# Patient Record
Sex: FEMALE | Race: WHITE | NOT HISPANIC OR LATINO | Employment: UNEMPLOYED | URBAN - METROPOLITAN AREA
[De-identification: names, ages, dates, MRNs, and addresses within clinical notes are randomized per-mention and may not be internally consistent; named-entity substitution may affect disease eponyms.]

---

## 2017-02-23 ENCOUNTER — LAB VISIT (OUTPATIENT)
Dept: LAB | Facility: HOSPITAL | Age: 72
End: 2017-02-23
Attending: FAMILY MEDICINE
Payer: MEDICARE

## 2017-02-23 ENCOUNTER — OFFICE VISIT (OUTPATIENT)
Dept: INTERNAL MEDICINE | Facility: CLINIC | Age: 72
End: 2017-02-23
Payer: MEDICARE

## 2017-02-23 VITALS
OXYGEN SATURATION: 99 % | SYSTOLIC BLOOD PRESSURE: 155 MMHG | DIASTOLIC BLOOD PRESSURE: 67 MMHG | BODY MASS INDEX: 29.64 KG/M2 | WEIGHT: 167.31 LBS | HEIGHT: 63 IN | RESPIRATION RATE: 16 BRPM | HEART RATE: 60 BPM | TEMPERATURE: 98 F

## 2017-02-23 DIAGNOSIS — J30.9 ALLERGIC RHINITIS, UNSPECIFIED ALLERGIC RHINITIS TRIGGER, UNSPECIFIED RHINITIS SEASONALITY: ICD-10-CM

## 2017-02-23 DIAGNOSIS — E78.5 DYSLIPIDEMIA: ICD-10-CM

## 2017-02-23 DIAGNOSIS — I10 ESSENTIAL HYPERTENSION: ICD-10-CM

## 2017-02-23 DIAGNOSIS — Z11.59 ENCOUNTER FOR HEPATITIS C SCREENING TEST FOR LOW RISK PATIENT: ICD-10-CM

## 2017-02-23 DIAGNOSIS — Z12.11 COLON CANCER SCREENING: Primary | ICD-10-CM

## 2017-02-23 LAB
ALBUMIN SERPL BCP-MCNC: 3.9 G/DL
ALP SERPL-CCNC: 130 U/L
ALT SERPL W/O P-5'-P-CCNC: 24 U/L
ANION GAP SERPL CALC-SCNC: 7 MMOL/L
AST SERPL-CCNC: 28 U/L
BILIRUB SERPL-MCNC: 0.6 MG/DL
BUN SERPL-MCNC: 13 MG/DL
CALCIUM SERPL-MCNC: 9.7 MG/DL
CHLORIDE SERPL-SCNC: 103 MMOL/L
CHOLEST/HDLC SERPL: 3.4 {RATIO}
CO2 SERPL-SCNC: 30 MMOL/L
CREAT SERPL-MCNC: 0.8 MG/DL
EST. GFR  (AFRICAN AMERICAN): >60 ML/MIN/1.73 M^2
EST. GFR  (NON AFRICAN AMERICAN): >60 ML/MIN/1.73 M^2
GLUCOSE SERPL-MCNC: 97 MG/DL
HDL/CHOLESTEROL RATIO: 29.6 %
HDLC SERPL-MCNC: 159 MG/DL
HDLC SERPL-MCNC: 47 MG/DL
LDLC SERPL CALC-MCNC: 81.4 MG/DL
NONHDLC SERPL-MCNC: 112 MG/DL
POTASSIUM SERPL-SCNC: 4.7 MMOL/L
PROT SERPL-MCNC: 7.4 G/DL
SODIUM SERPL-SCNC: 140 MMOL/L
TRIGL SERPL-MCNC: 153 MG/DL

## 2017-02-23 PROCEDURE — 80053 COMPREHEN METABOLIC PANEL: CPT

## 2017-02-23 PROCEDURE — 3077F SYST BP >= 140 MM HG: CPT | Mod: S$GLB,,, | Performed by: FAMILY MEDICINE

## 2017-02-23 PROCEDURE — 1159F MED LIST DOCD IN RCRD: CPT | Mod: S$GLB,,, | Performed by: FAMILY MEDICINE

## 2017-02-23 PROCEDURE — 36415 COLL VENOUS BLD VENIPUNCTURE: CPT | Mod: PO

## 2017-02-23 PROCEDURE — 1160F RVW MEDS BY RX/DR IN RCRD: CPT | Mod: S$GLB,,, | Performed by: FAMILY MEDICINE

## 2017-02-23 PROCEDURE — 1126F AMNT PAIN NOTED NONE PRSNT: CPT | Mod: S$GLB,,, | Performed by: FAMILY MEDICINE

## 2017-02-23 PROCEDURE — 99999 PR PBB SHADOW E&M-EST. PATIENT-LVL III: CPT | Mod: PBBFAC,,, | Performed by: FAMILY MEDICINE

## 2017-02-23 PROCEDURE — 80061 LIPID PANEL: CPT

## 2017-02-23 PROCEDURE — 99203 OFFICE O/P NEW LOW 30 MIN: CPT | Mod: S$GLB,,, | Performed by: FAMILY MEDICINE

## 2017-02-23 PROCEDURE — 86803 HEPATITIS C AB TEST: CPT

## 2017-02-23 PROCEDURE — 3078F DIAST BP <80 MM HG: CPT | Mod: S$GLB,,, | Performed by: FAMILY MEDICINE

## 2017-02-23 PROCEDURE — 1157F ADVNC CARE PLAN IN RCRD: CPT | Mod: S$GLB,,, | Performed by: FAMILY MEDICINE

## 2017-02-23 RX ORDER — LISINOPRIL 10 MG/1
10 TABLET ORAL DAILY
Qty: 90 TABLET | Refills: 2 | Status: SHIPPED | OUTPATIENT
Start: 2017-02-23 | End: 2017-08-24 | Stop reason: SDUPTHER

## 2017-02-23 RX ORDER — FLUTICASONE PROPIONATE 50 MCG
1 SPRAY, SUSPENSION (ML) NASAL DAILY
Qty: 16 G | Refills: 5 | Status: SHIPPED | OUTPATIENT
Start: 2017-02-23 | End: 2017-10-19 | Stop reason: SDUPTHER

## 2017-02-23 RX ORDER — ATORVASTATIN CALCIUM 20 MG/1
20 TABLET, FILM COATED ORAL DAILY
Qty: 90 TABLET | Refills: 2 | Status: SHIPPED | OUTPATIENT
Start: 2017-02-23 | End: 2017-09-25 | Stop reason: SDUPTHER

## 2017-02-23 RX ORDER — ASPIRIN 81 MG/1
81 TABLET ORAL DAILY
COMMUNITY

## 2017-02-23 RX ORDER — LORATADINE 10 MG/1
10 TABLET ORAL DAILY
COMMUNITY
End: 2020-03-09

## 2017-02-23 RX ORDER — MONTELUKAST SODIUM 10 MG/1
10 TABLET ORAL NIGHTLY
Qty: 90 TABLET | Refills: 2 | Status: SHIPPED | OUTPATIENT
Start: 2017-02-23 | End: 2017-09-25 | Stop reason: SDUPTHER

## 2017-02-23 RX ORDER — MONTELUKAST SODIUM 10 MG/1
10 TABLET ORAL NIGHTLY
COMMUNITY
End: 2017-02-23 | Stop reason: SDUPTHER

## 2017-02-23 RX ORDER — LISINOPRIL 10 MG/1
10 TABLET ORAL DAILY
COMMUNITY
End: 2017-02-23 | Stop reason: SDUPTHER

## 2017-02-23 RX ORDER — CALCIUM CARBONATE 600 MG
600 TABLET ORAL 2 TIMES DAILY WITH MEALS
COMMUNITY
End: 2024-02-21

## 2017-02-23 RX ORDER — FLUTICASONE PROPIONATE 44 UG/1
1 AEROSOL, METERED RESPIRATORY (INHALATION) 2 TIMES DAILY
COMMUNITY
End: 2017-02-23

## 2017-02-23 RX ORDER — ATENOLOL 25 MG/1
25 TABLET ORAL DAILY
Qty: 90 TABLET | Refills: 2 | Status: SHIPPED | OUTPATIENT
Start: 2017-02-23 | End: 2017-10-19 | Stop reason: SDUPTHER

## 2017-02-23 RX ORDER — ATENOLOL 25 MG/1
25 TABLET ORAL DAILY
COMMUNITY
End: 2017-02-23 | Stop reason: SDUPTHER

## 2017-02-23 RX ORDER — ATORVASTATIN CALCIUM 20 MG/1
20 TABLET, FILM COATED ORAL DAILY
COMMUNITY
End: 2017-02-23 | Stop reason: SDUPTHER

## 2017-02-23 NOTE — PROGRESS NOTES
Subjective:       Patient ID: Amber Naranjo is a 71 y.o. female.    Chief Complaint: Establish Care    HPI     Was seeing Dr. Adams. Here to establish care. She does not have any complaints today    Hypertension: She has no problems with the medications. She has been taking them a long time. Her last reading was 130/70 something at home. Feels like she gets a lot of stress from taking care of her  and that's possibly why it is high today.     Hypercholesterolemia: She has not had it done in the past 6 months.     Arthritis: Uses exercise and stretching. Does not like pain medications.     Allergies: These have been all over the place with the past few weather changes. Feels well controlled on her current medications.     Health Maintenance  [place], but doesn't know exact date  -Colonoscopy 5 years ago  -up to date on flu and pneumonia (september 2016)  -no zoster and tetanus      No family history on file.    Current Outpatient Prescriptions:     aspirin (ECOTRIN) 81 MG EC tablet, Take 81 mg by mouth once daily., Disp: , Rfl:     atenolol (TENORMIN) 25 MG tablet, Take 25 mg by mouth once daily., Disp: , Rfl:     atorvastatin (LIPITOR) 20 MG tablet, Take 20 mg by mouth once daily., Disp: , Rfl:     calcium carbonate (OS-NERI) 600 mg (1,500 mg) Tab, Take 600 mg by mouth 2 (two) times daily with meals., Disp: , Rfl:     cyanocobalamin, vitamin B-12, (VITAMIN B-12) 50 mcg tablet, Take 50 mcg by mouth once daily., Disp: , Rfl:     fluticasone (FLOVENT HFA) 44 mcg/actuation inhaler, Inhale 1 puff into the lungs 2 (two) times daily. Controller, Disp: , Rfl:     lisinopril 10 MG tablet, Take 10 mg by mouth once daily., Disp: , Rfl:     loratadine (CLARITIN) 10 mg tablet, Take 10 mg by mouth once daily., Disp: , Rfl:     montelukast (SINGULAIR) 10 mg tablet, Take 10 mg by mouth every evening., Disp: , Rfl:     multivitamin capsule, Take 1 capsule by mouth once daily.,  "Disp: , Rfl:     timolol 0.5 % ophthalmic solution, 1 drop 2 (two) times daily., Disp: , Rfl:     Review of Systems   HENT: Positive for postnasal drip and sneezing. Negative for congestion, ear pain, sore throat and trouble swallowing.    Eyes: Negative for visual disturbance.   Respiratory: Negative for cough, shortness of breath and wheezing.    Cardiovascular: Negative for chest pain, palpitations and leg swelling.   Gastrointestinal: Negative for abdominal pain, constipation, diarrhea, nausea and vomiting.   Genitourinary: Negative for difficulty urinating.   Musculoskeletal: Positive for arthralgias (Hip). Negative for back pain, gait problem and neck pain.   Neurological: Negative for headaches.   Hematological: Negative for adenopathy.   Psychiatric/Behavioral: Negative for sleep disturbance.       Objective:     Visit Vitals    BP (!) 155/67 (BP Location: Left arm, Patient Position: Sitting, BP Method: Automatic)    Pulse 60    Temp 97.7 °F (36.5 °C) (Tympanic)    Resp 16    Ht 5' 3" (1.6 m)    Wt 75.9 kg (167 lb 5.3 oz)    SpO2 99%    BMI 29.64 kg/m2        Physical Exam   Constitutional: She is oriented to person, place, and time. She appears well-developed and well-nourished.   HENT:   Head: Normocephalic and atraumatic.   Right Ear: External ear normal.   Left Ear: External ear normal.   Nose: Nose normal.   Mouth/Throat: Oropharynx is clear and moist.   Eyes: Conjunctivae and EOM are normal. Pupils are equal, round, and reactive to light.   Neck: Normal range of motion. Neck supple. No thyromegaly present.   Cardiovascular: Normal rate, regular rhythm, normal heart sounds and intact distal pulses.    Pulmonary/Chest: Effort normal and breath sounds normal.   Abdominal: Soft. Bowel sounds are normal. There is no tenderness.   Lymphadenopathy:     She has no cervical adenopathy.   Neurological: She is alert and oriented to person, place, and time.   Skin: Skin is warm and dry.   Psychiatric: She " has a normal mood and affect. Her behavior is normal. Judgment and thought content normal.       Assessment & Plan     1. Colon cancer screening  Referring for repeat colonoscopy.  - Case request GI: COLONOSCOPY    2. Essential hypertension  Mildly elevated today.  Continue same medications for now.  We'll continue to monitor at next visit and adjust medications as needed.  - Comprehensive metabolic panel; Future    3. Allergic rhinitis, unspecified allergic rhinitis trigger, unspecified rhinitis seasonality  Sent refill for maintenance medication of Singulair, Flonase and she will get Claritin over-the-counter.    4. Dyslipidemia  Continue Lipitor.  Assessing lipid levels today.  - Lipid panel; Future    5. Encounter for hepatitis C screening test for low risk patient  - Hepatitis C antibody; Future    Follow-up in 3 months.

## 2017-02-23 NOTE — MEDICAL/APP STUDENT
Subjective:       Patient ID: Amber Naranjo is a 71 y.o. female.    Chief Complaint: Establish Care    HPI     Was seeing Dr. Adams. Here to establish care. She does not have any complaints today    Hypertension: She has no problems with the medications. She has been taking them a long time. Her last reading was 130/70 something at home. Feels like she gets a lot of stress from taking care of her  and that's possibly why it is high today.     Hypercholesterolemia: She has not had it done in the past 6 months.     Arthritis: Uses exercise and stretching. Does not like pain medications.     Allergies: These have been all over the place with the past few weather changes. Feels well controlled on her current medications.     Health Maintenance  [place], but doesn't know exact date  -Colonoscopy 5 years ago  -up to date on flu and pneumonia (september 2016)  -no zoster and tetanus      No family history on file.    Current Outpatient Prescriptions:     aspirin (ECOTRIN) 81 MG EC tablet, Take 81 mg by mouth once daily., Disp: , Rfl:     atenolol (TENORMIN) 25 MG tablet, Take 25 mg by mouth once daily., Disp: , Rfl:     atorvastatin (LIPITOR) 20 MG tablet, Take 20 mg by mouth once daily., Disp: , Rfl:     calcium carbonate (OS-NERI) 600 mg (1,500 mg) Tab, Take 600 mg by mouth 2 (two) times daily with meals., Disp: , Rfl:     cyanocobalamin, vitamin B-12, (VITAMIN B-12) 50 mcg tablet, Take 50 mcg by mouth once daily., Disp: , Rfl:     fluticasone (FLOVENT HFA) 44 mcg/actuation inhaler, Inhale 1 puff into the lungs 2 (two) times daily. Controller, Disp: , Rfl:     lisinopril 10 MG tablet, Take 10 mg by mouth once daily., Disp: , Rfl:     loratadine (CLARITIN) 10 mg tablet, Take 10 mg by mouth once daily., Disp: , Rfl:     montelukast (SINGULAIR) 10 mg tablet, Take 10 mg by mouth every evening., Disp: , Rfl:     multivitamin capsule, Take 1 capsule by mouth once daily.,  "Disp: , Rfl:     timolol 0.5 % ophthalmic solution, 1 drop 2 (two) times daily., Disp: , Rfl:     Review of Systems   HENT: Positive for postnasal drip and sneezing. Negative for congestion, ear pain, sore throat and trouble swallowing.    Eyes: Negative for visual disturbance.   Respiratory: Negative for cough, shortness of breath and wheezing.    Cardiovascular: Negative for chest pain, palpitations and leg swelling.   Gastrointestinal: Negative for abdominal pain, constipation, diarrhea, nausea and vomiting.   Genitourinary: Negative for difficulty urinating.   Musculoskeletal: Positive for arthralgias (Hip). Negative for back pain, gait problem and neck pain.   Neurological: Negative for headaches.   Hematological: Negative for adenopathy.   Psychiatric/Behavioral: Negative for sleep disturbance.       Objective:     Visit Vitals    BP (!) 155/67 (BP Location: Left arm, Patient Position: Sitting, BP Method: Automatic)    Pulse 60    Temp 97.7 °F (36.5 °C) (Tympanic)    Resp 16    Ht 5' 3" (1.6 m)    Wt 75.9 kg (167 lb 5.3 oz)    SpO2 99%    BMI 29.64 kg/m2        Physical Exam   Constitutional: She is oriented to person, place, and time. She appears well-developed and well-nourished.   HENT:   Head: Normocephalic and atraumatic.   Right Ear: External ear normal.   Left Ear: External ear normal.   Nose: Nose normal.   Mouth/Throat: Oropharynx is clear and moist.   Eyes: Conjunctivae and EOM are normal. Pupils are equal, round, and reactive to light.   Neck: Normal range of motion. Neck supple. No thyromegaly present.   Cardiovascular: Normal rate, regular rhythm, normal heart sounds and intact distal pulses.    Pulmonary/Chest: Effort normal and breath sounds normal.   Abdominal: Soft. Bowel sounds are normal. There is no tenderness.   Lymphadenopathy:     She has no cervical adenopathy.   Neurological: She is alert and oriented to person, place, and time.   Skin: Skin is warm and dry.   Psychiatric: She " has a normal mood and affect. Her behavior is normal. Judgment and thought content normal.       Assessment & Plan     Establish Care  -needs Lipid Panel, Hep C, CMP today    Health Maintenance  -needs to be scheduled for colonoscopy  -zoster and tetanus at pharmacy    Viktoria Jacobsen  MS 4

## 2017-02-23 NOTE — MR AVS SNAPSHOT
[facility]  [address] [address] [address]  Phone: [phone]                  Amber Naranjo   2017 10:20 AM   Office Visit    Description:  Female : 1945   Provider:  Wei Roa DO   Department:  [facility]           Reason for Visit     Establish Care           Diagnoses this Visit        Comments    Colon cancer screening    -  Primary     Essential hypertension         Allergic rhinitis, unspecified allergic rhinitis trigger, unspecified rhinitis seasonality         Dyslipidemia         Encounter for hepatitis C screening test for low risk patient                To Do List           Future Appointments        Provider Department Dept Phone    2017 11:50 AM [facility] LABORATORY [facility] [phone]    2017 9:40 AM Wei Roa DO [facility] [phone]      Goals (5 Years of Data)     None      Follow-Up and Disposition     Return in about 3 months (around 2017).       These Medications        Disp Refills Start End    montelukast (SINGULAIR) 10 mg tablet 90 tablet 2 2017     Take 1 tablet (10 mg total) by mouth every evening. - Oral    Pharmacy: [facility] - [address] Ph #: [phone]       lisinopril 10 MG tablet 90 tablet 2 2017     Take 1 tablet (10 mg total) by mouth once daily. - Oral    Pharmacy: [facility] - [address] Ph #: [phone]       atorvastatin (LIPITOR) 20 MG tablet 90 tablet 2 2017     Take 1 tablet (20 mg total) by mouth once daily. - Oral    Pharmacy: [facility] - [address] Ph #: [phone]       atenolol (TENORMIN) 25 MG tablet 90 tablet 2 2017     Take 1 tablet (25 mg total) by mouth once daily. - Oral    Pharmacy: [facility] - [address] Ph #:  [phone]       fluticasone (FLONASE) 50 mcg/actuation nasal spray 16 g 5 2017     1 spray by Each Nare route once daily. - Each Nare    Pharmacy: [facility] - [facility] [address]  Ph #: [phone]         [facility]     [facility] [place] On [facility] Nurse Care Line -  Assistance  Registered nurses in the [facility] [place] [facility] Center provide clinical advisement, health education, appointment booking, and other advisory services.  Call for this free service at [phone].             Medications           START taking these NEW medications        Refills    montelukast (SINGULAIR) 10 mg tablet 2    Sig: Take 1 tablet (10 mg total) by mouth every evening.    Class: Normal    Route: Oral    lisinopril 10 MG tablet 2    Sig: Take 1 tablet (10 mg total) by mouth once daily.    Class: Normal    Route: Oral    atorvastatin (LIPITOR) 20 MG tablet 2    Sig: Take 1 tablet (20 mg total) by mouth once daily.    Class: Normal    Route: Oral    atenolol (TENORMIN) 25 MG tablet 2    Sig: Take 1 tablet (25 mg total) by mouth once daily.    Class: Normal    Route: Oral    fluticasone (FLONASE) 50 mcg/actuation nasal spray 5    Si spray by Each Nare route once daily.    Class: Normal    Route: Each Nare      STOP taking these medications     fluticasone (FLOVENT HFA) 44 mcg/actuation inhaler Inhale 1 puff into the lungs 2 (two) times daily. Controller           Verify that the below list of medications is an accurate representation of the medications you are currently taking.  If none reported, the list may be blank. If incorrect, please contact your healthcare provider. Carry this list with you in case of emergency.           Current Medications     aspirin (ECOTRIN) 81 MG EC tablet Take 81 mg by mouth once daily.    atenolol (TENORMIN) 25 MG tablet Take 1 tablet (25 mg total) by mouth once daily.    atorvastatin (LIPITOR) 20 MG tablet Take 1 tablet (20 mg total) by mouth once daily.     "calcium carbonate (OS-NERI) 600 mg (1,500 mg) Tab Take 600 mg by mouth 2 (two) times daily with meals.    cyanocobalamin, vitamin B-12, (VITAMIN B-12) 50 mcg tablet Take 50 mcg by mouth once daily.    lisinopril 10 MG tablet Take 1 tablet (10 mg total) by mouth once daily.    loratadine (CLARITIN) 10 mg tablet Take 10 mg by mouth once daily.    montelukast (SINGULAIR) 10 mg tablet Take 1 tablet (10 mg total) by mouth every evening.    multivitamin capsule Take 1 capsule by mouth once daily.    timolol 0.5 % ophthalmic solution 1 drop 2 (two) times daily.    fluticasone (FLONASE) 50 mcg/actuation nasal spray 1 spray by Each Nare route once daily.           Clinical Reference Information           Your Vitals Were     BP Pulse Temp Resp    155/67 (BP Location: Left arm, Patient Position: Sitting, BP Method: Automatic) 60 97.7 °F (36.5 °C) (Tympanic) 16    Height Weight SpO2 BMI    5' 3" (1.6 m) 75.9 kg (167 lb 5.3 oz) 99% 29.64 kg/m2      Blood Pressure          Most Recent Value    BP  (!)  155/67      Allergies as of 2/23/2017     No Known Allergies      Immunizations Administered on Date of Encounter - 2/23/2017     None      Orders Placed During Today's Visit      Normal Orders This Visit    Case request GI: COLONOSCOPY     Future Labs/Procedures Expected by Expires    Comprehensive metabolic panel  2/23/2017 4/24/2018    Hepatitis C antibody  2/23/2017 4/24/2018    Lipid panel  2/23/2017 (Approximate) 4/24/2018      [facility] Sign-Up     Activating your [facility] account is as easy as 1-2-3!     1) Visit [email or web address], select Sign Up Now, enter this activation code and your date of birth, then select Next.  [email or web address]  Expires: 4/9/2017 11:34 AM      2) Create a username and password to use when you visit [facility] in the future and select a security question in case you lose your password and select Next.    3) Enter your e-mail address and click Sign Up!    Additional Information  If you have questions, " please e-mail [email or web address] or call [phone] to talk to our [facility] staff. Remember, [facility] is NOT to be used for urgent needs. For medical emergencies, dial 911.         Instructions      Prevention Guidelines, Women Ages 65 and Older  Screening tests and vaccines are an important part of managing your health. Health counseling is essential, too. Below are guidelines for these, for women ages 65 and older. Talk with your healthcare provider to make sure youre up to date on what you need.  Screening Who needs it How often   Type 2 diabetes or prediabetes All adults beginning at age 45 and adults without symptoms at any age who are overweight or obese and have 1 or more additional risk factors for diabetes At least every 3 years   Alcohol misuse All women in this age group At routine exams   Blood pressure All women in this age group Every 2 years if your blood pressure is less than 120/80 mm Hg; yearly if your systolic blood pressure is 120 to 139 mm Hg, or your diastolic blood pressure reading is 80 to 89 mm Hg   Breast cancer All women in this age group Yearly mammogram and clinical breast exam1   Cervical cancer Only women who had abnormal screening results before age 65 Talk with your healthcare provider   Chlamydia Women at increased risk for infection At routine exams   Colorectal cancer All women in this age group1 Flexible sigmoidoscopy every 5 years, or colonoscopy every 10 years, or double-contrast barium enema every 5 years; yearly fecal occult blood test or fecal immunochemical test; or a stool DNA test as often as your healthcare provider advises; talk with your healthcare provider about which tests are best for you   Depression All women in this age group At routine exams   Gonorrhea Sexually active women at increased risk for infection At routine exams   Hepatitis C Anyone at increased risk; 1 time for those born between 1945 and 1965 At routine exams   High cholesterol or  triglycerides All women in this age group who are at risk for coronary artery disease At least every 5 years   HIV Women at increased risk for infection - talk with your healthcare provider At routine exams   Lung cancer Adults age 55 to 80 who have smoked Yearly screening in smokers with 30 pack-year history of smoking or who quit within 15 years   Obesity All women in this age group At routine exams   Osteoporosis All women in this age group Bone density test at age 65, then follow-up as advised by your healthcare provider   Syphilis Women at increased risk for infection - talk with your healthcare provider At routine exams   Thyroid-Stimulating Hormone (TSH) All women in this age group Every 5 years   Tuberculosis Women at increased risk for infection - talk with your healthcare provider Ask your healthcare provider   Vision All women in this age group Every 1 to 2 years; if you have a chronic health condition, ask your healthcare provider if you need exams more often   Vaccine Who needs it How often   Chickenpox (varicella) All women in this age group who have no record of this infection or vaccine 2 doses; second dose should be given at least 4 weeks after the first dose   Hepatitis A Women at increased risk for infection - talk with your healthcare provider 2 doses given 6 months apart   Hepatitis B Women at increased risk for infection - talk with your healthcare provider 3 doses over 6 months; second dose should be given 1 month after the first dose; the third dose should be given at least 2 months after the second dose and at least 4 months after the first dose   Haemophilus influenza Type B (HIB) Women at increased risk for infection - talk with your healthcare provider 1 to 3 doses   Influenza (flu) All women in this age group Once a year   Pneumococcal conjugate vaccine (PCV13) and pneumococcal polysaccharide vaccine (PPSV23) All women in this age group 1 dose of each vaccine    Tetanus/diphtheria/pertussis (Td/Tdap) booster All women in this age group Td every 10 years, or a one-time dose of Tdap instead of a Td booster after age 18, then Td every 10 years   Zoster All women in this age group 1 dose   Counseling Who needs it How often   Diet and exercise Women who are overweight or obese When diagnosed, and then at routine exams   Fall prevention (exercise and vitamin D supplements) All women in this age group At routine exams   Sexually transmitted infection prevention Women at increased risk for infection - talk with your healthcare provider At routine exams   Use of daily aspirin Women ages 55 and up in this age group who are at risk for cardiovascular health problems such as stroke When your risk is known   Use of tobacco and the health effects it can cause All women in this age group Every exam   1[employer]  Date Last Reviewed: 8/9/2015  © 8506-3462 [employer]. [address] [place] [address]. All rights reserved. This information is not intended as a substitute for professional medical care. Always follow your healthcare professional's instructions.             Language Assistance Services     ATTENTION: Language assistance services are available, free of charge. Please call [phone].      ATENCIÓN: Si habla español, tiene a malave disposición servicios gratuitos de asistencia lingüística. Llame al [phone].     DAV Ý: N?u b?n nói Ti?ng Vi?t, có các d?ch v? h? tr? ngôn ng? mi?n phí dành cho b?n. G?i s? [phone].         [facility] complies with applicable Federal civil rights laws and does not discriminate on the basis of race, color, national origin, age, disability, or sex.

## 2017-02-23 NOTE — PATIENT INSTRUCTIONS
Prevention Guidelines, Women Ages 65 and Older  Screening tests and vaccines are an important part of managing your health. Health counseling is essential, too. Below are guidelines for these, for women ages 65 and older. Talk with your healthcare provider to make sure youre up to date on what you need.  Screening Who needs it How often   Type 2 diabetes or prediabetes All adults beginning at age 45 and adults without symptoms at any age who are overweight or obese and have 1 or more additional risk factors for diabetes At least every 3 years   Alcohol misuse All women in this age group At routine exams   Blood pressure All women in this age group Every 2 years if your blood pressure is less than 120/80 mm Hg; yearly if your systolic blood pressure is 120 to 139 mm Hg, or your diastolic blood pressure reading is 80 to 89 mm Hg   Breast cancer All women in this age group Yearly mammogram and clinical breast exam1   Cervical cancer Only women who had abnormal screening results before age 65 Talk with your healthcare provider   Chlamydia Women at increased risk for infection At routine exams   Colorectal cancer All women in this age group1 Flexible sigmoidoscopy every 5 years, or colonoscopy every 10 years, or double-contrast barium enema every 5 years; yearly fecal occult blood test or fecal immunochemical test; or a stool DNA test as often as your healthcare provider advises; talk with your healthcare provider about which tests are best for you   Depression All women in this age group At routine exams   Gonorrhea Sexually active women at increased risk for infection At routine exams   Hepatitis C Anyone at increased risk; 1 time for those born between 1945 and 1965 At routine exams   High cholesterol or triglycerides All women in this age group who are at risk for coronary artery disease At least every 5 years   HIV Women at increased risk for infection - talk with your healthcare provider At routine exams   Lung  cancer Adults age 55 to 80 who have smoked Yearly screening in smokers with 30 pack-year history of smoking or who quit within 15 years   Obesity All women in this age group At routine exams   Osteoporosis All women in this age group Bone density test at age 65, then follow-up as advised by your healthcare provider   Syphilis Women at increased risk for infection - talk with your healthcare provider At routine exams   Thyroid-Stimulating Hormone (TSH) All women in this age group Every 5 years   Tuberculosis Women at increased risk for infection - talk with your healthcare provider Ask your healthcare provider   Vision All women in this age group Every 1 to 2 years; if you have a chronic health condition, ask your healthcare provider if you need exams more often   Vaccine Who needs it How often   Chickenpox (varicella) All women in this age group who have no record of this infection or vaccine 2 doses; second dose should be given at least 4 weeks after the first dose   Hepatitis A Women at increased risk for infection - talk with your healthcare provider 2 doses given 6 months apart   Hepatitis B Women at increased risk for infection - talk with your healthcare provider 3 doses over 6 months; second dose should be given 1 month after the first dose; the third dose should be given at least 2 months after the second dose and at least 4 months after the first dose   Haemophilus influenza Type B (HIB) Women at increased risk for infection - talk with your healthcare provider 1 to 3 doses   Influenza (flu) All women in this age group Once a year   Pneumococcal conjugate vaccine (PCV13) and pneumococcal polysaccharide vaccine (PPSV23) All women in this age group 1 dose of each vaccine   Tetanus/diphtheria/pertussis (Td/Tdap) booster All women in this age group Td every 10 years, or a one-time dose of Tdap instead of a Td booster after age 18, then Td every 10 years   Zoster All women in this age group 1 dose   Counseling  Who needs it How often   Diet and exercise Women who are overweight or obese When diagnosed, and then at routine exams   Fall prevention (exercise and vitamin D supplements) All women in this age group At routine exams   Sexually transmitted infection prevention Women at increased risk for infection - talk with your healthcare provider At routine exams   Use of daily aspirin Women ages 55 and up in this age group who are at risk for cardiovascular health problems such as stroke When your risk is known   Use of tobacco and the health effects it can cause All women in this age group Every exam   1[employer]  Date Last Reviewed: 8/9/2015  © 2317-5558 [employer]. [address] [place] [address], [place] [address]. All rights reserved. This information is not intended as a substitute for professional medical care. Always follow your healthcare professional's instructions.

## 2017-02-24 ENCOUNTER — TELEPHONE (OUTPATIENT)
Dept: INTERNAL MEDICINE | Facility: CLINIC | Age: 72
End: 2017-02-24

## 2017-02-24 LAB — HCV AB SERPL QL IA: NEGATIVE

## 2017-02-24 RX ORDER — SODIUM, POTASSIUM,MAG SULFATES 17.5-3.13G
SOLUTION, RECONSTITUTED, ORAL ORAL
Qty: 354 ML | Refills: 0 | Status: SHIPPED | OUTPATIENT
Start: 2017-02-24 | End: 2017-06-06 | Stop reason: ALTCHOICE

## 2017-02-24 NOTE — TELEPHONE ENCOUNTER
----- Message from Lillie Mathew sent at 2/24/2017  3:34 PM CST -----  States she received a call from [facility] pharmacy stating she have two Rx to . States need to know if her Rx was called in to [facility] because she use [employer].     Please adv/call [phone].//thanks. cw

## 2017-02-24 NOTE — TELEPHONE ENCOUNTER
----- Message from Wei Roa, DO sent at 2/24/2017  9:13 AM CST -----  Please inform patient that all of the labs are normal if he/she does not have [facility] activated. If there are any questions please let me know.

## 2017-02-24 NOTE — TELEPHONE ENCOUNTER
Spoke with patient all scripts that were sent by Dr. Roa have been sent to [facility].

## 2017-03-15 ENCOUNTER — HOSPITAL ENCOUNTER (OUTPATIENT)
Facility: HOSPITAL | Age: 72
Discharge: HOME | End: 2017-03-15
Attending: SURGERY | Admitting: SURGERY
Payer: MEDICARE

## 2017-03-15 ENCOUNTER — SURGERY (OUTPATIENT)
Age: 72
End: 2017-03-15

## 2017-03-15 ENCOUNTER — ANESTHESIA (OUTPATIENT)
Dept: ENDOSCOPY | Facility: HOSPITAL | Age: 72
End: 2017-03-15
Payer: MEDICARE

## 2017-03-15 ENCOUNTER — TELEPHONE (OUTPATIENT)
Dept: INTERNAL MEDICINE | Facility: CLINIC | Age: 72
End: 2017-03-15

## 2017-03-15 ENCOUNTER — HOSPITAL ENCOUNTER (OUTPATIENT)
Dept: RADIOLOGY | Facility: HOSPITAL | Age: 72
Discharge: HOME | End: 2017-03-15
Attending: SURGERY
Payer: MEDICARE

## 2017-03-15 ENCOUNTER — ANESTHESIA EVENT (OUTPATIENT)
Dept: ENDOSCOPY | Facility: HOSPITAL | Age: 72
End: 2017-03-15
Payer: MEDICARE

## 2017-03-15 VITALS
HEART RATE: 65 BPM | RESPIRATION RATE: 16 BRPM | HEIGHT: 63 IN | OXYGEN SATURATION: 96 % | RESPIRATION RATE: 20 BRPM | SYSTOLIC BLOOD PRESSURE: 177 MMHG | DIASTOLIC BLOOD PRESSURE: 82 MMHG | WEIGHT: 162 LBS | BODY MASS INDEX: 28.7 KG/M2 | TEMPERATURE: 98 F

## 2017-03-15 DIAGNOSIS — Z12.11 ENCOUNTER FOR SCREENING COLONOSCOPY: Primary | ICD-10-CM

## 2017-03-15 DIAGNOSIS — Z12.11 SPECIAL SCREENING FOR MALIGNANT NEOPLASMS, COLON: ICD-10-CM

## 2017-03-15 DIAGNOSIS — Z12.11 ENCOUNTER FOR SCREENING COLONOSCOPY: ICD-10-CM

## 2017-03-15 PROCEDURE — 25000003 PHARM REV CODE 250: Performed by: NURSE ANESTHETIST, CERTIFIED REGISTERED

## 2017-03-15 PROCEDURE — G0121 COLON CA SCRN NOT HI RSK IND: HCPCS | Mod: 53,,, | Performed by: SURGERY

## 2017-03-15 PROCEDURE — 74280 X-RAY XM COLON 2CNTRST STD: CPT | Mod: TC

## 2017-03-15 PROCEDURE — 37000008 HC ANESTHESIA 1ST 15 MINUTES: Performed by: SURGERY

## 2017-03-15 PROCEDURE — 63600175 PHARM REV CODE 636 W HCPCS: Performed by: NURSE ANESTHETIST, CERTIFIED REGISTERED

## 2017-03-15 PROCEDURE — G0121 COLON CA SCRN NOT HI RSK IND: HCPCS | Performed by: SURGERY

## 2017-03-15 PROCEDURE — 25000003 PHARM REV CODE 250: Performed by: SURGERY

## 2017-03-15 PROCEDURE — 37000009 HC ANESTHESIA EA ADD 15 MINS: Performed by: SURGERY

## 2017-03-15 PROCEDURE — 45378 DIAGNOSTIC COLONOSCOPY: CPT | Performed by: SURGERY

## 2017-03-15 RX ORDER — SODIUM CHLORIDE, SODIUM LACTATE, POTASSIUM CHLORIDE, CALCIUM CHLORIDE 600; 310; 30; 20 MG/100ML; MG/100ML; MG/100ML; MG/100ML
INJECTION, SOLUTION INTRAVENOUS CONTINUOUS
Status: DISCONTINUED | OUTPATIENT
Start: 2017-03-15 | End: 2017-03-15 | Stop reason: HOSPADM

## 2017-03-15 RX ORDER — LIDOCAINE HYDROCHLORIDE 10 MG/ML
INJECTION INFILTRATION; PERINEURAL
Status: DISCONTINUED | OUTPATIENT
Start: 2017-03-15 | End: 2017-03-15

## 2017-03-15 RX ORDER — PROPOFOL 10 MG/ML
VIAL (ML) INTRAVENOUS
Status: DISCONTINUED | OUTPATIENT
Start: 2017-03-15 | End: 2017-03-15

## 2017-03-15 RX ADMIN — PROPOFOL 50 MG: 10 INJECTION, EMULSION INTRAVENOUS at 12:03

## 2017-03-15 RX ADMIN — PROPOFOL 50 MG: 10 INJECTION, EMULSION INTRAVENOUS at 01:03

## 2017-03-15 RX ADMIN — PROPOFOL 100 MG: 10 INJECTION, EMULSION INTRAVENOUS at 12:03

## 2017-03-15 RX ADMIN — LIDOCAINE HYDROCHLORIDE 40 MG: 10 INJECTION, SOLUTION INFILTRATION; PERINEURAL at 12:03

## 2017-03-15 RX ADMIN — SODIUM CHLORIDE, SODIUM LACTATE, POTASSIUM CHLORIDE, AND CALCIUM CHLORIDE: .6; .31; .03; .02 INJECTION, SOLUTION INTRAVENOUS at 10:03

## 2017-03-15 NOTE — ANESTHESIA RELEASE NOTE
"Anesthesia Release from PACU Note    Patient: Amber Naranjo    Procedure(s) Performed: Procedure(s) (LRB):  COLONOSCOPY (N/A)    Anesthesia type: MAC    Post pain: Adequate analgesia    Post assessment: no apparent anesthetic complications, tolerated procedure well and no evidence of recall    Last Vitals:   Visit Vitals    BP (!) 154/68 (BP Location: Left arm, Patient Position: Lying, BP Method: Automatic)    Pulse 66    Temp 36.7 °C (98.1 °F) (Oral)    Resp 18    Ht 5' 3" (1.6 m)    Wt 73.5 kg (162 lb)    SpO2 97%    Breastfeeding No    BMI 28.7 kg/m2       Post vital signs: stable    Level of consciousness: awake and alert     Nausea/Vomiting: no nausea/no vomiting    Complications: none    Airway Patency: patent    Respiratory: unassisted, spontaneous ventilation    Cardiovascular: stable and blood pressure at baseline    Hydration: euvolemic  "

## 2017-03-15 NOTE — H&P
Short Stay Endoscopy History and Physical    PCP - Wei Roa, DO    Procedure - screening colonoscopy  ASA - 1  Mallampati - per anesthesia  History of Anesthesia problems - no  Family history Anesthesia problems -  no     HPI:  This is a 71 y.o.female here for evaluation of :     Reflux - no  Dysphagia - no  Abdominal pain - no  Diarrhea - no  Anemia - no  GI bleeding - no  Nausea and vomiting-no  Early satiety-no  aversion to sight or smell of food-no    ROS:  Constitutional: No fevers, chills, No weight loss  ENT: No allergies  CV: No chest pain  Pulm: No cough, No shortness of breath  Ophtho: No vision changes  GI: see HPI  Derm: No rash  Heme: No lymphadenopathy, No bruising  MSK: No arthritis  : No dysuria, No hematuria  Endo: No hot or cold intolerance  Neuro: No syncope, No seizure  Psych: No anxiety, No depression    Medical History:  Past Medical History:   Diagnosis Date    Allergy     Arthritis     Glaucoma     Hyperlipidemia     Hypertension        Surgical History:  Past Surgical History:   Procedure Laterality Date    COLONOSCOPY         Family History:  Family History   Problem Relation Age of Onset    Hypertension Father     Colon cancer Sister        Social History:  Social History     Social History    Marital status:      Spouse name: N/A    Number of children: N/A    Years of education: N/A     Occupational History    Not on file.     Social History Main Topics    Smoking status: Never Smoker    Smokeless tobacco: Not on file    Alcohol use No    Drug use: No    Sexual activity: Not on file     Other Topics Concern    Not on file     Social History Narrative       Allergies: Review of patient's allergies indicates:  No Known Allergies    Medications:   No current facility-administered medications on file prior to encounter.      Current Outpatient Prescriptions on File Prior to Encounter   Medication Sig Dispense Refill    atenolol (TENORMIN) 25 MG tablet Take  1 tablet (25 mg total) by mouth once daily. 90 tablet 2    atorvastatin (LIPITOR) 20 MG tablet Take 1 tablet (20 mg total) by mouth once daily. 90 tablet 2    calcium carbonate (OS-NERI) 600 mg (1,500 mg) Tab Take 600 mg by mouth 2 (two) times daily with meals.      cyanocobalamin, vitamin B-12, (VITAMIN B-12) 50 mcg tablet Take 50 mcg by mouth once daily.      fluticasone (FLONASE) 50 mcg/actuation nasal spray 1 spray by Each Nare route once daily. 16 g 5    lisinopril 10 MG tablet Take 1 tablet (10 mg total) by mouth once daily. 90 tablet 2    loratadine (CLARITIN) 10 mg tablet Take 10 mg by mouth once daily.      montelukast (SINGULAIR) 10 mg tablet Take 1 tablet (10 mg total) by mouth every evening. 90 tablet 2    multivitamin capsule Take 1 capsule by mouth once daily.      timolol 0.5 % ophthalmic solution 1 drop 2 (two) times daily.      aspirin (ECOTRIN) 81 MG EC tablet Take 81 mg by mouth once daily.         Objective Findings:    Vital Signs:  Vitals:    03/15/17 1041   BP: (!) 154/68   Pulse: 66   Resp: 18   Temp: 98.1 °F (36.7 °C)           Physical Exam:  General Appearance: Well appearing in no acute distress  Eyes:    No scleral icterus  ENT: Neck supple, Lips, mucosa, and tongue normal; teeth and gums normal  Lungs: CTA bilaterally in anterior and posterior fields, no wheezes, no crackles.  Heart:  Regular rate, S1, S2 normal, no murmurs heard.  Abdomen: Soft, non tender, non distended with normal bowel sounds. No hepatosplenomegaly, ascites, or mass.  Extremities: No clubbing, cyanosis or edema  Skin: No rash    Labs:  Reviewed    Plan:  I have explained the risks and benefits of endoscopy procedures to the patient including but not limited to bleeding, perforation, infection, and death. The patient wishes to proceed.     Yogi Carbone

## 2017-03-15 NOTE — OP NOTE
See my note in Provation. Due to severe stricture and tortuosity of the sigmoid colon, the procedure not completed. Barium enema is scheduled.    Yogi Carbone

## 2017-03-15 NOTE — ANESTHESIA POSTPROCEDURE EVALUATION
"Anesthesia Post Evaluation    Patient: Amber Naranjo    Procedure(s) Performed: Procedure(s) (LRB):  COLONOSCOPY (N/A)    Final Anesthesia Type: MAC  Patient location during evaluation: GI PACU  Patient participation: Yes- Able to Participate  Level of consciousness: awake and alert  Post-procedure vital signs: reviewed and stable  Pain management: adequate  Airway patency: patent  PONV status at discharge: No PONV  Anesthetic complications: no      Cardiovascular status: blood pressure returned to baseline  Respiratory status: unassisted and spontaneous ventilation  Hydration status: euvolemic  Follow-up not needed.        Visit Vitals    BP (!) 154/68 (BP Location: Left arm, Patient Position: Lying, BP Method: Automatic)    Pulse 66    Temp 36.7 °C (98.1 °F) (Oral)    Resp 18    Ht 5' 3" (1.6 m)    Wt 73.5 kg (162 lb)    SpO2 97%    Breastfeeding No    BMI 28.7 kg/m2       Pain/Vickie Score: No Data Recorded      "

## 2017-03-15 NOTE — ANESTHESIA PREPROCEDURE EVALUATION
03/15/2017  Amber Naranjo is a 71 y.o., female.    [facility] Anesthesia Evaluation    I have reviewed the Patient Summary Reports.    I have reviewed the Nursing Notes.   I have reviewed the Medications.     Review of Systems  Anesthesia Hx:  No problems with previous Anesthesia    Cardiovascular:   Hypertension, well controlled        Physical Exam  General:  Well nourished    Airway/Jaw/Neck:  Airway Findings: Mouth Opening: Normal Tongue: Normal  General Airway Assessment: Adult  Mallampati: II  TM Distance: Normal, at least 6 cm      Dental:  Dental Findings: In tact   Chest/Lungs:  Chest/Lungs Findings: Normal Respiratory Rate     Heart/Vascular:  Heart Findings: Rate: Normal  Rhythm: Regular Rhythm             Anesthesia Plan  Type of Anesthesia, risks & benefits discussed:  Anesthesia Type:  MAC  Patient's Preference:   Intra-op Monitoring Plan:   Intra-op Monitoring Plan Comments:   Post Op Pain Control Plan:   Post Op Pain Control Plan Comments:   Induction:   IV  Beta Blocker:  Patient is not currently on a Beta-Blocker (No further documentation required).       Informed Consent: Patient understands risks and agrees with Anesthesia plan.  Questions answered. Anesthesia consent signed with patient.  ASA Score: 2     Day of Surgery Review of History & Physical: I have interviewed and examined the patient. I have reviewed the patient's H&P dated:  There are no significant changes.          Ready For Surgery From Anesthesia Perspective.

## 2017-03-15 NOTE — IP AVS SNAPSHOT
[address] [place] [address]           Patient Discharge Instructions     Our goal is to set you up for success. This packet includes information on your condition, medications, and your home care. It will help you to care for yourself so you don't get sicker and need to go back to the hospital.     Please ask your nurse if you have any questions.        There are many details to remember when preparing to leave the hospital. Here is what you will need to do:    1. Take your medicine. If you are prescribed medications, review your Medication List in the following pages. You may have new medications to  at the pharmacy and others that you'll need to stop taking. Review the instructions for how and when to take your medications. Talk with your doctor or nurses if you are unsure of what to do.     2. Go to your follow-up appointments. Specific follow-up information is listed in the following pages. Your may be contacted by a transition nurse or clinical provider about future appointments. Be sure we have all of the phone numbers to reach you, if needed. Please contact your provider's office if you are unable to make an appointment.     3. Watch for warning signs. Your doctor or nurse will give you detailed warning signs to watch for and when to call for assistance. These instructions may also include educational information about your condition. If you experience any of warning signs to your health, call your doctor.               ** Verify the list of medication(s) below is accurate and up to date. Carry this with you in case of emergency. If your medications have changed, please notify your healthcare provider.             Medication List      CONTINUE taking these medications        Additional Info                      aspirin 81 MG EC tablet   Commonly known as:  ECOTRIN   Refills:  0   Dose:  81 mg    Instructions:  Take 81 mg by mouth once daily.     Begin  Date    AM    Noon    PM    Bedtime       atenolol 25 MG tablet   Commonly known as:  TENORMIN   Quantity:  90 tablet   Refills:  2   Dose:  25 mg    Instructions:  Take 1 tablet (25 mg total) by mouth once daily.     Begin Date    AM    Noon    PM    Bedtime       atorvastatin 20 MG tablet   Commonly known as:  LIPITOR   Quantity:  90 tablet   Refills:  2   Dose:  20 mg    Instructions:  Take 1 tablet (20 mg total) by mouth once daily.     Begin Date    AM    Noon    PM    Bedtime       calcium carbonate 600 mg (1,500 mg) Tab   Commonly known as:  OS-NERI   Refills:  0   Dose:  600 mg    Instructions:  Take 600 mg by mouth 2 (two) times daily with meals.     Begin Date    AM    Noon    PM    Bedtime       fluticasone 50 mcg/actuation nasal spray   Commonly known as:  FLONASE   Quantity:  16 g   Refills:  5   Dose:  1 spray    Instructions:  1 spray by Each Nare route once daily.     Begin Date    AM    Noon    PM    Bedtime       lisinopril 10 MG tablet   Quantity:  90 tablet   Refills:  2   Dose:  10 mg    Instructions:  Take 1 tablet (10 mg total) by mouth once daily.     Begin Date    AM    Noon    PM    Bedtime       loratadine 10 mg tablet   Commonly known as:  CLARITIN   Refills:  0   Dose:  10 mg    Instructions:  Take 10 mg by mouth once daily.     Begin Date    AM    Noon    PM    Bedtime       montelukast 10 mg tablet   Commonly known as:  SINGULAIR   Quantity:  90 tablet   Refills:  2   Dose:  10 mg    Instructions:  Take 1 tablet (10 mg total) by mouth every evening.     Begin Date    AM    Noon    PM    Bedtime       multivitamin capsule   Refills:  0   Dose:  1 capsule    Instructions:  Take 1 capsule by mouth once daily.     Begin Date    AM    Noon    PM    Bedtime       sodium,potassium,mag sulfates 17.5-3.13-1.6 gram Solr   Commonly known as:  SUPREP BOWEL PREP KIT   Quantity:  354 mL   Refills:  0    Instructions:  Use as directed.     Begin Date    AM    Noon    PM    Bedtime       timolol 0.5 %  ophthalmic solution   Refills:  0   Dose:  1 drop    Instructions:  1 drop 2 (two) times daily.     Begin Date    AM    Noon    PM    Bedtime       VITAMIN B-12 50 mcg tablet   Refills:  0   Dose:  50 mcg   Generic drug:  cyanocobalamin (vitamin B-12)    Instructions:  Take 50 mcg by mouth once daily.     Begin Date    AM    Noon    PM    Bedtime                  Please bring to all follow up appointments:    1. A copy of your discharge instructions.  2. All medicines you are currently taking in their original bottles.  3. Identification and insurance card.    Please arrive 15 minutes ahead of scheduled appointment time.    Please call 24 hours in advance if you must reschedule your appointment and/or time.        Your Scheduled Appointments     May 23, 2017  9:40 AM CDT   Established Patient Visit with Wei Roa DO   [facility] ([facility])    [address] [place] [address]  [place] [id number] [phone]              Follow-up Information     Follow up with Wei Roa DO.    Specialty:  Family Medicine    Why:  As needed    Contact information:    [address] [place] [address]  [place] [address]  [phone]          Discharge Instructions     Future Orders    FL Barium Enema Air Contrast     Process Instructions:    No food or drink after midnight (8 hours before exam time for an afternoon appointment).  Oral medication with a small amount of water the morning before test is acceptable.    Questions:    Reason for Exam:      May the Radiologist modify the order per protocol to meet the clinical needs of the patient?:  Yes        Primary Diagnosis     Your primary diagnosis was:  Screen For Colon Cancer      Admission Information     Date & Time Provider Department CSN    3/15/2017  9:47 AM Yogi Carbone MD [facility] -  [id number]      Care Providers     Provider Role Specialty Primary office phone    Yogi Carbone MD Attending Provider General Surgery [phone]    Yogi Carbone MD Surgeon  " General Surgery [phone]      Your Vitals Were     BP Pulse Temp Resp Height Weight    100/70 66 98.1 °F (36.7 °C) (Oral) 16 5' 3" (1.6 m) 73.5 kg (162 lb)    SpO2 BMI             93% 28.7 kg/m2         Recent Lab Values     No lab values to display.      Allergies as of 3/15/2017     No Known Allergies      [facility]     [facility] Nurse Care Line - 24/7 Assistance  Unless otherwise directed by your provider, please contact [facility], our nurse care line that is available for 24/7 assistance.     Registered nurses in the [facility] provide clinical advisement, health education, appointment booking, and other advisory services.  Call for this free service at [phone].        Advance Directives     An advance directive is a document which, in the event you are no longer able to make decisions for yourself, tells your healthcare team what kind of treatment you do or do not want to receive, or who you would like to make those decisions for you.  If you do not currently have an advance directive, [facility] encourages you to create one.  For more information call:  [phone]), [phone] ([phone]),  or log on to [email or web address].        Language Assistance Services     ATTENTION: Language assistance services are available, free of charge. Please call [phone].      ATENCIÓN: Si habla español, tiene a malave disposición servicios gratuitos de asistencia lingüística. Llame al [phone].     [facility] Ý: N?u b?n nói Ti?ng Vi?t, có các d?ch v? h? tr? ngôn ng? mi?n phí dành cho b?n. G?i s? [phone].        [facility] Sign-Up     Activating your [facility] account is as easy as 1-2-3!     1) Visit [email or web address], select Sign Up Now, enter this activation code and your date of birth, then select Next.  [email or web address]  Expires: 4/9/2017 12:34 PM      2) Create a username and password to use when you visit [facility] in the future and select a security " question in case you lose your password and select Next.    3) Enter your e-mail address and click Sign Up!    Additional Information  If you have questions, please e-mail [email or web address] or call [phone] to talk to our [facility] staff. Remember, [facility] is NOT to be used for urgent needs. For medical emergencies, dial 911.          [facility] complies with applicable Federal civil rights laws and does not discriminate on the basis of race, color, national origin, age, disability, or sex.

## 2017-03-15 NOTE — DISCHARGE SUMMARY
[facility]  Short Stay  Discharge Summary    Admit Date: 3/15/2017    Discharge Date and Time: 3/15/2017      Discharge Attending Physician: Yogi Carbone MD     Reason for Admission: Screening colonoscopy    Hospital Course (synopsis of major diagnoses, care, treatment, and services provided during the course of the hospital stay): Uneventful and full recovery    Final Diagnoses:    Principal Problem: Special screening for malignant neoplasms, colon   Secondary Diagnoses: Special screening for malignant neoplasms, colon    Procedures Performed: Procedure(s) (LRB):  COLONOSCOPY (N/A)      Discharged Condition: good    Disposition: Home or Self Care    Discharge Condition: Stable    Follow up/Patient Instructions:  Follow-up Information     Follow up with Wei Roa DO.    Specialty:  Family Medicine    Why:  As needed    Contact information:    [address] [address] [address] [phone]            Medications:      Medication List      CONTINUE taking these medications          aspirin 81 MG EC tablet   Commonly known as:  ECOTRIN       atenolol 25 MG tablet   Commonly known as:  TENORMIN   Take 1 tablet (25 mg total) by mouth once daily.       atorvastatin 20 MG tablet   Commonly known as:  LIPITOR   Take 1 tablet (20 mg total) by mouth once daily.       calcium carbonate 600 mg (1,500 mg) Tab   Commonly known as:  OS-NERI       fluticasone 50 mcg/actuation nasal spray   Commonly known as:  FLONASE   1 spray by Each Nare route once daily.       lisinopril 10 MG tablet   Take 1 tablet (10 mg total) by mouth once daily.       loratadine 10 mg tablet   Commonly known as:  CLARITIN       montelukast 10 mg tablet   Commonly known as:  SINGULAIR   Take 1 tablet (10 mg total) by mouth every evening.       multivitamin capsule       sodium,potassium,mag sulfates 17.5-3.13-1.6 gram Solr   Commonly known as:  SUPREP BOWEL PREP KIT   Use as directed.       timolol 0.5 % ophthalmic solution       VITAMIN  B-12 50 mcg tablet   Generic drug:  cyanocobalamin (vitamin B-12)             Discharge Procedure Orders  FL Barium Enema Air Contrast   Standing Status: Future  Standing Exp. Date: 03/15/18   Order Specific Question Answer Comments   May the Radiologist modify the order per protocol to meet the clinical needs of the patient? Yes      Yogi Carbone

## 2017-03-15 NOTE — TRANSFER OF CARE
"Anesthesia Transfer of Care Note    Patient: Amber Naranjo    Procedure(s) Performed: Procedure(s) (LRB):  COLONOSCOPY (N/A)    Patient location: GI    Anesthesia Type: MAC    Transport from OR: Transported from OR on room air with adequate spontaneous ventilation    Post pain: adequate analgesia    Post assessment: no apparent anesthetic complications    Post vital signs: stable    Level of consciousness: awake    Nausea/Vomiting: no nausea/vomiting    Complications: none          Last vitals:   Visit Vitals    BP (!) 154/68 (BP Location: Left arm, Patient Position: Lying, BP Method: Automatic)    Pulse 66    Temp 36.7 °C (98.1 °F) (Oral)    Resp 18    Ht 5' 3" (1.6 m)    Wt 73.5 kg (162 lb)    SpO2 97%    Breastfeeding No    BMI 28.7 kg/m2     "

## 2017-05-09 ENCOUNTER — PATIENT OUTREACH (OUTPATIENT)
Dept: ADMINISTRATIVE | Facility: HOSPITAL | Age: 72
End: 2017-05-09
Payer: MEDICARE

## 2017-05-09 DIAGNOSIS — M89.9 BONE DISORDER: Primary | ICD-10-CM

## 2017-05-09 DIAGNOSIS — N95.1 MENOPAUSAL STATE: ICD-10-CM

## 2017-05-09 NOTE — LETTER
May 9, 2017    Amber Naranjo  [id number] [address]             [facility] [address] [address]  Phone: [phone] Dear Mrs. Naranjo:    [facility] is committed to your overall health.  To help you get the most out of each of your visits, we will review your information to make sure you are up to date on all of your recommended tests and/or procedures.      Wei Roa DO has found that you may be due for   Health Maintenance Due   Topic    TETANUS VACCINE     DEXA SCAN         If you have had any of the above done at another facility, please bring the records or information with you so that your record at [facility] will be complete.    If you are currently taking medication, please bring it with you to your appointment for review.    We will be happy to assist you with scheduling any necessary appointments or you may contact the [facility] appointment desk at [phone] to schedule at your convenience.     Thank you for choosing [facility] for your healthcare needs.  If you have any questions or concerns, please don't hesitate to call.    Sincerely,  Jennifer ODELL LPN Care Coordinator  [facility]  [phone]

## 2017-05-11 ENCOUNTER — DOCUMENTATION ONLY (OUTPATIENT)
Dept: INTERNAL MEDICINE | Facility: CLINIC | Age: 72
End: 2017-05-11

## 2017-05-11 NOTE — PROGRESS NOTES
Records from former PCP received and reviewed. Chart updated appropriately.     Eye Dr is Dr Corbin VANCE

## 2017-05-23 ENCOUNTER — OFFICE VISIT (OUTPATIENT)
Dept: INTERNAL MEDICINE | Facility: CLINIC | Age: 72
End: 2017-05-23
Payer: MEDICARE

## 2017-05-23 VITALS
RESPIRATION RATE: 16 BRPM | BODY MASS INDEX: 29.88 KG/M2 | DIASTOLIC BLOOD PRESSURE: 74 MMHG | HEART RATE: 60 BPM | OXYGEN SATURATION: 98 % | WEIGHT: 168.63 LBS | SYSTOLIC BLOOD PRESSURE: 128 MMHG | HEIGHT: 63 IN | TEMPERATURE: 98 F

## 2017-05-23 DIAGNOSIS — I10 ESSENTIAL HYPERTENSION: ICD-10-CM

## 2017-05-23 DIAGNOSIS — E78.5 DYSLIPIDEMIA: ICD-10-CM

## 2017-05-23 PROCEDURE — 3074F SYST BP LT 130 MM HG: CPT | Mod: S$GLB,,, | Performed by: FAMILY MEDICINE

## 2017-05-23 PROCEDURE — 99213 OFFICE O/P EST LOW 20 MIN: CPT | Mod: S$GLB,,, | Performed by: FAMILY MEDICINE

## 2017-05-23 PROCEDURE — 1159F MED LIST DOCD IN RCRD: CPT | Mod: S$GLB,,, | Performed by: FAMILY MEDICINE

## 2017-05-23 PROCEDURE — 3078F DIAST BP <80 MM HG: CPT | Mod: S$GLB,,, | Performed by: FAMILY MEDICINE

## 2017-05-23 PROCEDURE — 1126F AMNT PAIN NOTED NONE PRSNT: CPT | Mod: S$GLB,,, | Performed by: FAMILY MEDICINE

## 2017-05-23 PROCEDURE — 99999 PR PBB SHADOW E&M-EST. PATIENT-LVL III: CPT | Mod: PBBFAC,,, | Performed by: FAMILY MEDICINE

## 2017-05-23 PROCEDURE — 1160F RVW MEDS BY RX/DR IN RCRD: CPT | Mod: S$GLB,,, | Performed by: FAMILY MEDICINE

## 2017-05-23 NOTE — PROGRESS NOTES
Subjective:       Patient ID: Amber Naranjo is a 71 y.o. female.    Chief Complaint: Follow-up (3 month )    HPI  Here today for routine follow-up for hypertension.  She has been doing well with same medications.  Reports to having a bit more stress recently and especially in the context of her  recently falling off his bike and fracturing his neck.  He is doing okay but she continues to worry about him.  Otherwise she is doing well and has no acute complaints at this time.  Had DEXA in past at [facility]      Family History   Problem Relation Age of Onset    Glaucoma Mother     Hypertension Father     Colon cancer Sister     Cancer Sister     Diabetes Sister     Hypertension Sister     Cancer Brother     Diabetes Brother     Hypertension Brother        Current Outpatient Prescriptions:     aspirin (ECOTRIN) 81 MG EC tablet, Take 81 mg by mouth once daily., Disp: , Rfl:     atenolol (TENORMIN) 25 MG tablet, Take 1 tablet (25 mg total) by mouth once daily., Disp: 90 tablet, Rfl: 2    atorvastatin (LIPITOR) 20 MG tablet, Take 1 tablet (20 mg total) by mouth once daily., Disp: 90 tablet, Rfl: 2    calcium carbonate (OS-NERI) 600 mg (1,500 mg) Tab, Take 600 mg by mouth 2 (two) times daily with meals., Disp: , Rfl:     cyanocobalamin, vitamin B-12, (VITAMIN B-12) 50 mcg tablet, Take 50 mcg by mouth once daily., Disp: , Rfl:     fluticasone (FLONASE) 50 mcg/actuation nasal spray, 1 spray by Each Nare route once daily., Disp: 16 g, Rfl: 5    lisinopril 10 MG tablet, Take 1 tablet (10 mg total) by mouth once daily., Disp: 90 tablet, Rfl: 2    loratadine (CLARITIN) 10 mg tablet, Take 10 mg by mouth once daily., Disp: , Rfl:     montelukast (SINGULAIR) 10 mg tablet, Take 1 tablet (10 mg total) by mouth every evening., Disp: 90 tablet, Rfl: 2    multivitamin capsule, Take 1 capsule by mouth once daily., Disp: , Rfl:     timolol 0.5 % ophthalmic solution, 1 drop 2 (two) times daily., Disp: , Rfl:      "sodium,potassium,mag sulfates (SUPREP BOWEL PREP KIT) 17.5-3.13-1.6 gram SolR, Use as directed., Disp: 354 mL, Rfl: 0    Review of Systems   Constitutional: Negative for chills and fever.   HENT: Negative for congestion and sore throat.    Eyes: Negative for visual disturbance.   Respiratory: Negative for cough and shortness of breath.    Cardiovascular: Negative for chest pain.   Gastrointestinal: Negative for abdominal pain, constipation and diarrhea.   Endocrine: Negative for polydipsia and polyuria.   Genitourinary: Negative for difficulty urinating and menstrual problem.   Skin: Negative for rash.   Neurological: Negative for dizziness.   Hematological: Does not bruise/bleed easily.       Objective:   BP (!) 148/69 (BP Location: Left arm, Patient Position: Sitting, BP Method: Automatic)   Pulse 60   Temp 97.7 °F (36.5 °C) (Tympanic)   Resp 16   Ht 5' 3" (1.6 m)   Wt 76.5 kg (168 lb 10.4 oz)   SpO2 98%   BMI 29.88 kg/m²      Physical Exam   Constitutional: She is oriented to person, place, and time. She appears well-developed and well-nourished.   HENT:   Head: Normocephalic and atraumatic.   Eyes: Conjunctivae are normal.   Cardiovascular: Normal rate, regular rhythm and normal heart sounds.    No murmur heard.  Pulmonary/Chest: Effort normal. No respiratory distress.   Musculoskeletal: She exhibits no edema.   Neurological: She is alert and oriented to person, place, and time. Coordination normal.   Skin: Skin is warm and dry. No rash noted.   Psychiatric: She has a normal mood and affect. Her behavior is normal.   Vitals reviewed.      Assessment & Plan     1. Essential hypertension  A bit elevated today here in the clinic.  I repeated manually and it was similar.  She reports having systolic readings in the 120s and 130s at home.  Has been adhering to her atenolol and lisinopril.  Also admits to having stress with her  recently fracturing his neck after a bicycle accident.  I encouraged her to " continue taking same medication and monitor her blood pressure.  Report to me if having consistent numbers greater than 140.    2. Dyslipidemia  Doing well with atorvastatin.  Encouraged her to continue this medication.    We'll request records for past DEXA scan and I encouraged her to get TdaP at pharmacy

## 2017-06-02 ENCOUNTER — TELEPHONE (OUTPATIENT)
Dept: INTERNAL MEDICINE | Facility: CLINIC | Age: 72
End: 2017-06-02

## 2017-06-02 ENCOUNTER — NURSE TRIAGE (OUTPATIENT)
Dept: ADMINISTRATIVE | Facility: CLINIC | Age: 72
End: 2017-06-02

## 2017-06-02 NOTE — TELEPHONE ENCOUNTER
Advice given was appropriate. Please make sure that as she is keeping BP log over next several weeks she is only taking her BP at least 1 hour after she has taken her medication or if she is having symptoms. Do not routinely check BP prior to medications. She is scheduled to have an HRA with me next week. She can bring her BP log for review that day

## 2017-06-02 NOTE — TELEPHONE ENCOUNTER
Reason for Disposition   BP > 140/90 and is taking BP medications    Protocols used: ST HIGH BLOOD PRESSURE-A-OH    Amber is calling to report blood pressure has been elevated.  States this AM blood pressure is 177/72.  Just took blood pressure medication.  States head feels funny but can not describe.  No chest pain, sob, difficulty walking or talking.  Home care advice given.  Advised to monitor and keep log and see PCP within 2 weeks if elevation continues.  Please contact caller directly with any further care advice.

## 2017-06-02 NOTE — TELEPHONE ENCOUNTER
Kaylie Whitney, RN 1 hour ago (6:21 AM)         Reason for Disposition   BP > 140/90 and is taking BP medications    Protocols used:  HIGH BLOOD PRESSURE-A-OH     Amber is calling to report blood pressure has been elevated.  States this AM blood pressure is 177/72.  Just took blood pressure medication.  States head feels funny but can not describe.  No chest pain, sob, difficulty walking or talking.  Home care advice given.  Advised to monitor and keep log and see PCP within 2 weeks if elevation continues.  Please contact caller directly with any further care advice.      ANABELAI: Pt of Dr. Roa.

## 2017-06-06 ENCOUNTER — OFFICE VISIT (OUTPATIENT)
Dept: INTERNAL MEDICINE | Facility: CLINIC | Age: 72
End: 2017-06-06
Payer: MEDICARE

## 2017-06-06 VITALS
WEIGHT: 169.75 LBS | BODY MASS INDEX: 32.05 KG/M2 | DIASTOLIC BLOOD PRESSURE: 68 MMHG | HEART RATE: 61 BPM | SYSTOLIC BLOOD PRESSURE: 153 MMHG | HEIGHT: 61 IN | OXYGEN SATURATION: 97 % | TEMPERATURE: 98 F | RESPIRATION RATE: 18 BRPM

## 2017-06-06 DIAGNOSIS — K57.30 DIVERTICULOSIS OF LARGE INTESTINE WITHOUT HEMORRHAGE: ICD-10-CM

## 2017-06-06 DIAGNOSIS — J30.1 SEASONAL ALLERGIC RHINITIS DUE TO POLLEN: ICD-10-CM

## 2017-06-06 DIAGNOSIS — I10 ESSENTIAL HYPERTENSION: ICD-10-CM

## 2017-06-06 DIAGNOSIS — E78.5 HYPERLIPIDEMIA, UNSPECIFIED HYPERLIPIDEMIA TYPE: ICD-10-CM

## 2017-06-06 DIAGNOSIS — D25.9 UTERINE LEIOMYOMA, UNSPECIFIED LOCATION: ICD-10-CM

## 2017-06-06 DIAGNOSIS — Q43.8 TORTUOUS COLON: ICD-10-CM

## 2017-06-06 DIAGNOSIS — K21.9 GASTROESOPHAGEAL REFLUX DISEASE, ESOPHAGITIS PRESENCE NOT SPECIFIED: ICD-10-CM

## 2017-06-06 DIAGNOSIS — H40.9 GLAUCOMA, UNSPECIFIED GLAUCOMA, UNSPECIFIED LATERALITY: ICD-10-CM

## 2017-06-06 DIAGNOSIS — E66.9 OBESITY, UNSPECIFIED OBESITY SEVERITY, UNSPECIFIED OBESITY TYPE: ICD-10-CM

## 2017-06-06 DIAGNOSIS — M19.90 ARTHRITIS: ICD-10-CM

## 2017-06-06 DIAGNOSIS — Z00.00 ENCOUNTER FOR PREVENTIVE HEALTH EXAMINATION: Primary | ICD-10-CM

## 2017-06-06 PROBLEM — Z12.11 SPECIAL SCREENING FOR MALIGNANT NEOPLASMS, COLON: Status: RESOLVED | Noted: 2017-03-15 | Resolved: 2017-06-06

## 2017-06-06 PROCEDURE — 99999 PR PBB SHADOW E&M-EST. PATIENT-LVL V: CPT | Mod: PBBFAC,,, | Performed by: NURSE PRACTITIONER

## 2017-06-06 PROCEDURE — G0439 PPPS, SUBSEQ VISIT: HCPCS | Mod: S$GLB,,, | Performed by: NURSE PRACTITIONER

## 2017-06-06 PROCEDURE — 99499 UNLISTED E&M SERVICE: CPT | Mod: S$GLB,,, | Performed by: NURSE PRACTITIONER

## 2017-06-06 NOTE — PROGRESS NOTES
"Amber Naranjo presented for a  Medicare AWV and comprehensive Health Risk Assessment today. The following components were reviewed and updated:    · Medical history  · Family History  · Social history  · Allergies and Current Medications  · Health Risk Assessment  · Health Maintenance  · Care Team     ** See Completed Assessments for Annual Wellness Visit within the encounter summary.**       The following assessments were completed:  · Living Situation  · CAGE  · Depression Screening  · Timed Get Up and Go  · Whisper Test  · Cognitive Function Screening  · Nutrition Screening  · ADL Screening  · PAQ Screening    Vitals:    06/06/17 1033   BP: (!) 153/68   Pulse: 61   Resp: 18   Temp: 98 °F (36.7 °C)   TempSrc: Tympanic   SpO2: 97%   Weight: 77 kg (169 lb 12.1 oz)   Height: 5' 1.25" (1.556 m)     Body mass index is 31.81 kg/m².  Physical Exam   Constitutional: She is oriented to person, place, and time. She appears well-developed and well-nourished. No distress.   HENT:   Head: Normocephalic and atraumatic.   Right Ear: External ear normal.   Left Ear: External ear normal.   Nose: Nose normal.   Mouth/Throat: Oropharynx is clear and moist. No oropharyngeal exudate.   Eyes: Conjunctivae are normal. Pupils are equal, round, and reactive to light. Right eye exhibits no discharge. Left eye exhibits no discharge.   Neck: Normal range of motion. Neck supple.   Cardiovascular: Normal rate, regular rhythm, normal heart sounds and intact distal pulses.    No murmur heard.  Pulmonary/Chest: Effort normal and breath sounds normal. No respiratory distress.   Abdominal: Soft. Bowel sounds are normal. She exhibits no distension. There is no tenderness.   Musculoskeletal: Normal range of motion. She exhibits no edema or tenderness.   Lymphadenopathy:     She has no cervical adenopathy.   Neurological: She is alert and oriented to person, place, and time. Coordination normal.   Skin: Skin is warm and dry. No rash noted. She is not " diaphoretic.   Psychiatric: She has a normal mood and affect. Her behavior is normal.   Nursing note and vitals reviewed.        Diagnoses and health risks identified today and associated recommendations/orders:    1. Encounter for preventive health examination  Done today    2. Essential hypertension  Uncontrolled. Follow up with PCP for medication adjustments    3. Hyperlipidemia, unspecified hyperlipidemia type  Stable and controlled on Lipitor. Follow with PCP    4. Diverticulosis of large intestine without hemorrhage  As per colonoscopy and barium enema 3/15/17. Follow diverticulosis diet. Follow up with PCP if any complications such as constipation or abdominal pain.     5. Tortuous colon  See # 4     6. Gastroesophageal reflux disease, esophagitis presence not specified  Controlled with lifestyle changes and OTC medications. Follow with PCP.    7. Obesity, unspecified obesity severity, unspecified obesity type  Educated on importance of healthy/balanced diet and getting 150 minutes of exercise per week to promote weight loss, reach optimal BMI, improve comorbidities and improve lifestyle.   Pt plans to start using exercise bike. Goal to loose 10 lbs.    8. Uterine leiomyoma, unspecified location  Per abd/pelvic US 8/2/15. No longer with pelvic pain. Instructed to follow up with PCP if any pain or especially if any AUB.     9. Glaucoma, unspecified glaucoma, unspecified laterality  Controlled with timolol. Follow with Dr Alaniz, opthalmology as scheduled in July.     10. Arthritis  Currently using naproxen for management of pain when needed. Recommended avoid NSAID due to hypertension, recommended she try to use tylenol. Follow with PCP.    11. Seasonal allergic rhinitis due to pollen  Controlled with Claritin, Singulair and Flonase. Continue to follow with PCP.       Provided Amber with a 5-10 year written screening schedule and personal prevention plan. Recommendations were developed using the USPSTF age  appropriate recommendations. Education, counseling, and referrals were provided as needed. After Visit Summary printed and given to patient which includes a list of additional screenings\tests needed.    Keep follow up appt 8/23/2017 as scheduled with Dr Roa. Scheduled DXA as previously ordered by Dr Roa.    AGUILAR Gonzalez,FNP-C

## 2017-06-06 NOTE — PATIENT INSTRUCTIONS
Counseling and Referral of Other Preventative  (Italic type indicates deductible and co-insurance are waived)    Patient Name: Amber Naranjo  Today's Date: 6/6/2017      SERVICE LIMITATIONS RECOMMENDATION    Vaccines    · Pneumococcal (once after 65)    · Influenza (annually)    · Hepatitis B (if medium/high risk)    · Prevnar 13      Hepatitis B medium/high risk factors:       - End-stage renal disease       - Hemophiliacs who received Factor VII or         IX concentrates       - Clients of institutions for the mentally             retarded       - Persons who live in the same house as          a HepB carrier       - Homosexual men       - Illicit injectable drug abusers     Pneumococcal: Done, no repeat necessary     Influenza: Done, repeat in one year     Hepatitis B: N/A     Prevnar 13: Done, no repeat necessary    Mammogram (biennial age 50-74)  Annually (age 40 or over)  Done this year, repeat every year    Pap (up to age 70 and after 70 if unknown history or abnormal study last 10 years)    N/A     The USPSTF recommends against screening for cervical cancer in women older than age 65 years who have had adequate prior screening and are not otherwise at high risk for cervical cancer.      Colorectal cancer screening (to age 75)    · Fecal occult blood test (annual)  · Flexible sigmoidoscopy (5y)  · Screening colonoscopy (10y)  · Barium enema   Last done 3/15/2017, recommend to repeat every 10  years    Diabetes self-management training (no USPSTF recommendations)  Requires referral by treating physician for patient with diabetes or renal disease. 10 hours of initial DSMT sessions of no less than 30 minutes each in a continuous 12-month period. 2 hours of follow-up DSMT in subsequent years.  N/A    Bone mass measurements (age 65 & older, biennial)  Requires diagnosis related to osteoporosis or estrogen deficiency. Biennial benefit unless patient has history of long-term glucocorticoid  Scheduled, see appointments     Glaucoma screening (no USPSTF recommendation)  Diabetes mellitus, family history   , age 50 or over    American, age 65 or over Currently being treated for glaucoma by Dr Alaniz. Next appt July.    Medical nutrition therapy for diabetes or renal disease (no recommended schedule)  Requires referral by treating physician for patient with diabetes or renal disease or kidney transplant within the past 3 years.  Can be provided in same year as diabetes self-management training (DSMT), and CMS recommends medical nutrition therapy take place after DSMT. Up to 3 hours for initial year and 2 hours in subsequent years.  N/A    Cardiovascular screening blood tests (every 5 years)  · Fasting lipid panel  Order as a panel if possible  Done this year, repeat every year    Diabetes screening tests (at least every 3 years, Medicare covers annually or at 6-month intervals for prediabetic patients)  · Fasting blood sugar (FBS) or glucose tolerance test (GTT)  Patient must be diagnosed with one of the following:       - Hypertension       - Dyslipidemia       - Obesity (BMI 30kg/m2)       - Previous elevated impaired FBS or GTT       ... or any two of the following:       - Overweight (BMI 25 but <30)       - Family history of diabetes       - Age 65 or older       - History of gestational diabetes or birth of baby weighing more than 9 pounds  Done this year, repeat every year    Abdominal aortic aneurysm screening (once)  · Sonogram   Limited to patients who meet one of the following criteria:       - Men who are 65-75 years old and have smoked more than 100 cigarette in their lifetime       - Anyone with a family history of abdominal aortic aneurysm       - Anyone recommended for screening by the USPSTF  N/A    HIV screening (annually for increased risk patients)  · HIV-1 and HIV-2 by EIA, or JOSR, rapid antibody test or oral mucosa transudate  Patients must be at increased risk for HIV infection per USPSTF  guidelines or pregnant. Tests covered annually for patient at increased risk or as requested by the patient. Pregnant patients may receive up to 3 tests during pregnancy.  Risks discussed, screening is not recommended    Smoking cessation counseling (up to 8 sessions per year)  Patients must be asymptomatic of tobacco-related conditions to receive as a preventative service.  Non-smoker    Subsequent annual wellness visit  At least 12 months since last AWV  Return in one year     The following information is provided to all patients.  This information is to help you find resources for any of the problems found today that may be affecting your health:                Living healthy guide: www.[facility].[place].[facility]      Understanding Diabetes: www.diabetes.org      Eating healthy: www.cdc.gov/healthyweight      CDC home safety checklist: www.cdc.gov/steadi/patient.html      Agency on Aging: www.goea.[place].[facility]      Alcoholics anonymous (AA): www.aa.org      Physical Activity: www.[place]enny.nih.gov/vr8gssv      Tobacco use: www.quitwithusla.org

## 2017-06-09 ENCOUNTER — APPOINTMENT (OUTPATIENT)
Dept: RADIOLOGY | Facility: CLINIC | Age: 72
End: 2017-06-09
Attending: FAMILY MEDICINE
Payer: MEDICARE

## 2017-06-09 DIAGNOSIS — M89.9 BONE DISORDER: ICD-10-CM

## 2017-06-09 DIAGNOSIS — N95.1 MENOPAUSAL STATE: ICD-10-CM

## 2017-06-09 PROCEDURE — 77080 DXA BONE DENSITY AXIAL: CPT | Mod: TC,PO

## 2017-06-09 PROCEDURE — 77080 DXA BONE DENSITY AXIAL: CPT | Mod: 26,,, | Performed by: INTERNAL MEDICINE

## 2017-06-26 ENCOUNTER — TELEPHONE (OUTPATIENT)
Dept: INTERNAL MEDICINE | Facility: CLINIC | Age: 72
End: 2017-06-26

## 2017-06-26 PROBLEM — M85.89 OSTEOPENIA OF MULTIPLE SITES: Status: ACTIVE | Noted: 2017-06-26

## 2017-06-26 NOTE — TELEPHONE ENCOUNTER
----- Message from Wei Roa DO sent at 6/26/2017 11:05 AM CDT -----  Recent DEXA shows osteopenia. Recommend adequate calcium and vitamin D through dietary sources. May consider multi-vitamin with these. Encourage exercise.

## 2017-08-24 ENCOUNTER — OFFICE VISIT (OUTPATIENT)
Dept: INTERNAL MEDICINE | Facility: CLINIC | Age: 72
End: 2017-08-24
Payer: MEDICARE

## 2017-08-24 VITALS
BODY MASS INDEX: 32.22 KG/M2 | SYSTOLIC BLOOD PRESSURE: 146 MMHG | OXYGEN SATURATION: 99 % | RESPIRATION RATE: 16 BRPM | HEIGHT: 61 IN | DIASTOLIC BLOOD PRESSURE: 66 MMHG | TEMPERATURE: 96 F | WEIGHT: 170.63 LBS | HEART RATE: 60 BPM

## 2017-08-24 DIAGNOSIS — K21.9 GASTROESOPHAGEAL REFLUX DISEASE WITHOUT ESOPHAGITIS: ICD-10-CM

## 2017-08-24 DIAGNOSIS — I10 ESSENTIAL HYPERTENSION: Primary | ICD-10-CM

## 2017-08-24 DIAGNOSIS — R43.9 SMELL OR TASTE PROBLEM: ICD-10-CM

## 2017-08-24 DIAGNOSIS — Z12.31 ENCOUNTER FOR SCREENING MAMMOGRAM FOR BREAST CANCER: ICD-10-CM

## 2017-08-24 PROCEDURE — 99214 OFFICE O/P EST MOD 30 MIN: CPT | Mod: S$GLB,,, | Performed by: FAMILY MEDICINE

## 2017-08-24 PROCEDURE — 1159F MED LIST DOCD IN RCRD: CPT | Mod: S$GLB,,, | Performed by: FAMILY MEDICINE

## 2017-08-24 PROCEDURE — 99999 PR PBB SHADOW E&M-EST. PATIENT-LVL IV: CPT | Mod: PBBFAC,,, | Performed by: FAMILY MEDICINE

## 2017-08-24 PROCEDURE — 3008F BODY MASS INDEX DOCD: CPT | Mod: S$GLB,,, | Performed by: FAMILY MEDICINE

## 2017-08-24 PROCEDURE — 3078F DIAST BP <80 MM HG: CPT | Mod: S$GLB,,, | Performed by: FAMILY MEDICINE

## 2017-08-24 PROCEDURE — 3077F SYST BP >= 140 MM HG: CPT | Mod: S$GLB,,, | Performed by: FAMILY MEDICINE

## 2017-08-24 PROCEDURE — 99499 UNLISTED E&M SERVICE: CPT | Mod: S$PBB,,, | Performed by: FAMILY MEDICINE

## 2017-08-24 PROCEDURE — 1126F AMNT PAIN NOTED NONE PRSNT: CPT | Mod: S$GLB,,, | Performed by: FAMILY MEDICINE

## 2017-08-24 RX ORDER — LISINOPRIL 20 MG/1
20 TABLET ORAL DAILY
Qty: 90 TABLET | Refills: 1 | Status: SHIPPED | OUTPATIENT
Start: 2017-08-24 | End: 2017-09-26

## 2017-08-24 RX ORDER — CALCIUM/MAGNESIUM/ZINC 333-133-5
TABLET ORAL DAILY
COMMUNITY
End: 2019-05-16 | Stop reason: ALTCHOICE

## 2017-08-24 NOTE — PROGRESS NOTES
Subjective:       Patient ID: Amber Naranjo is a 71 y.o. female.    Chief Complaint: Follow-up; Pain (left ); and Chest Pain    HPI  Here today to follow-up on blood pressure.  At her recent health risk assessment she had elevated blood pressure and her blood pressure at home usually is borderline.  She has been adherent to her medication.  Denies any chest pain, shortness of breath or headaches.  She is not really having any chest pain but reported to the nurse that she does have occasional dyspepsia and reflux symptoms.  She takes an over-the-counter medication that she cannot remember the name but this helps sufficiently.  Also has been having some left foot pain for the last few weeks.  She did not have any injury or fall.  No history of foot problems.  Says that it bothers her mostly in the morning whenever she gets up but after she stretches and starts making a few steps and puts her shoes on she feels better.  She has not taken any medications for this and doesn't feel that she needs any.    Due for upcoming mammogram for yearly screening.  She does have a family history of breast cancer which is why she is doing screening yearly.    Family History   Problem Relation Age of Onset    Glaucoma Mother     Hypertension Father     Asthma Daughter     Colon cancer Sister     Cancer Sister 70     colon    Diabetes Sister     Hypertension Sister     Hyperlipidemia Sister     Diabetes Brother     Hypertension Brother     Heart disease Brother     Hyperlipidemia Brother     Asthma Paternal Aunt     Diabetes Paternal Uncle     Diabetes Maternal Grandmother     Early death Maternal Grandfather     Early death Paternal Grandfather     COPD Neg Hx     Kidney disease Neg Hx     Stroke Neg Hx     Mental illness Neg Hx        Current Outpatient Prescriptions:     aspirin (ECOTRIN) 81 MG EC tablet, Take 81 mg by mouth once daily., Disp: , Rfl:     atenolol (TENORMIN) 25 MG tablet, Take 1 tablet (25 mg  "total) by mouth once daily., Disp: 90 tablet, Rfl: 2    atorvastatin (LIPITOR) 20 MG tablet, Take 1 tablet (20 mg total) by mouth once daily., Disp: 90 tablet, Rfl: 2    calcium carbonate (OS-NERI) 600 mg (1,500 mg) Tab, Take 600 mg by mouth 2 (two) times daily with meals., Disp: , Rfl:     calcium-magnesium-zinc 333-133-5 mg Tab, Take by mouth once daily., Disp: , Rfl:     cyanocobalamin, vitamin B-12, (VITAMIN B-12) 50 mcg tablet, Take 50 mcg by mouth once daily., Disp: , Rfl:     fluticasone (FLONASE) 50 mcg/actuation nasal spray, 1 spray by Each Nare route once daily., Disp: 16 g, Rfl: 5    lisinopril 10 MG tablet, Take 1 tablet (10 mg total) by mouth once daily., Disp: 90 tablet, Rfl: 2    loratadine (CLARITIN) 10 mg tablet, Take 10 mg by mouth once daily., Disp: , Rfl:     montelukast (SINGULAIR) 10 mg tablet, Take 1 tablet (10 mg total) by mouth every evening., Disp: 90 tablet, Rfl: 2    multivitamin capsule, Take 1 capsule by mouth once daily., Disp: , Rfl:     timolol 0.5 % ophthalmic solution, 1 drop 2 (two) times daily., Disp: , Rfl:     Review of Systems   Constitutional: Negative for chills and fever.   Respiratory: Negative for cough and shortness of breath.    Cardiovascular: Negative for chest pain.   Gastrointestinal: Negative for abdominal pain.   Musculoskeletal: Positive for arthralgias (left foot pain).   Skin: Negative for rash.   Neurological: Negative for dizziness.       Objective:   BP (!) 146/66 (BP Location: Left arm, Patient Position: Sitting, BP Method: Large (Automatic))   Pulse 60   Temp 96.2 °F (35.7 °C) (Tympanic)   Resp 16   Ht 5' 1.25" (1.556 m)   Wt 77.4 kg (170 lb 10.2 oz)   SpO2 99%   BMI 31.98 kg/m²      Physical Exam   Constitutional: She is oriented to person, place, and time. She appears well-developed and well-nourished.   HENT:   Head: Normocephalic and atraumatic.   Eyes: Conjunctivae are normal.   Cardiovascular: Normal rate.    Pulmonary/Chest: Effort " normal. No respiratory distress.   Musculoskeletal: She exhibits no edema.   No pain to palpation of her left foot.  No acute abnormality   Neurological: She is alert and oriented to person, place, and time. Coordination normal.   Skin: Skin is warm and dry. No rash noted.   Psychiatric: She has a normal mood and affect. Her behavior is normal.   Vitals reviewed.      Assessment & Plan     1. Essential hypertension  Continues to be not well controlled.  Will increase the dose of lisinopril to 20 mg from 10 mg and have her follow-up in 4 weeks with Rupali.  Also at that time would recommend doing basic metabolic panel to monitor potassium and renal function.    2. Gastroesophageal reflux disease without esophagitis  She does note well with only intermittent use of anti acid medication.    3. Encounter for screening mammogram for breast cancer  Due for follow-up mammogram in October.  - Mammo Digital Screening Bilateral With CAD; Future    4. Smell or taste problem  Referring to ENT for additional recommendations for chronic sinus issues and now decrease in smell and taste  - Ambulatory Referral to ENT    Recommended that she get flu shot within the next month or 2.

## 2017-09-26 RX ORDER — MONTELUKAST SODIUM 10 MG/1
TABLET ORAL
Qty: 90 TABLET | Refills: 2 | Status: SHIPPED | OUTPATIENT
Start: 2017-09-26 | End: 2018-05-10 | Stop reason: SDUPTHER

## 2017-09-26 RX ORDER — LISINOPRIL 10 MG/1
TABLET ORAL
Qty: 90 TABLET | Refills: 2 | Status: SHIPPED | OUTPATIENT
Start: 2017-09-26 | End: 2017-11-24 | Stop reason: DRUGHIGH

## 2017-09-26 RX ORDER — ATORVASTATIN CALCIUM 20 MG/1
TABLET, FILM COATED ORAL
Qty: 90 TABLET | Refills: 2 | Status: SHIPPED | OUTPATIENT
Start: 2017-09-26 | End: 2018-05-08 | Stop reason: SDUPTHER

## 2017-10-03 ENCOUNTER — TELEPHONE (OUTPATIENT)
Dept: INTERNAL MEDICINE | Facility: CLINIC | Age: 72
End: 2017-10-03

## 2017-10-03 NOTE — TELEPHONE ENCOUNTER
Spoke with pt and she said [facility] called her that she needed refills. I asked her which ones she needed refilled. She said she did not know.I explained to her that she needs to call them and ask them what they are and write them down. Then call us back so we can send the request to the doctor. Pt verbalized understanding.

## 2017-10-03 NOTE — TELEPHONE ENCOUNTER
----- Message from Tyler Mora sent at 10/3/2017  3:10 PM CDT -----  Pt is requesting the office f/u with [facility] regarding her prescriptions.          Please call pt back at [phone]

## 2017-10-19 RX ORDER — ATENOLOL 25 MG/1
TABLET ORAL
Qty: 90 TABLET | Refills: 2 | Status: SHIPPED | OUTPATIENT
Start: 2017-10-19 | End: 2017-11-24

## 2017-10-19 RX ORDER — FLUTICASONE PROPIONATE 50 MCG
SPRAY, SUSPENSION (ML) NASAL
Qty: 32 G | Refills: 5 | Status: SHIPPED | OUTPATIENT
Start: 2017-10-19 | End: 2018-11-21 | Stop reason: SDUPTHER

## 2017-10-24 ENCOUNTER — HOSPITAL ENCOUNTER (OUTPATIENT)
Dept: RADIOLOGY | Facility: HOSPITAL | Age: 72
Discharge: HOME | End: 2017-10-24
Attending: FAMILY MEDICINE
Payer: MEDICARE

## 2017-10-24 VITALS — HEIGHT: 61 IN | WEIGHT: 170 LBS | BODY MASS INDEX: 32.1 KG/M2

## 2017-10-24 DIAGNOSIS — Z12.31 ENCOUNTER FOR SCREENING MAMMOGRAM FOR BREAST CANCER: ICD-10-CM

## 2017-10-24 PROCEDURE — 77067 SCR MAMMO BI INCL CAD: CPT | Mod: TC

## 2017-10-24 PROCEDURE — 77063 BREAST TOMOSYNTHESIS BI: CPT | Mod: 26,,, | Performed by: RADIOLOGY

## 2017-10-24 PROCEDURE — 77067 SCR MAMMO BI INCL CAD: CPT | Mod: 26,,, | Performed by: RADIOLOGY

## 2017-11-08 ENCOUNTER — PATIENT OUTREACH (OUTPATIENT)
Dept: ADMINISTRATIVE | Facility: HOSPITAL | Age: 72
End: 2017-11-08

## 2017-11-24 ENCOUNTER — OFFICE VISIT (OUTPATIENT)
Dept: INTERNAL MEDICINE | Facility: CLINIC | Age: 72
End: 2017-11-24
Payer: MEDICARE

## 2017-11-24 ENCOUNTER — LAB VISIT (OUTPATIENT)
Dept: LAB | Facility: HOSPITAL | Age: 72
End: 2017-11-24
Attending: NURSE PRACTITIONER
Payer: MEDICARE

## 2017-11-24 VITALS
BODY MASS INDEX: 31.16 KG/M2 | OXYGEN SATURATION: 98 % | DIASTOLIC BLOOD PRESSURE: 80 MMHG | HEIGHT: 62 IN | WEIGHT: 169.31 LBS | HEART RATE: 64 BPM | SYSTOLIC BLOOD PRESSURE: 154 MMHG | TEMPERATURE: 97 F

## 2017-11-24 DIAGNOSIS — I10 ESSENTIAL HYPERTENSION: ICD-10-CM

## 2017-11-24 DIAGNOSIS — I10 ESSENTIAL HYPERTENSION: Primary | ICD-10-CM

## 2017-11-24 LAB
ANION GAP SERPL CALC-SCNC: 10 MMOL/L
BUN SERPL-MCNC: 26 MG/DL
CALCIUM SERPL-MCNC: 9.9 MG/DL
CHLORIDE SERPL-SCNC: 106 MMOL/L
CO2 SERPL-SCNC: 25 MMOL/L
CREAT SERPL-MCNC: 0.8 MG/DL
EST. GFR  (AFRICAN AMERICAN): >60 ML/MIN/1.73 M^2
EST. GFR  (NON AFRICAN AMERICAN): >60 ML/MIN/1.73 M^2
GLUCOSE SERPL-MCNC: 101 MG/DL
POTASSIUM SERPL-SCNC: 4.8 MMOL/L
SODIUM SERPL-SCNC: 141 MMOL/L

## 2017-11-24 PROCEDURE — 36415 COLL VENOUS BLD VENIPUNCTURE: CPT | Mod: PO

## 2017-11-24 PROCEDURE — 99999 PR PBB SHADOW E&M-EST. PATIENT-LVL IV: CPT | Mod: PBBFAC,,, | Performed by: NURSE PRACTITIONER

## 2017-11-24 PROCEDURE — 99214 OFFICE O/P EST MOD 30 MIN: CPT | Mod: S$GLB,,, | Performed by: NURSE PRACTITIONER

## 2017-11-24 PROCEDURE — 80048 BASIC METABOLIC PNL TOTAL CA: CPT | Mod: PO

## 2017-11-24 PROCEDURE — 99499 UNLISTED E&M SERVICE: CPT | Mod: S$GLB,,, | Performed by: NURSE PRACTITIONER

## 2017-11-24 RX ORDER — METOPROLOL SUCCINATE 25 MG/1
25 TABLET, EXTENDED RELEASE ORAL DAILY
Qty: 90 TABLET | Refills: 3 | Status: SHIPPED | OUTPATIENT
Start: 2017-11-24 | End: 2018-09-10 | Stop reason: SDUPTHER

## 2017-11-24 RX ORDER — LISINOPRIL 20 MG/1
20 TABLET ORAL DAILY
Qty: 90 TABLET | Refills: 3 | Status: SHIPPED | OUTPATIENT
Start: 2017-11-24 | End: 2018-09-24 | Stop reason: SDUPTHER

## 2017-11-24 RX ORDER — CHLORTHALIDONE 25 MG/1
25 TABLET ORAL DAILY
Qty: 90 TABLET | Refills: 3 | Status: SHIPPED | OUTPATIENT
Start: 2017-11-24 | End: 2018-05-22 | Stop reason: ALTCHOICE

## 2017-11-24 RX ORDER — LISINOPRIL 20 MG/1
1 TABLET ORAL DAILY
COMMUNITY
Start: 2017-10-24 | End: 2017-11-24 | Stop reason: SDUPTHER

## 2017-11-24 RX ORDER — LISINOPRIL 20 MG/1
20 TABLET ORAL DAILY
Status: CANCELLED | OUTPATIENT
Start: 2017-11-24

## 2017-11-24 NOTE — PROGRESS NOTES
Subjective:       Patient ID: Amber Naranjo is a 72 y.o. female.    Chief Complaint: Hypertension    Hypertension   This is a chronic problem. The current episode started more than 1 year ago. The problem is unchanged. The problem is uncontrolled. Pertinent negatives include no anxiety, blurred vision, chest pain, headaches, malaise/fatigue, neck pain, orthopnea, palpitations, peripheral edema, PND or sweats. There are no associated agents to hypertension. Risk factors for coronary artery disease include dyslipidemia, obesity, sedentary lifestyle, stress, post-menopausal state and family history. Past treatments include ACE inhibitors and beta blockers. The current treatment provides mild improvement. There are no compliance problems.      Review of Systems   Constitutional: Negative.  Negative for malaise/fatigue.   HENT: Negative.    Eyes: Negative for blurred vision.   Respiratory: Negative.    Cardiovascular: Positive for leg swelling (trace BLE). Negative for chest pain, palpitations, orthopnea and PND.   Gastrointestinal: Negative.    Musculoskeletal: Negative for arthralgias, myalgias and neck pain.   Skin: Negative.    Neurological: Negative for dizziness, weakness and headaches.   Psychiatric/Behavioral: Negative.        Objective:      Physical Exam   Constitutional: She is oriented to person, place, and time. Vital signs are normal. She appears well-developed and well-nourished.   Neck: Normal range of motion. Neck supple. No JVD present. No thyromegaly present.   Cardiovascular: Normal rate, regular rhythm, normal heart sounds and intact distal pulses.  Exam reveals no gallop and no friction rub.    No murmur heard.  Pulmonary/Chest: Effort normal and breath sounds normal. She has no wheezes. She has no rhonchi. She has no rales.   Abdominal: Soft. She exhibits no distension. There is no tenderness.   Neurological: She is alert and oriented to person, place, and time. She displays a negative Romberg sign.  Coordination and gait normal.   Skin: Skin is warm and dry. No rash noted.   Psychiatric: She has a normal mood and affect. Her behavior is normal. Thought content normal.       Assessment:       1. Essential hypertension        Plan:       *Essential hypertension  Changing from atenolol to metoprolol as pt states insurance informed her they will no longer cover atenolol  Adding chlorthalidone and have pt f/u in 3 weeks for recheck  -     metoprolol succinate (TOPROL-XL) 25 MG 24 hr tablet; Take 1 tablet (25 mg total) by mouth once daily.  Dispense: 90 tablet; Refill: 3  -     lisinopril (PRINIVIL,ZESTRIL) 20 MG tablet; Take 1 tablet (20 mg total) by mouth once daily.  Dispense: 90 tablet; Refill: 3  -     chlorthalidone (HYGROTEN) 25 MG Tab; Take 1 tablet (25 mg total) by mouth once daily.  Dispense: 90 tablet; Refill: 3  -     Basic metabolic panel; Future; Expected date: 12/08/2017      **

## 2017-11-24 NOTE — PATIENT INSTRUCTIONS
Heart Disease Education    The heart beats 60 to 100 times per minute, 24 hours a day. This equals almost 1000,000 times a day. It pumps blood with oxygen and nutrients to the tissues and organs of the body. But the heart is a muscle and needs its own supply of blood. Blood flow to the heart is supplied by the coronary arteries. Coronary artery disease (atherosclerosis) is a result of cholesterol, saturated fat, and calcium deposits (plaques) that build up inside the walls. This causes inflammation within the coronary arteries. These plaques narrow the artery and reduce blood flow to the heart muscle. The reduction in blood flow to the heart muscle decreases oxygen supply to the heart. If the narrowing is significant enough, the oxygen supply to one or more regions of the heart can be temporarily or permanently shut down. This can cause chest pain, and possibly death of heart tissue (heart attack).  Types of chest pain  Angina is the name for pain in the heart muscle. Angina is a warning sign of serious heart disease. When untreated it can lead to a heart attack, also known as acute myocardial infarction, or AMI. Angina occurs when there is not enough blood and oxygen flowing to the heart for the amount of work it is doing. This most often happens during physical exertion, when the heart is working hardest. It is usually relieved by rest or nitroglycerin. Angina may also occur after a large meal when extra blood is sent to the digestive organs and less goes to the heart. In the case of advanced or unstable heart disease, angina can occur at rest or awaken you from sleep. Angina usually lasts from a few minutes up to 20 minutes or more. When treated early, the effects of angina can be reversed without permanent damage to the heart. Angina is a serious condition and needs to be evaluated by a medical professional immediately.  There are two types of angina -- stable and unstable:  · Stable angina usually occurs  with a predictable level of activity. Being stable, its character, severity, and occurrence do not change much over time. It usually starts with activity, and resolves with rest or taking your medicine as instructed by your doctor. The symptoms usually do not last long.  · Unstable angina changes or gets worse over time. It is different from whatever you are used to. It may feel different or worse, begin without cause, occur with exercise or exertion, wake you up from sleep, and last longer. It may not respond in the same way as it does when you take your usual medicines for an attack. This type of angina can be a warning sign of an impending heart attack.     A heart attack is usually the result of a blood clot that suddenly forms in a coronary artery that has been narrowed with plaque. When this occurs, blood flow may be cut off to a part of the heart muscle, causing the cells to die. This weakens the pumping action of the heart, which affects the delivery of blood to all the other organs in the body including the brain. This damage is not reversible. However, early treatment can limit the amount of damage.  The pain you feel with angina and a heart attack may have a similar quality. However, it is usually different in intensity and duration. Here are some typical descriptions of a heart attack:  · It is most often experienced as a squeezing, crushing, pressure-like sensation in the center of the chest.  · It is sometimes described as something heavy sitting on my chest.  · It may feel more like a bad case of indigestion.  · The pain may spread from the chest to the arm, shoulder, throat or jaw.  · Sometimes the pain is not felt in the chest at all, but only in the arm, shoulder, throat or jaw.  · There may also be nausea, vomiting, dizziness or light-headedness, sweating and trouble breathing.  · Palpitations, or your heart beating rapidly  · A new, irregular heart beat  · Unexplained weakness  You may not be  "able to tell the difference between "bad" angina and a heart attack at home. Seek help if your symptoms are different than usual. Do not be in denial or just try to "tough it out."  Call 911  This is the fastest and safest way to get to the emergency department. The paramedics can also start treatment on the way to the hospital, saving valuable time for your heart.  · If the angina gets worse, if it continues, or if it stops and returns, call 911 immediately. Do not delay. You may be having a heart attack.  · After you call 911, take a second tablet or spray unless instructed otherwise. When repeating doses, sit down if possible, because it can make you feel lightheaded or dizzy. Wait another 5 minutes. If the angina still does not go away, take a third tablet or spray. Do not take more than 3 tablets or sprays within 15 minutes. Stay on the phone with 911 for further instruction.  · Your healthcare provider may give you slightly different instructions than those above. If so, follow them carefully.  Do not wait until symptoms become severe to call 911.  Other reasons to call 911 include:  · Trouble breathing  · Feeling lightheaded, faint, or dizzy  · Rapid heart beat  · Slower than usual heart rate compared to your normal  · Angina with weakness, dizziness, fainting, heavy sweating, nausea, or vomiting  · Extreme drowsiness, confusion  · Weakness of an arm or leg or one side of the face  · Difficulty with speech or vision  When to seek medical care  Remember, the signs and symptoms of a heart attack are not always like they are on TV. Sometimes they are not so obvious. You may only feel weak, or just not right. If it is not clear or if you have any doubt, call for advice.  · Seek help if there is a change in the type of pain, if it feels different, or if your symptoms are mild.  · Do not drive yourself. Have someone else drive you. If no one can drive, call 911.  · Do not delay. Fast diagnosis and treatment can " "prevent or limit the amount of heart damage during a heart attack.  · Do not go to your doctor's office or a clinic as they may not be able to provide all the testing and treatment required for this condition.  · If your doctor has given you medicine to take when symptoms occur, take them but don't delay getting help trying to locate medicines.  What happens in the emergency department  The emergency department is connected to your local emergency medical system (EMS) through 911. That's why during a cardiac emergency, calling 911 is the fastest way to get help. The goal of the emergency department is to rapidly screen, evaluate, and treat people.  Once you are there, an electrocardiogram (ECG or heart tracing) will be done. Blood samples may be taken to look for the presence of heart enzymes that leak from damaged heart cells and show if a heart attack is occurring. You will often be evaluated by a heart specialist (cardiologist) who decides the best course of action. In the case of severe angina or early heart attack, and depending on the circumstances, powerful "clot busting" medicines can be used to dissolve blood clots in the coronary artery. In other cases, you may be taken to a cardiac catheterization lab. Here, a tiny balloon-tipped catheter is advanced through blood vessels to the heart. There the balloon is inflated pushing open the blood vessel restoring blood flow.  Risk factors for heart disease  Risk factors for heart disease are a combination of genetic and lifestyle. Many risk factors work by either directly or indirectly damaging the blood vessels of the heart, or by increasing the risk of forming blood or cholesterol clots, which then clog up and block the arteries.     Examples of physical lifestyle risk factors:  · Cigarette smoking  · High blood pressure  · High blood cholesterol  · Use of stimulant drugs such as cocaine, crack, and amphetamines  · Eating a high-fat, high-cholesterol " meal  · Diabetes   · Obesity which increases risk for diabetes and high blood pressure  · Lack of regular physical activity     Examples of emotional lifestyle factors:  · Chronic high stress levels release stress hormones. These raise blood pressure and cholesterol level and makes blood clot more easily.  · Held-in anger, hostile or cynical attitude  · Social and emotional isolation, lack of intimacy  · Loss of relationship  · Depression  Other factors that increase the risk of heart attack that you cannot control :  · Age. The older you get beyond 40, the greater is your risk of significant coronary artery disease.  · Gender. More men than women get heart disease; but once past menopause, women who are not taking estrogen replacement have the same risk as men for a heart attack.  · Family history. If your mother, father, brother or sister has coronary artery disease, your risk of having it is higher than a person your age without this family history.  What can you do to decrease your risk  To reduce your risk of heart disease:  · Get regular checkups with your doctor.  · Take your medicines for blood pressure, cholesterol or diabetes as directed.  · Watch your diet. Eat a heart healthy diet choosing fresh foods, less salt, cholesterol, and fat  · Stop smoking. Get help if needed.  · Get regular exercise.  · Manage stress.  · Carry a list of medicines and doses in your wallet.  Date Last Reviewed: 12/30/2015  © 9705-6209 [employer]. [address] [place] [address], [place] [address]. All rights reserved. This information is not intended as a substitute for professional medical care. Always follow your healthcare professional's instructions.

## 2017-12-13 ENCOUNTER — TELEPHONE (OUTPATIENT)
Dept: INTERNAL MEDICINE | Facility: CLINIC | Age: 72
End: 2017-12-13

## 2017-12-13 NOTE — TELEPHONE ENCOUNTER
----- Message from Sunil Zaldivar sent at 12/12/2017  1:50 PM CST -----  Contact: self [phone]  Would like to consult with nurse regarding blood pressure.  Please call back at [phone].   Md Louie

## 2017-12-13 NOTE — TELEPHONE ENCOUNTER
Spoke with pt and she said she needed to make an appt for a BP check per Rupali Escoto. Pt scheduled for 12/19/17 on nurse visit schedule.

## 2017-12-19 ENCOUNTER — TELEPHONE (OUTPATIENT)
Dept: INTERNAL MEDICINE | Facility: CLINIC | Age: 72
End: 2017-12-19

## 2017-12-19 ENCOUNTER — CLINICAL SUPPORT (OUTPATIENT)
Dept: INTERNAL MEDICINE | Facility: CLINIC | Age: 72
End: 2017-12-19
Payer: MEDICARE

## 2017-12-19 VITALS
HEART RATE: 68 BPM | DIASTOLIC BLOOD PRESSURE: 76 MMHG | OXYGEN SATURATION: 97 % | SYSTOLIC BLOOD PRESSURE: 130 MMHG | RESPIRATION RATE: 18 BRPM

## 2017-12-19 PROCEDURE — 99999 PR PBB SHADOW E&M-EST. PATIENT-LVL II: CPT | Mod: PBBFAC,,,

## 2017-12-27 ENCOUNTER — OFFICE VISIT (OUTPATIENT)
Dept: OTOLARYNGOLOGY | Facility: CLINIC | Age: 72
End: 2017-12-27
Payer: MEDICARE

## 2017-12-27 VITALS
SYSTOLIC BLOOD PRESSURE: 123 MMHG | WEIGHT: 166.69 LBS | DIASTOLIC BLOOD PRESSURE: 67 MMHG | BODY MASS INDEX: 30.48 KG/M2 | TEMPERATURE: 98 F | HEART RATE: 69 BPM

## 2017-12-27 DIAGNOSIS — R43.8 HYPOSMIA: Primary | ICD-10-CM

## 2017-12-27 DIAGNOSIS — J30.89 CHRONIC NON-SEASONAL ALLERGIC RHINITIS, UNSPECIFIED TRIGGER: ICD-10-CM

## 2017-12-27 PROCEDURE — 31231 NASAL ENDOSCOPY DX: CPT | Mod: S$GLB,,, | Performed by: ORTHOPAEDIC SURGERY

## 2017-12-27 PROCEDURE — 99204 OFFICE O/P NEW MOD 45 MIN: CPT | Mod: 25,S$GLB,, | Performed by: ORTHOPAEDIC SURGERY

## 2017-12-27 PROCEDURE — 99999 PR PBB SHADOW E&M-EST. PATIENT-LVL III: CPT | Mod: PBBFAC,,, | Performed by: ORTHOPAEDIC SURGERY

## 2017-12-27 NOTE — PROGRESS NOTES
Subjective:       Patient ID: Amber Naranjo is a 72 y.o. female.    Chief Complaint: Problems tasting    Patient is a very pleasant 72 year old female here to see me today for evaluation of an alteration in her sense of smell.  She says that since a pneumonia in 2015 she has had a diminished sense of smell, as well as some alteration in her sense of smell.  For example, she used to enjoy the smell of coffee, but no longer finds it pleasant.  She has not noted any difficulty or change in her sense of taste.  She does not smoke, but her  does.  She does have nasal allergies, and is currently on claritin, singulair, and flonase daily.  She does not have frequent issues with heartburn, only with certain foods.      Review of Systems   Constitutional: Negative for chills, fatigue, fever and unexpected weight change.   HENT: Positive for congestion and postnasal drip. Negative for dental problem, ear discharge, ear pain, facial swelling, hearing loss, nosebleeds, rhinorrhea, sinus pressure, sneezing, sore throat, tinnitus, trouble swallowing and voice change.    Eyes: Negative for redness, itching and visual disturbance.   Respiratory: Negative for cough (intermittent), choking, shortness of breath and wheezing.    Cardiovascular: Negative for chest pain and palpitations.   Gastrointestinal: Negative for abdominal pain.        No reflux.   Musculoskeletal: Negative for gait problem.   Skin: Negative for rash.   Allergic/Immunologic: Positive for environmental allergies.   Neurological: Negative for dizziness, light-headedness and headaches.       Objective:      Physical Exam   Constitutional: She is oriented to person, place, and time. She appears well-developed and well-nourished. No distress.   HENT:   Head: Normocephalic and atraumatic.   Right Ear: Tympanic membrane, external ear and ear canal normal.   Left Ear: Tympanic membrane, external ear and ear canal normal.   Nose: Nose normal. No mucosal edema,  rhinorrhea, nasal deformity or septal deviation. No epistaxis. Right sinus exhibits no maxillary sinus tenderness and no frontal sinus tenderness. Left sinus exhibits no maxillary sinus tenderness and no frontal sinus tenderness.   Mouth/Throat: Uvula is midline, oropharynx is clear and moist and mucous membranes are normal. Mucous membranes are not pale and not dry. No oropharyngeal exudate or posterior oropharyngeal erythema.   Edentulous   Eyes: Conjunctivae, EOM and lids are normal. Pupils are equal, round, and reactive to light. Right eye exhibits no chemosis. Left eye exhibits no chemosis. Right conjunctiva is not injected. Left conjunctiva is not injected. No scleral icterus. Right eye exhibits normal extraocular motion and no nystagmus. Left eye exhibits normal extraocular motion and no nystagmus.   Neck: Trachea normal and phonation normal. No tracheal tenderness present. No tracheal deviation present. No thyroid mass and no thyromegaly present.   Cardiovascular: Intact distal pulses.    Pulmonary/Chest: Effort normal. No stridor. No respiratory distress.   Abdominal: She exhibits no distension.   Lymphadenopathy:        Head (right side): No submental, no submandibular, no preauricular, no posterior auricular and no occipital adenopathy present.        Head (left side): No submental, no submandibular, no preauricular, no posterior auricular and no occipital adenopathy present.     She has no cervical adenopathy.   Neurological: She is alert and oriented to person, place, and time. No cranial nerve deficit.   Skin: Skin is warm and dry. No rash noted. No erythema.   Psychiatric: She has a normal mood and affect. Her behavior is normal.       PROCEDURE NOTE:  Nasal endoscopy and debridement  Preprocedure diagnosis:  Chronic sinusitis  Postprocedure diangosis:  Same  Complications:  None  Blood Loss:  None    Procedure in detail:  After verbal consent was obtained, the patient's nasal cavity was anesthesized  using topical Tetracaine and Neosynepherine.  A rigid 30 degree endoscope was placed in first the right, then the left nasal cavity.  The inferior and middle turbinates were examined, and found to be normal bilaterally.  The middle meatus and maxillary antrum was also examined, and found to be normal bilaterally.  No purulent drainage or masses seen.  The patient tolerated the procedure well and there were no complications.            Assessment:       1. Hyposmia    2. Chronic non-seasonal allergic rhinitis, unspecified trigger        Plan:       1.  Hyposmia:  She has had an issue with hyposmia following a pneumonia in 2015.  Discussed that she may have had a viral insult to her olfactory nerve, and is not likely to have significant recovery at this point.  No indication for central imaging at this time (no phantosmia, etc.).  I would recommend doing what she can to maximize her nasal airflow, and would like for her to add saline nasal spray to her daily regimen.  Also, avoid cigarette smoke from her .  2.  AR:  Currently on maximal medical therapy including claritin, singulair and flonase.  Continue with those medications daily.

## 2017-12-27 NOTE — LETTER
December 27, 2017      [place]  [id number] [address] [address]           [facility]  [address] [facility] [address]  [facility] [address]  Phone: [phone]  Fax: [phone]          Patient: Amber Naranjo   MR Number: [id number]   YOB: 1945   Date of Visit: 12/27/2017       Dear Dr. Wei Roa:    Thank you for referring Amber Naranjo to me for evaluation. Attached you will find relevant portions of my assessment and plan of care.    If you have questions, please do not hesitate to call me. I look forward to following Amber Naranjo along with you.    Sincerely,    Liliam Rosales MD    Enclosure  CC:  No Recipients    If you would like to receive this communication electronically, please contact [email or web address] or [phone] to request more information on [facility] access.    For providers and/or their staff who would like to refer a patient to [facility], please contact us through our one-stop-shop provider referral line, [facility], at [phone].    If you feel you have received this communication in error or would no longer like to receive these types of communications, please e-mail [email or web address]

## 2018-02-20 ENCOUNTER — PATIENT OUTREACH (OUTPATIENT)
Dept: ADMINISTRATIVE | Facility: HOSPITAL | Age: 73
End: 2018-02-20

## 2018-02-20 DIAGNOSIS — E78.5 HYPERLIPIDEMIA, UNSPECIFIED HYPERLIPIDEMIA TYPE: ICD-10-CM

## 2018-02-23 ENCOUNTER — OFFICE VISIT (OUTPATIENT)
Dept: INTERNAL MEDICINE | Facility: CLINIC | Age: 73
End: 2018-02-23
Payer: MEDICARE

## 2018-02-23 ENCOUNTER — LAB VISIT (OUTPATIENT)
Dept: LAB | Facility: HOSPITAL | Age: 73
End: 2018-02-23
Attending: NURSE PRACTITIONER
Payer: MEDICARE

## 2018-02-23 VITALS
DIASTOLIC BLOOD PRESSURE: 72 MMHG | WEIGHT: 168 LBS | BODY MASS INDEX: 30.91 KG/M2 | HEART RATE: 64 BPM | SYSTOLIC BLOOD PRESSURE: 116 MMHG | TEMPERATURE: 97 F | HEIGHT: 62 IN | RESPIRATION RATE: 16 BRPM | OXYGEN SATURATION: 97 %

## 2018-02-23 DIAGNOSIS — E78.5 HYPERLIPIDEMIA, UNSPECIFIED HYPERLIPIDEMIA TYPE: ICD-10-CM

## 2018-02-23 DIAGNOSIS — I10 ESSENTIAL HYPERTENSION: Primary | ICD-10-CM

## 2018-02-23 DIAGNOSIS — S60.559A FOREIGN BODY IN HAND, INITIAL ENCOUNTER: ICD-10-CM

## 2018-02-23 DIAGNOSIS — I10 ESSENTIAL HYPERTENSION: ICD-10-CM

## 2018-02-23 LAB
ANION GAP SERPL CALC-SCNC: 8 MMOL/L
BUN SERPL-MCNC: 17 MG/DL
CALCIUM SERPL-MCNC: 10.1 MG/DL
CHLORIDE SERPL-SCNC: 102 MMOL/L
CHOLEST SERPL-MCNC: 158 MG/DL
CHOLEST/HDLC SERPL: 3.2 {RATIO}
CO2 SERPL-SCNC: 30 MMOL/L
CREAT SERPL-MCNC: 0.8 MG/DL
EST. GFR  (AFRICAN AMERICAN): >60 ML/MIN/1.73 M^2
EST. GFR  (NON AFRICAN AMERICAN): >60 ML/MIN/1.73 M^2
GLUCOSE SERPL-MCNC: 107 MG/DL
HDLC SERPL-MCNC: 49 MG/DL
HDLC SERPL: 31 %
LDLC SERPL CALC-MCNC: 85.2 MG/DL
NONHDLC SERPL-MCNC: 109 MG/DL
POTASSIUM SERPL-SCNC: 4.4 MMOL/L
SODIUM SERPL-SCNC: 140 MMOL/L
TRIGL SERPL-MCNC: 119 MG/DL

## 2018-02-23 PROCEDURE — 99999 PR PBB SHADOW E&M-EST. PATIENT-LVL V: CPT | Mod: PBBFAC,,, | Performed by: NURSE PRACTITIONER

## 2018-02-23 PROCEDURE — 80061 LIPID PANEL: CPT

## 2018-02-23 PROCEDURE — 1126F AMNT PAIN NOTED NONE PRSNT: CPT | Mod: S$GLB,,, | Performed by: NURSE PRACTITIONER

## 2018-02-23 PROCEDURE — 80048 BASIC METABOLIC PNL TOTAL CA: CPT | Mod: PO

## 2018-02-23 PROCEDURE — 3008F BODY MASS INDEX DOCD: CPT | Mod: S$GLB,,, | Performed by: NURSE PRACTITIONER

## 2018-02-23 PROCEDURE — 1159F MED LIST DOCD IN RCRD: CPT | Mod: S$GLB,,, | Performed by: NURSE PRACTITIONER

## 2018-02-23 PROCEDURE — 36415 COLL VENOUS BLD VENIPUNCTURE: CPT | Mod: PO

## 2018-02-23 PROCEDURE — 99499 UNLISTED E&M SERVICE: CPT | Mod: S$GLB,,, | Performed by: NURSE PRACTITIONER

## 2018-02-23 PROCEDURE — 99214 OFFICE O/P EST MOD 30 MIN: CPT | Mod: S$GLB,,, | Performed by: NURSE PRACTITIONER

## 2018-02-23 NOTE — PROGRESS NOTES
Subjective:       Patient ID: Amber Naranjo is a 72 y.o. female.    Chief Complaint: Follow-up (3 month); Hypertension; and Hand Pain    Hypertension   This is a chronic problem. The current episode started more than 1 year ago. The problem has been rapidly improving since onset. The problem is controlled. Pertinent negatives include no anxiety, blurred vision, chest pain, headaches, malaise/fatigue, neck pain, orthopnea, palpitations, peripheral edema, PND, shortness of breath or sweats. There are no associated agents to hypertension. Risk factors for coronary artery disease include dyslipidemia, obesity, sedentary lifestyle, stress, post-menopausal state and family history. Past treatments include ACE inhibitors and beta blockers. The current treatment provides mild improvement. There are no compliance problems.    Hand Pain    Incident onset: 5 months. The incident occurred at home. Injury mechanism: splinter in hand. The pain is present in the left hand. The quality of the pain is described as aching (only when grabbing something). The pain is mild. Pertinent negatives include no chest pain. The symptoms are aggravated by movement, palpation, lifting and a foreign body (splinter stuck). She has tried nothing for the symptoms.     Review of Systems   Constitutional: Negative.  Negative for malaise/fatigue.   HENT: Negative.    Eyes: Negative for blurred vision.   Respiratory: Negative for chest tightness, shortness of breath and wheezing.    Cardiovascular: Negative for chest pain, palpitations, orthopnea, leg swelling and PND.   Gastrointestinal: Negative for abdominal pain, constipation, diarrhea, nausea and vomiting.   Musculoskeletal: Negative for myalgias and neck pain.   Skin: Negative for rash.   Neurological: Negative for dizziness, weakness, light-headedness and headaches.       Objective:      Physical Exam   Constitutional: She is oriented to person, place, and time. Vital signs are normal. She appears  well-developed and well-nourished.   Neck: Normal range of motion. Neck supple. No JVD present. No thyromegaly present.   Cardiovascular: Normal rate, regular rhythm, normal heart sounds and intact distal pulses.  Exam reveals no gallop and no friction rub.    No murmur heard.  Pulmonary/Chest: Effort normal and breath sounds normal. She has no wheezes. She has no rhonchi. She has no rales.   Abdominal: Soft. She exhibits no distension. There is no tenderness.   Musculoskeletal:        Left hand: She exhibits tenderness. She exhibits no bony tenderness and no deformity. Normal sensation noted. Normal strength noted.        Hands:  Neurological: She is alert and oriented to person, place, and time. She displays a negative Romberg sign. Coordination and gait normal.   Skin: Skin is warm and dry. No rash noted.   Psychiatric: She has a normal mood and affect. Her behavior is normal. Thought content normal.       Assessment:       1. Essential hypertension    2. Foreign body in hand, initial encounter        Plan:     Problem List Items Addressed This Visit        Cardiac/Vascular    Essential hypertension - Primary    Current Assessment & Plan     Controlled, continue current meds and follow with Dr Roa in 3 months            Other    Foreign body in hand, initial encounter    Current Assessment & Plan     Since FB is possibly associated with tendon will send to hand specialist to have removed         Relevant Orders    Ambulatory Referral to Orthopedics

## 2018-04-11 ENCOUNTER — LAB VISIT (OUTPATIENT)
Dept: LAB | Facility: HOSPITAL | Age: 73
End: 2018-04-11
Attending: FAMILY MEDICINE
Payer: MEDICARE

## 2018-04-11 ENCOUNTER — OFFICE VISIT (OUTPATIENT)
Dept: INTERNAL MEDICINE | Facility: CLINIC | Age: 73
End: 2018-04-11
Payer: MEDICARE

## 2018-04-11 VITALS
HEART RATE: 63 BPM | SYSTOLIC BLOOD PRESSURE: 126 MMHG | OXYGEN SATURATION: 99 % | RESPIRATION RATE: 16 BRPM | WEIGHT: 169.31 LBS | TEMPERATURE: 97 F | DIASTOLIC BLOOD PRESSURE: 62 MMHG | HEIGHT: 62 IN | BODY MASS INDEX: 31.16 KG/M2

## 2018-04-11 DIAGNOSIS — R55 SYNCOPE, UNSPECIFIED SYNCOPE TYPE: Primary | ICD-10-CM

## 2018-04-11 DIAGNOSIS — R55 SYNCOPE, UNSPECIFIED SYNCOPE TYPE: ICD-10-CM

## 2018-04-11 LAB
ALBUMIN SERPL BCP-MCNC: 3.8 G/DL
ALP SERPL-CCNC: 110 U/L
ALT SERPL W/O P-5'-P-CCNC: 29 U/L
ANION GAP SERPL CALC-SCNC: 10 MMOL/L
AST SERPL-CCNC: 28 U/L
BASOPHILS # BLD AUTO: 0.04 K/UL
BASOPHILS NFR BLD: 0.5 %
BILIRUB SERPL-MCNC: 0.7 MG/DL
BUN SERPL-MCNC: 22 MG/DL
CALCIUM SERPL-MCNC: 10.4 MG/DL
CHLORIDE SERPL-SCNC: 101 MMOL/L
CO2 SERPL-SCNC: 29 MMOL/L
CREAT SERPL-MCNC: 0.9 MG/DL
DIFFERENTIAL METHOD: ABNORMAL
EOSINOPHIL # BLD AUTO: 0.3 K/UL
EOSINOPHIL NFR BLD: 4 %
ERYTHROCYTE [DISTWIDTH] IN BLOOD BY AUTOMATED COUNT: 12.8 %
EST. GFR  (AFRICAN AMERICAN): >60 ML/MIN/1.73 M^2
EST. GFR  (NON AFRICAN AMERICAN): >60 ML/MIN/1.73 M^2
GLUCOSE SERPL-MCNC: 102 MG/DL
HCT VFR BLD AUTO: 38.4 %
HGB BLD-MCNC: 12.7 G/DL
LYMPHOCYTES # BLD AUTO: 1.7 K/UL
LYMPHOCYTES NFR BLD: 20.4 %
MCH RBC QN AUTO: 31.3 PG
MCHC RBC AUTO-ENTMCNC: 33.1 G/DL
MCV RBC AUTO: 95 FL
MONOCYTES # BLD AUTO: 0.5 K/UL
MONOCYTES NFR BLD: 6.5 %
NEUTROPHILS # BLD AUTO: 5.6 K/UL
NEUTROPHILS NFR BLD: 68.4 %
PLATELET # BLD AUTO: 176 K/UL
PMV BLD AUTO: 11.1 FL
POTASSIUM SERPL-SCNC: 4.2 MMOL/L
PROT SERPL-MCNC: 7.3 G/DL
RBC # BLD AUTO: 4.06 M/UL
SODIUM SERPL-SCNC: 140 MMOL/L
WBC # BLD AUTO: 8.18 K/UL

## 2018-04-11 PROCEDURE — 3074F SYST BP LT 130 MM HG: CPT | Mod: CPTII,S$GLB,, | Performed by: FAMILY MEDICINE

## 2018-04-11 PROCEDURE — 85025 COMPLETE CBC W/AUTO DIFF WBC: CPT | Mod: PO

## 2018-04-11 PROCEDURE — 36415 COLL VENOUS BLD VENIPUNCTURE: CPT | Mod: PO

## 2018-04-11 PROCEDURE — 99214 OFFICE O/P EST MOD 30 MIN: CPT | Mod: S$GLB,,, | Performed by: FAMILY MEDICINE

## 2018-04-11 PROCEDURE — 93005 ELECTROCARDIOGRAM TRACING: CPT | Mod: S$GLB,,, | Performed by: FAMILY MEDICINE

## 2018-04-11 PROCEDURE — 3078F DIAST BP <80 MM HG: CPT | Mod: CPTII,S$GLB,, | Performed by: FAMILY MEDICINE

## 2018-04-11 PROCEDURE — 93010 ELECTROCARDIOGRAM REPORT: CPT | Mod: S$GLB,,, | Performed by: INTERNAL MEDICINE

## 2018-04-11 PROCEDURE — 80053 COMPREHEN METABOLIC PANEL: CPT | Mod: PO

## 2018-04-11 PROCEDURE — 99999 PR PBB SHADOW E&M-EST. PATIENT-LVL III: CPT | Mod: PBBFAC,,, | Performed by: FAMILY MEDICINE

## 2018-04-11 NOTE — PROGRESS NOTES
Subjective:       Patient ID: Amber Naranjo is a 72 y.o. female.    Chief Complaint: Fall; Dizziness; and Neck Pain    HPI  Amber is here today with concerns about some neck pain and mild headache that is been lasting ever since she had a recent fall.  She said that she fell backwards after feeling lightheaded.  She has not had any episodes since that time.  Does not have any lightheadedness or dizziness whenever she stands up quickly.  Denies having any chest pain or shortness of breath.  No vision problems or changes.  She has not had any medication changes and has been adherent to what is on her medication list.    Family History   Problem Relation Age of Onset    Glaucoma Mother     Hypertension Father     Asthma Daughter     Colon cancer Sister     Cancer Sister 70     colon    Diabetes Sister     Hypertension Sister     Hyperlipidemia Sister     Diabetes Brother     Hypertension Brother     Heart disease Brother     Hyperlipidemia Brother     Asthma Paternal Aunt     Diabetes Paternal Uncle     Diabetes Maternal Grandmother     Early death Maternal Grandfather     Early death Paternal Grandfather     Breast cancer Cousin     COPD Neg Hx     Kidney disease Neg Hx     Stroke Neg Hx     Mental illness Neg Hx        Current Outpatient Prescriptions:     aspirin (ECOTRIN) 81 MG EC tablet, Take 81 mg by mouth once daily., Disp: , Rfl:     atorvastatin (LIPITOR) 20 MG tablet, TAKE 1 TABLET EVERY DAY, Disp: 90 tablet, Rfl: 2    calcium carbonate (OS-NERI) 600 mg (1,500 mg) Tab, Take 600 mg by mouth 2 (two) times daily with meals., Disp: , Rfl:     calcium-magnesium-zinc 333-133-5 mg Tab, Take by mouth once daily., Disp: , Rfl:     chlorthalidone (HYGROTEN) 25 MG Tab, Take 1 tablet (25 mg total) by mouth once daily., Disp: 90 tablet, Rfl: 3    cyanocobalamin, vitamin B-12, (VITAMIN B-12) 50 mcg tablet, Take 50 mcg by mouth once daily., Disp: , Rfl:     fluticasone (FLONASE) 50 mcg/actuation  "nasal spray, USE 1 SPRAY IN EACH NOSTRIL ONE TIME DAILY, Disp: 32 g, Rfl: 5    lisinopril (PRINIVIL,ZESTRIL) 20 MG tablet, Take 1 tablet (20 mg total) by mouth once daily., Disp: 90 tablet, Rfl: 3    loratadine (CLARITIN) 10 mg tablet, Take 10 mg by mouth once daily., Disp: , Rfl:     metoprolol succinate (TOPROL-XL) 25 MG 24 hr tablet, Take 1 tablet (25 mg total) by mouth once daily., Disp: 90 tablet, Rfl: 3    montelukast (SINGULAIR) 10 mg tablet, TAKE 1 TABLET EVERY EVENING, Disp: 90 tablet, Rfl: 2    multivitamin capsule, Take 1 capsule by mouth once daily., Disp: , Rfl:     SODIUM CHLORIDE (SALINE SPRAY MISC), by Misc.(Non-Drug; Combo Route) route., Disp: , Rfl:     timolol 0.5 % ophthalmic solution, 1 drop every evening. , Disp: , Rfl:     FLUZONE HIGH-DOSE 2017-18, PF, 180 mcg/0.5 mL vaccine, , Disp: , Rfl:     Review of Systems   Constitutional: Negative for chills and fever.   Respiratory: Negative for cough and shortness of breath.    Cardiovascular: Negative for chest pain.   Gastrointestinal: Negative for abdominal pain.   Musculoskeletal: Positive for neck pain.   Skin: Negative for rash.   Neurological: Positive for light-headedness and headaches. Negative for dizziness.       Objective:   /62 (BP Location: Right arm, Patient Position: Sitting, BP Method: Medium (Manual))   Pulse 63   Temp 96.9 °F (36.1 °C) (Tympanic)   Resp 16   Ht 5' 2" (1.575 m)   Wt 76.8 kg (169 lb 5 oz)   SpO2 99%   BMI 30.97 kg/m²      Physical Exam   Constitutional: She is oriented to person, place, and time. She appears well-developed and well-nourished. No distress.   HENT:   Head: Normocephalic and atraumatic.   Nose: Nose normal.   Eyes: Conjunctivae and EOM are normal. Pupils are equal, round, and reactive to light. Right eye exhibits no discharge. Left eye exhibits no discharge.   Neck: No thyromegaly present.   Cardiovascular: Normal rate and regular rhythm.    No murmur heard.  Pulmonary/Chest: " Effort normal and breath sounds normal. No respiratory distress.   Abdominal: Soft. She exhibits no distension.   Musculoskeletal: She exhibits no edema.   Normal strength of upper and lower extremities   Neurological: She is alert and oriented to person, place, and time. No cranial nerve deficit. Coordination normal.   Skin: No rash noted. She is not diaphoretic.   Psychiatric: She has a normal mood and affect. Her behavior is normal.       Assessment & Plan     Problem List Items Addressed This Visit        Other    Syncope - Primary    Current Assessment & Plan     EKG showed normal sinus rhythm.  We did orthostatic blood pressure measurements here in the clinic also and these were not positive for postural hypotension.  Physical exam normal.  She reported that she had issues like this in the past whenever she was dehydrated from not drinking enough.  I told her that if she starts to feel lightheaded whenever she stands up, she should let me know as her blood pressure is a bit on the low side and we may need to consider backing off on some of her medication.  The first thing I would think about decreasing would be the metoprolol.         Relevant Orders    IN OFFICE EKG 12-LEAD (to Muse) (Completed)    CBC auto differential (Completed)    Comprehensive metabolic panel (Completed)            No Follow-up on file.

## 2018-04-13 PROBLEM — R55 SYNCOPE: Status: ACTIVE | Noted: 2018-04-13

## 2018-04-13 NOTE — ASSESSMENT & PLAN NOTE
EKG showed normal sinus rhythm.  We did orthostatic blood pressure measurements here in the clinic also and these were not positive for postural hypotension.  Physical exam normal.  She reported that she had issues like this in the past whenever she was dehydrated from not drinking enough.  I told her that if she starts to feel lightheaded whenever she stands up, she should let me know as her blood pressure is a bit on the low side and we may need to consider backing off on some of her medication.  The first thing I would think about decreasing would be the metoprolol.

## 2018-05-09 RX ORDER — ATORVASTATIN CALCIUM 20 MG/1
TABLET, FILM COATED ORAL
Qty: 90 TABLET | Refills: 2 | Status: SHIPPED | OUTPATIENT
Start: 2018-05-09 | End: 2018-12-11 | Stop reason: SDUPTHER

## 2018-05-10 RX ORDER — MONTELUKAST SODIUM 10 MG/1
TABLET ORAL
Qty: 90 TABLET | Refills: 2 | Status: SHIPPED | OUTPATIENT
Start: 2018-05-10 | End: 2018-12-11 | Stop reason: SDUPTHER

## 2018-05-22 ENCOUNTER — TELEPHONE (OUTPATIENT)
Dept: INTERNAL MEDICINE | Facility: CLINIC | Age: 73
End: 2018-05-22

## 2018-05-22 ENCOUNTER — OFFICE VISIT (OUTPATIENT)
Dept: INTERNAL MEDICINE | Facility: CLINIC | Age: 73
End: 2018-05-22
Payer: MEDICARE

## 2018-05-22 VITALS
WEIGHT: 170.88 LBS | HEIGHT: 62 IN | DIASTOLIC BLOOD PRESSURE: 62 MMHG | BODY MASS INDEX: 31.45 KG/M2 | OXYGEN SATURATION: 98 % | SYSTOLIC BLOOD PRESSURE: 100 MMHG | HEART RATE: 69 BPM | RESPIRATION RATE: 16 BRPM | TEMPERATURE: 98 F

## 2018-05-22 DIAGNOSIS — I10 ESSENTIAL HYPERTENSION: ICD-10-CM

## 2018-05-22 PROBLEM — R55 SYNCOPE: Status: RESOLVED | Noted: 2018-04-13 | Resolved: 2018-05-22

## 2018-05-22 PROCEDURE — 3078F DIAST BP <80 MM HG: CPT | Mod: CPTII,S$GLB,, | Performed by: NURSE PRACTITIONER

## 2018-05-22 PROCEDURE — 99999 PR PBB SHADOW E&M-EST. PATIENT-LVL IV: CPT | Mod: PBBFAC,,, | Performed by: NURSE PRACTITIONER

## 2018-05-22 PROCEDURE — 3074F SYST BP LT 130 MM HG: CPT | Mod: CPTII,S$GLB,, | Performed by: NURSE PRACTITIONER

## 2018-05-22 PROCEDURE — 99214 OFFICE O/P EST MOD 30 MIN: CPT | Mod: S$GLB,,, | Performed by: NURSE PRACTITIONER

## 2018-05-22 RX ORDER — TIMOLOL MALEATE 5 MG/ML
SOLUTION/ DROPS OPHTHALMIC
COMMUNITY
Start: 2018-05-10 | End: 2018-05-22 | Stop reason: SDUPTHER

## 2018-05-22 RX ORDER — ACETAMINOPHEN 500 MG
TABLET ORAL
COMMUNITY
Start: 2018-05-07 | End: 2024-02-21

## 2018-05-22 NOTE — ASSESSMENT & PLAN NOTE
No more syncopal episodes, but believe her dizziness is from hypotension as she is borderline low in the office today. She also states at one point this week she checked at home and it was 106/49. Will D/C chlorthalidone reevaluate her BP at her [facility] in 3 weeks

## 2018-05-22 NOTE — TELEPHONE ENCOUNTER
----- Message from Janis Adams sent at 5/22/2018  8:23 AM CDT -----  Contact: patient  Calling concerning feeling week at times. States her medication has changed and could be the problem. Please call patient ASAP @ [phone]. Thanks, paresh [facility] [address] [place] [id number] [employer] [facility]  [address] [place] [facility] [address]  Phone: [phone] Fax: [phone]

## 2018-05-22 NOTE — PROGRESS NOTES
Subjective:       Patient ID: Amber Naranjo is a 72 y.o. female.    Chief Complaint: Dizziness    Dizziness:   Chronicity:  New  Onset:  More than 1 month ago  Progression since onset:  Waxing and waning  Severity:  Moderate  Dizziness characteristics:  Lightheaded/impending faint  Frequency of Spells:  Daily (in the morning)   Associated symptoms: weakness, light-headedness and syncope (once about a month ago).no ear congestion, no ear pain, no fever, no headaches, no nausea, no vomiting, no diaphoresis, no palpitations, no facial weakness and no chest pain.  Aggravated by:  Exertion and getting up  Treatments tried: increased fluids.  Improvements on treatment:  Mild    Review of Systems   Constitutional: Negative for diaphoresis and fever.   HENT: Negative.  Negative for ear pain.    Respiratory: Negative for chest tightness, shortness of breath and wheezing.    Cardiovascular: Positive for syncope (once about a month ago). Negative for chest pain, palpitations and leg swelling.   Gastrointestinal: Negative for abdominal pain, constipation, diarrhea, nausea and vomiting.   Musculoskeletal: Negative for arthralgias, myalgias and neck stiffness.   Skin: Negative for color change, rash and wound.   Neurological: Positive for dizziness, weakness and light-headedness. Negative for headaches.   Psychiatric/Behavioral: Negative.        Objective:      Physical Exam   Constitutional: She is oriented to person, place, and time. She appears well-developed and well-nourished. No distress.   HENT:   Head: Normocephalic and atraumatic.   Nose: Nose normal.   Eyes: Conjunctivae and EOM are normal. Pupils are equal, round, and reactive to light. Right eye exhibits no discharge. Left eye exhibits no discharge.   Neck: No JVD present. No thyromegaly present.   Cardiovascular: Normal rate and regular rhythm.    No murmur heard.  Pulmonary/Chest: Effort normal and breath sounds normal. No respiratory distress.   Abdominal: Soft. She  exhibits no distension.   Musculoskeletal: She exhibits no edema.   Normal strength of upper and lower extremities   Neurological: She is alert and oriented to person, place, and time. No cranial nerve deficit. Coordination normal.   Skin: No rash noted. She is not diaphoretic.   Psychiatric: She has a normal mood and affect. Her behavior is normal.       Assessment:       1. Essential hypertension        Plan:     Problem List Items Addressed This Visit        Cardiac/Vascular    Essential hypertension    Current Assessment & Plan     No more syncopal episodes, but believe her dizziness is from hypotension as she is borderline low in the office today. She also states at one point this week she checked at home and it was 106/49. Will D/C chlorthalidone reevaluate her BP at her [facility] in 3 weeks             Follow-up in about 3 weeks (around 6/14/2018) for bp check.

## 2018-09-07 ENCOUNTER — TELEPHONE (OUTPATIENT)
Dept: INTERNAL MEDICINE | Facility: CLINIC | Age: 73
End: 2018-09-07

## 2018-09-07 NOTE — TELEPHONE ENCOUNTER
Patient call stating she got a letter saying it is time for her mammogram in October. Informed patient we can schedule at her upcoming appointment on 09/17. Patient agrees

## 2018-09-07 NOTE — TELEPHONE ENCOUNTER
----- Message from Janis Adams sent at 9/7/2018  8:06 AM CDT -----  Contact: patient  Calling concerning getting her mammogram linked. Please call patient @ [phone]. Thanks, paresh

## 2018-09-10 DIAGNOSIS — I10 ESSENTIAL HYPERTENSION: ICD-10-CM

## 2018-09-10 RX ORDER — METOPROLOL SUCCINATE 25 MG/1
TABLET, EXTENDED RELEASE ORAL
Qty: 90 TABLET | Refills: 3 | Status: SHIPPED | OUTPATIENT
Start: 2018-09-10 | End: 2019-06-19 | Stop reason: SDUPTHER

## 2018-09-10 RX ORDER — CHLORTHALIDONE 25 MG/1
TABLET ORAL
Qty: 90 TABLET | Refills: 3 | Status: SHIPPED | OUTPATIENT
Start: 2018-09-10 | End: 2018-09-17 | Stop reason: ALTCHOICE

## 2018-09-17 ENCOUNTER — OFFICE VISIT (OUTPATIENT)
Dept: INTERNAL MEDICINE | Facility: CLINIC | Age: 73
End: 2018-09-17
Payer: MEDICARE

## 2018-09-17 VITALS
HEIGHT: 62 IN | HEART RATE: 61 BPM | SYSTOLIC BLOOD PRESSURE: 150 MMHG | WEIGHT: 172.81 LBS | BODY MASS INDEX: 31.8 KG/M2 | TEMPERATURE: 97 F | DIASTOLIC BLOOD PRESSURE: 68 MMHG | RESPIRATION RATE: 16 BRPM | OXYGEN SATURATION: 98 %

## 2018-09-17 DIAGNOSIS — M85.89 OSTEOPENIA OF MULTIPLE SITES: ICD-10-CM

## 2018-09-17 DIAGNOSIS — Z78.0 POSTMENOPAUSAL: ICD-10-CM

## 2018-09-17 DIAGNOSIS — Z12.31 ENCOUNTER FOR SCREENING MAMMOGRAM FOR BREAST CANCER: Primary | ICD-10-CM

## 2018-09-17 DIAGNOSIS — M72.2 PLANTAR FASCIITIS OF RIGHT FOOT: ICD-10-CM

## 2018-09-17 DIAGNOSIS — I10 ESSENTIAL HYPERTENSION: ICD-10-CM

## 2018-09-17 PROBLEM — S60.559A: Status: RESOLVED | Noted: 2018-02-23 | Resolved: 2018-09-17

## 2018-09-17 PROCEDURE — 90662 IIV NO PRSV INCREASED AG IM: CPT | Mod: PBBFAC,PO

## 2018-09-17 PROCEDURE — 3078F DIAST BP <80 MM HG: CPT | Mod: CPTII,,, | Performed by: FAMILY MEDICINE

## 2018-09-17 PROCEDURE — 99214 OFFICE O/P EST MOD 30 MIN: CPT | Mod: PBBFAC,PO,25 | Performed by: FAMILY MEDICINE

## 2018-09-17 PROCEDURE — 3077F SYST BP >= 140 MM HG: CPT | Mod: CPTII,,, | Performed by: FAMILY MEDICINE

## 2018-09-17 PROCEDURE — 1100F PTFALLS ASSESS-DOCD GE2>/YR: CPT | Mod: CPTII,,, | Performed by: FAMILY MEDICINE

## 2018-09-17 PROCEDURE — 99999 PR PBB SHADOW E&M-EST. PATIENT-LVL IV: CPT | Mod: PBBFAC,,, | Performed by: FAMILY MEDICINE

## 2018-09-17 PROCEDURE — 3288F FALL RISK ASSESSMENT DOCD: CPT | Mod: CPTII,,, | Performed by: FAMILY MEDICINE

## 2018-09-17 PROCEDURE — 99214 OFFICE O/P EST MOD 30 MIN: CPT | Mod: S$PBB,,, | Performed by: FAMILY MEDICINE

## 2018-09-17 RX ORDER — HYDROCHLOROTHIAZIDE 12.5 MG/1
12.5 TABLET ORAL DAILY
Qty: 90 TABLET | Refills: 3 | Status: SHIPPED | OUTPATIENT
Start: 2018-09-17 | End: 2019-06-19 | Stop reason: SDUPTHER

## 2018-09-17 NOTE — ASSESSMENT & PLAN NOTE
Elevated today.  Starting her on a low dose of hydrochlorothiazide with those medications.  Will have her follow-up in about 4 weeks for monitoring of effectiveness

## 2018-09-17 NOTE — PROGRESS NOTES
Subjective:       Patient ID: Amber Naranjo is a 72 y.o. female.    Chief Complaint: Follow-up (HTN) and Pain (rt heel )    HPI  Came in today to follow-up on hypertension.  At the last visit she was discontinued from chlorthalidone because of blood pressure was actually to low.  She has been feeling fine recently but today her blood pressure is elevated.  She is not taking the chlorthalidone any longer.  A new problem that she is having his right heel pain.  Says it is worse whenever she 1st gets up in the morning.  Also bother her sometimes throughout the day.  She has tried massaging with a frozen water bottle as she was told to do that the past but has not done it any time recently.  She does not have any injury to her foot.  Walking on it makes it worse.  Nothing really has made it better.  Family History   Problem Relation Age of Onset    Glaucoma Mother     Hypertension Father     Asthma Daughter     Colon cancer Sister     Cancer Sister 70        colon    Diabetes Sister     Hypertension Sister     Hyperlipidemia Sister     Diabetes Brother     Hypertension Brother     Heart disease Brother     Hyperlipidemia Brother     Asthma Paternal Aunt     Diabetes Paternal Uncle     Diabetes Maternal Grandmother     Early death Maternal Grandfather     Early death Paternal Grandfather     Breast cancer Cousin     COPD Neg Hx     Kidney disease Neg Hx     Stroke Neg Hx     Mental illness Neg Hx        Current Outpatient Medications:     aspirin (ECOTRIN) 81 MG EC tablet, Take 81 mg by mouth once daily., Disp: , Rfl:     atorvastatin (LIPITOR) 20 MG tablet, TAKE 1 TABLET EVERY DAY, Disp: 90 tablet, Rfl: 2    blood pressure monitor Kit, , Disp: , Rfl:     calcium carbonate (OS-NERI) 600 mg (1,500 mg) Tab, Take 600 mg by mouth 2 (two) times daily with meals., Disp: , Rfl:     calcium-magnesium-zinc 333-133-5 mg Tab, Take by mouth once daily., Disp: , Rfl:     fluticasone (FLONASE) 50 mcg/actuation  "nasal spray, USE 1 SPRAY IN EACH NOSTRIL ONE TIME DAILY, Disp: 32 g, Rfl: 5    lisinopril (PRINIVIL,ZESTRIL) 20 MG tablet, Take 1 tablet (20 mg total) by mouth once daily., Disp: 90 tablet, Rfl: 3    loratadine (CLARITIN) 10 mg tablet, Take 10 mg by mouth once daily., Disp: , Rfl:     metoprolol succinate (TOPROL-XL) 25 MG 24 hr tablet, TAKE 1 TABLET ONE TIME DAILY, Disp: 90 tablet, Rfl: 3    montelukast (SINGULAIR) 10 mg tablet, TAKE 1 TABLET EVERY EVENING, Disp: 90 tablet, Rfl: 2    multivitamin capsule, Take 1 capsule by mouth once daily., Disp: , Rfl:     SODIUM CHLORIDE (SALINE SPRAY MISC), by Misc.(Non-Drug; Combo Route) route., Disp: , Rfl:     timolol 0.5 % ophthalmic solution, 1 drop every evening. , Disp: , Rfl:     hydroCHLOROthiazide (HYDRODIURIL) 12.5 MG Tab, Take 1 tablet (12.5 mg total) by mouth once daily., Disp: 90 tablet, Rfl: 3    Review of Systems   Constitutional: Negative for chills and fever.   Respiratory: Negative for cough and shortness of breath.    Cardiovascular: Negative for chest pain.   Gastrointestinal: Negative for abdominal pain.   Musculoskeletal: Positive for arthralgias.   Skin: Negative for rash.   Neurological: Negative for dizziness.       Objective:   BP (!) 150/68   Pulse 61   Temp 97.1 °F (36.2 °C) (Tympanic)   Resp 16   Ht 5' 2" (1.575 m)   Wt 78.4 kg (172 lb 13.5 oz)   SpO2 98%   BMI 31.61 kg/m²      Physical Exam   Constitutional: She is oriented to person, place, and time. She appears well-developed and well-nourished.   HENT:   Head: Normocephalic and atraumatic.   Eyes: Conjunctivae are normal.   Cardiovascular: Normal rate.   Pulmonary/Chest: Effort normal. No respiratory distress.   Musculoskeletal: She exhibits no edema.        Feet:    Neurological: She is alert and oriented to person, place, and time. Coordination normal.   Skin: Skin is warm and dry. No rash noted.   Psychiatric: She has a normal mood and affect. Her behavior is normal.   Vitals " reviewed.      Assessment & Plan     Problem List Items Addressed This Visit        Cardiac/Vascular    Essential hypertension    Current Assessment & Plan     Elevated today.  Starting her on a low dose of hydrochlorothiazide with those medications.  Will have her follow-up in about 4 weeks for monitoring of effectiveness            Renal/    Encounter for screening mammogram for breast cancer - Primary    Current Assessment & Plan     Will be due for repeat mammogram at the end of October.  Scheduling her today         Relevant Orders    Mammo Digital Screening Bilat with CAD    Postmenopausal    Current Assessment & Plan     Due for DEXA scan.            Orthopedic    Osteopenia of multiple sites    Current Assessment & Plan     Getting follow-up DEXA scan.         Plantar fasciitis of right foot    Current Assessment & Plan     Provided her with information on plantar fasciitis and ways to help treat this condition.                 No Follow-up on file.

## 2018-09-17 NOTE — PATIENT INSTRUCTIONS
Plantar Fasciitis  Plantar fasciitis is a painful swelling of the plantar fascia. The plantar fascia is a thick, fibrous layer of tissue. It covers the bones on the bottom of your foot. And it supports the foot bones in an arched position.  This can happen gradually or suddenly. It usually affects one foot at a time. Heel pain can be sharp, like a knife sticking into the bottom of your foot. You may feel pain after exercising, long-distance jogging, stair climbing, long periods of standing, or after standing up.  Risk factors include: non-active lifestyle, arthritis, diabetes, obesity or recent weight gain, flat foot, high arch. Wearing high heels, loose shoes, or shoes with poor arch support for long periods of time adds to the risk. This problem is commonly found in runners and dancers. It also found in people who stand on hard surfaces for long periods of time.  Foot pain from this condition is usually worse in the morning. But it improves with walking. By the end of the day there may be a dull aching. Treatment requires short-term rest and controlling swelling. It may take up to 9 months before all symptoms go away. Rarely, a steroid injection into the foot, or surgery, may be needed.  Home care  · If you are overweight, lose weight to help healing.  · Choose supportive shoes with good arch support and shock absorbency. Replace athletic shoes when they become worn out. Dont walk or run barefoot.  · Premade or custom-fitted shoe inserts may be helpful. Inserts made of silicone seem to be the most effective. Custom-made inserts can be provided by a podiatrist or foot specialist, physical therapist, or orthopedist.  · Premade or custom-made night splints keep the heel stretched out while you sleep. They may prevent morning pain.  · Avoid activities that stress the feet: jogging, prolonged standing or walking, contact sports, etc.  · First thing in the morning and before sports, stretch the bottom of your feet.  Gently flex your ankle so the toes move toward your knee.  · Icing may help control heel pain. Apply an ice pack to the heel for 10-20 minutes as a preventive. Or ice your heel after a severe flare-up of symptoms. You may repeat this every 1-2 hours as needed.  · You may use over-the-counter pain medicine to control pain, unless another medicine was prescribed. Anti-inflammatory pain medicines, such as ibuprofen or naproxen, may work better than acetaminophen. If you have chronic liver or kidney disease or ever had a stomach ulcer or GI bleeding, talk with your healthcare provider before using these medicines.  Follow-up care  Follow up with your healthcare provider, physical therapist, or podiatrist or foot specialist as advised.  Call for an appointment if pain worsens or there is no relief after a few weeks of home treatment. Shoe inserts, a night splint, or a special boot may be required.  If X-rays were taken, you will be told of any new findings that may affect your care.  When to seek medical advice  Call your healthcare provider right away if any of these occur:  · Foot swelling  · Redness with increasing pain  Date Last Reviewed: 11/21/2015  © 8215-7940 [employer]. [address] [place] [address], [place] [address]. All rights reserved. This information is not intended as a substitute for professional medical care. Always follow your healthcare professional's instructions.

## 2018-09-24 DIAGNOSIS — I10 ESSENTIAL HYPERTENSION: ICD-10-CM

## 2018-09-25 RX ORDER — LISINOPRIL 20 MG/1
TABLET ORAL
Qty: 90 TABLET | Refills: 3 | Status: SHIPPED | OUTPATIENT
Start: 2018-09-25 | End: 2019-07-01 | Stop reason: SDUPTHER

## 2018-10-29 ENCOUNTER — HOSPITAL ENCOUNTER (OUTPATIENT)
Dept: RADIOLOGY | Facility: HOSPITAL | Age: 73
Discharge: HOME | End: 2018-10-29
Attending: FAMILY MEDICINE
Payer: MEDICARE

## 2018-10-29 ENCOUNTER — CLINICAL SUPPORT (OUTPATIENT)
Dept: INTERNAL MEDICINE | Facility: CLINIC | Age: 73
End: 2018-10-29
Payer: MEDICARE

## 2018-10-29 VITALS
SYSTOLIC BLOOD PRESSURE: 136 MMHG | DIASTOLIC BLOOD PRESSURE: 66 MMHG | BODY MASS INDEX: 31.65 KG/M2 | HEART RATE: 67 BPM | WEIGHT: 172 LBS | HEIGHT: 62 IN

## 2018-10-29 DIAGNOSIS — Z12.31 ENCOUNTER FOR SCREENING MAMMOGRAM FOR BREAST CANCER: ICD-10-CM

## 2018-10-29 PROCEDURE — 77067 SCR MAMMO BI INCL CAD: CPT | Mod: 26,HCNC,, | Performed by: RADIOLOGY

## 2018-10-29 PROCEDURE — 77067 SCR MAMMO BI INCL CAD: CPT | Mod: TC,HCNC,PO

## 2018-10-29 PROCEDURE — 77063 BREAST TOMOSYNTHESIS BI: CPT | Mod: 26,HCNC,, | Performed by: RADIOLOGY

## 2018-10-29 PROCEDURE — 77063 BREAST TOMOSYNTHESIS BI: CPT | Mod: TC,HCNC,PO

## 2018-11-21 RX ORDER — FLUTICASONE PROPIONATE 50 MCG
SPRAY, SUSPENSION (ML) NASAL
Qty: 32 G | Refills: 5 | Status: SHIPPED | OUTPATIENT
Start: 2018-11-21 | End: 2019-05-16

## 2018-12-11 RX ORDER — ATORVASTATIN CALCIUM 20 MG/1
TABLET, FILM COATED ORAL
Qty: 90 TABLET | Refills: 2 | Status: SHIPPED | OUTPATIENT
Start: 2018-12-11 | End: 2019-07-15 | Stop reason: SDUPTHER

## 2018-12-11 RX ORDER — MONTELUKAST SODIUM 10 MG/1
TABLET ORAL
Qty: 90 TABLET | Refills: 2 | Status: SHIPPED | OUTPATIENT
Start: 2018-12-11 | End: 2019-05-16

## 2019-05-15 ENCOUNTER — HOSPITAL ENCOUNTER (EMERGENCY)
Facility: HOSPITAL | Age: 74
Discharge: HOME | End: 2019-05-15
Attending: EMERGENCY MEDICINE
Payer: MEDICARE

## 2019-05-15 VITALS
HEIGHT: 61 IN | TEMPERATURE: 99 F | DIASTOLIC BLOOD PRESSURE: 61 MMHG | SYSTOLIC BLOOD PRESSURE: 131 MMHG | WEIGHT: 173.63 LBS | OXYGEN SATURATION: 100 % | BODY MASS INDEX: 32.78 KG/M2 | HEART RATE: 69 BPM | RESPIRATION RATE: 16 BRPM

## 2019-05-15 DIAGNOSIS — R42 DIZZINESS: ICD-10-CM

## 2019-05-15 DIAGNOSIS — R10.9 ABDOMINAL PAIN, UNSPECIFIED ABDOMINAL LOCATION: Primary | ICD-10-CM

## 2019-05-15 LAB
ALBUMIN SERPL BCP-MCNC: 3.6 G/DL (ref 3.5–5.2)
ALP SERPL-CCNC: 117 U/L (ref 55–135)
ALT SERPL W/O P-5'-P-CCNC: 28 U/L (ref 10–44)
ANION GAP SERPL CALC-SCNC: 9 MMOL/L (ref 8–16)
AST SERPL-CCNC: 28 U/L (ref 10–40)
BACTERIA #/AREA URNS AUTO: ABNORMAL /HPF
BASOPHILS # BLD AUTO: 0.04 K/UL (ref 0–0.2)
BASOPHILS NFR BLD: 0.4 % (ref 0–1.9)
BILIRUB SERPL-MCNC: 0.4 MG/DL (ref 0.1–1)
BILIRUB UR QL STRIP: NEGATIVE
BUN SERPL-MCNC: 20 MG/DL (ref 8–23)
CALCIUM SERPL-MCNC: 9.4 MG/DL (ref 8.7–10.5)
CHLORIDE SERPL-SCNC: 105 MMOL/L (ref 95–110)
CLARITY UR REFRACT.AUTO: ABNORMAL
CO2 SERPL-SCNC: 26 MMOL/L (ref 23–29)
COLOR UR AUTO: YELLOW
CREAT SERPL-MCNC: 0.9 MG/DL (ref 0.5–1.4)
DIFFERENTIAL METHOD: ABNORMAL
EOSINOPHIL # BLD AUTO: 0.2 K/UL (ref 0–0.5)
EOSINOPHIL NFR BLD: 2.3 % (ref 0–8)
ERYTHROCYTE [DISTWIDTH] IN BLOOD BY AUTOMATED COUNT: 13.2 % (ref 11.5–14.5)
EST. GFR  (AFRICAN AMERICAN): >60 ML/MIN/1.73 M^2
EST. GFR  (NON AFRICAN AMERICAN): >60 ML/MIN/1.73 M^2
GLUCOSE SERPL-MCNC: 108 MG/DL (ref 70–110)
GLUCOSE UR QL STRIP: NEGATIVE
HCT VFR BLD AUTO: 37.3 % (ref 37–48.5)
HGB BLD-MCNC: 12.3 G/DL (ref 12–16)
HGB UR QL STRIP: ABNORMAL
KETONES UR QL STRIP: NEGATIVE
LEUKOCYTE ESTERASE UR QL STRIP: ABNORMAL
LIPASE SERPL-CCNC: 39 U/L (ref 4–60)
LYMPHOCYTES # BLD AUTO: 1.7 K/UL (ref 1–4.8)
LYMPHOCYTES NFR BLD: 18.2 % (ref 18–48)
MCH RBC QN AUTO: 30.8 PG (ref 27–31)
MCHC RBC AUTO-ENTMCNC: 33 G/DL (ref 32–36)
MCV RBC AUTO: 94 FL (ref 82–98)
MICROSCOPIC COMMENT: ABNORMAL
MONOCYTES # BLD AUTO: 0.7 K/UL (ref 0.3–1)
MONOCYTES NFR BLD: 7.6 % (ref 4–15)
NEUTROPHILS # BLD AUTO: 6.6 K/UL (ref 1.8–7.7)
NEUTROPHILS NFR BLD: 71.2 % (ref 38–73)
NITRITE UR QL STRIP: NEGATIVE
PH UR STRIP: 6 [PH] (ref 5–8)
PLATELET # BLD AUTO: 180 K/UL (ref 150–350)
PMV BLD AUTO: 11.8 FL (ref 9.2–12.9)
POTASSIUM SERPL-SCNC: 4 MMOL/L (ref 3.5–5.1)
PROT SERPL-MCNC: 7 G/DL (ref 6–8.4)
PROT UR QL STRIP: NEGATIVE
RBC # BLD AUTO: 3.99 M/UL (ref 4–5.4)
RBC #/AREA URNS AUTO: 1 /HPF (ref 0–4)
SODIUM SERPL-SCNC: 140 MMOL/L (ref 136–145)
SP GR UR STRIP: 1.01 (ref 1–1.03)
SQUAMOUS #/AREA URNS AUTO: 4 /HPF
URN SPEC COLLECT METH UR: ABNORMAL
UROBILINOGEN UR STRIP-ACNC: <2 EU/DL
WBC # BLD AUTO: 9.21 K/UL (ref 3.9–12.7)
WBC #/AREA URNS AUTO: 5 /HPF (ref 0–5)

## 2019-05-15 PROCEDURE — 99284 EMERGENCY DEPT VISIT MOD MDM: CPT | Mod: 25,HCNC,ER

## 2019-05-15 PROCEDURE — 80053 COMPREHEN METABOLIC PANEL: CPT | Mod: HCNC,ER

## 2019-05-15 PROCEDURE — 83690 ASSAY OF LIPASE: CPT | Mod: HCNC,ER

## 2019-05-15 PROCEDURE — 25000003 PHARM REV CODE 250: Mod: HCNC,ER | Performed by: EMERGENCY MEDICINE

## 2019-05-15 PROCEDURE — 85025 COMPLETE CBC W/AUTO DIFF WBC: CPT | Mod: HCNC,ER

## 2019-05-15 PROCEDURE — 81000 URINALYSIS NONAUTO W/SCOPE: CPT | Mod: HCNC,ER

## 2019-05-15 PROCEDURE — 96360 HYDRATION IV INFUSION INIT: CPT | Mod: HCNC,ER

## 2019-05-15 RX ADMIN — SODIUM CHLORIDE 1000 ML: 0.9 INJECTION, SOLUTION INTRAVENOUS at 01:05

## 2019-05-15 NOTE — ED PROVIDER NOTES
Encounter Date: 5/15/2019       History     Chief Complaint   Patient presents with    Dizziness     dizziness , nausea and lower abd pain since this am. also right knee pain greater than 1 month    Abdominal Pain     The history is provided by the patient.   Dizziness   This is a new problem. The current episode started yesterday. The problem has been resolved. Associated symptoms include abdominal pain. Pertinent negatives include no chest pain, no headaches and no shortness of breath. Nothing aggravates the symptoms. Nothing relieves the symptoms. She has tried rest for the symptoms. The treatment provided significant relief.   Abdominal Pain   The current episode started yesterday. The onset of the illness was gradual. The problem has been resolved. The abdominal pain is generalized. The abdominal pain does not radiate. The severity of the abdominal pain is 0/10. The abdominal pain is relieved by nothing. The other symptoms of the illness include nausea. The other symptoms of the illness do not include fever, fatigue, jaundice, melena, shortness of breath, vomiting, diarrhea, dysuria, hematemesis, hematochezia, vaginal discharge or vaginal bleeding.   Nausea began yesterday. The nausea is exacerbated by food.   The patient has not had a change in bowel habit.     Review of patient's allergies indicates:  No Known Allergies  Past Medical History:   Diagnosis Date    Allergy     Arthritis     Back pain     Diverticulosis of colon     GERD (gastroesophageal reflux disease)     Glaucoma     Hyperlipidemia     Hypertension     Ingrown toenail     Klebsiella cystitis     Obesity     Pain in joint involving multiple sites     Pneumonia     Tortuous colon     Trouble in sleeping     Unspecified disorder of autonomic nervous system     Uterine fibroid      Past Surgical History:   Procedure Laterality Date    COLONOSCOPY      COLONOSCOPY N/A 3/15/2017    Performed by Yogi Carbone MD at [facility]      Family History   Problem Relation Age of Onset    Glaucoma Mother     Hypertension Father     Asthma Daughter     Colon cancer Sister     Cancer Sister 70        colon    Diabetes Sister     Hypertension Sister     Hyperlipidemia Sister     Diabetes Brother     Hypertension Brother     Heart disease Brother     Hyperlipidemia Brother     Asthma Paternal Aunt     Diabetes Paternal Uncle     Diabetes Maternal Grandmother     Early death Maternal Grandfather     Early death Paternal Grandfather     Breast cancer Cousin     COPD Neg Hx     Kidney disease Neg Hx     Stroke Neg Hx     Mental illness Neg Hx      Social History     Tobacco Use    Smoking status: Never Smoker    Smokeless tobacco: Never Used   Substance Use Topics    Alcohol use: No    Drug use: No     Review of Systems   Constitutional: Negative for fatigue and fever.   Respiratory: Negative for shortness of breath.    Cardiovascular: Negative for chest pain.   Gastrointestinal: Positive for abdominal pain and nausea. Negative for diarrhea, hematemesis, hematochezia, jaundice, melena and vomiting.   Genitourinary: Negative for dysuria, vaginal bleeding and vaginal discharge.   Neurological: Positive for dizziness. Negative for headaches.   All other systems reviewed and are negative.      Physical Exam     Initial Vitals [05/15/19 1311]   BP Pulse Resp Temp SpO2   (!) 118/56 72 16 98.6 °F (37 °C) 97 %      MAP       --         Physical Exam    Nursing note and vitals reviewed.  Constitutional: She appears well-developed and well-nourished. No distress.   HENT:   Head: Normocephalic and atraumatic.   Mouth/Throat: Oropharynx is clear and moist.   Eyes: Conjunctivae and EOM are normal. Pupils are equal, round, and reactive to light.   Neck: Normal range of motion. Neck supple.   Cardiovascular: Normal rate, regular rhythm and normal heart sounds.   Pulmonary/Chest: Breath sounds normal. No respiratory distress. She has no  wheezes.   Abdominal: Soft. Bowel sounds are normal. She exhibits no distension. There is no tenderness.   Musculoskeletal: Normal range of motion.   Neurological: She is alert and oriented to person, place, and time. She has normal strength.   Skin: Skin is warm and dry.   Psychiatric: She has a normal mood and affect. Thought content normal.         ED Course   Procedures  Labs Reviewed   CBC W/ AUTO DIFFERENTIAL - Abnormal; Notable for the following components:       Result Value    RBC 3.99 (*)     All other components within normal limits   URINALYSIS, REFLEX TO URINE CULTURE - Abnormal; Notable for the following components:    Appearance, UA Hazy (*)     Occult Blood UA Trace (*)     Leukocytes, UA 1+ (*)     All other components within normal limits    Narrative:     Preferred Collection Type->Urine, Clean Catch   URINALYSIS MICROSCOPIC - Abnormal; Notable for the following components:    Bacteria Few (*)     All other components within normal limits    Narrative:     Preferred Collection Type->Urine, Clean Catch   COMPREHENSIVE METABOLIC PANEL   LIPASE           Results for orders placed or performed during the hospital encounter of 05/15/19   CBC W/ AUTO DIFFERENTIAL   Result Value Ref Range    WBC 9.21 3.90 - 12.70 K/uL    RBC 3.99 (L) 4.00 - 5.40 M/uL    Hemoglobin 12.3 12.0 - 16.0 g/dL    Hematocrit 37.3 37.0 - 48.5 %    Mean Corpuscular Volume 94 82 - 98 fL    Mean Corpuscular Hemoglobin 30.8 27.0 - 31.0 pg    Mean Corpuscular Hemoglobin Conc 33.0 32.0 - 36.0 g/dL    RDW 13.2 11.5 - 14.5 %    Platelets 180 150 - 350 K/uL    MPV 11.8 9.2 - 12.9 fL    Gran # (ANC) 6.6 1.8 - 7.7 K/uL    Lymph # 1.7 1.0 - 4.8 K/uL    Mono # 0.7 0.3 - 1.0 K/uL    Eos # 0.2 0.0 - 0.5 K/uL    Baso # 0.04 0.00 - 0.20 K/uL    Gran% 71.2 38.0 - 73.0 %    Lymph% 18.2 18.0 - 48.0 %    Mono% 7.6 4.0 - 15.0 %    Eosinophil% 2.3 0.0 - 8.0 %    Basophil% 0.4 0.0 - 1.9 %    Differential Method Automated    Comp. Metabolic Panel   Result  Value Ref Range    Sodium 140 136 - 145 mmol/L    Potassium 4.0 3.5 - 5.1 mmol/L    Chloride 105 95 - 110 mmol/L    CO2 26 23 - 29 mmol/L    Glucose 108 70 - 110 mg/dL    BUN, Bld 20 8 - 23 mg/dL    Creatinine 0.9 0.5 - 1.4 mg/dL    Calcium 9.4 8.7 - 10.5 mg/dL    Total Protein 7.0 6.0 - 8.4 g/dL    Albumin 3.6 3.5 - 5.2 g/dL    Total Bilirubin 0.4 0.1 - 1.0 mg/dL    Alkaline Phosphatase 117 55 - 135 U/L    AST 28 10 - 40 U/L    ALT 28 10 - 44 U/L    Anion Gap 9 8 - 16 mmol/L    eGFR if African American >60.0 >60 mL/min/1.73 m^2    eGFR if non African American >60.0 >60 mL/min/1.73 m^2   Lipase   Result Value Ref Range    Lipase 39 4 - 60 U/L   Urinalysis, Reflex to Urine Culture Urine, Clean Catch   Result Value Ref Range    Specimen UA Urine, Clean Catch     Color, UA Yellow Yellow, Straw, Adelita    Appearance, UA Hazy (A) Clear    pH, UA 6.0 5.0 - 8.0    Specific Gravity, UA 1.015 1.005 - 1.030    Protein, UA Negative Negative    Glucose, UA Negative Negative    Ketones, UA Negative Negative    Bilirubin (UA) Negative Negative    Occult Blood UA Trace (A) Negative    Nitrite, UA Negative Negative    Urobilinogen, UA <2.0 <2.0 EU/dL    Leukocytes, UA 1+ (A) Negative   Urinalysis Microscopic   Result Value Ref Range    RBC, UA 1 0 - 4 /hpf    WBC, UA 5 0 - 5 /hpf    Bacteria Few (A) None-Occ /hpf    Squam Epithel, UA 4 /hpf    Microscopic Comment SEE COMMENT        Imaging Results          X-Ray Abdomen Flat And Erect (Final result)  Result time 05/15/19 14:13:43    Final result by Morgan Jacome III, MD (05/15/19 14:13:43)                 Impression:      No radiographic evidence of acute abdominal disease.      Electronically signed by: Morgan Jacome MD  Date:    05/15/2019  Time:    14:13             Narrative:    EXAMINATION:  XR ABDOMEN FLAT AND ERECT    CLINICAL HISTORY:  Abdominal Pain;    FINDINGS:  No definite free air is seen. Bowel gas pattern appears unremarkable.  No evidence of small-bowel  obstruction.  There are scattered atherosclerotic calcifications and a right pelvic phlebolith.  Superimposed small renal calculi cannot be entirely excluded.  No other radiographic evidence of urolithiasis.                            2:21 PM - Counseling: Spoke with the patient and discussed todays findings, in addition to providing specific details for the plan of care and counseling regarding the diagnosis and prognosis. Questions are answered at this time.    I discussed with patient and/or family/caretaker that evaluation in the ED does not suggest any emergent or life threatening medical conditions requiring immediate intervention beyond what was provided in the ED, and I believe patient is safe for discharge.  Regardless, an unremarkable evaluation in the ED does not preclude the development or presence of a serious of life threatening condition. As such, patient was instructed to return immediately for any worsening or change in current symptoms.  Pt reports sx had resolved prior to arrival, No complaints at present.                          Clinical Impression:       ICD-10-CM ICD-9-CM   1. Abdominal pain, unspecified abdominal location R10.9 789.00   2. Dizziness R42 780.4         Disposition:   Disposition: Discharged  Condition: Stable                        All Lopez MD  05/15/19 6920

## 2019-05-16 ENCOUNTER — HOSPITAL ENCOUNTER (OUTPATIENT)
Dept: RADIOLOGY | Facility: HOSPITAL | Age: 74
Discharge: HOME | End: 2019-05-16
Attending: FAMILY MEDICINE
Payer: MEDICARE

## 2019-05-16 ENCOUNTER — OFFICE VISIT (OUTPATIENT)
Dept: INTERNAL MEDICINE | Facility: CLINIC | Age: 74
End: 2019-05-16
Payer: MEDICARE

## 2019-05-16 VITALS
RESPIRATION RATE: 16 BRPM | HEART RATE: 70 BPM | DIASTOLIC BLOOD PRESSURE: 60 MMHG | BODY MASS INDEX: 33.34 KG/M2 | WEIGHT: 176.56 LBS | HEIGHT: 61 IN | SYSTOLIC BLOOD PRESSURE: 106 MMHG | TEMPERATURE: 98 F | OXYGEN SATURATION: 96 %

## 2019-05-16 DIAGNOSIS — M17.11 PRIMARY OSTEOARTHRITIS OF RIGHT KNEE: Primary | ICD-10-CM

## 2019-05-16 DIAGNOSIS — R42 LIGHTHEADEDNESS: ICD-10-CM

## 2019-05-16 DIAGNOSIS — M17.11 PRIMARY OSTEOARTHRITIS OF RIGHT KNEE: ICD-10-CM

## 2019-05-16 PROCEDURE — 73562 X-RAY EXAM OF KNEE 3: CPT | Mod: 26,HCNC,RT, | Performed by: RADIOLOGY

## 2019-05-16 PROCEDURE — 99999 PR PBB SHADOW E&M-EST. PATIENT-LVL III: ICD-10-PCS | Mod: PBBFAC,HCNC,, | Performed by: FAMILY MEDICINE

## 2019-05-16 PROCEDURE — 73562 X-RAY EXAM OF KNEE 3: CPT | Mod: TC,HCNC,PO,RT

## 2019-05-16 PROCEDURE — 73560 X-RAY EXAM OF KNEE 1 OR 2: CPT | Mod: 26,HCNC,59,RT | Performed by: RADIOLOGY

## 2019-05-16 PROCEDURE — 3074F PR MOST RECENT SYSTOLIC BLOOD PRESSURE < 130 MM HG: ICD-10-PCS | Mod: HCNC,CPTII,S$GLB, | Performed by: FAMILY MEDICINE

## 2019-05-16 PROCEDURE — 1101F PT FALLS ASSESS-DOCD LE1/YR: CPT | Mod: HCNC,CPTII,S$GLB, | Performed by: FAMILY MEDICINE

## 2019-05-16 PROCEDURE — 99214 PR OFFICE/OUTPT VISIT, EST, LEVL IV, 30-39 MIN: ICD-10-PCS | Mod: HCNC,S$GLB,, | Performed by: FAMILY MEDICINE

## 2019-05-16 PROCEDURE — 73560 X-RAY EXAM OF KNEE 1 OR 2: CPT | Mod: TC,HCNC,PO,RT

## 2019-05-16 PROCEDURE — 99214 OFFICE O/P EST MOD 30 MIN: CPT | Mod: HCNC,S$GLB,, | Performed by: FAMILY MEDICINE

## 2019-05-16 PROCEDURE — 1101F PR PT FALLS ASSESS DOC 0-1 FALLS W/OUT INJ PAST YR: ICD-10-PCS | Mod: HCNC,CPTII,S$GLB, | Performed by: FAMILY MEDICINE

## 2019-05-16 PROCEDURE — 99999 PR PBB SHADOW E&M-EST. PATIENT-LVL III: CPT | Mod: PBBFAC,HCNC,, | Performed by: FAMILY MEDICINE

## 2019-05-16 PROCEDURE — 3078F PR MOST RECENT DIASTOLIC BLOOD PRESSURE < 80 MM HG: ICD-10-PCS | Mod: HCNC,CPTII,S$GLB, | Performed by: FAMILY MEDICINE

## 2019-05-16 PROCEDURE — 73562 XR KNEE ORTHO RIGHT: ICD-10-PCS | Mod: 26,HCNC,RT, | Performed by: RADIOLOGY

## 2019-05-16 PROCEDURE — 3078F DIAST BP <80 MM HG: CPT | Mod: HCNC,CPTII,S$GLB, | Performed by: FAMILY MEDICINE

## 2019-05-16 PROCEDURE — 3074F SYST BP LT 130 MM HG: CPT | Mod: HCNC,CPTII,S$GLB, | Performed by: FAMILY MEDICINE

## 2019-05-16 PROCEDURE — 73560 XR KNEE ORTHO RIGHT: ICD-10-PCS | Mod: 26,HCNC,59,RT | Performed by: RADIOLOGY

## 2019-05-16 NOTE — ASSESSMENT & PLAN NOTE
Reviewed labs and imaging from the emergency room yesterday.  There is nothing concerning with this nor  are her symptoms concerning.  She is improving and exam was normal.  Advised her adequate hydration and continued supportive care.

## 2019-05-16 NOTE — PROGRESS NOTES
Subjective:       Patient ID: Amber Naranjo is a 73 y.o. female.    Chief Complaint: Follow-up (ED)    HPI  Came in today to follow-up from emergency room visit where she went in yesterday complaining of lightheadedness.  She also is having abdominal pain.  Today she feels better although not 100%.  Workup was negative.   Urinalysis was not completely normal however she does not have any urinary symptoms.  Another concern for hers is right-sided knee pain that has been bothering her for the last month.  She was trying to exercise and noticed after going on the treadmill she was having increasing pain.  It is worse going up stairs or getting up from seated position.  Nothing has made it better other than whenever she is lying down flat and relaxing.  Has not taking any medication or tried any creams.  Reports a known history of osteoarthritis in her knees however we have not had any imaging done here.    Family History   Problem Relation Age of Onset    Glaucoma Mother     Hypertension Father     Asthma Daughter     Colon cancer Sister     Cancer Sister 70        colon    Diabetes Sister     Hypertension Sister     Hyperlipidemia Sister     Diabetes Brother     Hypertension Brother     Heart disease Brother     Hyperlipidemia Brother     Asthma Paternal Aunt     Diabetes Paternal Uncle     Diabetes Maternal Grandmother     Early death Maternal Grandfather     Early death Paternal Grandfather     Breast cancer Cousin     COPD Neg Hx     Kidney disease Neg Hx     Stroke Neg Hx     Mental illness Neg Hx        Current Outpatient Medications:     aspirin (ECOTRIN) 81 MG EC tablet, Take 81 mg by mouth once daily., Disp: , Rfl:     atorvastatin (LIPITOR) 20 MG tablet, TAKE 1 TABLET EVERY DAY, Disp: 90 tablet, Rfl: 2    blood pressure monitor Kit, , Disp: , Rfl:     calcium carbonate (OS-NERI) 600 mg (1,500 mg) Tab, Take 600 mg by mouth 2 (two) times daily with meals., Disp: , Rfl:      "hydroCHLOROthiazide (HYDRODIURIL) 12.5 MG Tab, Take 1 tablet (12.5 mg total) by mouth once daily., Disp: 90 tablet, Rfl: 3    lisinopril (PRINIVIL,ZESTRIL) 20 MG tablet, TAKE 1 TABLET ONE TIME DAILY, Disp: 90 tablet, Rfl: 3    loratadine (CLARITIN) 10 mg tablet, Take 10 mg by mouth once daily., Disp: , Rfl:     metoprolol succinate (TOPROL-XL) 25 MG 24 hr tablet, TAKE 1 TABLET ONE TIME DAILY, Disp: 90 tablet, Rfl: 3    multivitamin capsule, Take 1 capsule by mouth once daily., Disp: , Rfl:     SODIUM CHLORIDE (SALINE SPRAY MISC), by Misc.(Non-Drug; Combo Route) route., Disp: , Rfl:     timolol 0.5 % ophthalmic solution, 1 drop every evening. , Disp: , Rfl:     Review of Systems   Constitutional: Negative for chills and fever.   Respiratory: Negative for cough and shortness of breath.    Cardiovascular: Negative for chest pain.   Gastrointestinal: Negative for abdominal pain.   Musculoskeletal: Positive for arthralgias.   Skin: Negative for rash.   Neurological: Negative for dizziness.       Objective:   /60 (BP Location: Left arm, Patient Position: Sitting, BP Method: Medium (Automatic))   Pulse 70   Temp 97.6 °F (36.4 °C) (Tympanic)   Resp 16   Ht 5' 1" (1.549 m)   Wt 80.1 kg (176 lb 9.4 oz)   SpO2 96%   BMI 33.37 kg/m²      Physical Exam   Constitutional: She is oriented to person, place, and time. She appears well-developed and well-nourished.   HENT:   Head: Normocephalic and atraumatic.   Eyes: Conjunctivae are normal.   Cardiovascular: Normal rate.   Pulmonary/Chest: Effort normal. No respiratory distress.   Musculoskeletal: She exhibits no edema.   Neurological: She is alert and oriented to person, place, and time. Coordination normal.   Skin: Skin is warm and dry. No rash noted.   Psychiatric: She has a normal mood and affect. Her behavior is normal.   Vitals reviewed.      Assessment & Plan     Problem List Items Addressed This Visit        Orthopedic    Primary osteoarthritis of right " knee - Primary    Current Assessment & Plan      No significant findings on x-ray imaging.  Recommending that she continue supportive care with icing and resume exercise when possible.  If it continues to bother her may consider intra-articular injection for symptomatic relief.            Other    Lightheadedness    Current Assessment & Plan       Reviewed labs and imaging from the emergency room yesterday.  There is nothing concerning with this nor  are her symptoms concerning.  She is improving and exam was normal.  Advised her adequate hydration and continued supportive care.                 No follow-ups on file.    Disclaimer:  This note may have been prepared using voice recognition software, it may have not been extensively proofed, as such there could be errors within the text such as sound alike errors.

## 2019-05-16 NOTE — ASSESSMENT & PLAN NOTE
No significant findings on x-ray imaging.  Recommending that she continue supportive care with icing and resume exercise when possible.  If it continues to bother her may consider intra-articular injection for symptomatic relief.

## 2019-05-21 ENCOUNTER — TELEPHONE (OUTPATIENT)
Dept: INTERNAL MEDICINE | Facility: CLINIC | Age: 74
End: 2019-05-21

## 2019-05-21 NOTE — TELEPHONE ENCOUNTER
Pt called requesting xray results. Pt informed results have not be reviewed and resulted as of today. Pt advised will contact once reviewed by doctor. Pt voiced understanding

## 2019-05-21 NOTE — TELEPHONE ENCOUNTER
----- Message from Franci Ordaz sent at 5/21/2019  8:23 AM CDT -----  Contact: self/[phone]  Would like to consult with nurse regarding her ex-ray result on 05-16-19. Please call back at [phone]. Thanks/ar

## 2019-05-22 ENCOUNTER — PES CALL (OUTPATIENT)
Dept: ADMINISTRATIVE | Facility: CLINIC | Age: 74
End: 2019-05-22

## 2019-05-28 ENCOUNTER — TELEPHONE (OUTPATIENT)
Dept: INTERNAL MEDICINE | Facility: CLINIC | Age: 74
End: 2019-05-28

## 2019-05-28 NOTE — TELEPHONE ENCOUNTER
----- Message from Ina Rosales sent at 5/28/2019 11:24 AM CDT -----  Contact: pt   Type:  Test Results    Who Called: pt   Name of Test (Lab/Mammo/Etc): labs   Date of Test: 05/16/19  Ordering Provider: Weeks   Where the test was performed: [facility]   Would the patient rather a call back or a response via [facility]? Call back   Best Call Back Number: .[phone] (home)   Additional Information:

## 2019-05-28 NOTE — TELEPHONE ENCOUNTER
Return to call to patient who complain of still have knee pain. Patient has been schedule for 06/05/19 with Dr. Roa for injection pre his last note

## 2019-06-05 ENCOUNTER — OFFICE VISIT (OUTPATIENT)
Dept: INTERNAL MEDICINE | Facility: CLINIC | Age: 74
End: 2019-06-05
Payer: MEDICARE

## 2019-06-05 VITALS
HEART RATE: 62 BPM | SYSTOLIC BLOOD PRESSURE: 134 MMHG | HEIGHT: 61 IN | OXYGEN SATURATION: 98 % | WEIGHT: 174.63 LBS | DIASTOLIC BLOOD PRESSURE: 60 MMHG | TEMPERATURE: 97 F | RESPIRATION RATE: 16 BRPM | BODY MASS INDEX: 32.97 KG/M2

## 2019-06-05 DIAGNOSIS — M17.11 PRIMARY OSTEOARTHRITIS OF RIGHT KNEE: ICD-10-CM

## 2019-06-05 DIAGNOSIS — H81.12 BENIGN PAROXYSMAL POSITIONAL VERTIGO OF LEFT EAR: ICD-10-CM

## 2019-06-05 PROCEDURE — 3075F SYST BP GE 130 - 139MM HG: CPT | Mod: HCNC,CPTII,S$GLB, | Performed by: FAMILY MEDICINE

## 2019-06-05 PROCEDURE — 99214 OFFICE O/P EST MOD 30 MIN: CPT | Mod: HCNC,25,S$GLB, | Performed by: FAMILY MEDICINE

## 2019-06-05 PROCEDURE — 99214 PR OFFICE/OUTPT VISIT, EST, LEVL IV, 30-39 MIN: ICD-10-PCS | Mod: HCNC,25,S$GLB, | Performed by: FAMILY MEDICINE

## 2019-06-05 PROCEDURE — 1101F PT FALLS ASSESS-DOCD LE1/YR: CPT | Mod: HCNC,CPTII,S$GLB, | Performed by: FAMILY MEDICINE

## 2019-06-05 PROCEDURE — 3078F DIAST BP <80 MM HG: CPT | Mod: HCNC,CPTII,S$GLB, | Performed by: FAMILY MEDICINE

## 2019-06-05 PROCEDURE — 3078F PR MOST RECENT DIASTOLIC BLOOD PRESSURE < 80 MM HG: ICD-10-PCS | Mod: HCNC,CPTII,S$GLB, | Performed by: FAMILY MEDICINE

## 2019-06-05 PROCEDURE — 99999 PR PBB SHADOW E&M-EST. PATIENT-LVL III: ICD-10-PCS | Mod: PBBFAC,HCNC,, | Performed by: FAMILY MEDICINE

## 2019-06-05 PROCEDURE — 99999 PR PBB SHADOW E&M-EST. PATIENT-LVL III: CPT | Mod: PBBFAC,HCNC,, | Performed by: FAMILY MEDICINE

## 2019-06-05 PROCEDURE — 20610 PR DRAIN/INJECT LARGE JOINT/BURSA: ICD-10-PCS | Mod: HCNC,RT,S$GLB, | Performed by: FAMILY MEDICINE

## 2019-06-05 PROCEDURE — 20610 DRAIN/INJ JOINT/BURSA W/O US: CPT | Mod: HCNC,RT,S$GLB, | Performed by: FAMILY MEDICINE

## 2019-06-05 PROCEDURE — 1101F PR PT FALLS ASSESS DOC 0-1 FALLS W/OUT INJ PAST YR: ICD-10-PCS | Mod: HCNC,CPTII,S$GLB, | Performed by: FAMILY MEDICINE

## 2019-06-05 PROCEDURE — 3075F PR MOST RECENT SYSTOLIC BLOOD PRESS GE 130-139MM HG: ICD-10-PCS | Mod: HCNC,CPTII,S$GLB, | Performed by: FAMILY MEDICINE

## 2019-06-05 RX ORDER — METHYLPREDNISOLONE ACETATE 40 MG/ML
40 INJECTION, SUSPENSION INTRA-ARTICULAR; INTRALESIONAL; INTRAMUSCULAR; SOFT TISSUE ONCE
Status: COMPLETED | OUTPATIENT
Start: 2019-06-05 | End: 2019-06-05

## 2019-06-05 RX ADMIN — METHYLPREDNISOLONE ACETATE 40 MG: 40 INJECTION, SUSPENSION INTRA-ARTICULAR; INTRALESIONAL; INTRAMUSCULAR; SOFT TISSUE at 05:06

## 2019-06-05 NOTE — PROGRESS NOTES
Subjective:       Patient ID: Amber Naranjo is a 73 y.o. female.    Chief Complaint: Knee Pain (right)    HPI    Came in today with a chief complaint of continued right knee pain.  She has been able to exercise and workout like she normally does.  She is having pain mainly on medial aspect and posterior knee.  Not have any significant arthritis but she has tried multiple other conservative therapy so we decided that she could come in and try an intra-articular injection symptomatic relief.  Never had injection before we discussed the risks and benefits of doing this procedure.    She also says that she continues to have dizziness and the room spinning whenever she turns her head or gets up from a lying down position.  Has not found anything to be helpful for this as of yet.  Has not had the Vinnie-Hallpike maneuver performed nor any Epley exercises.    Family History   Problem Relation Age of Onset    Glaucoma Mother     Hypertension Father     Asthma Daughter     Colon cancer Sister     Cancer Sister 70        colon    Diabetes Sister     Hypertension Sister     Hyperlipidemia Sister     Diabetes Brother     Hypertension Brother     Heart disease Brother     Hyperlipidemia Brother     Asthma Paternal Aunt     Diabetes Paternal Uncle     Diabetes Maternal Grandmother     Early death Maternal Grandfather     Early death Paternal Grandfather     Breast cancer Cousin     COPD Neg Hx     Kidney disease Neg Hx     Stroke Neg Hx     Mental illness Neg Hx        Current Outpatient Medications:     aspirin (ECOTRIN) 81 MG EC tablet, Take 81 mg by mouth once daily., Disp: , Rfl:     atorvastatin (LIPITOR) 20 MG tablet, TAKE 1 TABLET EVERY DAY, Disp: 90 tablet, Rfl: 2    blood pressure monitor Kit, , Disp: , Rfl:     calcium carbonate (OS-NERI) 600 mg (1,500 mg) Tab, Take 600 mg by mouth 2 (two) times daily with meals., Disp: , Rfl:     hydroCHLOROthiazide (HYDRODIURIL) 12.5 MG Tab, Take 1 tablet (12.5 mg  "total) by mouth once daily., Disp: 90 tablet, Rfl: 3    lisinopril (PRINIVIL,ZESTRIL) 20 MG tablet, TAKE 1 TABLET ONE TIME DAILY, Disp: 90 tablet, Rfl: 3    loratadine (CLARITIN) 10 mg tablet, Take 10 mg by mouth once daily., Disp: , Rfl:     metoprolol succinate (TOPROL-XL) 25 MG 24 hr tablet, TAKE 1 TABLET ONE TIME DAILY, Disp: 90 tablet, Rfl: 3    multivitamin capsule, Take 1 capsule by mouth once daily., Disp: , Rfl:     SODIUM CHLORIDE (SALINE SPRAY MISC), by Misc.(Non-Drug; Combo Route) route., Disp: , Rfl:     timolol 0.5 % ophthalmic solution, 1 drop every evening. , Disp: , Rfl:     Review of Systems   Constitutional: Negative for chills and fever.   Respiratory: Negative for cough and shortness of breath.    Cardiovascular: Negative for chest pain.   Gastrointestinal: Negative for abdominal pain.   Musculoskeletal: Positive for arthralgias.   Skin: Negative for rash.   Neurological: Negative for dizziness.       Objective:   /60 (BP Location: Left arm, Patient Position: Sitting, BP Method: Large (Automatic))   Pulse 62   Temp 96.7 °F (35.9 °C) (Tympanic)   Resp 16   Ht 5' 1" (1.549 m)   Wt 79.2 kg (174 lb 9.7 oz)   SpO2 98%   BMI 32.99 kg/m²      Physical Exam   Constitutional: She is oriented to person, place, and time. She appears well-developed and well-nourished.   HENT:   Head: Normocephalic and atraumatic.   Eyes: Conjunctivae are normal.   Cardiovascular: Normal rate.   Pulmonary/Chest: Effort normal. No respiratory distress.   Musculoskeletal: She exhibits no edema.   Evidence of small effusion on her right knee.  No erythema   Neurological: She is alert and oriented to person, place, and time. Coordination normal.   Vinnie-Hallpike maneuver was positive  For nystagmus on the left   Skin: Skin is warm and dry. No rash noted.   Psychiatric: She has a normal mood and affect. Her behavior is normal.   Vitals reviewed.        Procedure note:  After obtaining written informed consent " verified with the patient that we were doing the correct knee and confirm that we had the right patient.  There are no concerns or questions after explanation of procedure was complete.  Cleaned the skin alcohol after identifying landmarks and using no touch technique advanced a 21 gauge needle into the medial intra-articular space of the right knee.  Then injected 40 mg of Depo-Medrol without any resistance.  Patient tolerated the procedure well and Band-Aid was placed over the in  jection site.      Epley maneuver was performed   For 1 round without complete resolution of symptoms.  Assessment & Plan     Problem List Items Addressed This Visit        ENT    Benign paroxysmal positional vertigo of left ear    Current Assessment & Plan       Demonstrated Epley maneuver and provided patient with handout to continue doing at home.            Orthopedic    Primary osteoarthritis of right knee    Current Assessment & Plan      Treated with intra-articular injection today.  She tolerated the procedure well.                 Follow up if symptoms worsen or fail to improve.    Disclaimer:  This note may have been prepared using voice recognition software, it may have not been extensively proofed, as such there could be errors within the text such as sound alike errors.

## 2019-06-08 ENCOUNTER — HOSPITAL ENCOUNTER (EMERGENCY)
Facility: HOSPITAL | Age: 74
Discharge: HOME | End: 2019-06-08
Attending: EMERGENCY MEDICINE
Payer: MEDICARE

## 2019-06-08 VITALS
TEMPERATURE: 98 F | WEIGHT: 171.06 LBS | HEART RATE: 71 BPM | HEIGHT: 62 IN | OXYGEN SATURATION: 98 % | RESPIRATION RATE: 19 BRPM | SYSTOLIC BLOOD PRESSURE: 132 MMHG | BODY MASS INDEX: 31.48 KG/M2 | DIASTOLIC BLOOD PRESSURE: 65 MMHG

## 2019-06-08 DIAGNOSIS — S39.012A ACUTE MYOFASCIAL STRAIN OF LUMBOSACRAL REGION, INITIAL ENCOUNTER: Primary | ICD-10-CM

## 2019-06-08 PROCEDURE — 86803 HEPATITIS C AB TEST: CPT | Mod: HCNC

## 2019-06-08 PROCEDURE — 99283 EMERGENCY DEPT VISIT LOW MDM: CPT | Mod: HCNC,ER

## 2019-06-08 PROCEDURE — 25000003 PHARM REV CODE 250: Mod: HCNC,ER | Performed by: EMERGENCY MEDICINE

## 2019-06-08 RX ORDER — CYCLOBENZAPRINE HCL 10 MG
10 TABLET ORAL
Status: COMPLETED | OUTPATIENT
Start: 2019-06-08 | End: 2019-06-08

## 2019-06-08 RX ORDER — NAPROXEN 500 MG/1
500 TABLET ORAL
Status: COMPLETED | OUTPATIENT
Start: 2019-06-08 | End: 2019-06-08

## 2019-06-08 RX ORDER — CYCLOBENZAPRINE HCL 10 MG
10 TABLET ORAL 2 TIMES DAILY PRN
Qty: 14 TABLET | Refills: 1 | Status: SHIPPED | OUTPATIENT
Start: 2019-06-08 | End: 2019-06-15

## 2019-06-08 RX ORDER — NAPROXEN 500 MG/1
500 TABLET ORAL 2 TIMES DAILY PRN
Qty: 14 TABLET | Refills: 1 | Status: SHIPPED | OUTPATIENT
Start: 2019-06-08 | End: 2019-06-15

## 2019-06-08 RX ORDER — ACETAMINOPHEN 500 MG
1000 TABLET ORAL
Status: COMPLETED | OUTPATIENT
Start: 2019-06-08 | End: 2019-06-08

## 2019-06-08 RX ADMIN — ACETAMINOPHEN 1000 MG: 500 TABLET ORAL at 10:06

## 2019-06-08 RX ADMIN — CYCLOBENZAPRINE HYDROCHLORIDE 10 MG: 10 TABLET, FILM COATED ORAL at 10:06

## 2019-06-08 RX ADMIN — NAPROXEN 500 MG: 500 TABLET ORAL at 10:06

## 2019-06-08 NOTE — ED PROVIDER NOTES
Encounter Date: 6/8/2019       History     Chief Complaint   Patient presents with    Back Pain     Reports picking up on box, onset 2 days ago. bilateral lower back pain.      Low back strain from bending, lifting, and carrying a heavy box in the rain a few days ago, similar to previous episodes.  No neurologic symptoms or urologic symptoms. Has taken some Tylenol.  Pain is confined to the bilateral low lumbar region without radiation or weakness. No other complaints.    The history is provided by the patient. No  was used.     Review of patient's allergies indicates:  No Known Allergies  Past Medical History:   Diagnosis Date    Allergy     Arthritis     Back pain     Diverticulosis of colon     GERD (gastroesophageal reflux disease)     Glaucoma     Hyperlipidemia     Hypertension     Ingrown toenail     Klebsiella cystitis     Obesity     Pain in joint involving multiple sites     Pneumonia     Tortuous colon     Trouble in sleeping     Unspecified disorder of autonomic nervous system     Uterine fibroid      Past Surgical History:   Procedure Laterality Date    COLONOSCOPY      COLONOSCOPY N/A 3/15/2017    Performed by Yogi Carbone MD at [facility] ENDO     Family History   Problem Relation Age of Onset    Glaucoma Mother     Hypertension Father     Asthma Daughter     Colon cancer Sister     Cancer Sister 70        colon    Diabetes Sister     Hypertension Sister     Hyperlipidemia Sister     Diabetes Brother     Hypertension Brother     Heart disease Brother     Hyperlipidemia Brother     Asthma Paternal Aunt     Diabetes Paternal Uncle     Diabetes Maternal Grandmother     Early death Maternal Grandfather     Early death Paternal Grandfather     Breast cancer Cousin     COPD Neg Hx     Kidney disease Neg Hx     Stroke Neg Hx     Mental illness Neg Hx      Social History     Tobacco Use    Smoking status: Passive Smoke Exposure - Never Smoker     Smokeless tobacco: Never Used   Substance Use Topics    Alcohol use: No    Drug use: No     Review of Systems   Constitutional: Negative for activity change, fatigue and fever.   HENT: Negative for congestion, ear pain, facial swelling, nosebleeds, sinus pressure and sore throat.    Eyes: Negative for pain, discharge, redness and visual disturbance.   Respiratory: Negative for cough, choking, chest tightness, shortness of breath and wheezing.    Cardiovascular: Negative for chest pain, palpitations and leg swelling.   Gastrointestinal: Negative for abdominal distention, abdominal pain, nausea and vomiting.   Endocrine: Negative for heat intolerance, polydipsia and polyuria.   Genitourinary: Negative for difficulty urinating, dysuria, flank pain, hematuria and urgency.   Musculoskeletal: Positive for back pain. Negative for gait problem, joint swelling and myalgias.   Skin: Negative for color change and rash.   Allergic/Immunologic: Negative for environmental allergies and food allergies.   Neurological: Negative for dizziness, weakness, numbness and headaches.   Hematological: Negative for adenopathy. Does not bruise/bleed easily.   Psychiatric/Behavioral: Negative for agitation and behavioral problems. The patient is not nervous/anxious.    All other systems reviewed and are negative.      Physical Exam     Initial Vitals [06/08/19 0949]   BP Pulse Resp Temp SpO2   134/63 73 16 97.9 °F (36.6 °C) 96 %      MAP       --         Physical Exam    Nursing note and vitals reviewed.  Constitutional: She appears well-developed and well-nourished. She is not diaphoretic. No distress.   HENT:   Head: Normocephalic and atraumatic.   Mouth/Throat: No oropharyngeal exudate.   Eyes: Conjunctivae and EOM are normal. Pupils are equal, round, and reactive to light. Right eye exhibits no discharge. Left eye exhibits no discharge. No scleral icterus.   Neck: Normal range of motion. Neck supple. No thyromegaly present. No tracheal  deviation present. No JVD present.   Cardiovascular: Normal rate, regular rhythm, normal heart sounds and intact distal pulses. Exam reveals no gallop and no friction rub.    No murmur heard.  Pulmonary/Chest: Breath sounds normal. No stridor. No respiratory distress. She has no wheezes. She has no rhonchi. She has no rales. She exhibits no tenderness.   Abdominal: Soft. Bowel sounds are normal. She exhibits no distension and no mass. There is no tenderness. There is no rebound and no guarding.   Musculoskeletal: Normal range of motion. She exhibits tenderness. She exhibits no edema.   Mild diffuse lumbar muscular stiffness and slight tenderness with normal range of motion, normal gait, and normal neuro vascular and tendon exam throughout.   Neurological: She is alert and oriented to person, place, and time. She has normal strength.   Skin: Skin is warm and dry. No rash and no abscess noted. No erythema.   Psychiatric: She has a normal mood and affect. Her behavior is normal. Judgment and thought content normal.         ED Course   Procedures  Labs Reviewed   HEPATITIS C ANTIBODY          Imaging Results    None                               Clinical Impression:     1. Acute myofascial strain of lumbosacral region, initial encounter         Disposition:   Disposition: Discharged  Condition: Stable                        Morgan Palmer MD  06/08/19 8208

## 2019-06-10 LAB — HCV AB SERPL QL IA: NEGATIVE

## 2019-06-11 ENCOUNTER — HOSPITAL ENCOUNTER (EMERGENCY)
Facility: HOSPITAL | Age: 74
Discharge: HOME | End: 2019-06-11
Attending: EMERGENCY MEDICINE
Payer: MEDICARE

## 2019-06-11 VITALS
SYSTOLIC BLOOD PRESSURE: 131 MMHG | OXYGEN SATURATION: 98 % | WEIGHT: 172.94 LBS | DIASTOLIC BLOOD PRESSURE: 60 MMHG | BODY MASS INDEX: 31.63 KG/M2 | TEMPERATURE: 98 F | HEART RATE: 77 BPM | RESPIRATION RATE: 19 BRPM

## 2019-06-11 DIAGNOSIS — S39.012A BACK STRAIN, INITIAL ENCOUNTER: Primary | ICD-10-CM

## 2019-06-11 LAB
BILIRUB UR QL STRIP: NEGATIVE
CLARITY UR REFRACT.AUTO: CLEAR
COLOR UR AUTO: YELLOW
GLUCOSE UR QL STRIP: NEGATIVE
HGB UR QL STRIP: NEGATIVE
KETONES UR QL STRIP: NEGATIVE
LEUKOCYTE ESTERASE UR QL STRIP: NEGATIVE
NITRITE UR QL STRIP: NEGATIVE
PH UR STRIP: 6 [PH] (ref 5–8)
PROT UR QL STRIP: NEGATIVE
SP GR UR STRIP: <=1.005 (ref 1–1.03)
URN SPEC COLLECT METH UR: ABNORMAL
UROBILINOGEN UR STRIP-ACNC: NEGATIVE EU/DL

## 2019-06-11 PROCEDURE — 96372 THER/PROPH/DIAG INJ SC/IM: CPT | Mod: HCNC,ER

## 2019-06-11 PROCEDURE — 63600175 PHARM REV CODE 636 W HCPCS: Mod: HCNC,ER | Performed by: EMERGENCY MEDICINE

## 2019-06-11 PROCEDURE — 81003 URINALYSIS AUTO W/O SCOPE: CPT | Mod: HCNC,ER

## 2019-06-11 PROCEDURE — 99284 EMERGENCY DEPT VISIT MOD MDM: CPT | Mod: 25,HCNC,ER

## 2019-06-11 RX ORDER — KETOROLAC TROMETHAMINE 30 MG/ML
15 INJECTION, SOLUTION INTRAMUSCULAR; INTRAVENOUS
Status: COMPLETED | OUTPATIENT
Start: 2019-06-11 | End: 2019-06-11

## 2019-06-11 RX ORDER — TRAMADOL HYDROCHLORIDE 50 MG/1
50 TABLET ORAL EVERY 6 HOURS PRN
Qty: 10 TABLET | Refills: 0 | Status: SHIPPED | OUTPATIENT
Start: 2019-06-11 | End: 2019-06-17 | Stop reason: SDUPTHER

## 2019-06-11 RX ORDER — ORPHENADRINE CITRATE 30 MG/ML
60 INJECTION INTRAMUSCULAR; INTRAVENOUS
Status: COMPLETED | OUTPATIENT
Start: 2019-06-11 | End: 2019-06-11

## 2019-06-11 RX ADMIN — KETOROLAC TROMETHAMINE 15 MG: 30 INJECTION, SOLUTION INTRAMUSCULAR at 11:06

## 2019-06-11 RX ADMIN — ORPHENADRINE CITRATE 60 MG: 30 INJECTION INTRAMUSCULAR; INTRAVENOUS at 11:06

## 2019-06-11 NOTE — ED PROVIDER NOTES
Encounter Date: 6/11/2019       History     Chief Complaint   Patient presents with    Back Pain     mid back pain all the way across since 6/6.      The history is provided by the patient.   Back Pain    This is a recurrent problem. The current episode started yesterday. The problem occurs constantly. The problem has been unchanged. The pain is associated with lifting heavy objects. The pain is present in the lumbar spine. The quality of the pain is described as aching. The pain does not radiate. The symptoms are aggravated by bending, twisting and certain positions. Pertinent negatives include no chest pain, no fever, no numbness, no bowel incontinence, no bladder incontinence, no dysuria, no paresthesias, no paresis, no tingling and no weakness. She has tried nothing for the symptoms.     Review of patient's allergies indicates:  No Known Allergies  Past Medical History:   Diagnosis Date    Allergy     Arthritis     Back pain     Diverticulosis of colon     GERD (gastroesophageal reflux disease)     Glaucoma     Hyperlipidemia     Hypertension     Ingrown toenail     Klebsiella cystitis     Obesity     Pain in joint involving multiple sites     Pneumonia     Tortuous colon     Trouble in sleeping     Unspecified disorder of autonomic nervous system     Uterine fibroid      Past Surgical History:   Procedure Laterality Date    COLONOSCOPY      COLONOSCOPY N/A 3/15/2017    Performed by Yogi Carbone MD at [facility] ENDO     Family History   Problem Relation Age of Onset    Glaucoma Mother     Hypertension Father     Asthma Daughter     Colon cancer Sister     Cancer Sister 70        colon    Diabetes Sister     Hypertension Sister     Hyperlipidemia Sister     Diabetes Brother     Hypertension Brother     Heart disease Brother     Hyperlipidemia Brother     Asthma Paternal Aunt     Diabetes Paternal Uncle     Diabetes Maternal Grandmother     Early death Maternal Grandfather     Early  death Paternal Grandfather     Breast cancer Cousin     COPD Neg Hx     Kidney disease Neg Hx     Stroke Neg Hx     Mental illness Neg Hx      Social History     Tobacco Use    Smoking status: Passive Smoke Exposure - Never Smoker    Smokeless tobacco: Never Used   Substance Use Topics    Alcohol use: No    Drug use: No     Review of Systems   Constitutional: Negative for fever.   HENT: Negative for sore throat.    Respiratory: Negative for shortness of breath.    Cardiovascular: Negative for chest pain.   Gastrointestinal: Negative for bowel incontinence and nausea.   Genitourinary: Negative for bladder incontinence and dysuria.   Musculoskeletal: Positive for back pain.   Skin: Negative for rash.   Neurological: Negative for tingling, weakness, numbness and paresthesias.   Hematological: Does not bruise/bleed easily.       Physical Exam     Initial Vitals [06/11/19 1008]   BP Pulse Resp Temp SpO2   (!) 187/81 79 18 98.5 °F (36.9 °C) 99 %      MAP       --         Physical Exam    Nursing note and vitals reviewed.  Constitutional: She appears well-developed and well-nourished. No distress.   HENT:   Head: Normocephalic and atraumatic.   Mouth/Throat: Oropharynx is clear and moist.   Eyes: Conjunctivae and EOM are normal. Pupils are equal, round, and reactive to light.   Neck: Normal range of motion. Neck supple.   Cardiovascular: Normal rate, regular rhythm and normal heart sounds. Exam reveals no gallop and no friction rub.    No murmur heard.  Pulmonary/Chest: Breath sounds normal. No respiratory distress. She has no wheezes. She has no rhonchi. She has no rales.   Abdominal: Soft. Bowel sounds are normal. She exhibits no distension and no mass. There is no tenderness. There is no rebound and no guarding.   Musculoskeletal: Normal range of motion. She exhibits no edema or tenderness.        Lumbar back: She exhibits spasm (Left paraspinal). She exhibits no tenderness and no bony tenderness.    Neurological: She is alert and oriented to person, place, and time. She has normal strength.   Skin: Skin is warm and dry. No rash noted.   Psychiatric: She has a normal mood and affect. Thought content normal.         ED Course   Procedures  Labs Reviewed   URINALYSIS, REFLEX TO URINE CULTURE - Abnormal; Notable for the following components:       Result Value    Specific Gravity, UA <=1.005 (*)     All other components within normal limits    Narrative:     Preferred Collection Type->Urine, Clean Catch          Imaging Results          X-Ray Lumbar Spine Ap And Lateral (Final result)  Result time 06/11/19 10:43:58    Final result by Familia Bishop MD (06/11/19 10:43:58)                 Impression:      Moderate degenerative changes as above.      Electronically signed by: Familia Bishop MD  Date:    06/11/2019  Time:    10:43             Narrative:    EXAMINATION:  XR LUMBAR SPINE AP AND LATERAL    CLINICAL HISTORY:  Low back pain, minor trauma;    COMPARISON:  None    FINDINGS:  3 views of the lumbar spine.    Multilevel disc space narrowing is seen throughout the lumbar spine greatest from L2/3 through L5/S1.  Grade 1 spondylolisthesis at L5/S1 thought to be degenerative in nature..  Moderate facet arthropathy L4/5 and L5/S1.  No evidence of spondylolysis.  Vertebral body heights are normal.    Moderate constipation.  Vascular calcifications are noted.                                     ED Vital Signs:  Vitals:    06/11/19 1008   BP: (!) 187/81   Pulse: 79   Resp: 18   Temp: 98.5 °F (36.9 °C)   TempSrc: Oral   SpO2: 99%   Weight: 78.4 kg (172 lb 15.2 oz)         Abnormal Lab Results:  Labs Reviewed   URINALYSIS, REFLEX TO URINE CULTURE - Abnormal; Notable for the following components:       Result Value    Specific Gravity, UA <=1.005 (*)     All other components within normal limits    Narrative:     Preferred Collection Type->Urine, Clean Catch          All Lab Results:  Results for orders placed or  performed during the hospital encounter of 06/11/19   Urinalysis, Reflex to Urine Culture Urine, Clean Catch   Result Value Ref Range    Specimen UA Urine, Clean Catch     Color, UA Yellow Yellow, Straw, Adelita    Appearance, UA Clear Clear    pH, UA 6.0 5.0 - 8.0    Specific Gravity, UA <=1.005 (A) 1.005 - 1.030    Protein, UA Negative Negative    Glucose, UA Negative Negative    Ketones, UA Negative Negative    Bilirubin (UA) Negative Negative    Occult Blood UA Negative Negative    Nitrite, UA Negative Negative    Urobilinogen, UA Negative <2.0 EU/dL    Leukocytes, UA Negative Negative           Imaging Results:  Imaging Results          X-Ray Lumbar Spine Ap And Lateral (Final result)  Result time 06/11/19 10:43:58    Final result by Familia Bishop MD (06/11/19 10:43:58)                 Impression:      Moderate degenerative changes as above.      Electronically signed by: Familia Bishop MD  Date:    06/11/2019  Time:    10:43             Narrative:    EXAMINATION:  XR LUMBAR SPINE AP AND LATERAL    CLINICAL HISTORY:  Low back pain, minor trauma;    COMPARISON:  None    FINDINGS:  3 views of the lumbar spine.    Multilevel disc space narrowing is seen throughout the lumbar spine greatest from L2/3 through L5/S1.  Grade 1 spondylolisthesis at L5/S1 thought to be degenerative in nature..  Moderate facet arthropathy L4/5 and L5/S1.  No evidence of spondylolysis.  Vertebral body heights are normal.    Moderate constipation.  Vascular calcifications are noted.                                   The Emergency Provider reviewed the vital signs and test results, which are outlined above.    ED Discussions:  10:59 AM: Reassessed pt at this time.  Pt states her condition has improved at this time. Discussed with pt all pertinent ED information and results. Discussed pt dx of back strain and plan of tx. Gave pt all f/u and return to the ED instructions. All questions and concerns were addressed at this time. Pt expresses  understanding of information and instructions, and is comfortable with plan to discharge. Pt is stable for discharge.                              Clinical Impression:       ICD-10-CM ICD-9-CM   1. Back strain, initial encounter S39.012A 847.9           Disposition:   Disposition: Discharged  Condition: Stable                        Jovanni Yung MD  06/11/19 1054

## 2019-06-17 ENCOUNTER — OFFICE VISIT (OUTPATIENT)
Dept: INTERNAL MEDICINE | Facility: CLINIC | Age: 74
End: 2019-06-17
Payer: MEDICARE

## 2019-06-17 VITALS
BODY MASS INDEX: 31.68 KG/M2 | SYSTOLIC BLOOD PRESSURE: 96 MMHG | OXYGEN SATURATION: 97 % | HEART RATE: 68 BPM | HEIGHT: 62 IN | DIASTOLIC BLOOD PRESSURE: 50 MMHG | RESPIRATION RATE: 16 BRPM | WEIGHT: 172.19 LBS | TEMPERATURE: 97 F

## 2019-06-17 DIAGNOSIS — I70.0 ABDOMINAL AORTIC ATHEROSCLEROSIS: ICD-10-CM

## 2019-06-17 DIAGNOSIS — M43.17 SPONDYLOLISTHESIS AT L5-S1 LEVEL: Primary | ICD-10-CM

## 2019-06-17 PROCEDURE — 3078F PR MOST RECENT DIASTOLIC BLOOD PRESSURE < 80 MM HG: ICD-10-PCS | Mod: HCNC,CPTII,S$GLB, | Performed by: FAMILY MEDICINE

## 2019-06-17 PROCEDURE — 99499 RISK ADDL DX/OHS AUDIT: ICD-10-PCS | Mod: HCNC,S$GLB,, | Performed by: FAMILY MEDICINE

## 2019-06-17 PROCEDURE — 3074F SYST BP LT 130 MM HG: CPT | Mod: HCNC,CPTII,S$GLB, | Performed by: FAMILY MEDICINE

## 2019-06-17 PROCEDURE — 1101F PT FALLS ASSESS-DOCD LE1/YR: CPT | Mod: HCNC,CPTII,S$GLB, | Performed by: FAMILY MEDICINE

## 2019-06-17 PROCEDURE — 99499 UNLISTED E&M SERVICE: CPT | Mod: HCNC,S$GLB,, | Performed by: FAMILY MEDICINE

## 2019-06-17 PROCEDURE — 3074F PR MOST RECENT SYSTOLIC BLOOD PRESSURE < 130 MM HG: ICD-10-PCS | Mod: HCNC,CPTII,S$GLB, | Performed by: FAMILY MEDICINE

## 2019-06-17 PROCEDURE — 3078F DIAST BP <80 MM HG: CPT | Mod: HCNC,CPTII,S$GLB, | Performed by: FAMILY MEDICINE

## 2019-06-17 PROCEDURE — 99999 PR PBB SHADOW E&M-EST. PATIENT-LVL III: CPT | Mod: PBBFAC,HCNC,, | Performed by: FAMILY MEDICINE

## 2019-06-17 PROCEDURE — 1101F PR PT FALLS ASSESS DOC 0-1 FALLS W/OUT INJ PAST YR: ICD-10-PCS | Mod: HCNC,CPTII,S$GLB, | Performed by: FAMILY MEDICINE

## 2019-06-17 PROCEDURE — 99214 PR OFFICE/OUTPT VISIT, EST, LEVL IV, 30-39 MIN: ICD-10-PCS | Mod: HCNC,S$GLB,, | Performed by: FAMILY MEDICINE

## 2019-06-17 PROCEDURE — 99214 OFFICE O/P EST MOD 30 MIN: CPT | Mod: HCNC,S$GLB,, | Performed by: FAMILY MEDICINE

## 2019-06-17 PROCEDURE — 99999 PR PBB SHADOW E&M-EST. PATIENT-LVL III: ICD-10-PCS | Mod: PBBFAC,HCNC,, | Performed by: FAMILY MEDICINE

## 2019-06-17 RX ORDER — TRAMADOL HYDROCHLORIDE 50 MG/1
50 TABLET ORAL EVERY 8 HOURS PRN
Qty: 21 TABLET | Refills: 0 | Status: SHIPPED | OUTPATIENT
Start: 2019-06-17 | End: 2019-06-24

## 2019-06-17 NOTE — ASSESSMENT & PLAN NOTE
Visualized atherosclerosis of the abdominal aorta.  She is already on statin medication and blood pressure is well controlled.

## 2019-06-17 NOTE — PROGRESS NOTES
Subjective:       Patient ID: Amber Naranjo is a 73 y.o. female.    Chief Complaint: Follow-up (ER)    HPI    Here today to follow-up from the emergency room.  She was recently seen 2 different times  For back pain after lifting a box that was too heavy for her.  She initially was given naproxen Tylenol on Flexeril on the 1st visit and went back to emerge see room because she continued to have pain.  She was then prescribed intramuscular Toradol injection and tramadol.  She feels that the tramadol has helped her more than anything else to alleviate her pain.  However, she ran out of this a couple days ago.  Not having any red flag symptoms such is bowel or bladder incontinence.  The pain sometimes radiates around towards the front on both sides.  Imaging was reviewed from the ER she does have spondylolisthesis of the L5-S1.    Family History   Problem Relation Age of Onset    Glaucoma Mother     Hypertension Father     Asthma Daughter     Colon cancer Sister     Cancer Sister 70        colon    Diabetes Sister     Hypertension Sister     Hyperlipidemia Sister     Diabetes Brother     Hypertension Brother     Heart disease Brother     Hyperlipidemia Brother     Asthma Paternal Aunt     Diabetes Paternal Uncle     Diabetes Maternal Grandmother     Early death Maternal Grandfather     Early death Paternal Grandfather     Breast cancer Cousin     COPD Neg Hx     Kidney disease Neg Hx     Stroke Neg Hx     Mental illness Neg Hx        Current Outpatient Medications:     aspirin (ECOTRIN) 81 MG EC tablet, Take 81 mg by mouth once daily., Disp: , Rfl:     atorvastatin (LIPITOR) 20 MG tablet, TAKE 1 TABLET EVERY DAY, Disp: 90 tablet, Rfl: 2    blood pressure monitor Kit, , Disp: , Rfl:     calcium carbonate (OS-NERI) 600 mg (1,500 mg) Tab, Take 600 mg by mouth 2 (two) times daily with meals., Disp: , Rfl:     hydroCHLOROthiazide (HYDRODIURIL) 12.5 MG Tab, Take 1 tablet (12.5 mg total) by mouth once  "daily., Disp: 90 tablet, Rfl: 3    lisinopril (PRINIVIL,ZESTRIL) 20 MG tablet, TAKE 1 TABLET ONE TIME DAILY, Disp: 90 tablet, Rfl: 3    loratadine (CLARITIN) 10 mg tablet, Take 10 mg by mouth once daily., Disp: , Rfl:     metoprolol succinate (TOPROL-XL) 25 MG 24 hr tablet, TAKE 1 TABLET ONE TIME DAILY, Disp: 90 tablet, Rfl: 3    multivitamin capsule, Take 1 capsule by mouth once daily., Disp: , Rfl:     SODIUM CHLORIDE (SALINE SPRAY MISC), by Misc.(Non-Drug; Combo Route) route., Disp: , Rfl:     timolol 0.5 % ophthalmic solution, 1 drop every evening. , Disp: , Rfl:     traMADol (ULTRAM) 50 mg tablet, Take 1 tablet (50 mg total) by mouth every 8 (eight) hours as needed for Pain., Disp: 21 tablet, Rfl: 0    Review of Systems   Constitutional: Negative for chills and fever.   Respiratory: Negative for cough and shortness of breath.    Cardiovascular: Negative for chest pain.   Gastrointestinal: Negative for abdominal pain.   Musculoskeletal: Positive for back pain.   Skin: Negative for rash.   Neurological: Negative for dizziness.       Objective:   BP (!) 96/50 (BP Location: Left arm, Patient Position: Sitting, BP Method: Medium (Manual))   Pulse 68   Temp 97.2 °F (36.2 °C) (Oral)   Resp 16   Ht 5' 2" (1.575 m)   Wt 78.1 kg (172 lb 2.9 oz)   SpO2 97%   BMI 31.49 kg/m²      Physical Exam   Constitutional: She is oriented to person, place, and time. She appears well-developed and well-nourished.   HENT:   Head: Normocephalic and atraumatic.   Eyes: Conjunctivae are normal.   Cardiovascular: Normal rate.   Pulmonary/Chest: Effort normal. No respiratory distress.   Musculoskeletal: She exhibits no edema.   Some tenderness of paraspinal muscles around L4 and L5 along with spinous process tenderness.  No edema nor erythema.  No skin rash.   Neurological: She is alert and oriented to person, place, and time. Coordination normal.   Skin: Skin is warm and dry. No rash noted.   Psychiatric: She has a normal mood " and affect. Her behavior is normal.   Vitals reviewed.      Assessment & Plan     Problem List Items Addressed This Visit        Cardiac/Vascular    Abdominal aortic atherosclerosis    Current Assessment & Plan       Visualized atherosclerosis of the abdominal aorta.  She is already on statin medication and blood pressure is well controlled.            Orthopedic    Spondylolisthesis at L5-S1 level - Primary    Current Assessment & Plan       Giving refill of tramadol.  I recommended stretching as well as use of heating pad.  If symptoms not starting to resolve after another week will consider referring to Orthopedics or pain management.                 No follow-ups on file.    Disclaimer:  This note may have been prepared using voice recognition software, it may have not been extensively proofed, as such there could be errors within the text such as sound alike errors.

## 2019-06-17 NOTE — ASSESSMENT & PLAN NOTE
Giving refill of tramadol.  I recommended stretching as well as use of heating pad.  If symptoms not starting to resolve after another week will consider referring to Orthopedics or pain management.

## 2019-06-19 DIAGNOSIS — I10 ESSENTIAL HYPERTENSION: ICD-10-CM

## 2019-06-19 RX ORDER — METOPROLOL SUCCINATE 25 MG/1
TABLET, EXTENDED RELEASE ORAL
Qty: 90 TABLET | Refills: 3 | Status: SHIPPED | OUTPATIENT
Start: 2019-06-19 | End: 2020-04-07

## 2019-06-19 RX ORDER — HYDROCHLOROTHIAZIDE 12.5 MG/1
TABLET ORAL
Qty: 90 TABLET | Refills: 3 | Status: SHIPPED | OUTPATIENT
Start: 2019-06-19 | End: 2020-04-07

## 2019-07-01 DIAGNOSIS — I10 ESSENTIAL HYPERTENSION: ICD-10-CM

## 2019-07-01 RX ORDER — LISINOPRIL 20 MG/1
TABLET ORAL
Qty: 90 TABLET | Refills: 3 | Status: SHIPPED | OUTPATIENT
Start: 2019-07-01 | End: 2020-04-21

## 2019-07-15 RX ORDER — ATORVASTATIN CALCIUM 20 MG/1
TABLET, FILM COATED ORAL
Qty: 90 TABLET | Refills: 2 | Status: SHIPPED | OUTPATIENT
Start: 2019-07-15 | End: 2020-02-17

## 2019-07-29 ENCOUNTER — OFFICE VISIT (OUTPATIENT)
Dept: INTERNAL MEDICINE | Facility: CLINIC | Age: 74
End: 2019-07-29
Payer: MEDICARE

## 2019-07-29 VITALS
WEIGHT: 174.63 LBS | HEIGHT: 62 IN | HEART RATE: 68 BPM | OXYGEN SATURATION: 97 % | TEMPERATURE: 97 F | SYSTOLIC BLOOD PRESSURE: 127 MMHG | BODY MASS INDEX: 32.14 KG/M2 | DIASTOLIC BLOOD PRESSURE: 68 MMHG

## 2019-07-29 DIAGNOSIS — H81.12 BENIGN PAROXYSMAL POSITIONAL VERTIGO OF LEFT EAR: ICD-10-CM

## 2019-07-29 DIAGNOSIS — M43.17 SPONDYLOLISTHESIS AT L5-S1 LEVEL: ICD-10-CM

## 2019-07-29 DIAGNOSIS — I70.0 ABDOMINAL AORTIC ATHEROSCLEROSIS: ICD-10-CM

## 2019-07-29 DIAGNOSIS — H40.9 GLAUCOMA OF BOTH EYES, UNSPECIFIED GLAUCOMA TYPE: ICD-10-CM

## 2019-07-29 DIAGNOSIS — K57.30 DIVERTICULOSIS OF COLON: ICD-10-CM

## 2019-07-29 DIAGNOSIS — J30.1 SEASONAL ALLERGIC RHINITIS DUE TO POLLEN: ICD-10-CM

## 2019-07-29 DIAGNOSIS — E78.5 HYPERLIPIDEMIA, UNSPECIFIED HYPERLIPIDEMIA TYPE: ICD-10-CM

## 2019-07-29 DIAGNOSIS — Z00.00 ENCOUNTER FOR PREVENTIVE HEALTH EXAMINATION: ICD-10-CM

## 2019-07-29 DIAGNOSIS — I10 ESSENTIAL HYPERTENSION: Primary | ICD-10-CM

## 2019-07-29 DIAGNOSIS — E66.9 CLASS 1 OBESITY WITH SERIOUS COMORBIDITY AND BODY MASS INDEX (BMI) OF 31.0 TO 31.9 IN ADULT, UNSPECIFIED OBESITY TYPE: ICD-10-CM

## 2019-07-29 DIAGNOSIS — K21.9 GASTROESOPHAGEAL REFLUX DISEASE WITHOUT ESOPHAGITIS: ICD-10-CM

## 2019-07-29 DIAGNOSIS — M85.89 OSTEOPENIA OF MULTIPLE SITES: ICD-10-CM

## 2019-07-29 PROBLEM — M72.2 PLANTAR FASCIITIS OF RIGHT FOOT: Status: RESOLVED | Noted: 2018-09-17 | Resolved: 2019-07-29

## 2019-07-29 PROBLEM — Z12.31 ENCOUNTER FOR SCREENING MAMMOGRAM FOR BREAST CANCER: Status: RESOLVED | Noted: 2018-09-17 | Resolved: 2019-07-29

## 2019-07-29 PROCEDURE — 99499 UNLISTED E&M SERVICE: CPT | Mod: S$GLB,,, | Performed by: NURSE PRACTITIONER

## 2019-07-29 PROCEDURE — 99499 RISK ADDL DX/OHS AUDIT: ICD-10-PCS | Mod: S$GLB,,, | Performed by: NURSE PRACTITIONER

## 2019-07-29 PROCEDURE — 3074F PR MOST RECENT SYSTOLIC BLOOD PRESSURE < 130 MM HG: ICD-10-PCS | Mod: HCNC,CPTII,S$GLB, | Performed by: NURSE PRACTITIONER

## 2019-07-29 PROCEDURE — G0439 PPPS, SUBSEQ VISIT: HCPCS | Mod: HCNC,S$GLB,, | Performed by: NURSE PRACTITIONER

## 2019-07-29 PROCEDURE — 3078F PR MOST RECENT DIASTOLIC BLOOD PRESSURE < 80 MM HG: ICD-10-PCS | Mod: HCNC,CPTII,S$GLB, | Performed by: NURSE PRACTITIONER

## 2019-07-29 PROCEDURE — G0439 PR MEDICARE ANNUAL WELLNESS SUBSEQUENT VISIT: ICD-10-PCS | Mod: HCNC,S$GLB,, | Performed by: NURSE PRACTITIONER

## 2019-07-29 PROCEDURE — 99999 PR PBB SHADOW E&M-EST. PATIENT-LVL IV: ICD-10-PCS | Mod: PBBFAC,HCNC,, | Performed by: NURSE PRACTITIONER

## 2019-07-29 PROCEDURE — 3078F DIAST BP <80 MM HG: CPT | Mod: HCNC,CPTII,S$GLB, | Performed by: NURSE PRACTITIONER

## 2019-07-29 PROCEDURE — 99999 PR PBB SHADOW E&M-EST. PATIENT-LVL IV: CPT | Mod: PBBFAC,HCNC,, | Performed by: NURSE PRACTITIONER

## 2019-07-29 PROCEDURE — 3074F SYST BP LT 130 MM HG: CPT | Mod: HCNC,CPTII,S$GLB, | Performed by: NURSE PRACTITIONER

## 2019-07-29 NOTE — PATIENT INSTRUCTIONS
Counseling and Referral of Other Preventative  (Italic type indicates deductible and co-insurance are waived)    Patient Name: Amber Naranjo  Today's Date: 7/29/2019    Health Maintenance       Date Due Completion Date    Shingles Vaccine (2 of 3) 04/20/2017 2/23/2017    Lipid Panel 02/23/2019 2/23/2018    Influenza Vaccine 08/01/2019 9/17/2018    Mammogram 10/29/2019 10/29/2018    Override on 9/15/2015: Done (PER CARE EVERYWHERE)    DEXA SCAN 06/09/2020 6/9/2017    High Dose Statin 07/15/2020 7/15/2019    Colonoscopy 03/15/2027 3/15/2017    TETANUS VACCINE 05/23/2027 5/23/2017 (Done)    Override on 5/23/2017: Done        No orders of the defined types were placed in this encounter.    The following information is provided to all patients.  This information is to help you find resources for any of the problems found today that may be affecting your health:                Living healthy guide: www.[facility].[place].gov      Understanding Diabetes: www.diabetes.org      Eating healthy: www.cdc.gov/healthyweight      [facility] home safety checklist: www.cdc.gov/steadi/patient.html      Agency on Aging: www.goea.[place].gov      Alcoholics anonymous (AA): www.aa.org      Physical Activity: www.kenny.nih.gov/tq8ried      Tobacco use: www.quitwithusla.org

## 2019-07-29 NOTE — PROGRESS NOTES
"Amber Naranjo presented for a  Medicare AWV and comprehensive Health Risk Assessment today. The following components were reviewed and updated:    · Medical history  · Family History  · Social history  · Allergies and Current Medications  · Health Risk Assessment  · Health Maintenance  · Care Team     ** See Completed Assessments for Annual Wellness Visit within the encounter summary.**       The following assessments were completed:  · Living Situation  · CAGE  · Depression Screening  · Timed Get Up and Go  · Whisper Test  · Cognitive Function Screening  · Nutrition Screening  · ADL Screening  · PAQ Screening    Vitals:    07/29/19 0907   BP: 127/68   BP Location: Left arm   Patient Position: Sitting   BP Method: Medium (Manual)   Pulse: 68   Temp: 96.5 °F (35.8 °C)   TempSrc: Tympanic   SpO2: 97%   Weight: 79.2 kg (174 lb 9.7 oz)   Height: 5' 2" (1.575 m)     Body mass index is 31.94 kg/m².  Physical Exam      Diagnoses and health risks identified today and associated recommendations/orders:   1. Encounter for preventive health examination  Done today     2. Essential hypertension  Stable and controlled. Continue current mgt     3. Hyperlipidemia, unspecified hyperlipidemia type  Stable, needs repeat on lipids. Annual appt scheduled with PCP     4. Diverticulosis of large intestine without hemorrhage  As per colonoscopy and barium enema 3/15/17. Follow diverticulosis diet. Follow up with PCP if any complications such as constipation or abdominal pain.      5. Gastroesophgeal reflux disease, esophagitis presence not specified  Controlled with lifestyle changes and OTC medications. Follow with PCP.    6. Spondylolisthesis at L5-S1 level  Per recent L spine xray. Heat/nsaids PRN     7. Obesity, unspecified obesity severity, unspecified obesity type  This problem is not controlled. Educated on importance of healthy/balanced diet and getting 150 minutes of exercise per week to promote weight loss, reach optimal BMI, " improve comorbidities and improve lifestyle. Pt was exercising 3 x per week, but stopped with recent back strain.  Follow up with PCP for further recommendations     8. Uterine leiomyoma, unspecified location  Per abd/pelvic US 8/2/15. No longer with pelvic pain. Instructed to follow up with PCP if any pain or especially if any AUB.      9. Glaucoma, unspecified glaucoma, unspecified laterality  Controlled with timolol. Follow with Dr Alaniz, opthalmology as scheduled      10. Arthritis  Currently using naproxen for management of pain when needed. Recommended avoid NSAID due to hypertension, recommended she try to use tylenol. Follow with PCP.     11. Seasonal allergic rhinitis due to pollen  Controlled with Claritin, Singulair and saline nasal spray. Continue to follow with PCP.      12. Osteopenia of multiple sites  On calcium/vit D. No falls or fractures. Follow up with PCP    13. Benign paroxysmal positional vertigo of left ear  Pt uses Epley maneuvers at home which keeps condition quiet. Follow up with PCP    Provided Amber with a 5-10 year written screening schedule and personal prevention plan. Recommendations were developed using the USPSTF age appropriate recommendations. Education, counseling, and referrals were provided as needed. After Visit Summary printed and given to patient which includes a list of additional screenings\tests needed.    Follow up with PCP and specialists as scheduled  New labs/tests ordered today:  Upcoming health maintenance scheduled:  HRA follow up in 1 year    Marti Rivero NP

## 2019-09-09 ENCOUNTER — LAB VISIT (OUTPATIENT)
Dept: LAB | Facility: HOSPITAL | Age: 74
End: 2019-09-09
Attending: FAMILY MEDICINE
Payer: MEDICARE

## 2019-09-09 ENCOUNTER — OFFICE VISIT (OUTPATIENT)
Dept: INTERNAL MEDICINE | Facility: CLINIC | Age: 74
End: 2019-09-09
Payer: MEDICARE

## 2019-09-09 VITALS
HEIGHT: 62 IN | DIASTOLIC BLOOD PRESSURE: 70 MMHG | RESPIRATION RATE: 16 BRPM | SYSTOLIC BLOOD PRESSURE: 132 MMHG | WEIGHT: 173.5 LBS | HEART RATE: 61 BPM | OXYGEN SATURATION: 98 % | TEMPERATURE: 98 F | BODY MASS INDEX: 31.93 KG/M2

## 2019-09-09 DIAGNOSIS — M17.11 PRIMARY OSTEOARTHRITIS OF RIGHT KNEE: ICD-10-CM

## 2019-09-09 DIAGNOSIS — I10 ESSENTIAL HYPERTENSION: ICD-10-CM

## 2019-09-09 DIAGNOSIS — I70.0 ABDOMINAL AORTIC ATHEROSCLEROSIS: ICD-10-CM

## 2019-09-09 DIAGNOSIS — I70.0 ABDOMINAL AORTIC ATHEROSCLEROSIS: Primary | ICD-10-CM

## 2019-09-09 DIAGNOSIS — Z12.31 ENCOUNTER FOR SCREENING MAMMOGRAM FOR BREAST CANCER: ICD-10-CM

## 2019-09-09 LAB
CHOLEST SERPL-MCNC: 142 MG/DL (ref 120–199)
CHOLEST/HDLC SERPL: 3.2 {RATIO} (ref 2–5)
HDLC SERPL-MCNC: 45 MG/DL (ref 40–75)
HDLC SERPL: 31.7 % (ref 20–50)
LDLC SERPL CALC-MCNC: 78 MG/DL (ref 63–159)
NONHDLC SERPL-MCNC: 97 MG/DL
TRIGL SERPL-MCNC: 95 MG/DL (ref 30–150)

## 2019-09-09 PROCEDURE — 99999 PR PBB SHADOW E&M-EST. PATIENT-LVL III: ICD-10-PCS | Mod: PBBFAC,HCNC,, | Performed by: FAMILY MEDICINE

## 2019-09-09 PROCEDURE — 80061 LIPID PANEL: CPT | Mod: HCNC

## 2019-09-09 PROCEDURE — 99214 PR OFFICE/OUTPT VISIT, EST, LEVL IV, 30-39 MIN: ICD-10-PCS | Mod: 25,HCNC,S$GLB, | Performed by: FAMILY MEDICINE

## 2019-09-09 PROCEDURE — 3075F SYST BP GE 130 - 139MM HG: CPT | Mod: HCNC,CPTII,S$GLB, | Performed by: FAMILY MEDICINE

## 2019-09-09 PROCEDURE — 3075F PR MOST RECENT SYSTOLIC BLOOD PRESS GE 130-139MM HG: ICD-10-PCS | Mod: HCNC,CPTII,S$GLB, | Performed by: FAMILY MEDICINE

## 2019-09-09 PROCEDURE — 36415 COLL VENOUS BLD VENIPUNCTURE: CPT | Mod: HCNC,PO

## 2019-09-09 PROCEDURE — G0008 ADMIN INFLUENZA VIRUS VAC: HCPCS | Mod: HCNC,S$GLB,, | Performed by: FAMILY MEDICINE

## 2019-09-09 PROCEDURE — 90662 IIV NO PRSV INCREASED AG IM: CPT | Mod: HCNC,S$GLB,, | Performed by: FAMILY MEDICINE

## 2019-09-09 PROCEDURE — G0008 FLU VACCINE - HIGH DOSE (65+) PRESERVATIVE FREE IM: ICD-10-PCS | Mod: HCNC,S$GLB,, | Performed by: FAMILY MEDICINE

## 2019-09-09 PROCEDURE — 1101F PT FALLS ASSESS-DOCD LE1/YR: CPT | Mod: HCNC,CPTII,S$GLB, | Performed by: FAMILY MEDICINE

## 2019-09-09 PROCEDURE — 1101F PR PT FALLS ASSESS DOC 0-1 FALLS W/OUT INJ PAST YR: ICD-10-PCS | Mod: HCNC,CPTII,S$GLB, | Performed by: FAMILY MEDICINE

## 2019-09-09 PROCEDURE — 3078F DIAST BP <80 MM HG: CPT | Mod: HCNC,CPTII,S$GLB, | Performed by: FAMILY MEDICINE

## 2019-09-09 PROCEDURE — 90662 FLU VACCINE - HIGH DOSE (65+) PRESERVATIVE FREE IM: ICD-10-PCS | Mod: HCNC,S$GLB,, | Performed by: FAMILY MEDICINE

## 2019-09-09 PROCEDURE — 3078F PR MOST RECENT DIASTOLIC BLOOD PRESSURE < 80 MM HG: ICD-10-PCS | Mod: HCNC,CPTII,S$GLB, | Performed by: FAMILY MEDICINE

## 2019-09-09 PROCEDURE — 99214 OFFICE O/P EST MOD 30 MIN: CPT | Mod: 25,HCNC,S$GLB, | Performed by: FAMILY MEDICINE

## 2019-09-09 PROCEDURE — 99999 PR PBB SHADOW E&M-EST. PATIENT-LVL III: CPT | Mod: PBBFAC,HCNC,, | Performed by: FAMILY MEDICINE

## 2019-09-09 NOTE — PROGRESS NOTES
Subjective:       Patient ID: Amber Naranjo is a 73 y.o. female.    Chief Complaint: Follow-up    HPI  Here today to follow-up from recent health maintenance exam.  She was told she will be due soon for mammogram and is now due for lipid screening.  She has been having a little bit more pain recently in her right knee and also in her lower back.  She goes to the gym 3 days a week and does the stationary bike but has not used many of the other equipment options.  She is hoping to do something exercise wise instead of having to do more medication for her knee pain. She also has been trying to stretch on a routine basis which she feels helps.    Family History   Problem Relation Age of Onset    Glaucoma Mother     Hypertension Father     Asthma Daughter     Colon cancer Sister     Cancer Sister 70        colon    Diabetes Sister     Hypertension Sister     Hyperlipidemia Sister     Diabetes Brother     Hypertension Brother     Heart disease Brother     Hyperlipidemia Brother     Asthma Paternal Aunt     Diabetes Paternal Uncle     Diabetes Maternal Grandmother     Early death Maternal Grandfather     Early death Paternal Grandfather     Breast cancer Cousin     COPD Neg Hx     Kidney disease Neg Hx     Stroke Neg Hx     Mental illness Neg Hx        Current Outpatient Medications:     aspirin (ECOTRIN) 81 MG EC tablet, Take 81 mg by mouth once daily., Disp: , Rfl:     atorvastatin (LIPITOR) 20 MG tablet, TAKE 1 TABLET EVERY DAY, Disp: 90 tablet, Rfl: 2    blood pressure monitor Kit, , Disp: , Rfl:     calcium carbonate (OS-NERI) 600 mg (1,500 mg) Tab, Take 600 mg by mouth 2 (two) times daily with meals., Disp: , Rfl:     hydroCHLOROthiazide (HYDRODIURIL) 12.5 MG Tab, TAKE 1 TABLET ONE TIME DAILY, Disp: 90 tablet, Rfl: 3    lisinopril (PRINIVIL,ZESTRIL) 20 MG tablet, TAKE 1 TABLET EVERY DAY, Disp: 90 tablet, Rfl: 3    loratadine (CLARITIN) 10 mg tablet, Take 10 mg by mouth once daily., Disp: ,  "Rfl:     metoprolol succinate (TOPROL-XL) 25 MG 24 hr tablet, TAKE 1 TABLET EVERY DAY, Disp: 90 tablet, Rfl: 3    multivitamin capsule, Take 1 capsule by mouth once daily., Disp: , Rfl:     SODIUM CHLORIDE (SALINE SPRAY MISC), by Misc.(Non-Drug; Combo Route) route., Disp: , Rfl:     timolol 0.5 % ophthalmic solution, 1 drop every evening. , Disp: , Rfl:     Review of Systems   Constitutional: Negative for chills and fever.   Respiratory: Negative for cough and shortness of breath.    Cardiovascular: Negative for chest pain.   Gastrointestinal: Negative for abdominal pain.   Musculoskeletal: Positive for arthralgias.   Skin: Negative for rash.   Neurological: Negative for dizziness.       Objective:   /70 (BP Location: Left arm, Patient Position: Sitting, BP Method: Medium (Manual))   Pulse 61   Temp 97.7 °F (36.5 °C) (Oral)   Resp 16   Ht 5' 2" (1.575 m)   Wt 78.7 kg (173 lb 8 oz)   SpO2 98%   BMI 31.73 kg/m²      Physical Exam   Constitutional: She is oriented to person, place, and time. She appears well-developed and well-nourished.   HENT:   Head: Normocephalic and atraumatic.   Eyes: Conjunctivae are normal.   Cardiovascular: Normal rate.   Pulmonary/Chest: Effort normal. No respiratory distress.   Musculoskeletal: She exhibits no edema.   Neurological: She is alert and oriented to person, place, and time. Coordination normal.   Skin: Skin is warm and dry. No rash noted.   Psychiatric: She has a normal mood and affect. Her behavior is normal.   Vitals reviewed.      Assessment & Plan     Problem List Items Addressed This Visit        Cardiac/Vascular    Essential hypertension    Current Assessment & Plan     Blood pressure well controlled today.  Continue on same medications.         Abdominal aortic atherosclerosis - Primary    Current Assessment & Plan     Due for lipid screening.  Continue for now on same dose of Lipitor.         Relevant Orders    Lipid panel       Orthopedic    Primary " osteoarthritis of right knee    Current Assessment & Plan     Discussed different exercises that she can do.  She is hoping that exercises can help without having use additional medication.           Other Visit Diagnoses     Encounter for screening mammogram for breast cancer        Relevant Orders    Mammo Digital Screening Bilat            No follow-ups on file.    Disclaimer:  This note may have been prepared using voice recognition software, it may have not been extensively proofed, as such there could be errors within the text such as sound alike errors.

## 2019-09-09 NOTE — ASSESSMENT & PLAN NOTE
Discussed different exercises that she can do.  She is hoping that exercises can help without having use additional medication.

## 2019-11-11 ENCOUNTER — HOSPITAL ENCOUNTER (OUTPATIENT)
Dept: RADIOLOGY | Facility: HOSPITAL | Age: 74
Discharge: HOME | End: 2019-11-11
Attending: FAMILY MEDICINE
Payer: MEDICARE

## 2019-11-11 VITALS — BODY MASS INDEX: 31.93 KG/M2 | HEIGHT: 62 IN | WEIGHT: 173.5 LBS

## 2019-11-11 DIAGNOSIS — Z12.31 ENCOUNTER FOR SCREENING MAMMOGRAM FOR BREAST CANCER: ICD-10-CM

## 2019-11-11 PROCEDURE — 77067 MAMMO DIGITAL SCREENING BILAT WITH TOMOSYNTHESIS_CAD: ICD-10-PCS | Mod: 26,HCNC,, | Performed by: RADIOLOGY

## 2019-11-11 PROCEDURE — 77067 SCR MAMMO BI INCL CAD: CPT | Mod: 26,HCNC,, | Performed by: RADIOLOGY

## 2019-11-11 PROCEDURE — 77063 BREAST TOMOSYNTHESIS BI: CPT | Mod: TC,HCNC,PO

## 2019-11-11 PROCEDURE — 77063 BREAST TOMOSYNTHESIS BI: CPT | Mod: 26,HCNC,, | Performed by: RADIOLOGY

## 2019-11-11 PROCEDURE — 77063 MAMMO DIGITAL SCREENING BILAT WITH TOMOSYNTHESIS_CAD: ICD-10-PCS | Mod: 26,HCNC,, | Performed by: RADIOLOGY

## 2020-02-17 RX ORDER — ATORVASTATIN CALCIUM 20 MG/1
TABLET, FILM COATED ORAL
Qty: 90 TABLET | Refills: 2 | Status: SHIPPED | OUTPATIENT
Start: 2020-02-17 | End: 2020-10-01

## 2020-02-19 ENCOUNTER — NURSE TRIAGE (OUTPATIENT)
Dept: ADMINISTRATIVE | Facility: CLINIC | Age: 75
End: 2020-02-19

## 2020-02-19 NOTE — TELEPHONE ENCOUNTER
Pt calling, states her BP now is 157-71 and is asymptomatic. Earlier her BP was 171/78 and she states she had possible blurry vision, but she reports that she took her BP medication and she feels better, BP coming down. I advised that she be seen with PCP within 2 weeks. Advised her to monitor her BP and symptoms closely and to call back with any changes. She verbalized understanding.    Reason for Disposition   Systolic BP >= 130 OR Diastolic >= 80, and is taking BP medications    Additional Information   Negative: Sounds like a life-threatening emergency to the triager   Negative: Pregnant > 20 weeks and new hand or face swelling   Negative: Pregnant > 20 weeks and BP > 140/90   Negative: Systolic BP >= 160 OR Diastolic >= 100, and any cardiac or neurologic symptoms (e.g., chest pain, difficulty breathing, unsteady gait, blurred vision)   Negative: Patient sounds very sick or weak to the triager   Negative: BP Systolic BP >= 140 OR Diastolic >= 90 and postpartum < 4 weeks   Negative: Systolic BP >= 180 OR Diastolic >= 110, and missed most recent dose of blood pressure medication   Negative: Systolic BP >= 180 OR Diastolic >= 110   Negative: Patient wants to be seen   Negative: Ran out of BP medications   Negative: Taking BP medications and feels is having side effects (e.g., impotence, cough, dizziness)   Negative: Systolic BP >= 160 OR Diastolic >= 100   Negative: Systolic BP >= 130 OR Diastolic >= 80, and pregnant    Protocols used: HIGH BLOOD PRESSURE-A-OH

## 2020-02-20 ENCOUNTER — OFFICE VISIT (OUTPATIENT)
Dept: INTERNAL MEDICINE | Facility: CLINIC | Age: 75
End: 2020-02-20
Payer: MEDICARE

## 2020-02-20 VITALS
BODY MASS INDEX: 31.2 KG/M2 | HEART RATE: 65 BPM | TEMPERATURE: 97 F | OXYGEN SATURATION: 98 % | DIASTOLIC BLOOD PRESSURE: 64 MMHG | WEIGHT: 169.56 LBS | HEIGHT: 62 IN | RESPIRATION RATE: 16 BRPM | SYSTOLIC BLOOD PRESSURE: 137 MMHG

## 2020-02-20 DIAGNOSIS — I70.0 ABDOMINAL AORTIC ATHEROSCLEROSIS: ICD-10-CM

## 2020-02-20 DIAGNOSIS — M17.11 PRIMARY OSTEOARTHRITIS OF RIGHT KNEE: ICD-10-CM

## 2020-02-20 DIAGNOSIS — I10 ESSENTIAL HYPERTENSION: ICD-10-CM

## 2020-02-20 DIAGNOSIS — E78.5 HYPERLIPIDEMIA, UNSPECIFIED HYPERLIPIDEMIA TYPE: Primary | ICD-10-CM

## 2020-02-20 PROCEDURE — 99999 PR PBB SHADOW E&M-EST. PATIENT-LVL IV: ICD-10-PCS | Mod: PBBFAC,HCNC,, | Performed by: NURSE PRACTITIONER

## 2020-02-20 PROCEDURE — 1159F PR MEDICATION LIST DOCUMENTED IN MEDICAL RECORD: ICD-10-PCS | Mod: HCNC,S$GLB,, | Performed by: NURSE PRACTITIONER

## 2020-02-20 PROCEDURE — 1101F PT FALLS ASSESS-DOCD LE1/YR: CPT | Mod: HCNC,CPTII,S$GLB, | Performed by: NURSE PRACTITIONER

## 2020-02-20 PROCEDURE — 1101F PR PT FALLS ASSESS DOC 0-1 FALLS W/OUT INJ PAST YR: ICD-10-PCS | Mod: HCNC,CPTII,S$GLB, | Performed by: NURSE PRACTITIONER

## 2020-02-20 PROCEDURE — 99499 UNLISTED E&M SERVICE: CPT | Mod: HCNC,S$GLB,, | Performed by: NURSE PRACTITIONER

## 2020-02-20 PROCEDURE — 3075F SYST BP GE 130 - 139MM HG: CPT | Mod: HCNC,CPTII,S$GLB, | Performed by: NURSE PRACTITIONER

## 2020-02-20 PROCEDURE — 3078F PR MOST RECENT DIASTOLIC BLOOD PRESSURE < 80 MM HG: ICD-10-PCS | Mod: HCNC,CPTII,S$GLB, | Performed by: NURSE PRACTITIONER

## 2020-02-20 PROCEDURE — 99499 RISK ADDL DX/OHS AUDIT: ICD-10-PCS | Mod: HCNC,S$GLB,, | Performed by: NURSE PRACTITIONER

## 2020-02-20 PROCEDURE — 3075F PR MOST RECENT SYSTOLIC BLOOD PRESS GE 130-139MM HG: ICD-10-PCS | Mod: HCNC,CPTII,S$GLB, | Performed by: NURSE PRACTITIONER

## 2020-02-20 PROCEDURE — 1159F MED LIST DOCD IN RCRD: CPT | Mod: HCNC,S$GLB,, | Performed by: NURSE PRACTITIONER

## 2020-02-20 PROCEDURE — 99214 OFFICE O/P EST MOD 30 MIN: CPT | Mod: HCNC,S$GLB,, | Performed by: NURSE PRACTITIONER

## 2020-02-20 PROCEDURE — 1125F PR PAIN SEVERITY QUANTIFIED, PAIN PRESENT: ICD-10-PCS | Mod: HCNC,S$GLB,, | Performed by: NURSE PRACTITIONER

## 2020-02-20 PROCEDURE — 99999 PR PBB SHADOW E&M-EST. PATIENT-LVL IV: CPT | Mod: PBBFAC,HCNC,, | Performed by: NURSE PRACTITIONER

## 2020-02-20 PROCEDURE — 1125F AMNT PAIN NOTED PAIN PRSNT: CPT | Mod: HCNC,S$GLB,, | Performed by: NURSE PRACTITIONER

## 2020-02-20 PROCEDURE — 3078F DIAST BP <80 MM HG: CPT | Mod: HCNC,CPTII,S$GLB, | Performed by: NURSE PRACTITIONER

## 2020-02-20 PROCEDURE — 99214 PR OFFICE/OUTPT VISIT, EST, LEVL IV, 30-39 MIN: ICD-10-PCS | Mod: HCNC,S$GLB,, | Performed by: NURSE PRACTITIONER

## 2020-02-20 NOTE — PROGRESS NOTES
Subjective:       Patient ID: Amber Naranjo is a 74 y.o. female.    Chief Complaint: Hypertension and Knee Pain    Hypertension   This is a chronic problem. The current episode started more than 1 year ago. The problem has been waxing and waning since onset. The problem is controlled. Associated symptoms include headaches (intermittent). Pertinent negatives include no anxiety, blurred vision, chest pain, malaise/fatigue, palpitations, peripheral edema or shortness of breath. There are no associated agents to hypertension. Risk factors for coronary artery disease include dyslipidemia, post-menopausal state and sedentary lifestyle. Past treatments include ACE inhibitors, beta blockers and diuretics. There are no compliance problems.  There is no history of kidney disease, CAD/MI, CVA or heart failure.   Knee Pain    The incident occurred more than 1 week ago. The incident occurred at home (started going to the gym and walking on treadmill. She has been doing bicycle instead). There was no injury mechanism. The pain is present in the right knee. The quality of the pain is described as aching. The pain is at a severity of 7/10. The pain is moderate. The pain has been intermittent since onset. Pertinent negatives include no inability to bear weight, loss of motion, loss of sensation, muscle weakness, numbness or tingling. She reports no foreign bodies present. The symptoms are aggravated by movement, palpation and weight bearing (medial knee with palpation, and external rotation). She has tried acetaminophen for the symptoms. The treatment provided mild relief.             Patient Active Problem List   Diagnosis    Essential hypertension    Diverticulosis of colon    Tortuous colon    Uterine fibroid    Hyperlipidemia    Glaucoma    GERD (gastroesophageal reflux disease)    Arthritis    Seasonal allergic rhinitis due to pollen    Obesity    Osteopenia of multiple sites    Postmenopausal    Primary  osteoarthritis of right knee    Benign paroxysmal positional vertigo of left ear    Spondylolisthesis at L5-S1 level    Abdominal aortic atherosclerosis       Family History   Problem Relation Age of Onset    Glaucoma Mother     Hypertension Father     Asthma Daughter     Colon cancer Sister     Cancer Sister 70        colon    Diabetes Sister     Hypertension Sister     Hyperlipidemia Sister     Diabetes Brother     Hypertension Brother     Heart disease Brother     Hyperlipidemia Brother     Asthma Paternal Aunt     Diabetes Paternal Uncle     Diabetes Maternal Grandmother     Early death Maternal Grandfather     Early death Paternal Grandfather     Breast cancer Cousin     COPD Neg Hx     Kidney disease Neg Hx     Stroke Neg Hx     Mental illness Neg Hx      Past Surgical History:   Procedure Laterality Date    COLONOSCOPY      COLONOSCOPY N/A 3/15/2017    Procedure: COLONOSCOPY;  Surgeon: Yogi Carbone MD;  Location: [facility];  Service: Endoscopy;  Laterality: N/A;         Current Outpatient Medications:     aspirin (ECOTRIN) 81 MG EC tablet, Take 81 mg by mouth once daily., Disp: , Rfl:     atorvastatin (LIPITOR) 20 MG tablet, TAKE 1 TABLET EVERY DAY, Disp: 90 tablet, Rfl: 2    blood pressure monitor Kit, , Disp: , Rfl:     calcium carbonate (OS-NREI) 600 mg (1,500 mg) Tab, Take 600 mg by mouth 2 (two) times daily with meals., Disp: , Rfl:     hydroCHLOROthiazide (HYDRODIURIL) 12.5 MG Tab, TAKE 1 TABLET ONE TIME DAILY, Disp: 90 tablet, Rfl: 3    lisinopril (PRINIVIL,ZESTRIL) 20 MG tablet, TAKE 1 TABLET EVERY DAY, Disp: 90 tablet, Rfl: 3    loratadine (CLARITIN) 10 mg tablet, Take 10 mg by mouth once daily., Disp: , Rfl:     metoprolol succinate (TOPROL-XL) 25 MG 24 hr tablet, TAKE 1 TABLET EVERY DAY, Disp: 90 tablet, Rfl: 3    multivitamin capsule, Take 1 capsule by mouth once daily., Disp: , Rfl:     SODIUM CHLORIDE (SALINE SPRAY MISC), by Misc.(Non-Drug; Combo Route) route.,  "Disp: , Rfl:     timolol 0.5 % ophthalmic solution, 1 drop every evening. , Disp: , Rfl:     Review of Systems   Constitutional: Negative for activity change, appetite change, chills, fatigue, fever and malaise/fatigue.   Eyes: Negative for blurred vision.   Respiratory: Negative for cough, chest tightness, shortness of breath and wheezing.    Cardiovascular: Negative for chest pain, palpitations and leg swelling.   Gastrointestinal: Negative for abdominal pain, diarrhea, nausea and vomiting.   Musculoskeletal: Positive for arthralgias and joint swelling (R knee). Negative for back pain.   Skin: Negative for color change.   Neurological: Positive for light-headedness (this morning with getting up out of bed) and headaches (intermittent). Negative for dizziness, tingling, syncope, weakness and numbness.       Objective:   /64 (BP Location: Left arm, Patient Position: Sitting, BP Method: Medium (Automatic))   Pulse 65   Temp 96.9 °F (36.1 °C) (Oral)   Resp 16   Ht 5' 2" (1.575 m)   Wt 76.9 kg (169 lb 8.5 oz)   SpO2 98%   BMI 31.01 kg/m²      Physical Exam   Constitutional: She is oriented to person, place, and time. She appears well-developed and well-nourished. She is cooperative. She does not appear ill. No distress.   HENT:   Head: Normocephalic and atraumatic.   Mouth/Throat: No oropharyngeal exudate.   Eyes: Pupils are equal, round, and reactive to light. Conjunctivae and EOM are normal.   Cardiovascular: Normal rate, regular rhythm, normal heart sounds and intact distal pulses. Exam reveals no gallop and no friction rub.   No murmur heard.  Pulmonary/Chest: Effort normal and breath sounds normal. No respiratory distress. She has no wheezes.   Musculoskeletal: Normal range of motion. She exhibits tenderness (medial knee). She exhibits no edema or deformity.   Neurological: She is alert and oriented to person, place, and time. No cranial nerve deficit.   Skin: Skin is warm and dry. She is not " diaphoretic. No erythema.   Vitals reviewed.      Assessment & Plan     Problem List Items Addressed This Visit        Cardiac/Vascular    Essential hypertension    Current Assessment & Plan     Blood pressure controlled at today's visit. Log from home from past two days were highly variable. Low bp of SBP in 90s, and high SBP in 170s. Recommended that she only take BID for now and when she is symptomatic, and bring to next visit. If she continued to have symptoms of lightheadedness with position changes, may need to make some adjustments to med regimen. Continue current medications for now. Discussed signs of hypotension and when to RTC if symptoms worsen.          Hyperlipidemia - Primary    Current Assessment & Plan     Continue statin therapy.          Abdominal aortic atherosclerosis    Current Assessment & Plan     Continue statin and ASA therapy.            Orthopedic    Primary osteoarthritis of right knee    Current Assessment & Plan     Recommended that she continue to exercise on bicycle since this does not irritate knee. Had steroid injection by Dr. Roa on 6/5/2019, so discussed that this is another option for treatment if symptoms persist since this has worked for her in the past. Continue tylenol since this helps with pain relief.                  Follow up if symptoms worsen or fail to improve.

## 2020-02-20 NOTE — ASSESSMENT & PLAN NOTE
Blood pressure controlled at today's visit. Log from home from past two days were highly variable. Low bp of SBP in 90s, and high SBP in 170s. Recommended that she only take BID for now and when she is symptomatic, and bring to next visit. If she continued to have symptoms of lightheadedness with position changes, may need to make some adjustments to med regimen. Continue current medications for now. Discussed signs of hypotension and when to RTC if symptoms worsen.

## 2020-02-20 NOTE — ASSESSMENT & PLAN NOTE
Recommended that she continue to exercise on bicycle since this does not irritate knee. Had steroid injection by Dr. Roa on 6/5/2019, so discussed that this is another option for treatment if symptoms persist since this has worked for her in the past. Continue tylenol since this helps with pain relief.

## 2020-03-09 ENCOUNTER — LAB VISIT (OUTPATIENT)
Dept: LAB | Facility: HOSPITAL | Age: 75
End: 2020-03-09
Attending: FAMILY MEDICINE
Payer: MEDICARE

## 2020-03-09 ENCOUNTER — OFFICE VISIT (OUTPATIENT)
Dept: INTERNAL MEDICINE | Facility: CLINIC | Age: 75
End: 2020-03-09
Payer: MEDICARE

## 2020-03-09 VITALS
BODY MASS INDEX: 31.52 KG/M2 | HEART RATE: 68 BPM | DIASTOLIC BLOOD PRESSURE: 60 MMHG | OXYGEN SATURATION: 99 % | SYSTOLIC BLOOD PRESSURE: 116 MMHG | WEIGHT: 171.31 LBS | TEMPERATURE: 96 F | HEIGHT: 62 IN | RESPIRATION RATE: 17 BRPM

## 2020-03-09 DIAGNOSIS — I10 ESSENTIAL HYPERTENSION: ICD-10-CM

## 2020-03-09 DIAGNOSIS — J30.1 SEASONAL ALLERGIC RHINITIS DUE TO POLLEN: ICD-10-CM

## 2020-03-09 DIAGNOSIS — M17.11 PRIMARY OSTEOARTHRITIS OF RIGHT KNEE: Primary | ICD-10-CM

## 2020-03-09 DIAGNOSIS — Z78.0 POSTMENOPAUSAL: ICD-10-CM

## 2020-03-09 DIAGNOSIS — K21.9 GASTROESOPHAGEAL REFLUX DISEASE WITHOUT ESOPHAGITIS: ICD-10-CM

## 2020-03-09 DIAGNOSIS — Z12.31 ENCOUNTER FOR SCREENING MAMMOGRAM FOR BREAST CANCER: ICD-10-CM

## 2020-03-09 LAB
ALBUMIN SERPL BCP-MCNC: 3.7 G/DL (ref 3.5–5.2)
ALP SERPL-CCNC: 112 U/L (ref 55–135)
ALT SERPL W/O P-5'-P-CCNC: 23 U/L (ref 10–44)
ANION GAP SERPL CALC-SCNC: 11 MMOL/L (ref 8–16)
AST SERPL-CCNC: 26 U/L (ref 10–40)
BILIRUB SERPL-MCNC: 0.5 MG/DL (ref 0.1–1)
BUN SERPL-MCNC: 22 MG/DL (ref 8–23)
CALCIUM SERPL-MCNC: 9.5 MG/DL (ref 8.7–10.5)
CHLORIDE SERPL-SCNC: 101 MMOL/L (ref 95–110)
CO2 SERPL-SCNC: 29 MMOL/L (ref 23–29)
CREAT SERPL-MCNC: 1 MG/DL (ref 0.5–1.4)
EST. GFR  (AFRICAN AMERICAN): >60 ML/MIN/1.73 M^2
EST. GFR  (NON AFRICAN AMERICAN): 55.6 ML/MIN/1.73 M^2
GLUCOSE SERPL-MCNC: 102 MG/DL (ref 70–110)
POTASSIUM SERPL-SCNC: 4 MMOL/L (ref 3.5–5.1)
PROT SERPL-MCNC: 7.1 G/DL (ref 6–8.4)
SODIUM SERPL-SCNC: 141 MMOL/L (ref 136–145)

## 2020-03-09 PROCEDURE — 1101F PR PT FALLS ASSESS DOC 0-1 FALLS W/OUT INJ PAST YR: ICD-10-PCS | Mod: HCNC,CPTII,S$GLB, | Performed by: FAMILY MEDICINE

## 2020-03-09 PROCEDURE — 3074F SYST BP LT 130 MM HG: CPT | Mod: HCNC,CPTII,S$GLB, | Performed by: FAMILY MEDICINE

## 2020-03-09 PROCEDURE — 3078F PR MOST RECENT DIASTOLIC BLOOD PRESSURE < 80 MM HG: ICD-10-PCS | Mod: HCNC,CPTII,S$GLB, | Performed by: FAMILY MEDICINE

## 2020-03-09 PROCEDURE — 3074F PR MOST RECENT SYSTOLIC BLOOD PRESSURE < 130 MM HG: ICD-10-PCS | Mod: HCNC,CPTII,S$GLB, | Performed by: FAMILY MEDICINE

## 2020-03-09 PROCEDURE — 36415 COLL VENOUS BLD VENIPUNCTURE: CPT | Mod: HCNC,PO

## 2020-03-09 PROCEDURE — 99214 PR OFFICE/OUTPT VISIT, EST, LEVL IV, 30-39 MIN: ICD-10-PCS | Mod: 25,HCNC,S$GLB, | Performed by: FAMILY MEDICINE

## 2020-03-09 PROCEDURE — 1101F PT FALLS ASSESS-DOCD LE1/YR: CPT | Mod: HCNC,CPTII,S$GLB, | Performed by: FAMILY MEDICINE

## 2020-03-09 PROCEDURE — 1125F AMNT PAIN NOTED PAIN PRSNT: CPT | Mod: HCNC,S$GLB,, | Performed by: FAMILY MEDICINE

## 2020-03-09 PROCEDURE — 1159F MED LIST DOCD IN RCRD: CPT | Mod: HCNC,S$GLB,, | Performed by: FAMILY MEDICINE

## 2020-03-09 PROCEDURE — 99214 OFFICE O/P EST MOD 30 MIN: CPT | Mod: 25,HCNC,S$GLB, | Performed by: FAMILY MEDICINE

## 2020-03-09 PROCEDURE — 3078F DIAST BP <80 MM HG: CPT | Mod: HCNC,CPTII,S$GLB, | Performed by: FAMILY MEDICINE

## 2020-03-09 PROCEDURE — 20610 LARGE JOINT ASPIRATION/INJECTION: R KNEE: ICD-10-PCS | Mod: HCNC,RT,S$GLB, | Performed by: FAMILY MEDICINE

## 2020-03-09 PROCEDURE — 80053 COMPREHEN METABOLIC PANEL: CPT | Mod: HCNC,PO

## 2020-03-09 PROCEDURE — 1159F PR MEDICATION LIST DOCUMENTED IN MEDICAL RECORD: ICD-10-PCS | Mod: HCNC,S$GLB,, | Performed by: FAMILY MEDICINE

## 2020-03-09 PROCEDURE — 99999 PR PBB SHADOW E&M-EST. PATIENT-LVL IV: CPT | Mod: PBBFAC,HCNC,, | Performed by: FAMILY MEDICINE

## 2020-03-09 PROCEDURE — 1125F PR PAIN SEVERITY QUANTIFIED, PAIN PRESENT: ICD-10-PCS | Mod: HCNC,S$GLB,, | Performed by: FAMILY MEDICINE

## 2020-03-09 PROCEDURE — 20610 DRAIN/INJ JOINT/BURSA W/O US: CPT | Mod: HCNC,RT,S$GLB, | Performed by: FAMILY MEDICINE

## 2020-03-09 PROCEDURE — 99999 PR PBB SHADOW E&M-EST. PATIENT-LVL IV: ICD-10-PCS | Mod: PBBFAC,HCNC,, | Performed by: FAMILY MEDICINE

## 2020-03-09 RX ORDER — PANTOPRAZOLE SODIUM 40 MG/1
40 TABLET, DELAYED RELEASE ORAL DAILY
Qty: 90 TABLET | Refills: 0 | Status: SHIPPED | OUTPATIENT
Start: 2020-03-09 | End: 2020-04-30

## 2020-03-09 RX ORDER — METHYLPREDNISOLONE ACETATE 80 MG/ML
80 INJECTION, SUSPENSION INTRA-ARTICULAR; INTRALESIONAL; INTRAMUSCULAR; SOFT TISSUE
Status: DISCONTINUED | OUTPATIENT
Start: 2020-03-09 | End: 2020-03-09 | Stop reason: HOSPADM

## 2020-03-09 RX ADMIN — METHYLPREDNISOLONE ACETATE 80 MG: 80 INJECTION, SUSPENSION INTRA-ARTICULAR; INTRALESIONAL; INTRAMUSCULAR; SOFT TISSUE at 09:03

## 2020-03-09 NOTE — PROGRESS NOTES
Subjective:       Patient ID: Amber Naranjo is a 74 y.o. female.    Chief Complaint: Follow-up (6 month)    HPI  Came in today for routine six-month follow-up.  Not due for any refills at this time.  Is up-to-date on colonoscopy and mammogram.  Is due for DEXA scan later this year.  No vaccine is needed at this time.  Blood pressure well controlled.  Her main concern currently is right knee pain.  She has known osteoarthritis and last year she had an injection done by me and did pretty well for a while.  She is hoping to get another injection at this time to give her further relief.  Not having any fever or chills.  Pain is worse ambulation and gets worse throughout the day.    Also needing different medication for acid reflux.  She has been taking some over-the-counter Prilosec but does not feel that it is helping sufficiently.  Not having any dysphagia.  No regurgitation of undigested food.  No weight loss    Family History   Problem Relation Age of Onset    Glaucoma Mother     Hypertension Father     Asthma Daughter     Colon cancer Sister     Cancer Sister 70        colon    Diabetes Sister     Hypertension Sister     Hyperlipidemia Sister     Diabetes Brother     Hypertension Brother     Heart disease Brother     Hyperlipidemia Brother     Asthma Paternal Aunt     Diabetes Paternal Uncle     Diabetes Maternal Grandmother     Early death Maternal Grandfather     Early death Paternal Grandfather     Breast cancer Cousin     COPD Neg Hx     Kidney disease Neg Hx     Stroke Neg Hx     Mental illness Neg Hx        Current Outpatient Medications:     aspirin (ECOTRIN) 81 MG EC tablet, Take 81 mg by mouth once daily., Disp: , Rfl:     atorvastatin (LIPITOR) 20 MG tablet, TAKE 1 TABLET EVERY DAY, Disp: 90 tablet, Rfl: 2    blood pressure monitor Kit, , Disp: , Rfl:     calcium carbonate (OS-NERI) 600 mg (1,500 mg) Tab, Take 600 mg by mouth 2 (two) times daily with meals., Disp: , Rfl:      "hydroCHLOROthiazide (HYDRODIURIL) 12.5 MG Tab, TAKE 1 TABLET ONE TIME DAILY, Disp: 90 tablet, Rfl: 3    lisinopril (PRINIVIL,ZESTRIL) 20 MG tablet, TAKE 1 TABLET EVERY DAY, Disp: 90 tablet, Rfl: 3    metoprolol succinate (TOPROL-XL) 25 MG 24 hr tablet, TAKE 1 TABLET EVERY DAY, Disp: 90 tablet, Rfl: 3    multivitamin capsule, Take 1 capsule by mouth once daily., Disp: , Rfl:     SODIUM CHLORIDE (SALINE SPRAY MISC), by Misc.(Non-Drug; Combo Route) route., Disp: , Rfl:     timolol 0.5 % ophthalmic solution, 1 drop every evening. , Disp: , Rfl:     pantoprazole (PROTONIX) 40 MG tablet, Take 1 tablet (40 mg total) by mouth once daily., Disp: 90 tablet, Rfl: 0    Current Facility-Administered Medications:     methylPREDNISolone acetate injection 80 mg, 80 mg, Intra-articular, , Wei Roa DO, 80 mg at 03/09/20 0940    Review of Systems   Constitutional: Negative for chills and fever.   HENT: Positive for congestion. Negative for sore throat.    Eyes: Negative for visual disturbance.   Respiratory: Positive for cough. Negative for shortness of breath.    Cardiovascular: Negative for chest pain.   Gastrointestinal: Negative for abdominal pain, constipation and diarrhea.   Endocrine: Negative for polydipsia and polyuria.   Genitourinary: Negative for difficulty urinating and menstrual problem.   Skin: Negative for rash.   Neurological: Negative for dizziness.   Hematological: Does not bruise/bleed easily.       Objective:   /60 (BP Location: Right arm, Patient Position: Sitting, BP Method: Medium (Manual))   Pulse 68   Temp 96.3 °F (35.7 °C) (Tympanic)   Resp 17   Ht 5' 2" (1.575 m)   Wt 77.7 kg (171 lb 4.8 oz)   SpO2 99%   BMI 31.33 kg/m²      Physical Exam   Constitutional: She is oriented to person, place, and time. She appears well-developed and well-nourished.   HENT:   Head: Normocephalic and atraumatic.   Eyes: Conjunctivae are normal.   Cardiovascular: Normal rate.   Pulmonary/Chest: Effort " normal. No respiratory distress.   Musculoskeletal: She exhibits no edema.   Neurological: She is alert and oriented to person, place, and time. Coordination normal.   Skin: Skin is warm and dry. No rash noted.   Psychiatric: She has a normal mood and affect. Her behavior is normal.   Vitals reviewed.      Assessment & Plan     Problem List Items Addressed This Visit        Cardiac/Vascular    Essential hypertension    Current Assessment & Plan     Well controlled.  Continue on same medication.         Relevant Orders    Comprehensive metabolic panel       Renal/    Postmenopausal    Current Assessment & Plan     Will be due for DEXA scan later this year.  Order placed         Relevant Orders    DXA Bone Density Spine And Hip       GI    GERD (gastroesophageal reflux disease)    Current Assessment & Plan     Switching from Claritin to Zyrtec.            Orthopedic    Primary osteoarthritis of right knee - Primary    Current Assessment & Plan     Provided with knee injection today to give symptomatic relief for osteoarthritis.         Relevant Medications    methylPREDNISolone acetate injection 80 mg    Other Relevant Orders    Large Joint Aspiration/Injection: R knee (Completed)       Other    Seasonal allergic rhinitis due to pollen    Current Assessment & Plan     Recommended switching from Claritin Zyrtec and using has  nasal spray.           Other Visit Diagnoses     Encounter for screening mammogram for breast cancer        Relevant Orders    Mammo Digital Screening Bilateral With CAD            No follow-ups on file.    Disclaimer:  This note may have been prepared using voice recognition software, it may have not been extensively proofed, as such there could be errors within the text such as sound alike errors.

## 2020-03-09 NOTE — PROCEDURES
Large Joint Aspiration/Injection: R knee  Performed by: Wei Roa DO  Authorized by: Wei Roa DO  Date/Time: 3/9/2020 9:40 AM    Consent Done?:  Yes (Verbal)  Indications:  joint swelling and pain  Timeout: Immediately prior to procedure a time out was called to verify the correct patient, procedure, equipment, support staff and site/side marked as required.  Procedure site marked: Yes     Anesthesia  Local anesthesia not used        Details:   Needle size: 21 G    Ultrasonic Guidance for needle placement: No   Approach: anteromedial  Location:  Knee  Site:  R knee    Medications: 80 mg methylPREDNISolone acetate 80 mg/mL  Aspirate:  0 mL  Patient tolerance:  patient tolerated the procedure well with no immediate complications

## 2020-04-06 DIAGNOSIS — I10 ESSENTIAL HYPERTENSION: ICD-10-CM

## 2020-04-07 RX ORDER — METOPROLOL SUCCINATE 25 MG/1
TABLET, EXTENDED RELEASE ORAL
Qty: 90 TABLET | Refills: 3 | Status: SHIPPED | OUTPATIENT
Start: 2020-04-07 | End: 2021-01-05

## 2020-04-07 RX ORDER — HYDROCHLOROTHIAZIDE 12.5 MG/1
TABLET ORAL
Qty: 90 TABLET | Refills: 3 | Status: SHIPPED | OUTPATIENT
Start: 2020-04-07 | End: 2021-01-05

## 2020-04-20 DIAGNOSIS — I10 ESSENTIAL HYPERTENSION: ICD-10-CM

## 2020-04-21 RX ORDER — LISINOPRIL 20 MG/1
TABLET ORAL
Qty: 90 TABLET | Refills: 3 | Status: SHIPPED | OUTPATIENT
Start: 2020-04-21 | End: 2021-01-22

## 2020-04-30 RX ORDER — PANTOPRAZOLE SODIUM 40 MG/1
TABLET, DELAYED RELEASE ORAL
Qty: 90 TABLET | Refills: 0 | Status: SHIPPED | OUTPATIENT
Start: 2020-04-30 | End: 2020-07-06

## 2020-06-15 ENCOUNTER — APPOINTMENT (OUTPATIENT)
Dept: RADIOLOGY | Facility: HOSPITAL | Age: 75
End: 2020-06-15
Attending: FAMILY MEDICINE
Payer: MEDICARE

## 2020-06-15 DIAGNOSIS — Z78.0 POSTMENOPAUSAL: ICD-10-CM

## 2020-06-15 PROCEDURE — 77080 DXA BONE DENSITY AXIAL: CPT | Mod: TC,HCNC

## 2020-06-15 PROCEDURE — 77080 DEXA BONE DENSITY SPINE HIP: ICD-10-PCS | Mod: 26,HCNC,, | Performed by: RADIOLOGY

## 2020-06-15 PROCEDURE — 77080 DXA BONE DENSITY AXIAL: CPT | Mod: 26,HCNC,, | Performed by: RADIOLOGY

## 2020-08-03 ENCOUNTER — PES CALL (OUTPATIENT)
Dept: ADMINISTRATIVE | Facility: CLINIC | Age: 75
End: 2020-08-03

## 2020-08-31 ENCOUNTER — OFFICE VISIT (OUTPATIENT)
Dept: INTERNAL MEDICINE | Facility: CLINIC | Age: 75
End: 2020-08-31
Payer: MEDICARE

## 2020-08-31 VITALS
HEART RATE: 60 BPM | SYSTOLIC BLOOD PRESSURE: 122 MMHG | HEIGHT: 62 IN | WEIGHT: 168.19 LBS | TEMPERATURE: 98 F | OXYGEN SATURATION: 100 % | DIASTOLIC BLOOD PRESSURE: 60 MMHG | BODY MASS INDEX: 30.95 KG/M2

## 2020-08-31 DIAGNOSIS — Q43.8 TORTUOUS COLON: ICD-10-CM

## 2020-08-31 DIAGNOSIS — Z00.00 ENCOUNTER FOR PREVENTIVE HEALTH EXAMINATION: Primary | ICD-10-CM

## 2020-08-31 DIAGNOSIS — K57.30 DIVERTICULOSIS OF COLON: ICD-10-CM

## 2020-08-31 DIAGNOSIS — M43.17 SPONDYLOLISTHESIS AT L5-S1 LEVEL: ICD-10-CM

## 2020-08-31 DIAGNOSIS — H81.12 BENIGN PAROXYSMAL POSITIONAL VERTIGO OF LEFT EAR: ICD-10-CM

## 2020-08-31 DIAGNOSIS — J30.1 SEASONAL ALLERGIC RHINITIS DUE TO POLLEN: ICD-10-CM

## 2020-08-31 DIAGNOSIS — I70.0 ABDOMINAL AORTIC ATHEROSCLEROSIS: ICD-10-CM

## 2020-08-31 DIAGNOSIS — E78.5 HYPERLIPIDEMIA, UNSPECIFIED HYPERLIPIDEMIA TYPE: ICD-10-CM

## 2020-08-31 DIAGNOSIS — I10 ESSENTIAL HYPERTENSION: ICD-10-CM

## 2020-08-31 DIAGNOSIS — M85.89 OSTEOPENIA OF MULTIPLE SITES: ICD-10-CM

## 2020-08-31 DIAGNOSIS — M17.11 PRIMARY OSTEOARTHRITIS OF RIGHT KNEE: ICD-10-CM

## 2020-08-31 DIAGNOSIS — K21.9 GASTROESOPHAGEAL REFLUX DISEASE WITHOUT ESOPHAGITIS: ICD-10-CM

## 2020-08-31 DIAGNOSIS — Z78.0 POSTMENOPAUSAL: ICD-10-CM

## 2020-08-31 PROCEDURE — 99999 PR PBB SHADOW E&M-EST. PATIENT-LVL IV: CPT | Mod: PBBFAC,HCNC,, | Performed by: NURSE PRACTITIONER

## 2020-08-31 PROCEDURE — 3078F DIAST BP <80 MM HG: CPT | Mod: HCNC,CPTII,S$GLB, | Performed by: NURSE PRACTITIONER

## 2020-08-31 PROCEDURE — 3074F SYST BP LT 130 MM HG: CPT | Mod: HCNC,CPTII,S$GLB, | Performed by: NURSE PRACTITIONER

## 2020-08-31 PROCEDURE — G0439 PR MEDICARE ANNUAL WELLNESS SUBSEQUENT VISIT: ICD-10-PCS | Mod: HCNC,S$GLB,, | Performed by: NURSE PRACTITIONER

## 2020-08-31 PROCEDURE — G0439 PPPS, SUBSEQ VISIT: HCPCS | Mod: HCNC,S$GLB,, | Performed by: NURSE PRACTITIONER

## 2020-08-31 PROCEDURE — 3074F PR MOST RECENT SYSTOLIC BLOOD PRESSURE < 130 MM HG: ICD-10-PCS | Mod: HCNC,CPTII,S$GLB, | Performed by: NURSE PRACTITIONER

## 2020-08-31 PROCEDURE — 3078F PR MOST RECENT DIASTOLIC BLOOD PRESSURE < 80 MM HG: ICD-10-PCS | Mod: HCNC,CPTII,S$GLB, | Performed by: NURSE PRACTITIONER

## 2020-08-31 PROCEDURE — 99499 RISK ADDL DX/OHS AUDIT: ICD-10-PCS | Mod: S$GLB,,, | Performed by: NURSE PRACTITIONER

## 2020-08-31 PROCEDURE — 99499 UNLISTED E&M SERVICE: CPT | Mod: S$GLB,,, | Performed by: NURSE PRACTITIONER

## 2020-08-31 PROCEDURE — 99999 PR PBB SHADOW E&M-EST. PATIENT-LVL IV: ICD-10-PCS | Mod: PBBFAC,HCNC,, | Performed by: NURSE PRACTITIONER

## 2020-08-31 RX ORDER — AZELASTINE 1 MG/ML
1 SPRAY, METERED NASAL 2 TIMES DAILY
Qty: 30 ML | Refills: 0 | Status: SHIPPED | OUTPATIENT
Start: 2020-08-31 | End: 2020-10-26

## 2020-08-31 NOTE — PATIENT INSTRUCTIONS
Counseling and Referral of Other Preventative  (Italic type indicates deductible and co-insurance are waived)    Patient Name: Amber Naranjo  Today's Date: 8/31/2020    Health Maintenance       Date Due Completion Date    Influenza Vaccine (1) 09/01/2020 9/9/2019    Lipid Panel 09/09/2020 9/9/2019    Mammogram 11/11/2020 11/11/2019    Override on 9/15/2015: Done (PER CARE EVERYWHERE)    High Dose Statin 03/09/2021 3/9/2020    DEXA SCAN 06/15/2023 6/15/2020    Colorectal Cancer Screening 03/15/2027 3/15/2017    TETANUS VACCINE 05/23/2027 5/23/2017 (Done)    Override on 5/23/2017: Done        No orders of the defined types were placed in this encounter.    The following information is provided to all patients.  This information is to help you find resources for any of the problems found today that may be affecting your health:                Living healthy guide: www.[facility].[place].gov      Understanding Diabetes: www.diabetes.org      Eating healthy: www.cdc.gov/healthyweight      CDC home safety checklist: www.cdc.gov/steadi/patient.html      Agency on Aging: www.goea.[place].[facility]      Alcoholics anonymous (AA): www.aa.org      Physical Activity: www.[place].nih.gov/eo2lmoh      Tobacco use: www.quitwithusla.org        Astelin nasal spray sold over the counter

## 2020-08-31 NOTE — PROGRESS NOTES
"  Amber Naranjo presented for a  Medicare AWV and comprehensive Health Risk Assessment today. The following components were reviewed and updated:    · Medical history  · Family History  · Social history  · Allergies and Current Medications  · Health Risk Assessment  · Health Maintenance  · Care Team     ** See Completed Assessments for Annual Wellness Visit within the encounter summary.**       The following assessments were completed:  · Living Situation  · CAGE  · Depression Screening  · Timed Get Up and Go  · Whisper Test  · Cognitive Function Screening  · Nutrition Screening  · ADL Screening  · PAQ Screening    Vitals:    08/31/20 1316   BP: 122/60   BP Location: Left arm   Patient Position: Sitting   BP Method: Large (Manual)   Pulse: 60   Temp: 98 °F (36.7 °C)   TempSrc: Tympanic   SpO2: 100%   Weight: 76.3 kg (168 lb 3.4 oz)   Height: 5' 2" (1.575 m)     Body mass index is 30.77 kg/m².  Physical Exam  HENT:      Head: Normocephalic and atraumatic.      Right Ear: Tympanic membrane normal.      Left Ear: Tympanic membrane normal.   Eyes:      Conjunctiva/sclera: Conjunctivae normal.   Neck:      Musculoskeletal: Normal range of motion and neck supple.   Cardiovascular:      Rate and Rhythm: Normal rate and regular rhythm.      Heart sounds: Normal heart sounds.   Pulmonary:      Effort: Pulmonary effort is normal.      Breath sounds: Normal breath sounds.   Abdominal:      General: Bowel sounds are normal.      Palpations: Abdomen is soft.   Musculoskeletal: Normal range of motion.   Skin:     General: Skin is warm and dry.   Neurological:      Mental Status: She is alert and oriented to person, place, and time.           Diagnoses and health risks identified today and associated recommendations/orders:    1. Encounter for preventive health examination  Done today     2. Essential hypertension  Stable and controlled. Continue current mgt     3. Hyperlipidemia, unspecified hyperlipidemia type  Stable, needs repeat " on lipids. Annual appt scheduled with PCP     4. Diverticulosis of large intestine without hemorrhage  As per colonoscopy and barium enema 3/15/17. Follow diverticulosis diet. Follow up with PCP if any complications such as constipation or abdominal pain.      5. Gastroesophgeal reflux disease, esophagitis presence not specified  Controlled with lifestyle changes and OTC medications. Follow with PCP.     6. Spondylolisthesis at L5-S1 level  Per L spine xray. Heat/nsaids PRN     7. Obesity, unspecified obesity severity, unspecified obesity type  This problem is not controlled. Educated on importance of healthy/balanced diet and getting 150 minutes of exercise per week to promote weight loss, reach optimal BMI, improve comorbidities and improve lifestyle. Pt was exercising 3 x per week, but stopped with recent back strain.  Follow up with PCP for further recommendations     9. Glaucoma, unspecified glaucoma, unspecified laterality  Controlled with timolol. Follow with Dr Alaniz, opthalmology as scheduled      10. Arthritis  Currently using naproxen for management of pain when needed. Recommended avoid NSAID due to hypertension, recommended she try to use tylenol. Follow with PCP.     11. Seasonal allergic rhinitis due to pollen  Controlled with Claritin, Singulair and saline nasal spray. Continue to follow with PCP.      12. Osteopenia of multiple sites  On calcium/vit D. No falls or fractures. Follow up with PCP     13. Benign paroxysmal positional vertigo of left ear  Pt uses Epley maneuvers at home which keeps condition quiet. Follow up with PCP     Provided Amber with a 5-10 year written screening schedule and personal prevention plan. Recommendations were developed using the USPSTF age appropriate recommendations      Follow up with PCP and specialists as scheduled    HRA follow up in 1 year    Marti Rivero NP

## 2020-09-21 ENCOUNTER — LAB VISIT (OUTPATIENT)
Dept: LAB | Facility: HOSPITAL | Age: 75
End: 2020-09-21
Attending: NURSE PRACTITIONER
Payer: MEDICARE

## 2020-09-21 ENCOUNTER — OFFICE VISIT (OUTPATIENT)
Dept: INTERNAL MEDICINE | Facility: CLINIC | Age: 75
End: 2020-09-21
Payer: MEDICARE

## 2020-09-21 VITALS
DIASTOLIC BLOOD PRESSURE: 62 MMHG | HEART RATE: 60 BPM | HEIGHT: 62 IN | BODY MASS INDEX: 31.36 KG/M2 | WEIGHT: 170.44 LBS | TEMPERATURE: 98 F | SYSTOLIC BLOOD PRESSURE: 132 MMHG | RESPIRATION RATE: 12 BRPM | OXYGEN SATURATION: 98 %

## 2020-09-21 DIAGNOSIS — I70.0 ABDOMINAL AORTIC ATHEROSCLEROSIS: ICD-10-CM

## 2020-09-21 DIAGNOSIS — E78.5 HYPERLIPIDEMIA, UNSPECIFIED HYPERLIPIDEMIA TYPE: ICD-10-CM

## 2020-09-21 DIAGNOSIS — E78.5 HYPERLIPIDEMIA, UNSPECIFIED HYPERLIPIDEMIA TYPE: Primary | ICD-10-CM

## 2020-09-21 DIAGNOSIS — I10 ESSENTIAL HYPERTENSION: ICD-10-CM

## 2020-09-21 PROCEDURE — 99214 OFFICE O/P EST MOD 30 MIN: CPT | Mod: HCNC,S$GLB,, | Performed by: NURSE PRACTITIONER

## 2020-09-21 PROCEDURE — 1159F MED LIST DOCD IN RCRD: CPT | Mod: HCNC,S$GLB,, | Performed by: NURSE PRACTITIONER

## 2020-09-21 PROCEDURE — 99999 PR PBB SHADOW E&M-EST. PATIENT-LVL V: ICD-10-PCS | Mod: PBBFAC,HCNC,, | Performed by: NURSE PRACTITIONER

## 2020-09-21 PROCEDURE — 1126F AMNT PAIN NOTED NONE PRSNT: CPT | Mod: HCNC,S$GLB,, | Performed by: NURSE PRACTITIONER

## 2020-09-21 PROCEDURE — 1101F PT FALLS ASSESS-DOCD LE1/YR: CPT | Mod: HCNC,CPTII,S$GLB, | Performed by: NURSE PRACTITIONER

## 2020-09-21 PROCEDURE — 80061 LIPID PANEL: CPT | Mod: HCNC

## 2020-09-21 PROCEDURE — 1101F PR PT FALLS ASSESS DOC 0-1 FALLS W/OUT INJ PAST YR: ICD-10-PCS | Mod: HCNC,CPTII,S$GLB, | Performed by: NURSE PRACTITIONER

## 2020-09-21 PROCEDURE — 99214 PR OFFICE/OUTPT VISIT, EST, LEVL IV, 30-39 MIN: ICD-10-PCS | Mod: HCNC,S$GLB,, | Performed by: NURSE PRACTITIONER

## 2020-09-21 PROCEDURE — 3078F PR MOST RECENT DIASTOLIC BLOOD PRESSURE < 80 MM HG: ICD-10-PCS | Mod: HCNC,CPTII,S$GLB, | Performed by: NURSE PRACTITIONER

## 2020-09-21 PROCEDURE — 1159F PR MEDICATION LIST DOCUMENTED IN MEDICAL RECORD: ICD-10-PCS | Mod: HCNC,S$GLB,, | Performed by: NURSE PRACTITIONER

## 2020-09-21 PROCEDURE — 3008F PR BODY MASS INDEX (BMI) DOCUMENTED: ICD-10-PCS | Mod: HCNC,CPTII,S$GLB, | Performed by: NURSE PRACTITIONER

## 2020-09-21 PROCEDURE — 36415 COLL VENOUS BLD VENIPUNCTURE: CPT | Mod: HCNC,PO

## 2020-09-21 PROCEDURE — 3078F DIAST BP <80 MM HG: CPT | Mod: HCNC,CPTII,S$GLB, | Performed by: NURSE PRACTITIONER

## 2020-09-21 PROCEDURE — 3075F PR MOST RECENT SYSTOLIC BLOOD PRESS GE 130-139MM HG: ICD-10-PCS | Mod: HCNC,CPTII,S$GLB, | Performed by: NURSE PRACTITIONER

## 2020-09-21 PROCEDURE — 3075F SYST BP GE 130 - 139MM HG: CPT | Mod: HCNC,CPTII,S$GLB, | Performed by: NURSE PRACTITIONER

## 2020-09-21 PROCEDURE — 99999 PR PBB SHADOW E&M-EST. PATIENT-LVL V: CPT | Mod: PBBFAC,HCNC,, | Performed by: NURSE PRACTITIONER

## 2020-09-21 PROCEDURE — 1126F PR PAIN SEVERITY QUANTIFIED, NO PAIN PRESENT: ICD-10-PCS | Mod: HCNC,S$GLB,, | Performed by: NURSE PRACTITIONER

## 2020-09-21 PROCEDURE — 3008F BODY MASS INDEX DOCD: CPT | Mod: HCNC,CPTII,S$GLB, | Performed by: NURSE PRACTITIONER

## 2020-09-21 RX ORDER — INFLUENZA A VIRUS A/MICHIGAN/45/2015 X-275 (H1N1) ANTIGEN (FORMALDEHYDE INACTIVATED), INFLUENZA A VIRUS A/SINGAPORE/INFIMH-16-0019/2016 IVR-186 (H3N2) ANTIGEN (FORMALDEHYDE INACTIVATED), INFLUENZA B VIRUS B/PHUKET/3073/2013 ANTIGEN (FORMALDEHYDE INACTIVATED), AND INFLUENZA B VIRUS B/MARYLAND/15/2016 BX-69A ANTIGEN (FORMALDEHYDE INACTIVATED) 60; 60; 60; 60 UG/.7ML; UG/.7ML; UG/.7ML; UG/.7ML
INJECTION, SUSPENSION INTRAMUSCULAR
COMMUNITY
Start: 2020-09-11 | End: 2020-09-21 | Stop reason: ALTCHOICE

## 2020-09-21 RX ORDER — TIMOLOL MALEATE 5 MG/ML
SOLUTION/ DROPS OPHTHALMIC
COMMUNITY
Start: 2020-09-04

## 2020-09-21 NOTE — PROGRESS NOTES
Subjective:       Patient ID: Amber Naranjo is a 74 y.o. female.    Chief Complaint: Hyperlipidemia    Mrs. Naranjo presents to clinic for HTN follow up. No acute complaints today.     Hypertension  This is a chronic problem. The current episode started more than 1 year ago. The problem has been waxing and waning since onset. The problem is controlled. Pertinent negatives include no anxiety, blurred vision, chest pain, headaches, malaise/fatigue, palpitations, peripheral edema or shortness of breath. There are no associated agents to hypertension. Risk factors for coronary artery disease include dyslipidemia. Past treatments include diuretics, ACE inhibitors and beta blockers. The current treatment provides significant improvement. There are no compliance problems.  Hypertensive end-organ damage includes CAD/MI.       Patient Active Problem List   Diagnosis    Essential hypertension    Diverticulosis of colon    Tortuous colon    Uterine fibroid    Hyperlipidemia    Glaucoma    GERD (gastroesophageal reflux disease)    Arthritis    Seasonal allergic rhinitis due to pollen    Obesity    Osteopenia of multiple sites    Postmenopausal    Primary osteoarthritis of right knee    Benign paroxysmal positional vertigo of left ear    Spondylolisthesis at L5-S1 level    Abdominal aortic atherosclerosis       Family History   Problem Relation Age of Onset    Glaucoma Mother     Hypertension Father     Asthma Daughter     Colon cancer Sister     Cancer Sister 70        colon    Diabetes Sister     Hypertension Sister     Hyperlipidemia Sister     Diabetes Brother     Hypertension Brother     Heart disease Brother     Hyperlipidemia Brother     Asthma Paternal Aunt     Diabetes Paternal Uncle     Diabetes Maternal Grandmother     Early death Maternal Grandfather     Early death Paternal Grandfather     Breast cancer Cousin     COPD Neg Hx     Kidney disease Neg Hx     Stroke Neg Hx     Mental  illness Neg Hx      Past Surgical History:   Procedure Laterality Date    COLONOSCOPY      COLONOSCOPY N/A 3/15/2017    Procedure: COLONOSCOPY;  Surgeon: Yogi Carbone MD;  [facility];  Service: Endoscopy;  Laterality: N/A;         Current Outpatient Medications:     aspirin (ECOTRIN) 81 MG EC tablet, Take 81 mg by mouth once daily., Disp: , Rfl:     atorvastatin (LIPITOR) 20 MG tablet, TAKE 1 TABLET EVERY DAY, Disp: 90 tablet, Rfl: 2    azelastine (ASTELIN) 137 mcg (0.1 %) nasal spray, 1 spray (137 mcg total) by Nasal route 2 (two) times daily., Disp: 30 mL, Rfl: 0    blood pressure monitor Kit, , Disp: , Rfl:     calcium carbonate (OS-NERI) 600 mg (1,500 mg) Tab, Take 600 mg by mouth 2 (two) times daily with meals., Disp: , Rfl:     hydroCHLOROthiazide (HYDRODIURIL) 12.5 MG Tab, TAKE 1 TABLET EVERY DAY, Disp: 90 tablet, Rfl: 3    lisinopriL (PRINIVIL,ZESTRIL) 20 MG tablet, TAKE 1 TABLET EVERY DAY, Disp: 90 tablet, Rfl: 3    metoprolol succinate (TOPROL-XL) 25 MG 24 hr tablet, TAKE 1 TABLET EVERY DAY, Disp: 90 tablet, Rfl: 3    multivitamin capsule, Take 1 capsule by mouth once daily., Disp: , Rfl:     pantoprazole (PROTONIX) 40 MG tablet, TAKE 1 TABLET EVERY DAY, Disp: 90 tablet, Rfl: 0    SODIUM CHLORIDE (SALINE SPRAY MISC), by Misc.(Non-Drug; Combo Route) route., Disp: , Rfl:     timolol 0.5 % ophthalmic solution, 1 drop every evening. , Disp: , Rfl:     timolol maleate 0.5% (TIMOPTIC) 0.5 % Drop, , Disp: , Rfl:     Review of Systems   Constitutional: Negative for chills, fatigue, fever and malaise/fatigue.   Eyes: Negative for blurred vision.   Respiratory: Negative for cough and shortness of breath.    Cardiovascular: Negative for chest pain and palpitations.   Gastrointestinal: Negative for abdominal pain, nausea and vomiting.   Musculoskeletal: Negative for gait problem.   Neurological: Negative for dizziness, light-headedness and headaches.       Objective:   /62 (BP Location:  "Left arm, Patient Position: Sitting, BP Method: Medium (Manual))   Pulse 60   Temp 97.7 °F (36.5 °C) (Temporal)   Resp 12   Ht 5' 2" (1.575 m)   Wt 77.3 kg (170 lb 6.7 oz)   SpO2 98%   BMI 31.17 kg/m²      Physical Exam  Constitutional:       General: She is not in acute distress.     Appearance: Normal appearance. She is not ill-appearing.   HENT:      Head: Normocephalic and atraumatic.   Cardiovascular:      Rate and Rhythm: Normal rate and regular rhythm.      Pulses: Normal pulses.      Heart sounds: Normal heart sounds. No murmur. No friction rub. No gallop.    Pulmonary:      Effort: Pulmonary effort is normal. No respiratory distress.      Breath sounds: Normal breath sounds.   Musculoskeletal: Normal range of motion.      Right lower leg: No edema.      Left lower leg: No edema.   Skin:     General: Skin is warm and dry.      Coloration: Skin is not pale.      Findings: No erythema.   Neurological:      General: No focal deficit present.      Mental Status: She is alert and oriented to person, place, and time.   Psychiatric:         Mood and Affect: Mood normal.         Behavior: Behavior normal.         Assessment & Plan     Problem List Items Addressed This Visit        Cardiac/Vascular    Essential hypertension    Current Assessment & Plan     Controlled. Continue current medication.         Hyperlipidemia - Primary    Current Assessment & Plan     Rechecking lipid panel today. Continue statin.         Relevant Orders    Lipid Panel    Abdominal aortic atherosclerosis    Current Assessment & Plan     Continue statin and ASA. BP well controlled.              Follow up in about 6 months (around 3/21/2021) for hypertension.            Portions of this note may have been created with voice recognition software. Occasional "wrong-word" or "sound-a-like" substitutions may have occurred due to the inherent limitations of voice recognition software. Please, read the note carefully and recognize, using " context, where substitutions have occurred.

## 2020-09-22 LAB
CHOLEST SERPL-MCNC: 150 MG/DL (ref 120–199)
CHOLEST/HDLC SERPL: 3 {RATIO} (ref 2–5)
HDLC SERPL-MCNC: 50 MG/DL (ref 40–75)
HDLC SERPL: 33.3 % (ref 20–50)
LDLC SERPL CALC-MCNC: 82.6 MG/DL (ref 63–159)
NONHDLC SERPL-MCNC: 100 MG/DL
TRIGL SERPL-MCNC: 87 MG/DL (ref 30–150)

## 2020-11-11 ENCOUNTER — HOSPITAL ENCOUNTER (OUTPATIENT)
Dept: RADIOLOGY | Facility: HOSPITAL | Age: 75
Discharge: HOME | End: 2020-11-11
Attending: FAMILY MEDICINE
Payer: MEDICARE

## 2020-11-11 VITALS — WEIGHT: 170.44 LBS | HEIGHT: 62 IN | BODY MASS INDEX: 31.36 KG/M2

## 2020-11-11 DIAGNOSIS — Z12.31 ENCOUNTER FOR SCREENING MAMMOGRAM FOR BREAST CANCER: ICD-10-CM

## 2020-11-11 PROCEDURE — 77063 BREAST TOMOSYNTHESIS BI: CPT | Mod: 26,,, | Performed by: RADIOLOGY

## 2020-11-11 PROCEDURE — 77067 SCR MAMMO BI INCL CAD: CPT | Mod: 26,,, | Performed by: RADIOLOGY

## 2020-11-11 PROCEDURE — 77063 MAMMO DIGITAL SCREENING BILAT WITH TOMO: ICD-10-PCS | Mod: 26,,, | Performed by: RADIOLOGY

## 2020-11-11 PROCEDURE — 77067 SCR MAMMO BI INCL CAD: CPT | Mod: TC,PO

## 2020-11-11 PROCEDURE — 77067 MAMMO DIGITAL SCREENING BILAT WITH TOMO: ICD-10-PCS | Mod: 26,,, | Performed by: RADIOLOGY

## 2021-03-15 ENCOUNTER — OFFICE VISIT (OUTPATIENT)
Dept: INTERNAL MEDICINE | Facility: CLINIC | Age: 76
End: 2021-03-15
Payer: MEDICARE

## 2021-03-15 ENCOUNTER — LAB VISIT (OUTPATIENT)
Dept: LAB | Facility: HOSPITAL | Age: 76
End: 2021-03-15
Attending: NURSE PRACTITIONER
Payer: MEDICARE

## 2021-03-15 ENCOUNTER — TELEPHONE (OUTPATIENT)
Dept: INTERNAL MEDICINE | Facility: CLINIC | Age: 76
End: 2021-03-15

## 2021-03-15 VITALS
OXYGEN SATURATION: 98 % | HEIGHT: 62 IN | DIASTOLIC BLOOD PRESSURE: 68 MMHG | HEART RATE: 85 BPM | TEMPERATURE: 98 F | BODY MASS INDEX: 31.68 KG/M2 | WEIGHT: 172.19 LBS | SYSTOLIC BLOOD PRESSURE: 150 MMHG

## 2021-03-15 DIAGNOSIS — I70.0 ABDOMINAL AORTIC ATHEROSCLEROSIS: ICD-10-CM

## 2021-03-15 DIAGNOSIS — I10 ESSENTIAL HYPERTENSION: ICD-10-CM

## 2021-03-15 DIAGNOSIS — E78.5 HYPERLIPIDEMIA, UNSPECIFIED HYPERLIPIDEMIA TYPE: Primary | ICD-10-CM

## 2021-03-15 LAB
ALBUMIN SERPL BCP-MCNC: 3.8 G/DL (ref 3.5–5.2)
ALP SERPL-CCNC: 107 U/L (ref 55–135)
ALT SERPL W/O P-5'-P-CCNC: 17 U/L (ref 10–44)
ANION GAP SERPL CALC-SCNC: 10 MMOL/L (ref 8–16)
AST SERPL-CCNC: 24 U/L (ref 10–40)
BILIRUB SERPL-MCNC: 0.5 MG/DL (ref 0.1–1)
BUN SERPL-MCNC: 28 MG/DL (ref 8–23)
CALCIUM SERPL-MCNC: 9.3 MG/DL (ref 8.7–10.5)
CHLORIDE SERPL-SCNC: 103 MMOL/L (ref 95–110)
CO2 SERPL-SCNC: 27 MMOL/L (ref 23–29)
CREAT SERPL-MCNC: 1 MG/DL (ref 0.5–1.4)
EST. GFR  (AFRICAN AMERICAN): >60 ML/MIN/1.73 M^2
EST. GFR  (NON AFRICAN AMERICAN): 55.2 ML/MIN/1.73 M^2
GLUCOSE SERPL-MCNC: 98 MG/DL (ref 70–110)
POTASSIUM SERPL-SCNC: 4.7 MMOL/L (ref 3.5–5.1)
PROT SERPL-MCNC: 6.8 G/DL (ref 6–8.4)
SODIUM SERPL-SCNC: 140 MMOL/L (ref 136–145)

## 2021-03-15 PROCEDURE — 99499 RISK ADDL DX/OHS AUDIT: ICD-10-PCS | Mod: HCNC,S$GLB,, | Performed by: NURSE PRACTITIONER

## 2021-03-15 PROCEDURE — 3288F PR FALLS RISK ASSESSMENT DOCUMENTED: ICD-10-PCS | Mod: CPTII,S$GLB,, | Performed by: NURSE PRACTITIONER

## 2021-03-15 PROCEDURE — 3288F FALL RISK ASSESSMENT DOCD: CPT | Mod: CPTII,S$GLB,, | Performed by: NURSE PRACTITIONER

## 2021-03-15 PROCEDURE — 99214 OFFICE O/P EST MOD 30 MIN: CPT | Mod: S$GLB,,, | Performed by: NURSE PRACTITIONER

## 2021-03-15 PROCEDURE — 99999 PR PBB SHADOW E&M-EST. PATIENT-LVL IV: ICD-10-PCS | Mod: PBBFAC,,, | Performed by: NURSE PRACTITIONER

## 2021-03-15 PROCEDURE — 80053 COMPREHEN METABOLIC PANEL: CPT | Mod: PO | Performed by: NURSE PRACTITIONER

## 2021-03-15 PROCEDURE — 1159F PR MEDICATION LIST DOCUMENTED IN MEDICAL RECORD: ICD-10-PCS | Mod: S$GLB,,, | Performed by: NURSE PRACTITIONER

## 2021-03-15 PROCEDURE — 3078F DIAST BP <80 MM HG: CPT | Mod: CPTII,S$GLB,, | Performed by: NURSE PRACTITIONER

## 2021-03-15 PROCEDURE — 1101F PR PT FALLS ASSESS DOC 0-1 FALLS W/OUT INJ PAST YR: ICD-10-PCS | Mod: CPTII,S$GLB,, | Performed by: NURSE PRACTITIONER

## 2021-03-15 PROCEDURE — 3077F SYST BP >= 140 MM HG: CPT | Mod: CPTII,S$GLB,, | Performed by: NURSE PRACTITIONER

## 2021-03-15 PROCEDURE — 3077F PR MOST RECENT SYSTOLIC BLOOD PRESSURE >= 140 MM HG: ICD-10-PCS | Mod: CPTII,S$GLB,, | Performed by: NURSE PRACTITIONER

## 2021-03-15 PROCEDURE — 1126F PR PAIN SEVERITY QUANTIFIED, NO PAIN PRESENT: ICD-10-PCS | Mod: S$GLB,,, | Performed by: NURSE PRACTITIONER

## 2021-03-15 PROCEDURE — 36415 COLL VENOUS BLD VENIPUNCTURE: CPT | Mod: PO | Performed by: NURSE PRACTITIONER

## 2021-03-15 PROCEDURE — 99499 UNLISTED E&M SERVICE: CPT | Mod: HCNC,S$GLB,, | Performed by: NURSE PRACTITIONER

## 2021-03-15 PROCEDURE — 1101F PT FALLS ASSESS-DOCD LE1/YR: CPT | Mod: CPTII,S$GLB,, | Performed by: NURSE PRACTITIONER

## 2021-03-15 PROCEDURE — 1126F AMNT PAIN NOTED NONE PRSNT: CPT | Mod: S$GLB,,, | Performed by: NURSE PRACTITIONER

## 2021-03-15 PROCEDURE — 1159F MED LIST DOCD IN RCRD: CPT | Mod: S$GLB,,, | Performed by: NURSE PRACTITIONER

## 2021-03-15 PROCEDURE — 99214 PR OFFICE/OUTPT VISIT, EST, LEVL IV, 30-39 MIN: ICD-10-PCS | Mod: S$GLB,,, | Performed by: NURSE PRACTITIONER

## 2021-03-15 PROCEDURE — 3078F PR MOST RECENT DIASTOLIC BLOOD PRESSURE < 80 MM HG: ICD-10-PCS | Mod: CPTII,S$GLB,, | Performed by: NURSE PRACTITIONER

## 2021-03-15 PROCEDURE — 99999 PR PBB SHADOW E&M-EST. PATIENT-LVL IV: CPT | Mod: PBBFAC,,, | Performed by: NURSE PRACTITIONER

## 2021-03-22 ENCOUNTER — OFFICE VISIT (OUTPATIENT)
Dept: INTERNAL MEDICINE | Facility: CLINIC | Age: 76
End: 2021-03-22
Payer: MEDICARE

## 2021-03-22 VITALS
SYSTOLIC BLOOD PRESSURE: 128 MMHG | BODY MASS INDEX: 31.85 KG/M2 | WEIGHT: 173.06 LBS | DIASTOLIC BLOOD PRESSURE: 62 MMHG | TEMPERATURE: 98 F | HEIGHT: 62 IN | OXYGEN SATURATION: 98 % | HEART RATE: 62 BPM

## 2021-03-22 DIAGNOSIS — I70.0 ABDOMINAL AORTIC ATHEROSCLEROSIS: ICD-10-CM

## 2021-03-22 DIAGNOSIS — I10 ESSENTIAL HYPERTENSION: ICD-10-CM

## 2021-03-22 PROCEDURE — 1101F PT FALLS ASSESS-DOCD LE1/YR: CPT | Mod: CPTII,S$GLB,, | Performed by: NURSE PRACTITIONER

## 2021-03-22 PROCEDURE — 3077F PR MOST RECENT SYSTOLIC BLOOD PRESSURE >= 140 MM HG: ICD-10-PCS | Mod: CPTII,S$GLB,, | Performed by: NURSE PRACTITIONER

## 2021-03-22 PROCEDURE — 3288F FALL RISK ASSESSMENT DOCD: CPT | Mod: CPTII,S$GLB,, | Performed by: NURSE PRACTITIONER

## 2021-03-22 PROCEDURE — 99999 PR PBB SHADOW E&M-EST. PATIENT-LVL IV: CPT | Mod: PBBFAC,,, | Performed by: NURSE PRACTITIONER

## 2021-03-22 PROCEDURE — 3288F PR FALLS RISK ASSESSMENT DOCUMENTED: ICD-10-PCS | Mod: CPTII,S$GLB,, | Performed by: NURSE PRACTITIONER

## 2021-03-22 PROCEDURE — 1159F MED LIST DOCD IN RCRD: CPT | Mod: S$GLB,,, | Performed by: NURSE PRACTITIONER

## 2021-03-22 PROCEDURE — 99213 OFFICE O/P EST LOW 20 MIN: CPT | Mod: S$GLB,,, | Performed by: NURSE PRACTITIONER

## 2021-03-22 PROCEDURE — 1159F PR MEDICATION LIST DOCUMENTED IN MEDICAL RECORD: ICD-10-PCS | Mod: S$GLB,,, | Performed by: NURSE PRACTITIONER

## 2021-03-22 PROCEDURE — 1126F AMNT PAIN NOTED NONE PRSNT: CPT | Mod: S$GLB,,, | Performed by: NURSE PRACTITIONER

## 2021-03-22 PROCEDURE — 99213 PR OFFICE/OUTPT VISIT, EST, LEVL III, 20-29 MIN: ICD-10-PCS | Mod: S$GLB,,, | Performed by: NURSE PRACTITIONER

## 2021-03-22 PROCEDURE — 1126F PR PAIN SEVERITY QUANTIFIED, NO PAIN PRESENT: ICD-10-PCS | Mod: S$GLB,,, | Performed by: NURSE PRACTITIONER

## 2021-03-22 PROCEDURE — 99999 PR PBB SHADOW E&M-EST. PATIENT-LVL IV: ICD-10-PCS | Mod: PBBFAC,,, | Performed by: NURSE PRACTITIONER

## 2021-03-22 PROCEDURE — 3078F DIAST BP <80 MM HG: CPT | Mod: CPTII,S$GLB,, | Performed by: NURSE PRACTITIONER

## 2021-03-22 PROCEDURE — 1101F PR PT FALLS ASSESS DOC 0-1 FALLS W/OUT INJ PAST YR: ICD-10-PCS | Mod: CPTII,S$GLB,, | Performed by: NURSE PRACTITIONER

## 2021-03-22 PROCEDURE — 3077F SYST BP >= 140 MM HG: CPT | Mod: CPTII,S$GLB,, | Performed by: NURSE PRACTITIONER

## 2021-03-22 PROCEDURE — 3078F PR MOST RECENT DIASTOLIC BLOOD PRESSURE < 80 MM HG: ICD-10-PCS | Mod: CPTII,S$GLB,, | Performed by: NURSE PRACTITIONER

## 2021-03-24 ENCOUNTER — CLINICAL SUPPORT (OUTPATIENT)
Dept: INTERNAL MEDICINE | Facility: CLINIC | Age: 76
End: 2021-03-24
Payer: MEDICARE

## 2021-03-24 ENCOUNTER — TELEPHONE (OUTPATIENT)
Dept: INTERNAL MEDICINE | Facility: CLINIC | Age: 76
End: 2021-03-24

## 2021-03-24 VITALS — DIASTOLIC BLOOD PRESSURE: 68 MMHG | HEART RATE: 70 BPM | SYSTOLIC BLOOD PRESSURE: 132 MMHG

## 2021-03-24 PROCEDURE — 99999 PR PBB SHADOW E&M-EST. PATIENT-LVL I: CPT | Mod: PBBFAC,,,

## 2021-03-24 PROCEDURE — 99999 PR PBB SHADOW E&M-EST. PATIENT-LVL I: ICD-10-PCS | Mod: PBBFAC,,,

## 2021-06-08 ENCOUNTER — TELEPHONE (OUTPATIENT)
Dept: ADMINISTRATIVE | Facility: HOSPITAL | Age: 76
End: 2021-06-08

## 2021-07-09 ENCOUNTER — PATIENT OUTREACH (OUTPATIENT)
Dept: ADMINISTRATIVE | Facility: HOSPITAL | Age: 76
End: 2021-07-09

## 2021-09-20 ENCOUNTER — OFFICE VISIT (OUTPATIENT)
Dept: INTERNAL MEDICINE | Facility: CLINIC | Age: 76
End: 2021-09-20
Payer: MEDICARE

## 2021-09-20 ENCOUNTER — HOSPITAL ENCOUNTER (OUTPATIENT)
Dept: RADIOLOGY | Facility: HOSPITAL | Age: 76
Discharge: HOME | End: 2021-09-20
Attending: FAMILY MEDICINE
Payer: MEDICARE

## 2021-09-20 ENCOUNTER — HOSPITAL ENCOUNTER (OUTPATIENT)
Dept: CARDIOLOGY | Facility: HOSPITAL | Age: 76
Discharge: HOME | End: 2021-09-20
Attending: FAMILY MEDICINE
Payer: MEDICARE

## 2021-09-20 VITALS
OXYGEN SATURATION: 98 % | SYSTOLIC BLOOD PRESSURE: 120 MMHG | WEIGHT: 167.31 LBS | TEMPERATURE: 99 F | DIASTOLIC BLOOD PRESSURE: 62 MMHG | BODY MASS INDEX: 30.79 KG/M2 | HEIGHT: 62 IN | RESPIRATION RATE: 15 BRPM | HEART RATE: 64 BPM

## 2021-09-20 DIAGNOSIS — R06.09 DYSPNEA ON EXERTION: Primary | ICD-10-CM

## 2021-09-20 DIAGNOSIS — I10 ESSENTIAL HYPERTENSION: ICD-10-CM

## 2021-09-20 DIAGNOSIS — R06.09 DYSPNEA ON EXERTION: ICD-10-CM

## 2021-09-20 PROCEDURE — 3078F PR MOST RECENT DIASTOLIC BLOOD PRESSURE < 80 MM HG: ICD-10-PCS | Mod: HCNC,CPTII,S$GLB, | Performed by: FAMILY MEDICINE

## 2021-09-20 PROCEDURE — 1159F PR MEDICATION LIST DOCUMENTED IN MEDICAL RECORD: ICD-10-PCS | Mod: HCNC,CPTII,S$GLB, | Performed by: FAMILY MEDICINE

## 2021-09-20 PROCEDURE — 3288F FALL RISK ASSESSMENT DOCD: CPT | Mod: HCNC,CPTII,S$GLB, | Performed by: FAMILY MEDICINE

## 2021-09-20 PROCEDURE — 3074F SYST BP LT 130 MM HG: CPT | Mod: HCNC,CPTII,S$GLB, | Performed by: FAMILY MEDICINE

## 2021-09-20 PROCEDURE — 99214 PR OFFICE/OUTPT VISIT, EST, LEVL IV, 30-39 MIN: ICD-10-PCS | Mod: HCNC,S$GLB,, | Performed by: FAMILY MEDICINE

## 2021-09-20 PROCEDURE — 1160F RVW MEDS BY RX/DR IN RCRD: CPT | Mod: HCNC,CPTII,S$GLB, | Performed by: FAMILY MEDICINE

## 2021-09-20 PROCEDURE — 1101F PR PT FALLS ASSESS DOC 0-1 FALLS W/OUT INJ PAST YR: ICD-10-PCS | Mod: HCNC,CPTII,S$GLB, | Performed by: FAMILY MEDICINE

## 2021-09-20 PROCEDURE — 1160F PR REVIEW ALL MEDS BY PRESCRIBER/CLIN PHARMACIST DOCUMENTED: ICD-10-PCS | Mod: HCNC,CPTII,S$GLB, | Performed by: FAMILY MEDICINE

## 2021-09-20 PROCEDURE — 1159F MED LIST DOCD IN RCRD: CPT | Mod: HCNC,CPTII,S$GLB, | Performed by: FAMILY MEDICINE

## 2021-09-20 PROCEDURE — 71046 X-RAY EXAM CHEST 2 VIEWS: CPT | Mod: 26,HCNC,, | Performed by: RADIOLOGY

## 2021-09-20 PROCEDURE — 3074F PR MOST RECENT SYSTOLIC BLOOD PRESSURE < 130 MM HG: ICD-10-PCS | Mod: HCNC,CPTII,S$GLB, | Performed by: FAMILY MEDICINE

## 2021-09-20 PROCEDURE — 1101F PT FALLS ASSESS-DOCD LE1/YR: CPT | Mod: HCNC,CPTII,S$GLB, | Performed by: FAMILY MEDICINE

## 2021-09-20 PROCEDURE — 99999 PR PBB SHADOW E&M-EST. PATIENT-LVL IV: CPT | Mod: PBBFAC,HCNC,, | Performed by: FAMILY MEDICINE

## 2021-09-20 PROCEDURE — 93005 ELECTROCARDIOGRAM TRACING: CPT | Mod: HCNC,PO

## 2021-09-20 PROCEDURE — 71046 XR CHEST PA AND LATERAL: ICD-10-PCS | Mod: 26,HCNC,, | Performed by: RADIOLOGY

## 2021-09-20 PROCEDURE — 1126F AMNT PAIN NOTED NONE PRSNT: CPT | Mod: HCNC,CPTII,S$GLB, | Performed by: FAMILY MEDICINE

## 2021-09-20 PROCEDURE — 99999 PR PBB SHADOW E&M-EST. PATIENT-LVL IV: ICD-10-PCS | Mod: PBBFAC,HCNC,, | Performed by: FAMILY MEDICINE

## 2021-09-20 PROCEDURE — 99499 UNLISTED E&M SERVICE: CPT | Mod: HCNC,S$GLB,, | Performed by: FAMILY MEDICINE

## 2021-09-20 PROCEDURE — 1126F PR PAIN SEVERITY QUANTIFIED, NO PAIN PRESENT: ICD-10-PCS | Mod: HCNC,CPTII,S$GLB, | Performed by: FAMILY MEDICINE

## 2021-09-20 PROCEDURE — 93010 EKG 12-LEAD: ICD-10-PCS | Mod: HCNC,S$GLB,, | Performed by: INTERNAL MEDICINE

## 2021-09-20 PROCEDURE — 71046 X-RAY EXAM CHEST 2 VIEWS: CPT | Mod: TC,HCNC,PO

## 2021-09-20 PROCEDURE — 3078F DIAST BP <80 MM HG: CPT | Mod: HCNC,CPTII,S$GLB, | Performed by: FAMILY MEDICINE

## 2021-09-20 PROCEDURE — 99499 RISK ADDL DX/OHS AUDIT: ICD-10-PCS | Mod: HCNC,S$GLB,, | Performed by: FAMILY MEDICINE

## 2021-09-20 PROCEDURE — 99214 OFFICE O/P EST MOD 30 MIN: CPT | Mod: HCNC,S$GLB,, | Performed by: FAMILY MEDICINE

## 2021-09-20 PROCEDURE — 3288F PR FALLS RISK ASSESSMENT DOCUMENTED: ICD-10-PCS | Mod: HCNC,CPTII,S$GLB, | Performed by: FAMILY MEDICINE

## 2021-09-20 PROCEDURE — 93010 ELECTROCARDIOGRAM REPORT: CPT | Mod: HCNC,S$GLB,, | Performed by: INTERNAL MEDICINE

## 2021-09-20 RX ORDER — OMEPRAZOLE 40 MG/1
40 CAPSULE, DELAYED RELEASE ORAL DAILY
Qty: 90 CAPSULE | Refills: 1 | Status: SHIPPED | OUTPATIENT
Start: 2021-09-20 | End: 2022-02-09

## 2021-09-27 ENCOUNTER — TELEPHONE (OUTPATIENT)
Dept: INTERNAL MEDICINE | Facility: CLINIC | Age: 76
End: 2021-09-27

## 2021-09-27 ENCOUNTER — PES CALL (OUTPATIENT)
Dept: ADMINISTRATIVE | Facility: CLINIC | Age: 76
End: 2021-09-27

## 2021-10-11 ENCOUNTER — HOSPITAL ENCOUNTER (OUTPATIENT)
Dept: CARDIOLOGY | Facility: HOSPITAL | Age: 76
Discharge: HOME | End: 2021-10-11
Attending: FAMILY MEDICINE
Payer: MEDICARE

## 2021-10-11 VITALS — HEIGHT: 62 IN | BODY MASS INDEX: 30.73 KG/M2 | WEIGHT: 167 LBS

## 2021-10-11 DIAGNOSIS — R06.09 DYSPNEA ON EXERTION: ICD-10-CM

## 2021-10-11 LAB
AORTIC ROOT ANNULUS: 3.05 CM
AV INDEX (PROSTH): 0.71
AV MEAN GRADIENT: 4 MMHG
AV PEAK GRADIENT: 7 MMHG
AV VALVE AREA: 2.23 CM2
AV VELOCITY RATIO: 0.82
BSA FOR ECHO PROCEDURE: 1.82 M2
CV ECHO LV RWT: 0.43 CM
DOP CALC AO PEAK VEL: 1.35 M/S
DOP CALC AO VTI: 40.81 CM
DOP CALC LVOT AREA: 3.1 CM2
DOP CALC LVOT DIAMETER: 2 CM
DOP CALC LVOT PEAK VEL: 1.11 M/S
DOP CALC LVOT STROKE VOLUME: 90.97 CM3
DOP CALC RVOT PEAK VEL: 0.64 M/S
DOP CALC RVOT VTI: 20.26 CM
DOP CALCLVOT PEAK VEL VTI: 28.97 CM
E WAVE DECELERATION TIME: 171.43 MSEC
E/A RATIO: 1.3
E/E' RATIO: 10 M/S
ECHO LV POSTERIOR WALL: 1.05 CM (ref 0.6–1.1)
EJECTION FRACTION: 60 %
FRACTIONAL SHORTENING: 36 % (ref 28–44)
INTERVENTRICULAR SEPTUM: 0.96 CM (ref 0.6–1.1)
IVRT: 85.63 MSEC
LA MAJOR: 5.05 CM
LA MINOR: 4.64 CM
LA WIDTH: 2.88 CM
LEFT ATRIUM SIZE: 3.23 CM
LEFT ATRIUM VOLUME INDEX: 21.6 ML/M2
LEFT ATRIUM VOLUME: 38.24 CM3
LEFT INTERNAL DIMENSION IN SYSTOLE: 3.09 CM (ref 2.1–4)
LEFT VENTRICLE DIASTOLIC VOLUME INDEX: 62.09 ML/M2
LEFT VENTRICLE DIASTOLIC VOLUME: 109.9 ML
LEFT VENTRICLE MASS INDEX: 98 G/M2
LEFT VENTRICLE SYSTOLIC VOLUME INDEX: 21.3 ML/M2
LEFT VENTRICLE SYSTOLIC VOLUME: 37.75 ML
LEFT VENTRICULAR INTERNAL DIMENSION IN DIASTOLE: 4.85 CM (ref 3.5–6)
LEFT VENTRICULAR MASS: 174.28 G
LV LATERAL E/E' RATIO: 9.5 M/S
LV SEPTAL E/E' RATIO: 10.56 M/S
MV A" WAVE DURATION": 9.13 MSEC
MV PEAK A VEL: 0.73 M/S
MV PEAK E VEL: 0.95 M/S
PISA TR MAX VEL: 2.94 M/S
PULM VEIN S/D RATIO: 1.26
PV MEAN GRADIENT: 1 MMHG
PV PEAK D VEL: 0.58 M/S
PV PEAK S VEL: 0.73 M/S
PV PEAK VELOCITY: 0.72 CM/S
RA MAJOR: 4.7 CM
RA PRESSURE: 3 MMHG
RA WIDTH: 3.68 CM
RIGHT VENTRICULAR END-DIASTOLIC DIMENSION: 2.74 CM
SINUS: 2.69 CM
STJ: 2.6 CM
TDI LATERAL: 0.1 M/S
TDI SEPTAL: 0.09 M/S
TDI: 0.1 M/S
TR MAX PG: 35 MMHG
TRICUSPID ANNULAR PLANE SYSTOLIC EXCURSION: 1.99 CM
TV REST PULMONARY ARTERY PRESSURE: 38 MMHG

## 2021-10-11 PROCEDURE — 93306 TTE W/DOPPLER COMPLETE: CPT | Mod: 26,HCNC,, | Performed by: INTERNAL MEDICINE

## 2021-10-11 PROCEDURE — 93306 ECHO (CUPID ONLY): ICD-10-PCS | Mod: 26,HCNC,, | Performed by: INTERNAL MEDICINE

## 2021-10-11 PROCEDURE — 93306 TTE W/DOPPLER COMPLETE: CPT | Mod: HCNC

## 2021-10-12 DIAGNOSIS — I34.0 MITRAL VALVE INSUFFICIENCY, UNSPECIFIED ETIOLOGY: Primary | ICD-10-CM

## 2021-10-12 DIAGNOSIS — R06.09 DYSPNEA ON EXERTION: ICD-10-CM

## 2021-10-15 ENCOUNTER — TELEPHONE (OUTPATIENT)
Dept: ADMINISTRATIVE | Facility: HOSPITAL | Age: 76
End: 2021-10-15

## 2021-11-03 ENCOUNTER — TELEPHONE (OUTPATIENT)
Dept: INTERNAL MEDICINE | Facility: CLINIC | Age: 76
End: 2021-11-03
Payer: MEDICARE

## 2021-11-03 DIAGNOSIS — Z12.31 ENCOUNTER FOR SCREENING MAMMOGRAM FOR BREAST CANCER: Primary | ICD-10-CM

## 2021-11-22 ENCOUNTER — HOSPITAL ENCOUNTER (OUTPATIENT)
Dept: RADIOLOGY | Facility: HOSPITAL | Age: 76
Discharge: HOME | End: 2021-11-22
Attending: FAMILY MEDICINE
Payer: MEDICARE

## 2021-11-22 ENCOUNTER — OFFICE VISIT (OUTPATIENT)
Dept: INTERNAL MEDICINE | Facility: CLINIC | Age: 76
End: 2021-11-22
Payer: MEDICARE

## 2021-11-22 ENCOUNTER — PATIENT OUTREACH (OUTPATIENT)
Dept: ADMINISTRATIVE | Facility: OTHER | Age: 76
End: 2021-11-22

## 2021-11-22 VITALS
SYSTOLIC BLOOD PRESSURE: 130 MMHG | DIASTOLIC BLOOD PRESSURE: 72 MMHG | BODY MASS INDEX: 30.51 KG/M2 | OXYGEN SATURATION: 97 % | TEMPERATURE: 99 F | HEIGHT: 62 IN | HEART RATE: 61 BPM | WEIGHT: 165.81 LBS

## 2021-11-22 VITALS — WEIGHT: 167 LBS | HEIGHT: 62 IN | BODY MASS INDEX: 30.73 KG/M2

## 2021-11-22 DIAGNOSIS — N18.31 CHRONIC KIDNEY DISEASE, STAGE 3A: ICD-10-CM

## 2021-11-22 DIAGNOSIS — E66.9 CLASS 1 OBESITY WITH SERIOUS COMORBIDITY AND BODY MASS INDEX (BMI) OF 30.0 TO 30.9 IN ADULT, UNSPECIFIED OBESITY TYPE: ICD-10-CM

## 2021-11-22 DIAGNOSIS — I10 ESSENTIAL HYPERTENSION: ICD-10-CM

## 2021-11-22 DIAGNOSIS — I70.0 ABDOMINAL AORTIC ATHEROSCLEROSIS: ICD-10-CM

## 2021-11-22 DIAGNOSIS — H40.9 GLAUCOMA OF BOTH EYES, UNSPECIFIED GLAUCOMA TYPE: ICD-10-CM

## 2021-11-22 DIAGNOSIS — Z00.00 ENCOUNTER FOR PREVENTIVE HEALTH EXAMINATION: Primary | ICD-10-CM

## 2021-11-22 DIAGNOSIS — E78.5 HYPERLIPIDEMIA, UNSPECIFIED HYPERLIPIDEMIA TYPE: ICD-10-CM

## 2021-11-22 DIAGNOSIS — R06.09 DYSPNEA ON EXERTION: ICD-10-CM

## 2021-11-22 DIAGNOSIS — Z12.31 ENCOUNTER FOR SCREENING MAMMOGRAM FOR BREAST CANCER: ICD-10-CM

## 2021-11-22 DIAGNOSIS — K21.9 GASTROESOPHAGEAL REFLUX DISEASE WITHOUT ESOPHAGITIS: ICD-10-CM

## 2021-11-22 PROCEDURE — 77063 BREAST TOMOSYNTHESIS BI: CPT | Mod: 26,HCNC,, | Performed by: RADIOLOGY

## 2021-11-22 PROCEDURE — G9919 PR SCREENING AND POSITIVE: ICD-10-PCS | Mod: HCNC,CPTII,S$GLB, | Performed by: NURSE PRACTITIONER

## 2021-11-22 PROCEDURE — 77067 MAMMO DIGITAL SCREENING BILAT WITH TOMO: ICD-10-PCS | Mod: 26,HCNC,, | Performed by: RADIOLOGY

## 2021-11-22 PROCEDURE — 77067 SCR MAMMO BI INCL CAD: CPT | Mod: TC,HCNC,PO

## 2021-11-22 PROCEDURE — 99499 UNLISTED E&M SERVICE: CPT | Mod: S$GLB,,, | Performed by: NURSE PRACTITIONER

## 2021-11-22 PROCEDURE — 77063 MAMMO DIGITAL SCREENING BILAT WITH TOMO: ICD-10-PCS | Mod: 26,HCNC,, | Performed by: RADIOLOGY

## 2021-11-22 PROCEDURE — 99999 PR PBB SHADOW E&M-EST. PATIENT-LVL IV: ICD-10-PCS | Mod: PBBFAC,HCNC,, | Performed by: NURSE PRACTITIONER

## 2021-11-22 PROCEDURE — G0439 PPPS, SUBSEQ VISIT: HCPCS | Mod: HCNC,S$GLB,, | Performed by: NURSE PRACTITIONER

## 2021-11-22 PROCEDURE — 99999 PR PBB SHADOW E&M-EST. PATIENT-LVL IV: CPT | Mod: PBBFAC,HCNC,, | Performed by: NURSE PRACTITIONER

## 2021-11-22 PROCEDURE — G0439 PR MEDICARE ANNUAL WELLNESS SUBSEQUENT VISIT: ICD-10-PCS | Mod: HCNC,S$GLB,, | Performed by: NURSE PRACTITIONER

## 2021-11-22 PROCEDURE — G9919 SCRN ND POS ND PROV OF REC: HCPCS | Mod: HCNC,CPTII,S$GLB, | Performed by: NURSE PRACTITIONER

## 2021-11-22 PROCEDURE — 99499 RISK ADDL DX/OHS AUDIT: ICD-10-PCS | Mod: S$GLB,,, | Performed by: NURSE PRACTITIONER

## 2021-11-22 PROCEDURE — 77067 SCR MAMMO BI INCL CAD: CPT | Mod: 26,HCNC,, | Performed by: RADIOLOGY

## 2021-11-22 NOTE — PROGRESS NOTES
I discussed with the patient options for social care programs based on their responses to the Social Determinants of Health questions. The patient has chosen to decline submitting a referral to a social care program.

## 2021-12-13 ENCOUNTER — OFFICE VISIT (OUTPATIENT)
Dept: DERMATOLOGY | Facility: CLINIC | Age: 76
End: 2021-12-13
Payer: MEDICARE

## 2021-12-13 DIAGNOSIS — L57.0 ACTINIC KERATOSIS: Primary | ICD-10-CM

## 2021-12-13 PROCEDURE — 17000 PR DESTRUCTION(LASER SURGERY,CRYOSURGERY,CHEMOSURGERY),PREMALIGNANT LESIONS,FIRST LESION: ICD-10-PCS | Mod: HCNC,S$GLB,, | Performed by: STUDENT IN AN ORGANIZED HEALTH CARE EDUCATION/TRAINING PROGRAM

## 2021-12-13 PROCEDURE — 17003 DESTRUCTION, PREMALIGNANT LESIONS; SECOND THROUGH 14 LESIONS: ICD-10-PCS | Mod: HCNC,S$GLB,, | Performed by: STUDENT IN AN ORGANIZED HEALTH CARE EDUCATION/TRAINING PROGRAM

## 2021-12-13 PROCEDURE — 17000 DESTRUCT PREMALG LESION: CPT | Mod: HCNC,S$GLB,, | Performed by: STUDENT IN AN ORGANIZED HEALTH CARE EDUCATION/TRAINING PROGRAM

## 2021-12-13 PROCEDURE — 99499 UNLISTED E&M SERVICE: CPT | Mod: HCNC,S$GLB,, | Performed by: STUDENT IN AN ORGANIZED HEALTH CARE EDUCATION/TRAINING PROGRAM

## 2021-12-13 PROCEDURE — 99499 NO LOS: ICD-10-PCS | Mod: HCNC,S$GLB,, | Performed by: STUDENT IN AN ORGANIZED HEALTH CARE EDUCATION/TRAINING PROGRAM

## 2021-12-13 PROCEDURE — 99999 PR PBB SHADOW E&M-EST. PATIENT-LVL III: ICD-10-PCS | Mod: PBBFAC,HCNC,, | Performed by: STUDENT IN AN ORGANIZED HEALTH CARE EDUCATION/TRAINING PROGRAM

## 2021-12-13 PROCEDURE — 99999 PR PBB SHADOW E&M-EST. PATIENT-LVL III: CPT | Mod: PBBFAC,HCNC,, | Performed by: STUDENT IN AN ORGANIZED HEALTH CARE EDUCATION/TRAINING PROGRAM

## 2021-12-13 PROCEDURE — 17003 DESTRUCT PREMALG LES 2-14: CPT | Mod: HCNC,S$GLB,, | Performed by: STUDENT IN AN ORGANIZED HEALTH CARE EDUCATION/TRAINING PROGRAM

## 2022-02-09 ENCOUNTER — TELEPHONE (OUTPATIENT)
Dept: INTERNAL MEDICINE | Facility: CLINIC | Age: 77
End: 2022-02-09
Payer: MEDICARE

## 2022-02-09 NOTE — TELEPHONE ENCOUNTER
----- Message from Veronica Garnica sent at 2/9/2022  8:38 AM CST -----  Regarding: authorization  Contact: pt  What is the name of the medication you are requesting? omeprazole  What is the dose? 40mg  How do you take the medication? Orally, Topically, etc?  How often do you take this medication?  Do you need a 30 day or 90 day supply?  How many refills are you requesting?  What is your preferred pharmacy and location of pharmacy? [facility]  Who can we contact with further questions? Pt at [phone]

## 2022-02-21 PROBLEM — Z00.00 ENCOUNTER FOR PREVENTIVE HEALTH EXAMINATION: Status: RESOLVED | Noted: 2018-09-17 | Resolved: 2022-02-21

## 2022-03-14 ENCOUNTER — OFFICE VISIT (OUTPATIENT)
Dept: INTERNAL MEDICINE | Facility: CLINIC | Age: 77
End: 2022-03-14
Payer: MEDICARE

## 2022-03-14 VITALS
DIASTOLIC BLOOD PRESSURE: 62 MMHG | HEIGHT: 62 IN | SYSTOLIC BLOOD PRESSURE: 112 MMHG | TEMPERATURE: 97 F | WEIGHT: 165.56 LBS | OXYGEN SATURATION: 96 % | HEART RATE: 66 BPM | BODY MASS INDEX: 30.47 KG/M2

## 2022-03-14 DIAGNOSIS — N18.31 CHRONIC KIDNEY DISEASE, STAGE 3A: Primary | ICD-10-CM

## 2022-03-14 DIAGNOSIS — I10 ESSENTIAL HYPERTENSION: ICD-10-CM

## 2022-03-14 DIAGNOSIS — M17.11 PRIMARY OSTEOARTHRITIS OF RIGHT KNEE: ICD-10-CM

## 2022-03-14 DIAGNOSIS — I70.0 ABDOMINAL AORTIC ATHEROSCLEROSIS: ICD-10-CM

## 2022-03-14 PROCEDURE — 1125F AMNT PAIN NOTED PAIN PRSNT: CPT | Mod: CPTII,S$GLB,, | Performed by: NURSE PRACTITIONER

## 2022-03-14 PROCEDURE — 99213 OFFICE O/P EST LOW 20 MIN: CPT | Mod: S$GLB,,, | Performed by: NURSE PRACTITIONER

## 2022-03-14 PROCEDURE — 99499 RISK ADDL DX/OHS AUDIT: ICD-10-PCS | Mod: HCNC,S$GLB,, | Performed by: NURSE PRACTITIONER

## 2022-03-14 PROCEDURE — 99213 PR OFFICE/OUTPT VISIT, EST, LEVL III, 20-29 MIN: ICD-10-PCS | Mod: S$GLB,,, | Performed by: NURSE PRACTITIONER

## 2022-03-14 PROCEDURE — 1159F MED LIST DOCD IN RCRD: CPT | Mod: CPTII,S$GLB,, | Performed by: NURSE PRACTITIONER

## 2022-03-14 PROCEDURE — 99499 UNLISTED E&M SERVICE: CPT | Mod: HCNC,S$GLB,, | Performed by: NURSE PRACTITIONER

## 2022-03-14 PROCEDURE — 1125F PR PAIN SEVERITY QUANTIFIED, PAIN PRESENT: ICD-10-PCS | Mod: CPTII,S$GLB,, | Performed by: NURSE PRACTITIONER

## 2022-03-14 PROCEDURE — 3074F PR MOST RECENT SYSTOLIC BLOOD PRESSURE < 130 MM HG: ICD-10-PCS | Mod: CPTII,S$GLB,, | Performed by: NURSE PRACTITIONER

## 2022-03-14 PROCEDURE — 3288F PR FALLS RISK ASSESSMENT DOCUMENTED: ICD-10-PCS | Mod: CPTII,S$GLB,, | Performed by: NURSE PRACTITIONER

## 2022-03-14 PROCEDURE — 99999 PR PBB SHADOW E&M-EST. PATIENT-LVL IV: ICD-10-PCS | Mod: PBBFAC,,, | Performed by: NURSE PRACTITIONER

## 2022-03-14 PROCEDURE — 1101F PT FALLS ASSESS-DOCD LE1/YR: CPT | Mod: CPTII,S$GLB,, | Performed by: NURSE PRACTITIONER

## 2022-03-14 PROCEDURE — 3288F FALL RISK ASSESSMENT DOCD: CPT | Mod: CPTII,S$GLB,, | Performed by: NURSE PRACTITIONER

## 2022-03-14 PROCEDURE — 3078F PR MOST RECENT DIASTOLIC BLOOD PRESSURE < 80 MM HG: ICD-10-PCS | Mod: CPTII,S$GLB,, | Performed by: NURSE PRACTITIONER

## 2022-03-14 PROCEDURE — 1160F PR REVIEW ALL MEDS BY PRESCRIBER/CLIN PHARMACIST DOCUMENTED: ICD-10-PCS | Mod: CPTII,S$GLB,, | Performed by: NURSE PRACTITIONER

## 2022-03-14 PROCEDURE — 1160F RVW MEDS BY RX/DR IN RCRD: CPT | Mod: CPTII,S$GLB,, | Performed by: NURSE PRACTITIONER

## 2022-03-14 PROCEDURE — 1101F PR PT FALLS ASSESS DOC 0-1 FALLS W/OUT INJ PAST YR: ICD-10-PCS | Mod: CPTII,S$GLB,, | Performed by: NURSE PRACTITIONER

## 2022-03-14 PROCEDURE — 99999 PR PBB SHADOW E&M-EST. PATIENT-LVL IV: CPT | Mod: PBBFAC,,, | Performed by: NURSE PRACTITIONER

## 2022-03-14 PROCEDURE — 3074F SYST BP LT 130 MM HG: CPT | Mod: CPTII,S$GLB,, | Performed by: NURSE PRACTITIONER

## 2022-03-14 PROCEDURE — 1159F PR MEDICATION LIST DOCUMENTED IN MEDICAL RECORD: ICD-10-PCS | Mod: CPTII,S$GLB,, | Performed by: NURSE PRACTITIONER

## 2022-03-14 PROCEDURE — 3078F DIAST BP <80 MM HG: CPT | Mod: CPTII,S$GLB,, | Performed by: NURSE PRACTITIONER

## 2022-03-14 RX ORDER — INFLUENZA A VIRUS A/VICTORIA/2570/2019 IVR-215 (H1N1) ANTIGEN (FORMALDEHYDE INACTIVATED), INFLUENZA A VIRUS A/TASMANIA/503/2020 IVR-221 (H3N2) ANTIGEN (FORMALDEHYDE INACTIVATED), INFLUENZA B VIRUS B/PHUKET/3073/2013 ANTIGEN (FORMALDEHYDE INACTIVATED), AND INFLUENZA B VIRUS B/WASHINGTON/02/2019 ANTIGEN (FORMALDEHYDE INACTIVATED) 60; 60; 60; 60 UG/.7ML; UG/.7ML; UG/.7ML; UG/.7ML
INJECTION, SUSPENSION INTRAMUSCULAR
COMMUNITY
Start: 2021-11-02 | End: 2022-03-14 | Stop reason: ALTCHOICE

## 2022-03-14 RX ORDER — CETIRIZINE HYDROCHLORIDE 10 MG/1
10 TABLET ORAL DAILY
COMMUNITY
End: 2024-04-03 | Stop reason: SDUPTHER

## 2022-03-14 RX ORDER — PNEUMOCOCCAL 13-VALENT CONJUGATE VACCINE 2.2; 2.2; 2.2; 2.2; 2.2; 4.4; 2.2; 2.2; 2.2; 2.2; 2.2; 2.2; 2.2 UG/.5ML; UG/.5ML; UG/.5ML; UG/.5ML; UG/.5ML; UG/.5ML; UG/.5ML; UG/.5ML; UG/.5ML; UG/.5ML; UG/.5ML; UG/.5ML; UG/.5ML
INJECTION, SUSPENSION INTRAMUSCULAR
COMMUNITY
Start: 2021-11-02 | End: 2022-03-14 | Stop reason: ALTCHOICE

## 2022-03-14 RX ORDER — DICLOFENAC SODIUM 10 MG/G
2 GEL TOPICAL 2 TIMES DAILY PRN
Qty: 100 G | Refills: 0 | Status: SHIPPED | OUTPATIENT
Start: 2022-03-14 | End: 2022-12-06 | Stop reason: SDUPTHER

## 2022-03-14 NOTE — PROGRESS NOTES
Subjective:       Patient ID: Amber Naranjo is a 76 y.o. female.    Chief Complaint: Knee Pain (Right knee x 2 days)    Mrs. Naranjo presents to visit with complaint of R knee pain. Hx of osteoarthritis. Has only tried biofreeze for pain relief. Has not had knee injections in over a year.     Knee Pain   The incident occurred 2 days ago. The incident occurred at home. There was no injury mechanism. The pain is present in the right knee. The quality of the pain is described as aching. The pain is at a severity of 9/10. The pain is moderate. The pain has been intermittent since onset. Associated symptoms include a loss of motion. Pertinent negatives include no inability to bear weight, loss of sensation, muscle weakness, numbness or tingling. She reports no foreign bodies present. The symptoms are aggravated by weight bearing and movement. Treatments tried: biofreeze. The treatment provided mild relief.       Patient Active Problem List   Diagnosis    Essential hypertension    Diverticulosis of colon    Tortuous colon    Uterine fibroid    Hyperlipidemia    Glaucoma    GERD (gastroesophageal reflux disease)    Arthritis    Seasonal allergic rhinitis due to pollen    Obesity    Osteopenia of multiple sites    Postmenopausal    Primary osteoarthritis of right knee    Benign paroxysmal positional vertigo of left ear    Spondylolisthesis at L5-S1 level    Abdominal aortic atherosclerosis    Dyspnea on exertion    Chronic kidney disease, stage 3a       Family History   Problem Relation Age of Onset    Glaucoma Mother     Hypertension Father     Asthma Daughter     Depression Daughter     Colon cancer Sister     Cancer Sister 70        colon    Diabetes Sister     Hypertension Sister     Hyperlipidemia Sister     Diabetes Brother     Hypertension Brother     Heart disease Brother     Hyperlipidemia Brother     Asthma Paternal Aunt     Diabetes Paternal Uncle     Diabetes Maternal Grandmother      Early death Maternal Grandfather     Early death Paternal Grandfather     Breast cancer Cousin     COPD Neg Hx     Kidney disease Neg Hx     Stroke Neg Hx     Mental illness Neg Hx      Past Surgical History:   Procedure Laterality Date    COLONOSCOPY      COLONOSCOPY N/A 3/15/2017    Procedure: COLONOSCOPY;  Surgeon: Yogi Carbone MD;  Location: [facility];  Service: Endoscopy;  Laterality: N/A;         Current Outpatient Medications:     aspirin (ECOTRIN) 81 MG EC tablet, Take 81 mg by mouth once daily., Disp: , Rfl:     atorvastatin (LIPITOR) 20 MG tablet, TAKE 1 TABLET EVERY DAY, Disp: 90 tablet, Rfl: 2    blood pressure monitor Kit, , Disp: , Rfl:     calcium carbonate (OS-NERI) 600 mg (1,500 mg) Tab, Take 600 mg by mouth 2 (two) times daily with meals., Disp: , Rfl:     cetirizine (ZYRTEC) 10 MG tablet, Take 10 mg by mouth once daily., Disp: , Rfl:     hydroCHLOROthiazide (HYDRODIURIL) 12.5 MG Tab, TAKE 1 TABLET EVERY DAY, Disp: 90 tablet, Rfl: 3    lisinopriL (PRINIVIL,ZESTRIL) 20 MG tablet, TAKE 1 TABLET EVERY DAY, Disp: 90 tablet, Rfl: 3    metoprolol succinate (TOPROL-XL) 25 MG 24 hr tablet, TAKE 1 TABLET EVERY DAY, Disp: 90 tablet, Rfl: 3    multivitamin capsule, Take 1 capsule by mouth once daily., Disp: , Rfl:     omeprazole (PRILOSEC) 40 MG capsule, TAKE 1 CAPSULE (40 MG TOTAL) BY MOUTH ONCE DAILY., Disp: 90 capsule, Rfl: 1    SODIUM CHLORIDE (SALINE SPRAY MISC), by Misc.(Non-Drug; Combo Route) route., Disp: , Rfl:     timolol maleate 0.5% (TIMOPTIC) 0.5 % Drop, , Disp: , Rfl:     diclofenac sodium (VOLTAREN) 1 % Gel, Apply 2 g topically 2 (two) times daily as needed., Disp: 100 g, Rfl: 0    Review of Systems   Constitutional: Negative for chills, fatigue and fever.   Respiratory: Negative for shortness of breath.    Cardiovascular: Negative for chest pain, palpitations and leg swelling.   Musculoskeletal: Positive for arthralgias and gait problem. Negative for joint swelling and  "myalgias.   Neurological: Negative for tingling and numbness.       Objective:   /62 (BP Location: Left arm, Patient Position: Sitting, BP Method: Medium (Manual))   Pulse 66   Temp 97.2 °F (36.2 °C) (Temporal)   Ht 5' 2" (1.575 m)   Wt 75.1 kg (165 lb 9.1 oz)   SpO2 96%   BMI 30.28 kg/m²      Physical Exam  Constitutional:       General: She is not in acute distress.     Appearance: Normal appearance. She is not ill-appearing.   Cardiovascular:      Rate and Rhythm: Normal rate and regular rhythm.      Pulses: Normal pulses.      Heart sounds: Normal heart sounds. No murmur heard.    No friction rub. No gallop.   Pulmonary:      Effort: Pulmonary effort is normal. No respiratory distress.      Breath sounds: Normal breath sounds. No wheezing.   Musculoskeletal:      Left knee: No swelling, effusion, erythema or bony tenderness. Decreased range of motion. Tenderness (posterior lateral) present over the medial joint line. No lateral joint line or patellar tendon tenderness. Normal alignment and normal patellar mobility.      Right lower leg: No edema.      Left lower leg: No edema.   Skin:     General: Skin is warm and dry.      Coloration: Skin is not pale.      Findings: No erythema.   Neurological:      Mental Status: She is alert and oriented to person, place, and time.         Assessment & Plan     Problem List Items Addressed This Visit        Cardiac/Vascular    Essential hypertension    Current Assessment & Plan     Blood pressure controlled. Continue current medications.            Abdominal aortic atherosclerosis    Current Assessment & Plan     Asymptomatic. Continue statin and ASA. Recent lipid panel reviewed.               Renal/    Chronic kidney disease, stage 3a - Primary    Current Assessment & Plan     Stable. GFR 49.2. Avoid PO NSAIDS.               Orthopedic    Primary osteoarthritis of right knee    Current Assessment & Plan     Discussed option for steroid injection if no " "improvement with diclofenac gel. Has not had in over a year. Tylenol PRN at home also.            Relevant Medications    diclofenac sodium (VOLTAREN) 1 % Gel        Follow up if symptoms worsen or fail to improve.            Portions of this note may have been created with voice recognition software. Occasional "wrong-word" or "sound-a-like" substitutions may have occurred due to the inherent limitations of voice recognition software. Please, read the note carefully and recognize, using context, where substitutions have occurred.       "

## 2022-03-14 NOTE — ASSESSMENT & PLAN NOTE
Discussed option for steroid injection if no improvement with diclofenac gel. Has not had in over a year. Tylenol PRN at home also.

## 2022-04-25 ENCOUNTER — OFFICE VISIT (OUTPATIENT)
Dept: INTERNAL MEDICINE | Facility: CLINIC | Age: 77
End: 2022-04-25
Payer: MEDICARE

## 2022-04-25 VITALS
WEIGHT: 167.31 LBS | DIASTOLIC BLOOD PRESSURE: 70 MMHG | HEART RATE: 64 BPM | OXYGEN SATURATION: 98 % | HEIGHT: 62 IN | TEMPERATURE: 97 F | SYSTOLIC BLOOD PRESSURE: 116 MMHG | BODY MASS INDEX: 30.79 KG/M2

## 2022-04-25 DIAGNOSIS — M17.11 PRIMARY OSTEOARTHRITIS OF RIGHT KNEE: ICD-10-CM

## 2022-04-25 DIAGNOSIS — N18.31 CHRONIC KIDNEY DISEASE, STAGE 3A: ICD-10-CM

## 2022-04-25 DIAGNOSIS — F41.9 ANXIETY: ICD-10-CM

## 2022-04-25 DIAGNOSIS — I10 ESSENTIAL HYPERTENSION: Primary | ICD-10-CM

## 2022-04-25 DIAGNOSIS — Z63.8 CAREGIVER ROLE STRAIN: ICD-10-CM

## 2022-04-25 PROCEDURE — 1160F PR REVIEW ALL MEDS BY PRESCRIBER/CLIN PHARMACIST DOCUMENTED: ICD-10-PCS | Mod: CPTII,S$GLB,, | Performed by: NURSE PRACTITIONER

## 2022-04-25 PROCEDURE — 3078F DIAST BP <80 MM HG: CPT | Mod: CPTII,S$GLB,, | Performed by: NURSE PRACTITIONER

## 2022-04-25 PROCEDURE — 1159F PR MEDICATION LIST DOCUMENTED IN MEDICAL RECORD: ICD-10-PCS | Mod: CPTII,S$GLB,, | Performed by: NURSE PRACTITIONER

## 2022-04-25 PROCEDURE — 1101F PT FALLS ASSESS-DOCD LE1/YR: CPT | Mod: CPTII,S$GLB,, | Performed by: NURSE PRACTITIONER

## 2022-04-25 PROCEDURE — 3074F SYST BP LT 130 MM HG: CPT | Mod: CPTII,S$GLB,, | Performed by: NURSE PRACTITIONER

## 2022-04-25 PROCEDURE — 99214 PR OFFICE/OUTPT VISIT, EST, LEVL IV, 30-39 MIN: ICD-10-PCS | Mod: 25,S$GLB,, | Performed by: NURSE PRACTITIONER

## 2022-04-25 PROCEDURE — 1125F AMNT PAIN NOTED PAIN PRSNT: CPT | Mod: CPTII,S$GLB,, | Performed by: NURSE PRACTITIONER

## 2022-04-25 PROCEDURE — 99999 PR PBB SHADOW E&M-EST. PATIENT-LVL IV: CPT | Mod: PBBFAC,,, | Performed by: NURSE PRACTITIONER

## 2022-04-25 PROCEDURE — 20610 LARGE JOINT ASPIRATION/INJECTION: R KNEE: ICD-10-PCS | Mod: RT,S$GLB,, | Performed by: NURSE PRACTITIONER

## 2022-04-25 PROCEDURE — 99999 PR PBB SHADOW E&M-EST. PATIENT-LVL IV: ICD-10-PCS | Mod: PBBFAC,,, | Performed by: NURSE PRACTITIONER

## 2022-04-25 PROCEDURE — 1125F PR PAIN SEVERITY QUANTIFIED, PAIN PRESENT: ICD-10-PCS | Mod: CPTII,S$GLB,, | Performed by: NURSE PRACTITIONER

## 2022-04-25 PROCEDURE — 99499 UNLISTED E&M SERVICE: CPT | Mod: S$GLB,,, | Performed by: NURSE PRACTITIONER

## 2022-04-25 PROCEDURE — 1159F MED LIST DOCD IN RCRD: CPT | Mod: CPTII,S$GLB,, | Performed by: NURSE PRACTITIONER

## 2022-04-25 PROCEDURE — 99214 OFFICE O/P EST MOD 30 MIN: CPT | Mod: 25,S$GLB,, | Performed by: NURSE PRACTITIONER

## 2022-04-25 PROCEDURE — 20610 DRAIN/INJ JOINT/BURSA W/O US: CPT | Mod: RT,S$GLB,, | Performed by: NURSE PRACTITIONER

## 2022-04-25 PROCEDURE — 99499 RISK ADDL DX/OHS AUDIT: ICD-10-PCS | Mod: S$GLB,,, | Performed by: NURSE PRACTITIONER

## 2022-04-25 PROCEDURE — 3074F PR MOST RECENT SYSTOLIC BLOOD PRESSURE < 130 MM HG: ICD-10-PCS | Mod: CPTII,S$GLB,, | Performed by: NURSE PRACTITIONER

## 2022-04-25 PROCEDURE — 3078F PR MOST RECENT DIASTOLIC BLOOD PRESSURE < 80 MM HG: ICD-10-PCS | Mod: CPTII,S$GLB,, | Performed by: NURSE PRACTITIONER

## 2022-04-25 PROCEDURE — 1160F RVW MEDS BY RX/DR IN RCRD: CPT | Mod: CPTII,S$GLB,, | Performed by: NURSE PRACTITIONER

## 2022-04-25 PROCEDURE — 3288F FALL RISK ASSESSMENT DOCD: CPT | Mod: CPTII,S$GLB,, | Performed by: NURSE PRACTITIONER

## 2022-04-25 PROCEDURE — 3288F PR FALLS RISK ASSESSMENT DOCUMENTED: ICD-10-PCS | Mod: CPTII,S$GLB,, | Performed by: NURSE PRACTITIONER

## 2022-04-25 PROCEDURE — 1101F PR PT FALLS ASSESS DOC 0-1 FALLS W/OUT INJ PAST YR: ICD-10-PCS | Mod: CPTII,S$GLB,, | Performed by: NURSE PRACTITIONER

## 2022-04-25 RX ORDER — SERTRALINE HYDROCHLORIDE 25 MG/1
25 TABLET, FILM COATED ORAL DAILY
Qty: 30 TABLET | Refills: 0 | Status: SHIPPED | OUTPATIENT
Start: 2022-04-25 | End: 2022-05-16

## 2022-04-25 RX ORDER — METHYLPREDNISOLONE ACETATE 40 MG/ML
40 INJECTION, SUSPENSION INTRA-ARTICULAR; INTRALESIONAL; INTRAMUSCULAR; SOFT TISSUE
Status: DISCONTINUED | OUTPATIENT
Start: 2022-04-25 | End: 2022-04-25 | Stop reason: HOSPADM

## 2022-04-25 RX ADMIN — METHYLPREDNISOLONE ACETATE 40 MG: 40 INJECTION, SUSPENSION INTRA-ARTICULAR; INTRALESIONAL; INTRAMUSCULAR; SOFT TISSUE at 08:04

## 2022-04-25 SDOH — SOCIAL DETERMINANTS OF HEALTH (SDOH): OTHER SPECIFIED PROBLEMS RELATED TO PRIMARY SUPPORT GROUP: Z63.8

## 2022-04-25 NOTE — PROCEDURES
Large Joint Aspiration/Injection: R knee    Date/Time: 4/25/2022 8:20 AM  Performed by: Marti Valadez NP  Authorized by: Marti Valadez NP     Consent Done?:  Yes (Verbal)  Indications:  Arthritis and pain  Site marked: the procedure site was marked      Local anesthesia used?: Yes    Anesthesia:  Local infiltration  Local anesthetic:  Lidocaine 1% without epinephrine  Anesthetic total (ml):  1      Details:  Needle Size:  25 G  Ultrasonic Guidance for needle placement?: No    Approach:  Anterolateral  Location:  Knee  Site:  R knee  Medications:  40 mg methylPREDNISolone acetate 40 mg/mL  Aspirate amount (mL):  0  Patient tolerance:  Patient tolerated the procedure well with no immediate complications

## 2022-04-25 NOTE — PROGRESS NOTES
Subjective:       Patient ID: Amber Naranjo is a 76 y.o. female.    Chief Complaint: Knee Pain and Depression    Mrs. Naranjo presents to visit with complaint of R knee pain. Has been going on for over a month. No relief with ibuprofen or diclofenac gel. Last injection was with Dr. Roa, 3/2020. No complication following injection.     She is also requesting to be put on something for anxiety due to stress with taking care of . Affecting her daily.  has alzheimers which makes it difficult for his medical management. She has to remind him for all of his medications and he sometimes gets irritable. She does have some help since her daughter has moved in with them, but still continues to get anxious.     Knee Pain   The incident occurred more than 1 week ago. The incident occurred at home. There was no injury mechanism. The pain is present in the right knee. The quality of the pain is described as aching. The pain is at a severity of 9/10. The pain is moderate. The pain has been fluctuating since onset. Associated symptoms include an inability to bear weight. Pertinent negatives include no loss of motion, loss of sensation, muscle weakness, numbness or tingling. She reports no foreign bodies present. The symptoms are aggravated by movement, palpation and weight bearing. She has tried NSAIDs and acetaminophen for the symptoms. The treatment provided mild relief.   Anxiety  Presents for initial visit. Onset was 1 to 5 years ago. The problem has been gradually worsening. Symptoms include excessive worry, insomnia, irritability and nervous/anxious behavior. Patient reports no chest pain, decreased concentration, depressed mood, dizziness, nausea, palpitations, panic, restlessness, shortness of breath or suicidal ideas. Symptoms occur constantly.           Patient Active Problem List   Diagnosis    Essential hypertension    Diverticulosis of colon    Tortuous colon    Uterine fibroid    Hyperlipidemia     Glaucoma    GERD (gastroesophageal reflux disease)    Arthritis    Seasonal allergic rhinitis due to pollen    Obesity    Osteopenia of multiple sites    Postmenopausal    Primary osteoarthritis of right knee    Benign paroxysmal positional vertigo of left ear    Spondylolisthesis at L5-S1 level    Abdominal aortic atherosclerosis    Dyspnea on exertion    Chronic kidney disease, stage 3a    Caregiver role strain    Anxiety       Family History   Problem Relation Age of Onset    Glaucoma Mother     Hypertension Father     Asthma Daughter     Depression Daughter     Colon cancer Sister     Cancer Sister 70        colon    Diabetes Sister     Hypertension Sister     Hyperlipidemia Sister     Diabetes Brother     Hypertension Brother     Heart disease Brother     Hyperlipidemia Brother     Asthma Paternal Aunt     Diabetes Paternal Uncle     Diabetes Maternal Grandmother     Early death Maternal Grandfather     Early death Paternal Grandfather     Breast cancer Cousin     COPD Neg Hx     Kidney disease Neg Hx     Stroke Neg Hx     Mental illness Neg Hx      Past Surgical History:   Procedure Laterality Date    COLONOSCOPY      COLONOSCOPY N/A 3/15/2017    Procedure: COLONOSCOPY;  Surgeon: Yogi Carbone MD;  Location: [facility];  Service: Endoscopy;  Laterality: N/A;         Current Outpatient Medications:     aspirin (ECOTRIN) 81 MG EC tablet, Take 81 mg by mouth once daily., Disp: , Rfl:     atorvastatin (LIPITOR) 20 MG tablet, TAKE 1 TABLET EVERY DAY, Disp: 90 tablet, Rfl: 2    calcium carbonate (OS-NERI) 600 mg (1,500 mg) Tab, Take 600 mg by mouth 2 (two) times daily with meals., Disp: , Rfl:     cetirizine (ZYRTEC) 10 MG tablet, Take 10 mg by mouth once daily., Disp: , Rfl:     hydroCHLOROthiazide (HYDRODIURIL) 12.5 MG Tab, TAKE 1 TABLET EVERY DAY, Disp: 90 tablet, Rfl: 3    lisinopriL (PRINIVIL,ZESTRIL) 20 MG tablet, TAKE 1 TABLET EVERY DAY, Disp: 90 tablet, Rfl: 3     "metoprolol succinate (TOPROL-XL) 25 MG 24 hr tablet, TAKE 1 TABLET EVERY DAY, Disp: 90 tablet, Rfl: 3    multivitamin capsule, Take 1 capsule by mouth once daily., Disp: , Rfl:     omeprazole (PRILOSEC) 40 MG capsule, TAKE 1 CAPSULE (40 MG TOTAL) BY MOUTH ONCE DAILY., Disp: 90 capsule, Rfl: 1    timolol maleate 0.5% (TIMOPTIC) 0.5 % Drop, , Disp: , Rfl:     blood pressure monitor Kit, , Disp: , Rfl:     diclofenac sodium (VOLTAREN) 1 % Gel, Apply 2 g topically 2 (two) times daily as needed., Disp: 100 g, Rfl: 0    sertraline (ZOLOFT) 25 MG tablet, Take 1 tablet (25 mg total) by mouth once daily., Disp: 30 tablet, Rfl: 0    Current Facility-Administered Medications:     methylPREDNISolone acetate injection 40 mg, 40 mg, Intra-articular, , Marti St Panda, NP, 40 mg at 04/25/22 0820    Review of Systems   Constitutional: Positive for irritability. Negative for appetite change, chills, fatigue and fever.   Respiratory: Negative for shortness of breath.    Cardiovascular: Negative for chest pain and palpitations.   Gastrointestinal: Negative for diarrhea, nausea and vomiting.   Musculoskeletal: Positive for arthralgias and gait problem.   Neurological: Negative for dizziness, tingling, light-headedness and numbness.   Psychiatric/Behavioral: Positive for depression and sleep disturbance. Negative for decreased concentration, dysphoric mood, self-injury and suicidal ideas. The patient is nervous/anxious and has insomnia.        Objective:   /70 (BP Location: Left arm, Patient Position: Sitting, BP Method: Medium (Manual))   Pulse 64   Temp 97.3 °F (36.3 °C) (Temporal)   Ht 5' 2" (1.575 m)   Wt 75.9 kg (167 lb 5.3 oz)   SpO2 98%   BMI 30.60 kg/m²      Physical Exam  Constitutional:       General: She is not in acute distress.     Appearance: Normal appearance. She is not ill-appearing.   Cardiovascular:      Rate and Rhythm: Normal rate and regular rhythm.      Pulses: Normal pulses.      Heart " "sounds: Normal heart sounds. No murmur heard.    No friction rub. No gallop.   Pulmonary:      Effort: Pulmonary effort is normal. No respiratory distress.      Breath sounds: Normal breath sounds. No wheezing.   Musculoskeletal:      Cervical back: Normal range of motion.      Right knee: No swelling, effusion or erythema. Decreased range of motion. Tenderness present over the medial joint line.      Right lower leg: No edema.      Left lower leg: No edema.   Skin:     General: Skin is warm and dry.      Coloration: Skin is not pale.      Findings: No erythema.   Neurological:      Mental Status: She is alert and oriented to person, place, and time.         Assessment & Plan     Problem List Items Addressed This Visit        Psychiatric    Caregiver role strain    Relevant Medications    sertraline (ZOLOFT) 25 MG tablet    Anxiety    Current Assessment & Plan     Related to caregiver role strain. Starting on low dose zoloft. Follow up in one month.            Relevant Medications    sertraline (ZOLOFT) 25 MG tablet       Cardiac/Vascular    Essential hypertension - Primary    Current Assessment & Plan     Blood pressure well controlled. Continue current medications.               Renal/    Chronic kidney disease, stage 3a    Current Assessment & Plan     Stable. Continue to avoid PO NSAIDs.              Orthopedic    Primary osteoarthritis of right knee    Current Assessment & Plan     IA injection today. Continue diclofenac gel PRN.            Relevant Medications    methylPREDNISolone acetate injection 40 mg    Other Relevant Orders    Large Joint Aspiration/Injection: R knee (Completed)           Follow up in about 1 month (around 5/25/2022) for anxiety medication..          Portions of this note may have been created with voice recognition software. Occasional "wrong-word" or "sound-a-like" substitutions may have occurred due to the inherent limitations of voice recognition software. Please, read the note " carefully and recognize, using context, where substitutions have occurred.

## 2022-05-25 ENCOUNTER — OFFICE VISIT (OUTPATIENT)
Dept: INTERNAL MEDICINE | Facility: CLINIC | Age: 77
End: 2022-05-25
Payer: MEDICARE

## 2022-05-25 VITALS
HEIGHT: 62 IN | BODY MASS INDEX: 30.71 KG/M2 | WEIGHT: 166.88 LBS | SYSTOLIC BLOOD PRESSURE: 100 MMHG | HEART RATE: 60 BPM | OXYGEN SATURATION: 97 % | DIASTOLIC BLOOD PRESSURE: 52 MMHG | TEMPERATURE: 98 F

## 2022-05-25 DIAGNOSIS — F41.9 ANXIETY: ICD-10-CM

## 2022-05-25 DIAGNOSIS — Z63.8 CAREGIVER ROLE STRAIN: ICD-10-CM

## 2022-05-25 DIAGNOSIS — I10 ESSENTIAL HYPERTENSION: Primary | ICD-10-CM

## 2022-05-25 DIAGNOSIS — E78.5 HYPERLIPIDEMIA, UNSPECIFIED HYPERLIPIDEMIA TYPE: ICD-10-CM

## 2022-05-25 PROCEDURE — 1101F PT FALLS ASSESS-DOCD LE1/YR: CPT | Mod: CPTII,S$GLB,, | Performed by: NURSE PRACTITIONER

## 2022-05-25 PROCEDURE — 1160F PR REVIEW ALL MEDS BY PRESCRIBER/CLIN PHARMACIST DOCUMENTED: ICD-10-PCS | Mod: CPTII,S$GLB,, | Performed by: NURSE PRACTITIONER

## 2022-05-25 PROCEDURE — 3078F DIAST BP <80 MM HG: CPT | Mod: CPTII,S$GLB,, | Performed by: NURSE PRACTITIONER

## 2022-05-25 PROCEDURE — 1101F PR PT FALLS ASSESS DOC 0-1 FALLS W/OUT INJ PAST YR: ICD-10-PCS | Mod: CPTII,S$GLB,, | Performed by: NURSE PRACTITIONER

## 2022-05-25 PROCEDURE — 3074F PR MOST RECENT SYSTOLIC BLOOD PRESSURE < 130 MM HG: ICD-10-PCS | Mod: CPTII,S$GLB,, | Performed by: NURSE PRACTITIONER

## 2022-05-25 PROCEDURE — 1126F PR PAIN SEVERITY QUANTIFIED, NO PAIN PRESENT: ICD-10-PCS | Mod: CPTII,S$GLB,, | Performed by: NURSE PRACTITIONER

## 2022-05-25 PROCEDURE — 3074F SYST BP LT 130 MM HG: CPT | Mod: CPTII,S$GLB,, | Performed by: NURSE PRACTITIONER

## 2022-05-25 PROCEDURE — 1159F PR MEDICATION LIST DOCUMENTED IN MEDICAL RECORD: ICD-10-PCS | Mod: CPTII,S$GLB,, | Performed by: NURSE PRACTITIONER

## 2022-05-25 PROCEDURE — 99214 PR OFFICE/OUTPT VISIT, EST, LEVL IV, 30-39 MIN: ICD-10-PCS | Mod: S$GLB,,, | Performed by: NURSE PRACTITIONER

## 2022-05-25 PROCEDURE — 1126F AMNT PAIN NOTED NONE PRSNT: CPT | Mod: CPTII,S$GLB,, | Performed by: NURSE PRACTITIONER

## 2022-05-25 PROCEDURE — 1160F RVW MEDS BY RX/DR IN RCRD: CPT | Mod: CPTII,S$GLB,, | Performed by: NURSE PRACTITIONER

## 2022-05-25 PROCEDURE — 3288F PR FALLS RISK ASSESSMENT DOCUMENTED: ICD-10-PCS | Mod: CPTII,S$GLB,, | Performed by: NURSE PRACTITIONER

## 2022-05-25 PROCEDURE — 1159F MED LIST DOCD IN RCRD: CPT | Mod: CPTII,S$GLB,, | Performed by: NURSE PRACTITIONER

## 2022-05-25 PROCEDURE — 99214 OFFICE O/P EST MOD 30 MIN: CPT | Mod: S$GLB,,, | Performed by: NURSE PRACTITIONER

## 2022-05-25 PROCEDURE — 99999 PR PBB SHADOW E&M-EST. PATIENT-LVL IV: CPT | Mod: PBBFAC,,, | Performed by: NURSE PRACTITIONER

## 2022-05-25 PROCEDURE — 3078F PR MOST RECENT DIASTOLIC BLOOD PRESSURE < 80 MM HG: ICD-10-PCS | Mod: CPTII,S$GLB,, | Performed by: NURSE PRACTITIONER

## 2022-05-25 PROCEDURE — 99999 PR PBB SHADOW E&M-EST. PATIENT-LVL IV: ICD-10-PCS | Mod: PBBFAC,,, | Performed by: NURSE PRACTITIONER

## 2022-05-25 PROCEDURE — 3288F FALL RISK ASSESSMENT DOCD: CPT | Mod: CPTII,S$GLB,, | Performed by: NURSE PRACTITIONER

## 2022-05-25 RX ORDER — SERTRALINE HYDROCHLORIDE 25 MG/1
25 TABLET, FILM COATED ORAL DAILY
Qty: 90 TABLET | Refills: 1 | Status: SHIPPED | OUTPATIENT
Start: 2022-05-25 | End: 2023-02-28

## 2022-05-25 SDOH — SOCIAL DETERMINANTS OF HEALTH (SDOH): OTHER SPECIFIED PROBLEMS RELATED TO PRIMARY SUPPORT GROUP: Z63.8

## 2022-05-25 NOTE — PROGRESS NOTES
Subjective:       Patient ID: Amber Naranjo is a 76 y.o. female.    Chief Complaint: Anxiety (1 month )    Mrs Naranjo presents to visit for 1 month follow up for anxiety/depression.  Started on Zoloft at last visit.  She has significant caregiver role strain with her 's alzheimers dx.  He recently was able to get a hospital bed which has helped with her worrying about him falling out of the bed.  She has noticed an improvement in her anxiety/depression since starting medication 1 month ago.  She feels that she is stable on current dose.  Does not desire to go up on dose.  Denies any side effects except for possibly some dizziness when she goes outside and stands up occasionally.  Symptoms resolved spontaneously within seconds.  Blood pressure is also on lower in today.  Does not monitor regularly at home.      Patient Active Problem List   Diagnosis    Essential hypertension    Diverticulosis of colon    Tortuous colon    Uterine fibroid    Hyperlipidemia    Glaucoma    GERD (gastroesophageal reflux disease)    Arthritis    Seasonal allergic rhinitis due to pollen    Obesity    Osteopenia of multiple sites    Postmenopausal    Primary osteoarthritis of right knee    Benign paroxysmal positional vertigo of left ear    Spondylolisthesis at L5-S1 level    Abdominal aortic atherosclerosis    Dyspnea on exertion    Chronic kidney disease, stage 3a    Caregiver role strain    Anxiety         Past Surgical History:   Procedure Laterality Date    COLONOSCOPY      COLONOSCOPY N/A 3/15/2017    Procedure: COLONOSCOPY;  Surgeon: Yogi Carbone MD;  Location: [facility];  Service: Endoscopy;  Laterality: N/A;         Current Outpatient Medications:     aspirin (ECOTRIN) 81 MG EC tablet, Take 81 mg by mouth once daily., Disp: , Rfl:     atorvastatin (LIPITOR) 20 MG tablet, TAKE 1 TABLET EVERY DAY, Disp: 90 tablet, Rfl: 2    blood pressure monitor Kit, , Disp: , Rfl:     calcium carbonate (OS-NERI) 600 mg  "(1,500 mg) Tab, Take 600 mg by mouth 2 (two) times daily with meals., Disp: , Rfl:     cetirizine (ZYRTEC) 10 MG tablet, Take 10 mg by mouth once daily., Disp: , Rfl:     diclofenac sodium (VOLTAREN) 1 % Gel, Apply 2 g topically 2 (two) times daily as needed., Disp: 100 g, Rfl: 0    hydroCHLOROthiazide (HYDRODIURIL) 12.5 MG Tab, TAKE 1 TABLET EVERY DAY, Disp: 90 tablet, Rfl: 3    lisinopriL (PRINIVIL,ZESTRIL) 20 MG tablet, TAKE 1 TABLET EVERY DAY, Disp: 90 tablet, Rfl: 3    metoprolol succinate (TOPROL-XL) 25 MG 24 hr tablet, TAKE 1 TABLET EVERY DAY, Disp: 90 tablet, Rfl: 3    multivitamin capsule, Take 1 capsule by mouth once daily., Disp: , Rfl:     omeprazole (PRILOSEC) 40 MG capsule, TAKE 1 CAPSULE (40 MG TOTAL) BY MOUTH ONCE DAILY., Disp: 90 capsule, Rfl: 1    timolol maleate 0.5% (TIMOPTIC) 0.5 % Drop, , Disp: , Rfl:     sertraline (ZOLOFT) 25 MG tablet, Take 1 tablet (25 mg total) by mouth once daily., Disp: 90 tablet, Rfl: 1    Review of Systems   Constitutional: Negative for appetite change, chills, fatigue and fever.   Eyes: Negative for visual disturbance.   Respiratory: Negative for cough and shortness of breath.    Cardiovascular: Negative for chest pain and palpitations.   Gastrointestinal: Negative for abdominal pain, diarrhea, nausea and vomiting.   Musculoskeletal: Negative for gait problem.   Neurological: Positive for dizziness (intermittent with standing and going outside) and light-headedness (intermittent, resolves with sitting and going inside). Negative for syncope.   Psychiatric/Behavioral: Negative for dysphoric mood, self-injury, sleep disturbance and suicidal ideas. The patient is not nervous/anxious.        Objective:   BP (!) 100/52 (BP Location: Left arm, Patient Position: Sitting, BP Method: Medium (Manual))   Pulse 60   Temp 97.7 °F (36.5 °C) (Temporal)   Ht 5' 2" (1.575 m)   Wt 75.7 kg (166 lb 14.2 oz)   SpO2 97%   BMI 30.52 kg/m²      Physical Exam  Constitutional:  " "     General: She is not in acute distress.     Appearance: Normal appearance. She is not ill-appearing.   HENT:      Head: Normocephalic.   Cardiovascular:      Rate and Rhythm: Normal rate.   Pulmonary:      Effort: Pulmonary effort is normal. No respiratory distress.   Skin:     General: Skin is warm and dry.      Coloration: Skin is not pale.      Findings: No erythema.   Neurological:      Mental Status: She is alert and oriented to person, place, and time.   Psychiatric:         Mood and Affect: Mood normal.         Behavior: Behavior normal.         Assessment & Plan     Problem List Items Addressed This Visit        Psychiatric    Caregiver role strain    Current Assessment & Plan     Has seen improvement since starting zoloft. Continue current dose.            Relevant Medications    sertraline (ZOLOFT) 25 MG tablet    Anxiety    Current Assessment & Plan     Has seen improvement since starting zoloft. Continue current dose.            Relevant Medications    sertraline (ZOLOFT) 25 MG tablet       Cardiac/Vascular    Essential hypertension - Primary    Current Assessment & Plan     On lower end today. Hold HCTZ. Continue to monitor at home.            Hyperlipidemia        Follow up in about 2 weeks (around 6/8/2022) for hypertension audio call on nurse visit for blood pressure reading. .            Portions of this note may have been created with voice recognition software. Occasional "wrong-word" or "sound-a-like" substitutions may have occurred due to the inherent limitations of voice recognition software. Please, read the note carefully and recognize, using context, where substitutions have occurred.       "

## 2022-09-27 ENCOUNTER — LAB VISIT (OUTPATIENT)
Dept: LAB | Facility: HOSPITAL | Age: 77
End: 2022-09-27
Attending: NURSE PRACTITIONER
Payer: MEDICARE

## 2022-09-27 ENCOUNTER — OFFICE VISIT (OUTPATIENT)
Dept: INTERNAL MEDICINE | Facility: CLINIC | Age: 77
End: 2022-09-27
Payer: MEDICARE

## 2022-09-27 VITALS
WEIGHT: 169.56 LBS | RESPIRATION RATE: 18 BRPM | BODY MASS INDEX: 31.01 KG/M2 | DIASTOLIC BLOOD PRESSURE: 68 MMHG | OXYGEN SATURATION: 96 % | HEART RATE: 62 BPM | TEMPERATURE: 98 F | SYSTOLIC BLOOD PRESSURE: 150 MMHG

## 2022-09-27 DIAGNOSIS — H40.9 GLAUCOMA OF BOTH EYES, UNSPECIFIED GLAUCOMA TYPE: ICD-10-CM

## 2022-09-27 DIAGNOSIS — Z63.8 CAREGIVER ROLE STRAIN: ICD-10-CM

## 2022-09-27 DIAGNOSIS — F41.9 ANXIETY: ICD-10-CM

## 2022-09-27 DIAGNOSIS — I10 ESSENTIAL HYPERTENSION: ICD-10-CM

## 2022-09-27 DIAGNOSIS — Z00.00 ROUTINE ADULT HEALTH MAINTENANCE: ICD-10-CM

## 2022-09-27 DIAGNOSIS — E66.9 CLASS 1 OBESITY WITH SERIOUS COMORBIDITY AND BODY MASS INDEX (BMI) OF 30.0 TO 30.9 IN ADULT, UNSPECIFIED OBESITY TYPE: ICD-10-CM

## 2022-09-27 DIAGNOSIS — I70.0 ABDOMINAL AORTIC ATHEROSCLEROSIS: ICD-10-CM

## 2022-09-27 DIAGNOSIS — Z00.00 ROUTINE ADULT HEALTH MAINTENANCE: Primary | ICD-10-CM

## 2022-09-27 DIAGNOSIS — N18.31 CHRONIC KIDNEY DISEASE, STAGE 3A: ICD-10-CM

## 2022-09-27 DIAGNOSIS — E78.5 HYPERLIPIDEMIA, UNSPECIFIED HYPERLIPIDEMIA TYPE: ICD-10-CM

## 2022-09-27 LAB
ALBUMIN SERPL BCP-MCNC: 3.5 G/DL (ref 3.5–5.2)
ALP SERPL-CCNC: 122 U/L (ref 55–135)
ALT SERPL W/O P-5'-P-CCNC: 20 U/L (ref 10–44)
ANION GAP SERPL CALC-SCNC: 8 MMOL/L (ref 8–16)
AST SERPL-CCNC: 25 U/L (ref 10–40)
BASOPHILS # BLD AUTO: 0.05 K/UL (ref 0–0.2)
BASOPHILS NFR BLD: 0.7 % (ref 0–1.9)
BILIRUB SERPL-MCNC: 0.5 MG/DL (ref 0.1–1)
BUN SERPL-MCNC: 18 MG/DL (ref 8–23)
CALCIUM SERPL-MCNC: 9.2 MG/DL (ref 8.7–10.5)
CHLORIDE SERPL-SCNC: 104 MMOL/L (ref 95–110)
CHOLEST SERPL-MCNC: 132 MG/DL (ref 120–199)
CHOLEST/HDLC SERPL: 3 {RATIO} (ref 2–5)
CO2 SERPL-SCNC: 26 MMOL/L (ref 23–29)
CREAT SERPL-MCNC: 1 MG/DL (ref 0.5–1.4)
DIFFERENTIAL METHOD: ABNORMAL
EOSINOPHIL # BLD AUTO: 0.3 K/UL (ref 0–0.5)
EOSINOPHIL NFR BLD: 3.6 % (ref 0–8)
ERYTHROCYTE [DISTWIDTH] IN BLOOD BY AUTOMATED COUNT: 13.2 % (ref 11.5–14.5)
EST. GFR  (NO RACE VARIABLE): 58.4 ML/MIN/1.73 M^2
GLUCOSE SERPL-MCNC: 101 MG/DL (ref 70–110)
HCT VFR BLD AUTO: 36.4 % (ref 37–48.5)
HDLC SERPL-MCNC: 44 MG/DL (ref 40–75)
HDLC SERPL: 33.3 % (ref 20–50)
HGB BLD-MCNC: 11.8 G/DL (ref 12–16)
IMM GRANULOCYTES # BLD AUTO: 0.03 K/UL (ref 0–0.04)
IMM GRANULOCYTES NFR BLD AUTO: 0.4 % (ref 0–0.5)
LDLC SERPL CALC-MCNC: 72.4 MG/DL (ref 63–159)
LYMPHOCYTES # BLD AUTO: 1.5 K/UL (ref 1–4.8)
LYMPHOCYTES NFR BLD: 19.1 % (ref 18–48)
MCH RBC QN AUTO: 30.3 PG (ref 27–31)
MCHC RBC AUTO-ENTMCNC: 32.4 G/DL (ref 32–36)
MCV RBC AUTO: 94 FL (ref 82–98)
MONOCYTES # BLD AUTO: 0.6 K/UL (ref 0.3–1)
MONOCYTES NFR BLD: 7.9 % (ref 4–15)
NEUTROPHILS # BLD AUTO: 5.2 K/UL (ref 1.8–7.7)
NEUTROPHILS NFR BLD: 68.3 % (ref 38–73)
NONHDLC SERPL-MCNC: 88 MG/DL
NRBC BLD-RTO: 0 /100 WBC
PLATELET # BLD AUTO: 178 K/UL (ref 150–450)
PMV BLD AUTO: 10.3 FL (ref 9.2–12.9)
POTASSIUM SERPL-SCNC: 4.2 MMOL/L (ref 3.5–5.1)
PROT SERPL-MCNC: 6.8 G/DL (ref 6–8.4)
RBC # BLD AUTO: 3.89 M/UL (ref 4–5.4)
SODIUM SERPL-SCNC: 138 MMOL/L (ref 136–145)
TRIGL SERPL-MCNC: 78 MG/DL (ref 30–150)
WBC # BLD AUTO: 7.68 K/UL (ref 3.9–12.7)

## 2022-09-27 PROCEDURE — 3078F DIAST BP <80 MM HG: CPT | Mod: CPTII,S$GLB,, | Performed by: NURSE PRACTITIONER

## 2022-09-27 PROCEDURE — 99999 PR PBB SHADOW E&M-EST. PATIENT-LVL IV: CPT | Mod: PBBFAC,,, | Performed by: NURSE PRACTITIONER

## 2022-09-27 PROCEDURE — 80061 LIPID PANEL: CPT | Performed by: NURSE PRACTITIONER

## 2022-09-27 PROCEDURE — 1159F MED LIST DOCD IN RCRD: CPT | Mod: CPTII,S$GLB,, | Performed by: NURSE PRACTITIONER

## 2022-09-27 PROCEDURE — 1126F AMNT PAIN NOTED NONE PRSNT: CPT | Mod: CPTII,S$GLB,, | Performed by: NURSE PRACTITIONER

## 2022-09-27 PROCEDURE — G0008 ADMIN INFLUENZA VIRUS VAC: HCPCS | Mod: S$GLB,,, | Performed by: NURSE PRACTITIONER

## 2022-09-27 PROCEDURE — 90694 VACC AIIV4 NO PRSRV 0.5ML IM: CPT | Mod: S$GLB,,, | Performed by: NURSE PRACTITIONER

## 2022-09-27 PROCEDURE — 85025 COMPLETE CBC W/AUTO DIFF WBC: CPT | Mod: PO | Performed by: NURSE PRACTITIONER

## 2022-09-27 PROCEDURE — 1159F PR MEDICATION LIST DOCUMENTED IN MEDICAL RECORD: ICD-10-PCS | Mod: CPTII,S$GLB,, | Performed by: NURSE PRACTITIONER

## 2022-09-27 PROCEDURE — 99397 PR PREVENTIVE VISIT,EST,65 & OVER: ICD-10-PCS | Mod: 25,S$GLB,, | Performed by: NURSE PRACTITIONER

## 2022-09-27 PROCEDURE — 1160F PR REVIEW ALL MEDS BY PRESCRIBER/CLIN PHARMACIST DOCUMENTED: ICD-10-PCS | Mod: CPTII,S$GLB,, | Performed by: NURSE PRACTITIONER

## 2022-09-27 PROCEDURE — 3078F PR MOST RECENT DIASTOLIC BLOOD PRESSURE < 80 MM HG: ICD-10-PCS | Mod: CPTII,S$GLB,, | Performed by: NURSE PRACTITIONER

## 2022-09-27 PROCEDURE — 3077F SYST BP >= 140 MM HG: CPT | Mod: CPTII,S$GLB,, | Performed by: NURSE PRACTITIONER

## 2022-09-27 PROCEDURE — 80053 COMPREHEN METABOLIC PANEL: CPT | Mod: PO | Performed by: NURSE PRACTITIONER

## 2022-09-27 PROCEDURE — G0008 FLU VACCINE - QUADRIVALENT - ADJUVANTED: ICD-10-PCS | Mod: S$GLB,,, | Performed by: NURSE PRACTITIONER

## 2022-09-27 PROCEDURE — 36415 COLL VENOUS BLD VENIPUNCTURE: CPT | Mod: PO | Performed by: NURSE PRACTITIONER

## 2022-09-27 PROCEDURE — 99999 PR PBB SHADOW E&M-EST. PATIENT-LVL IV: ICD-10-PCS | Mod: PBBFAC,,, | Performed by: NURSE PRACTITIONER

## 2022-09-27 PROCEDURE — 3077F PR MOST RECENT SYSTOLIC BLOOD PRESSURE >= 140 MM HG: ICD-10-PCS | Mod: CPTII,S$GLB,, | Performed by: NURSE PRACTITIONER

## 2022-09-27 PROCEDURE — 1160F RVW MEDS BY RX/DR IN RCRD: CPT | Mod: CPTII,S$GLB,, | Performed by: NURSE PRACTITIONER

## 2022-09-27 PROCEDURE — 99397 PER PM REEVAL EST PAT 65+ YR: CPT | Mod: 25,S$GLB,, | Performed by: NURSE PRACTITIONER

## 2022-09-27 PROCEDURE — 1126F PR PAIN SEVERITY QUANTIFIED, NO PAIN PRESENT: ICD-10-PCS | Mod: CPTII,S$GLB,, | Performed by: NURSE PRACTITIONER

## 2022-09-27 PROCEDURE — 90694 FLU VACCINE - QUADRIVALENT - ADJUVANTED: ICD-10-PCS | Mod: S$GLB,,, | Performed by: NURSE PRACTITIONER

## 2022-09-27 SDOH — SOCIAL DETERMINANTS OF HEALTH (SDOH): OTHER SPECIFIED PROBLEMS RELATED TO PRIMARY SUPPORT GROUP: Z63.8

## 2022-09-27 NOTE — PROGRESS NOTES
Subjective:       Patient ID: Amber Naranjo is a 76 y.o. female.    Chief Complaint: Annual Exam    Mrs. Naranjo presents to visit for annual exam and labs. She has no acute complaints today. Has been doing well herself, but does have some increased stressors at home since he  has been placed on hospice last month. Daughter is living with her and helping out so this does relieve some stress off of her.  Due for routine labs.  Would like flu shot today.      Patient Active Problem List   Diagnosis    Essential hypertension    Diverticulosis of colon    Tortuous colon    Uterine fibroid    Hyperlipidemia    Glaucoma    GERD (gastroesophageal reflux disease)    Arthritis    Seasonal allergic rhinitis due to pollen    Obesity    Osteopenia of multiple sites    Postmenopausal    Primary osteoarthritis of right knee    Benign paroxysmal positional vertigo of left ear    Spondylolisthesis at L5-S1 level    Abdominal aortic atherosclerosis    Dyspnea on exertion    Chronic kidney disease, stage 3a    Caregiver role strain    Anxiety       Family History   Problem Relation Age of Onset    Glaucoma Mother     Hypertension Father     Asthma Daughter     Depression Daughter     Colon cancer Sister     Cancer Sister 70        colon    Diabetes Sister     Hypertension Sister     Hyperlipidemia Sister     Diabetes Brother     Hypertension Brother     Heart disease Brother     Hyperlipidemia Brother     Asthma Paternal Aunt     Diabetes Paternal Uncle     Diabetes Maternal Grandmother     Early death Maternal Grandfather     Early death Paternal Grandfather     Breast cancer Cousin     COPD Neg Hx     Kidney disease Neg Hx     Stroke Neg Hx     Mental illness Neg Hx      Past Surgical History:   Procedure Laterality Date    COLONOSCOPY      COLONOSCOPY N/A 3/15/2017    Procedure: COLONOSCOPY;  Surgeon: Yogi Carbone MD;  Location: [facility];  Service: Endoscopy;  Laterality: N/A;         Current Outpatient Medications:     aspirin  (ECOTRIN) 81 MG EC tablet, Take 81 mg by mouth once daily., Disp: , Rfl:     atorvastatin (LIPITOR) 20 MG tablet, TAKE 1 TABLET EVERY DAY, Disp: 90 tablet, Rfl: 2    blood pressure monitor Kit, , Disp: , Rfl:     calcium carbonate (OS-NERI) 600 mg (1,500 mg) Tab, Take 600 mg by mouth 2 (two) times daily with meals., Disp: , Rfl:     cetirizine (ZYRTEC) 10 MG tablet, Take 10 mg by mouth once daily., Disp: , Rfl:     diclofenac sodium (VOLTAREN) 1 % Gel, Apply 2 g topically 2 (two) times daily as needed., Disp: 100 g, Rfl: 0    hydroCHLOROthiazide (HYDRODIURIL) 12.5 MG Tab, TAKE 1 TABLET EVERY DAY, Disp: 90 tablet, Rfl: 3    lisinopriL (PRINIVIL,ZESTRIL) 20 MG tablet, TAKE 1 TABLET EVERY DAY, Disp: 90 tablet, Rfl: 3    metoprolol succinate (TOPROL-XL) 25 MG 24 hr tablet, TAKE 1 TABLET EVERY DAY, Disp: 90 tablet, Rfl: 3    multivitamin capsule, Take 1 capsule by mouth once daily., Disp: , Rfl:     omeprazole (PRILOSEC) 40 MG capsule, TAKE 1 CAPSULE EVERY DAY, Disp: 90 capsule, Rfl: 1    sertraline (ZOLOFT) 25 MG tablet, Take 1 tablet (25 mg total) by mouth once daily., Disp: 90 tablet, Rfl: 1    timolol maleate 0.5% (TIMOPTIC) 0.5 % Drop, , Disp: , Rfl:     Review of Systems   Constitutional:  Negative for chills, fatigue and fever.   Eyes:  Negative for visual disturbance.   Respiratory:  Negative for cough and shortness of breath.    Cardiovascular:  Negative for chest pain, palpitations and leg swelling.   Gastrointestinal:  Negative for abdominal pain, blood in stool, constipation, diarrhea, nausea and vomiting.   Endocrine: Negative for polydipsia, polyphagia and polyuria.   Genitourinary:  Negative for dysuria and hematuria.   Musculoskeletal:  Positive for arthralgias. Negative for gait problem.   Neurological:  Negative for dizziness, light-headedness and headaches.   Psychiatric/Behavioral:  Positive for dysphoric mood. Negative for sleep disturbance. The patient is nervous/anxious.      Objective:   BP (!)  150/68 (BP Location: Left arm, Patient Position: Sitting, BP Method: Medium (Manual))   Pulse 62   Temp 98.1 °F (36.7 °C)   Resp 18   Wt 76.9 kg (169 lb 8.5 oz)   SpO2 96%   BMI 31.01 kg/m²      Physical Exam  Constitutional:       General: She is not in acute distress.     Appearance: Normal appearance. She is obese. She is not ill-appearing.   HENT:      Head: Normocephalic.      Right Ear: Tympanic membrane normal. There is no impacted cerumen.      Left Ear: Tympanic membrane normal. There is no impacted cerumen.      Nose: Nose normal.      Mouth/Throat:      Mouth: Mucous membranes are moist.      Pharynx: Oropharynx is clear. No oropharyngeal exudate or posterior oropharyngeal erythema.   Eyes:      Extraocular Movements: Extraocular movements intact.      Pupils: Pupils are equal, round, and reactive to light.   Cardiovascular:      Rate and Rhythm: Normal rate and regular rhythm.      Pulses: Normal pulses.      Heart sounds: Normal heart sounds. No murmur heard.    No friction rub. No gallop.   Pulmonary:      Effort: Pulmonary effort is normal.      Breath sounds: Normal breath sounds. No stridor. No rhonchi.   Abdominal:      Palpations: Abdomen is soft.      Tenderness: There is no abdominal tenderness. There is no right CVA tenderness, left CVA tenderness or guarding.   Musculoskeletal:         General: Normal range of motion.      Cervical back: Normal range of motion and neck supple. No tenderness.      Right lower leg: No edema.      Left lower leg: No edema.   Lymphadenopathy:      Cervical: No cervical adenopathy.   Skin:     General: Skin is warm and dry.      Coloration: Skin is not pale.   Neurological:      Mental Status: She is alert and oriented to person, place, and time.   Psychiatric:         Mood and Affect: Mood normal.         Behavior: Behavior normal.       Assessment & Plan     Problem List Items Addressed This Visit          Psychiatric    Caregiver role strain    Current  "Assessment & Plan      recently placed or hospice.  Does have support system of daughter at home.         Anxiety    Current Assessment & Plan     Stable on Zoloft.  Continue current dose.            Ophtho    Glaucoma    Current Assessment & Plan     Followed by Dr Lemus, [facility]3].             Cardiac/Vascular    Essential hypertension    Current Assessment & Plan     Blood pressure elevated today.  Follow-up in 2 weeks.  Patient just took medications prior to visit.  Previously well controlled.         Hyperlipidemia    Current Assessment & Plan     Lipid panel today. On statin.          Relevant Orders    Lipid Panel    Abdominal aortic atherosclerosis    Current Assessment & Plan     Lipid panel today. On statin and asa.             Renal/    Chronic kidney disease, stage 3a    Current Assessment & Plan     Stable. CMP today         Relevant Orders    COMPREHENSIVE METABOLIC PANEL    CBC Auto Differential       Endocrine    Obesity    Current Assessment & Plan     Counseled on importance of diet and exercise in order to improve overall quality of life, and reduce risk of future comorbidities.            Other Visit Diagnoses       Routine adult health maintenance    -  Primary    Relevant Orders    COMPREHENSIVE METABOLIC PANEL    Lipid Panel    CBC Auto Differential        No concerns on physical exam.  Routine labs today.    Follow up in about 2 weeks (around 10/11/2022) for hypertension nurse visit, then 6 month follow up. .          Portions of this note may have been created with voice recognition software. Occasional "wrong-word" or "sound-a-like" substitutions may have occurred due to the inherent limitations of voice recognition software. Please, read the note carefully and recognize, using context, where substitutions have occurred.         "

## 2022-09-27 NOTE — ASSESSMENT & PLAN NOTE
Blood pressure elevated today.  Follow-up in 2 weeks.  Patient just took medications prior to visit.  Previously well controlled.

## 2022-10-11 ENCOUNTER — CLINICAL SUPPORT (OUTPATIENT)
Dept: INTERNAL MEDICINE | Facility: CLINIC | Age: 77
End: 2022-10-11
Payer: MEDICARE

## 2022-10-11 VITALS — DIASTOLIC BLOOD PRESSURE: 62 MMHG | SYSTOLIC BLOOD PRESSURE: 130 MMHG

## 2022-10-11 NOTE — PROGRESS NOTES
Patient presented to the office this am for a b/p check. She stated it was good this morning at 130/62. States they have not been running high. When I checked the b/p it was 150/70. Patient is dealing with a lot with her . Home reading noted on chart.

## 2022-11-03 ENCOUNTER — HOSPITAL ENCOUNTER (OUTPATIENT)
Dept: RADIOLOGY | Facility: HOSPITAL | Age: 77
Discharge: HOME | End: 2022-11-03
Attending: NURSE PRACTITIONER
Payer: MEDICARE

## 2022-11-03 ENCOUNTER — OFFICE VISIT (OUTPATIENT)
Dept: INTERNAL MEDICINE | Facility: CLINIC | Age: 77
End: 2022-11-03
Payer: MEDICARE

## 2022-11-03 VITALS
DIASTOLIC BLOOD PRESSURE: 72 MMHG | TEMPERATURE: 98 F | BODY MASS INDEX: 31.11 KG/M2 | HEART RATE: 71 BPM | HEIGHT: 62 IN | SYSTOLIC BLOOD PRESSURE: 136 MMHG | OXYGEN SATURATION: 97 % | WEIGHT: 169.06 LBS

## 2022-11-03 DIAGNOSIS — Z12.31 SCREENING MAMMOGRAM FOR BREAST CANCER: ICD-10-CM

## 2022-11-03 DIAGNOSIS — M79.672 FOOT PAIN, BILATERAL: Primary | ICD-10-CM

## 2022-11-03 DIAGNOSIS — M79.672 FOOT PAIN, BILATERAL: ICD-10-CM

## 2022-11-03 DIAGNOSIS — M79.671 FOOT PAIN, BILATERAL: Primary | ICD-10-CM

## 2022-11-03 DIAGNOSIS — M79.671 FOOT PAIN, BILATERAL: ICD-10-CM

## 2022-11-03 PROCEDURE — 73630 X-RAY EXAM OF FOOT: CPT | Mod: TC,50,PO

## 2022-11-03 PROCEDURE — 3075F SYST BP GE 130 - 139MM HG: CPT | Mod: CPTII,S$GLB,, | Performed by: NURSE PRACTITIONER

## 2022-11-03 PROCEDURE — 1160F PR REVIEW ALL MEDS BY PRESCRIBER/CLIN PHARMACIST DOCUMENTED: ICD-10-PCS | Mod: CPTII,S$GLB,, | Performed by: NURSE PRACTITIONER

## 2022-11-03 PROCEDURE — 3078F DIAST BP <80 MM HG: CPT | Mod: CPTII,S$GLB,, | Performed by: NURSE PRACTITIONER

## 2022-11-03 PROCEDURE — 1125F PR PAIN SEVERITY QUANTIFIED, PAIN PRESENT: ICD-10-PCS | Mod: CPTII,S$GLB,, | Performed by: NURSE PRACTITIONER

## 2022-11-03 PROCEDURE — 1125F AMNT PAIN NOTED PAIN PRSNT: CPT | Mod: CPTII,S$GLB,, | Performed by: NURSE PRACTITIONER

## 2022-11-03 PROCEDURE — 1159F MED LIST DOCD IN RCRD: CPT | Mod: CPTII,S$GLB,, | Performed by: NURSE PRACTITIONER

## 2022-11-03 PROCEDURE — 1160F RVW MEDS BY RX/DR IN RCRD: CPT | Mod: CPTII,S$GLB,, | Performed by: NURSE PRACTITIONER

## 2022-11-03 PROCEDURE — 99213 OFFICE O/P EST LOW 20 MIN: CPT | Mod: S$GLB,,, | Performed by: NURSE PRACTITIONER

## 2022-11-03 PROCEDURE — 99999 PR PBB SHADOW E&M-EST. PATIENT-LVL V: ICD-10-PCS | Mod: PBBFAC,,, | Performed by: NURSE PRACTITIONER

## 2022-11-03 PROCEDURE — 3078F PR MOST RECENT DIASTOLIC BLOOD PRESSURE < 80 MM HG: ICD-10-PCS | Mod: CPTII,S$GLB,, | Performed by: NURSE PRACTITIONER

## 2022-11-03 PROCEDURE — 73630 X-RAY EXAM OF FOOT: CPT | Mod: 26,50,, | Performed by: RADIOLOGY

## 2022-11-03 PROCEDURE — 99213 PR OFFICE/OUTPT VISIT, EST, LEVL III, 20-29 MIN: ICD-10-PCS | Mod: S$GLB,,, | Performed by: NURSE PRACTITIONER

## 2022-11-03 PROCEDURE — 3075F PR MOST RECENT SYSTOLIC BLOOD PRESS GE 130-139MM HG: ICD-10-PCS | Mod: CPTII,S$GLB,, | Performed by: NURSE PRACTITIONER

## 2022-11-03 PROCEDURE — 99999 PR PBB SHADOW E&M-EST. PATIENT-LVL V: CPT | Mod: PBBFAC,,, | Performed by: NURSE PRACTITIONER

## 2022-11-03 PROCEDURE — 1159F PR MEDICATION LIST DOCUMENTED IN MEDICAL RECORD: ICD-10-PCS | Mod: CPTII,S$GLB,, | Performed by: NURSE PRACTITIONER

## 2022-11-03 PROCEDURE — 73630 XR FOOT COMPLETE 3 VIEW BILATERAL: ICD-10-PCS | Mod: 26,50,, | Performed by: RADIOLOGY

## 2022-11-03 NOTE — PROGRESS NOTES
Subjective:       Patient ID: Amber Naranjo is a 77 y.o. female.    Chief Complaint: Foot Pain    Mrs. Naranjo presents to clinic for c/o lateral pain to bilateral feet x 1 week. Reports pain only occurs when touched or putting on shoes described as burning pain. Pain does not occur when walking. Denies injury or trauma. Denies new shoes. Not taking anything for pain due to pain being brief.    Foot Pain  This is a new problem. The current episode started in the past 7 days. The problem occurs intermittently. The problem has been waxing and waning. Associated symptoms include arthralgias. Pertinent negatives include no chest pain, fatigue, fever, joint swelling, myalgias, numbness, rash or weakness.     Patient Active Problem List   Diagnosis    Essential hypertension    Diverticulosis of colon    Tortuous colon    Uterine fibroid    Hyperlipidemia    Glaucoma    GERD (gastroesophageal reflux disease)    Arthritis    Seasonal allergic rhinitis due to pollen    Obesity    Osteopenia of multiple sites    Postmenopausal    Primary osteoarthritis of right knee    Benign paroxysmal positional vertigo of left ear    Spondylolisthesis at L5-S1 level    Abdominal aortic atherosclerosis    Dyspnea on exertion    Chronic kidney disease, stage 3a    Caregiver role strain    Anxiety       Family History   Problem Relation Age of Onset    Glaucoma Mother     Hypertension Father     Asthma Daughter     Depression Daughter     Colon cancer Sister     Cancer Sister 70        colon    Diabetes Sister     Hypertension Sister     Hyperlipidemia Sister     Diabetes Brother     Hypertension Brother     Heart disease Brother     Hyperlipidemia Brother     Asthma Paternal Aunt     Diabetes Paternal Uncle     Diabetes Maternal Grandmother     Early death Maternal Grandfather     Early death Paternal Grandfather     Breast cancer Cousin     COPD Neg Hx     Kidney disease Neg Hx     Stroke Neg Hx     Mental illness Neg Hx      Past Surgical  "History:   Procedure Laterality Date    COLONOSCOPY      COLONOSCOPY N/A 3/15/2017    Procedure: COLONOSCOPY;  Surgeon: Yogi Carbone MD;  Location: [facility];  Service: Endoscopy;  Laterality: N/A;         Current Outpatient Medications:     aspirin (ECOTRIN) 81 MG EC tablet, Take 81 mg by mouth once daily., Disp: , Rfl:     atorvastatin (LIPITOR) 20 MG tablet, TAKE 1 TABLET EVERY DAY, Disp: 90 tablet, Rfl: 2    blood pressure monitor Kit, , Disp: , Rfl:     calcium carbonate (OS-NERI) 600 mg (1,500 mg) Tab, Take 600 mg by mouth 2 (two) times daily with meals., Disp: , Rfl:     cetirizine (ZYRTEC) 10 MG tablet, Take 10 mg by mouth once daily., Disp: , Rfl:     diclofenac sodium (VOLTAREN) 1 % Gel, Apply 2 g topically 2 (two) times daily as needed., Disp: 100 g, Rfl: 0    hydroCHLOROthiazide (HYDRODIURIL) 12.5 MG Tab, TAKE 1 TABLET EVERY DAY, Disp: 90 tablet, Rfl: 3    lisinopriL (PRINIVIL,ZESTRIL) 20 MG tablet, TAKE 1 TABLET EVERY DAY, Disp: 90 tablet, Rfl: 3    metoprolol succinate (TOPROL-XL) 25 MG 24 hr tablet, TAKE 1 TABLET EVERY DAY, Disp: 90 tablet, Rfl: 3    multivitamin capsule, Take 1 capsule by mouth once daily., Disp: , Rfl:     omeprazole (PRILOSEC) 40 MG capsule, TAKE 1 CAPSULE EVERY DAY, Disp: 90 capsule, Rfl: 1    sertraline (ZOLOFT) 25 MG tablet, Take 1 tablet (25 mg total) by mouth once daily., Disp: 90 tablet, Rfl: 1    timolol maleate 0.5% (TIMOPTIC) 0.5 % Drop, , Disp: , Rfl:     Review of Systems   Constitutional:  Negative for fatigue and fever.   Cardiovascular:  Negative for chest pain, palpitations and leg swelling.   Musculoskeletal:  Positive for arthralgias. Negative for gait problem, joint swelling and myalgias.   Skin:  Negative for rash and wound.   Neurological:  Negative for weakness and numbness.     Objective:   /72 (BP Location: Left arm, Patient Position: Sitting, BP Method: Medium (Manual))   Pulse 71   Temp 97.9 °F (36.6 °C)   Ht 5' 2" (1.575 m)   Wt 76.7 kg (169 lb 1.5 " "oz)   SpO2 97%   BMI 30.93 kg/m²      Physical Exam  Constitutional:       General: She is not in acute distress.     Appearance: Normal appearance. She is not ill-appearing.   HENT:      Head: Normocephalic and atraumatic.   Eyes:      Conjunctiva/sclera: Conjunctivae normal.   Cardiovascular:      Rate and Rhythm: Normal rate and regular rhythm.      Pulses:           Dorsalis pedis pulses are 2+ on the right side and 2+ on the left side.      Heart sounds: Normal heart sounds.   Pulmonary:      Effort: Pulmonary effort is normal. No respiratory distress.      Breath sounds: Normal breath sounds. No wheezing or rhonchi.   Musculoskeletal:      Right foot: Normal range of motion.      Left foot: Normal range of motion.        Feet:    Feet:      Right foot:      Skin integrity: Skin integrity normal.      Toenail Condition: Right toenails are normal.      Left foot:      Skin integrity: Skin integrity normal.      Toenail Condition: Left toenails are normal.   Skin:     General: Skin is warm and dry.      Findings: No erythema or rash.   Neurological:      Mental Status: She is alert and oriented to person, place, and time.      Gait: Gait normal.   Psychiatric:         Behavior: Behavior normal.       Assessment & Plan     Problem List Items Addressed This Visit    None  Visit Diagnoses       Foot pain, bilateral    -  Primary    XR bilateral feet. Tylenol/ibuprofen as needed for pain. Avoid tight fitting shoes.    Relevant Orders    X-Ray Foot Complete Bilateral    Screening mammogram for breast cancer        Relevant Orders    Mammo Digital Screening Lencho bruce/ Diego Olivares, ANAYELI-VIVIENNE      Portions of this note may have been created with voice recognition software. Occasional "wrong-word" or "sound-a-like" substitutions may have occurred due to the inherent limitations of voice recognition software. Please, read the note carefully and recognize, using context, where substitutions have " occurred.

## 2022-11-23 ENCOUNTER — HOSPITAL ENCOUNTER (OUTPATIENT)
Dept: RADIOLOGY | Facility: HOSPITAL | Age: 77
Discharge: HOME | End: 2022-11-23
Attending: NURSE PRACTITIONER
Payer: MEDICARE

## 2022-11-23 VITALS — WEIGHT: 169.06 LBS | HEIGHT: 62 IN | BODY MASS INDEX: 31.11 KG/M2

## 2022-11-23 DIAGNOSIS — Z12.31 SCREENING MAMMOGRAM FOR BREAST CANCER: ICD-10-CM

## 2022-11-23 PROCEDURE — 77063 BREAST TOMOSYNTHESIS BI: CPT | Mod: TC,PO

## 2022-11-23 PROCEDURE — 77067 MAMMO DIGITAL SCREENING BILAT WITH TOMO: ICD-10-PCS | Mod: 26,,, | Performed by: RADIOLOGY

## 2022-11-23 PROCEDURE — 77063 MAMMO DIGITAL SCREENING BILAT WITH TOMO: ICD-10-PCS | Mod: 26,,, | Performed by: RADIOLOGY

## 2022-11-23 PROCEDURE — 77063 BREAST TOMOSYNTHESIS BI: CPT | Mod: 26,,, | Performed by: RADIOLOGY

## 2022-11-23 PROCEDURE — 77067 SCR MAMMO BI INCL CAD: CPT | Mod: 26,,, | Performed by: RADIOLOGY

## 2022-11-23 PROCEDURE — 77067 SCR MAMMO BI INCL CAD: CPT | Mod: TC,PO

## 2022-12-06 ENCOUNTER — OFFICE VISIT (OUTPATIENT)
Dept: INTERNAL MEDICINE | Facility: CLINIC | Age: 77
End: 2022-12-06
Payer: MEDICARE

## 2022-12-06 ENCOUNTER — HOSPITAL ENCOUNTER (OUTPATIENT)
Dept: RADIOLOGY | Facility: HOSPITAL | Age: 77
Discharge: HOME | End: 2022-12-06
Attending: STUDENT IN AN ORGANIZED HEALTH CARE EDUCATION/TRAINING PROGRAM
Payer: MEDICARE

## 2022-12-06 VITALS
HEART RATE: 64 BPM | WEIGHT: 168.63 LBS | HEIGHT: 62 IN | OXYGEN SATURATION: 95 % | TEMPERATURE: 98 F | DIASTOLIC BLOOD PRESSURE: 58 MMHG | BODY MASS INDEX: 31.03 KG/M2 | SYSTOLIC BLOOD PRESSURE: 100 MMHG

## 2022-12-06 DIAGNOSIS — M17.11 PRIMARY OSTEOARTHRITIS OF RIGHT KNEE: ICD-10-CM

## 2022-12-06 DIAGNOSIS — M17.11 PRIMARY OSTEOARTHRITIS OF RIGHT KNEE: Primary | ICD-10-CM

## 2022-12-06 PROCEDURE — 1159F MED LIST DOCD IN RCRD: CPT | Mod: CPTII,S$GLB,, | Performed by: STUDENT IN AN ORGANIZED HEALTH CARE EDUCATION/TRAINING PROGRAM

## 2022-12-06 PROCEDURE — 1125F AMNT PAIN NOTED PAIN PRSNT: CPT | Mod: CPTII,S$GLB,, | Performed by: STUDENT IN AN ORGANIZED HEALTH CARE EDUCATION/TRAINING PROGRAM

## 2022-12-06 PROCEDURE — 99213 PR OFFICE/OUTPT VISIT, EST, LEVL III, 20-29 MIN: ICD-10-PCS | Mod: S$GLB,,, | Performed by: STUDENT IN AN ORGANIZED HEALTH CARE EDUCATION/TRAINING PROGRAM

## 2022-12-06 PROCEDURE — 73560 XR KNEE ORTHO RIGHT: ICD-10-PCS | Mod: 26,LT,, | Performed by: RADIOLOGY

## 2022-12-06 PROCEDURE — 99999 PR PBB SHADOW E&M-EST. PATIENT-LVL IV: CPT | Mod: PBBFAC,,, | Performed by: STUDENT IN AN ORGANIZED HEALTH CARE EDUCATION/TRAINING PROGRAM

## 2022-12-06 PROCEDURE — 3078F DIAST BP <80 MM HG: CPT | Mod: CPTII,S$GLB,, | Performed by: STUDENT IN AN ORGANIZED HEALTH CARE EDUCATION/TRAINING PROGRAM

## 2022-12-06 PROCEDURE — 3074F PR MOST RECENT SYSTOLIC BLOOD PRESSURE < 130 MM HG: ICD-10-PCS | Mod: CPTII,S$GLB,, | Performed by: STUDENT IN AN ORGANIZED HEALTH CARE EDUCATION/TRAINING PROGRAM

## 2022-12-06 PROCEDURE — 99213 OFFICE O/P EST LOW 20 MIN: CPT | Mod: S$GLB,,, | Performed by: STUDENT IN AN ORGANIZED HEALTH CARE EDUCATION/TRAINING PROGRAM

## 2022-12-06 PROCEDURE — 73560 X-RAY EXAM OF KNEE 1 OR 2: CPT | Mod: 26,LT,, | Performed by: RADIOLOGY

## 2022-12-06 PROCEDURE — 99999 PR PBB SHADOW E&M-EST. PATIENT-LVL IV: ICD-10-PCS | Mod: PBBFAC,,, | Performed by: STUDENT IN AN ORGANIZED HEALTH CARE EDUCATION/TRAINING PROGRAM

## 2022-12-06 PROCEDURE — 73560 X-RAY EXAM OF KNEE 1 OR 2: CPT | Mod: TC,59,PO,LT

## 2022-12-06 PROCEDURE — 73562 X-RAY EXAM OF KNEE 3: CPT | Mod: TC,PO,RT

## 2022-12-06 PROCEDURE — 3074F SYST BP LT 130 MM HG: CPT | Mod: CPTII,S$GLB,, | Performed by: STUDENT IN AN ORGANIZED HEALTH CARE EDUCATION/TRAINING PROGRAM

## 2022-12-06 PROCEDURE — 73562 X-RAY EXAM OF KNEE 3: CPT | Mod: 26,RT,, | Performed by: RADIOLOGY

## 2022-12-06 PROCEDURE — 73562 XR KNEE ORTHO RIGHT: ICD-10-PCS | Mod: 26,RT,, | Performed by: RADIOLOGY

## 2022-12-06 PROCEDURE — 1159F PR MEDICATION LIST DOCUMENTED IN MEDICAL RECORD: ICD-10-PCS | Mod: CPTII,S$GLB,, | Performed by: STUDENT IN AN ORGANIZED HEALTH CARE EDUCATION/TRAINING PROGRAM

## 2022-12-06 PROCEDURE — 3078F PR MOST RECENT DIASTOLIC BLOOD PRESSURE < 80 MM HG: ICD-10-PCS | Mod: CPTII,S$GLB,, | Performed by: STUDENT IN AN ORGANIZED HEALTH CARE EDUCATION/TRAINING PROGRAM

## 2022-12-06 PROCEDURE — 1125F PR PAIN SEVERITY QUANTIFIED, PAIN PRESENT: ICD-10-PCS | Mod: CPTII,S$GLB,, | Performed by: STUDENT IN AN ORGANIZED HEALTH CARE EDUCATION/TRAINING PROGRAM

## 2022-12-06 RX ORDER — DICLOFENAC SODIUM 10 MG/G
2 GEL TOPICAL 2 TIMES DAILY PRN
Qty: 100 G | Refills: 0 | Status: SHIPPED | OUTPATIENT
Start: 2022-12-06 | End: 2022-12-28

## 2022-12-06 NOTE — PROGRESS NOTES
Chief Complaint   Patient presents with    Knee Pain     HPI:   Amber Naranjo is a 77 y.o. female with Pm hx listed below presents to the clinic for Right knee pain. As per the patient, she has been having dull aching pain in medial aspect of right knee for last 1.5 years but it has been gradually worsening in last 3 weeks. She has tried OTC medication along with heating and cooling pad with partial relief. She has tried PT in the past about 3 years ago but not recently. She had undergone steroid injection previously X 2 with partial relief the most recent one being 04/25/2022. She denied any numbness or tingling sensation of the extremities. She denied any fever, joint swelling or pain anywhere else in the body. No other question and queries were raised today.    Problem List:  Patient Active Problem List   Diagnosis    Essential hypertension    Diverticulosis of colon    Tortuous colon    Uterine fibroid    Hyperlipidemia    Glaucoma    GERD (gastroesophageal reflux disease)    Arthritis    Seasonal allergic rhinitis due to pollen    Obesity    Osteopenia of multiple sites    Postmenopausal    Primary osteoarthritis of right knee    Benign paroxysmal positional vertigo of left ear    Spondylolisthesis at L5-S1 level    Abdominal aortic atherosclerosis    Dyspnea on exertion    Chronic kidney disease, stage 3a    Caregiver role strain    Anxiety     ROS: Negative except as noted above.     Current Meds:  Current Outpatient Medications   Medication Sig Dispense Refill    aspirin (ECOTRIN) 81 MG EC tablet Take 81 mg by mouth once daily.      atorvastatin (LIPITOR) 20 MG tablet TAKE 1 TABLET EVERY DAY 90 tablet 2    blood pressure monitor Kit       calcium carbonate (OS-NERI) 600 mg (1,500 mg) Tab Take 600 mg by mouth 2 (two) times daily with meals.      cetirizine (ZYRTEC) 10 MG tablet Take 10 mg by mouth once daily.      diclofenac sodium (VOLTAREN) 1 % Gel Apply 2 g topically 2 (two) times daily as needed. 100 g 0     hydroCHLOROthiazide (HYDRODIURIL) 12.5 MG Tab TAKE 1 TABLET EVERY DAY 90 tablet 3    lisinopriL (PRINIVIL,ZESTRIL) 20 MG tablet TAKE 1 TABLET EVERY DAY 90 tablet 3    metoprolol succinate (TOPROL-XL) 25 MG 24 hr tablet TAKE 1 TABLET EVERY DAY 90 tablet 3    multivitamin capsule Take 1 capsule by mouth once daily.      omeprazole (PRILOSEC) 40 MG capsule TAKE 1 CAPSULE EVERY DAY 90 capsule 1    sertraline (ZOLOFT) 25 MG tablet Take 1 tablet (25 mg total) by mouth once daily. 90 tablet 1    timolol maleate 0.5% (TIMOPTIC) 0.5 % Drop        No current facility-administered medications for this visit.      PE:  BP: (!) 100/58  Pulse: 64     Temp: 97.7 °F (36.5 °C)  Weight: 76.5 kg (168 lb 10.4 oz) Body mass index is 30.85 kg/m².    Wt Readings from Last 5 Encounters:   12/06/22 76.5 kg (168 lb 10.4 oz)   11/23/22 76.7 kg (169 lb 1.5 oz)   11/03/22 76.7 kg (169 lb 1.5 oz)   09/27/22 76.9 kg (169 lb 8.5 oz)   05/25/22 75.7 kg (166 lb 14.2 oz)     General appearance: alert and cooperative, not in acute distress  Head: normocephalic, without obvious abnormality, atraumatic  Eyes: conjunctivae/corneas clear. PERRL, EOM's intact.  Ears: clear tympanic membranes   Neck: no adenopathy, supple, symmetrical, trachea midline and thyroid not enlarged, symmetric, no tenderness/mass/nodules, no JVD  Throat: lips, mucosa, and tongue normal; teeth and gums normal; no thrush  Chest: no reproducible chest pain   Heart: regular rate and rhythm, S1, S2 normal, no murmur, click, rub or gallop  Lungs: unlabored respiration, bilateral equal air entry, normal vesicular breath sound heard, no wheezing, rhonchi   Abdomen: soft, non-tender, non-distended; bowel sounds +; no masses,  no organomegaly, no ascites   Extremities: Knee:right    Gait: antalgic    Standing Exam:   Trendelenburg: negative bilaterally   Patellar position: neutral  Sitting Exam:   Patellar motion: normal    Inspection:   No swelling, atrophy or  asymmetry    Palpation:   Knee fullness noted.   Effusion: Present.   Tenderness to Palpation : medial joint line.    ROM:   Active: Full extension / flexion   Passive: full extension / flexion   Resisted: non-tender    Special tests:   Ligaments:   PCL laxity: normal   Lachman's test (ACL): negative   Valgus stress (MCL) at 30 degrees: negative   Varus stress (LCL) at 30 degrees: negative   Valgus laxity: stable   Varus laxity: stable   Meniscal tests:   Flexion pinch: negative   Bounce home: negative   Magdalena's test: normal     Neurovascular exam:   Dermatomes: L1-S1: intact   Patellar reflex:normal   Achilles reflex:  normal   Pedal pulses: normal      Skin: skin color, texture, turgor normal. No rashes or lesions noted.  Neurologic: grossly intact        Lab:  Lab Results   Component Value Date    WBC 7.68 09/27/2022    HGB 11.8 (L) 09/27/2022    HCT 36.4 (L) 09/27/2022    MCV 94 09/27/2022     09/27/2022     09/27/2022    K 4.2 09/27/2022     09/27/2022    CO2 26 09/27/2022    BUN 18 09/27/2022     09/27/2022    CALCIUM 9.2 09/27/2022    AST 25 09/27/2022    ALT 20 09/27/2022    CHOL 132 09/27/2022    HDL 44 09/27/2022    LDLCALC 72.4 09/27/2022    TRIG 78 09/27/2022       Impression:  1. Primary osteoarthritis of right knee  - can take tylenol but other NSAID avoided due to CKD-IIIa  - X ray Knee: No acute fracture or dislocation.  Small superior patellar enthesophyte.  Minimal narrowing in the medial compartment right knee..  No patellar tilt.  Joint effusion is noted.  Bones remain demineralized  - diclofenac sodium (VOLTAREN) 1 % Gel; Apply 2 g topically 2 (two) times daily as needed.  Dispense: 100 g; Refill: 0  - Ambulatory referral/consult to Physical/Occupational Therapy; Future      Future Appointments   Date Time Provider Department Center   3/27/2023  8:00 AM Marti Valadez NP [facility]     RTC in 2 months after PT/OT evaluation or PRN    Jarrett Peck  MD

## 2022-12-08 ENCOUNTER — TELEPHONE (OUTPATIENT)
Dept: INTERNAL MEDICINE | Facility: CLINIC | Age: 77
End: 2022-12-08
Payer: MEDICARE

## 2022-12-08 DIAGNOSIS — M17.11 PRIMARY OSTEOARTHRITIS OF RIGHT KNEE: Primary | ICD-10-CM

## 2022-12-08 NOTE — TELEPHONE ENCOUNTER
----- Message from Vilma Davila sent at 12/8/2022 11:29 AM CST -----  Type:  Test Results    Who Called: patient  Name of Test (Lab/Mammo/Etc): Xray  Date of Test: 12/16  Ordering Provider: Hanna TAFOYA  Where the test was performed: [facility]  Would the patient rather a call back or a response via [facility]? Call back  Best Call Back Number: [phone]  Additional Information:  na

## 2023-01-03 ENCOUNTER — OFFICE VISIT (OUTPATIENT)
Dept: ORTHOPEDICS | Facility: CLINIC | Age: 78
End: 2023-01-03
Payer: MEDICARE

## 2023-01-03 VITALS — RESPIRATION RATE: 18 BRPM | HEIGHT: 62 IN | BODY MASS INDEX: 31.03 KG/M2 | WEIGHT: 168.63 LBS

## 2023-01-03 DIAGNOSIS — M17.11 PRIMARY OSTEOARTHRITIS OF RIGHT KNEE: Primary | ICD-10-CM

## 2023-01-03 PROCEDURE — 99204 OFFICE O/P NEW MOD 45 MIN: CPT | Mod: S$GLB,,, | Performed by: STUDENT IN AN ORGANIZED HEALTH CARE EDUCATION/TRAINING PROGRAM

## 2023-01-03 PROCEDURE — 1125F AMNT PAIN NOTED PAIN PRSNT: CPT | Mod: CPTII,S$GLB,, | Performed by: STUDENT IN AN ORGANIZED HEALTH CARE EDUCATION/TRAINING PROGRAM

## 2023-01-03 PROCEDURE — 1125F PR PAIN SEVERITY QUANTIFIED, PAIN PRESENT: ICD-10-PCS | Mod: CPTII,S$GLB,, | Performed by: STUDENT IN AN ORGANIZED HEALTH CARE EDUCATION/TRAINING PROGRAM

## 2023-01-03 PROCEDURE — 99204 PR OFFICE/OUTPT VISIT, NEW, LEVL IV, 45-59 MIN: ICD-10-PCS | Mod: S$GLB,,, | Performed by: STUDENT IN AN ORGANIZED HEALTH CARE EDUCATION/TRAINING PROGRAM

## 2023-01-03 PROCEDURE — 3288F FALL RISK ASSESSMENT DOCD: CPT | Mod: CPTII,S$GLB,, | Performed by: STUDENT IN AN ORGANIZED HEALTH CARE EDUCATION/TRAINING PROGRAM

## 2023-01-03 PROCEDURE — 1159F MED LIST DOCD IN RCRD: CPT | Mod: CPTII,S$GLB,, | Performed by: STUDENT IN AN ORGANIZED HEALTH CARE EDUCATION/TRAINING PROGRAM

## 2023-01-03 PROCEDURE — 3288F PR FALLS RISK ASSESSMENT DOCUMENTED: ICD-10-PCS | Mod: CPTII,S$GLB,, | Performed by: STUDENT IN AN ORGANIZED HEALTH CARE EDUCATION/TRAINING PROGRAM

## 2023-01-03 PROCEDURE — 99999 PR PBB SHADOW E&M-EST. PATIENT-LVL IV: CPT | Mod: PBBFAC,,, | Performed by: STUDENT IN AN ORGANIZED HEALTH CARE EDUCATION/TRAINING PROGRAM

## 2023-01-03 PROCEDURE — 1101F PR PT FALLS ASSESS DOC 0-1 FALLS W/OUT INJ PAST YR: ICD-10-PCS | Mod: CPTII,S$GLB,, | Performed by: STUDENT IN AN ORGANIZED HEALTH CARE EDUCATION/TRAINING PROGRAM

## 2023-01-03 PROCEDURE — 1101F PT FALLS ASSESS-DOCD LE1/YR: CPT | Mod: CPTII,S$GLB,, | Performed by: STUDENT IN AN ORGANIZED HEALTH CARE EDUCATION/TRAINING PROGRAM

## 2023-01-03 PROCEDURE — 1159F PR MEDICATION LIST DOCUMENTED IN MEDICAL RECORD: ICD-10-PCS | Mod: CPTII,S$GLB,, | Performed by: STUDENT IN AN ORGANIZED HEALTH CARE EDUCATION/TRAINING PROGRAM

## 2023-01-03 PROCEDURE — 99999 PR PBB SHADOW E&M-EST. PATIENT-LVL IV: ICD-10-PCS | Mod: PBBFAC,,, | Performed by: STUDENT IN AN ORGANIZED HEALTH CARE EDUCATION/TRAINING PROGRAM

## 2023-01-03 NOTE — PROGRESS NOTES
Patient ID: Amber Naranjo  YOB: 1945  MRN: [id number]    Chief Complaint: Pain of the Right Knee    Referred By: Jarrett Peck MD for right knee pain    History of Present Illness: Amber Naranjo is a 77 y.o. female who presents today with chronic right knee pain.     The patient is active in  garden, caretaker .  Occupation: House Wife    Amber Naranjo states it is Chronic in nature and there was not a specific mechanism. She describes long standing knee pain that worsens with excessive activity.  Amber Naranjo describes the pain as a intermittent ache and burn along medial. Treatment to date includes home therapy, topical diclofenac, and intra-articular corticosteroid injection. They believe that they are unchanged with this treatment. Last corticosteroid injection was in April 2022. Current pain level at rest is 5/10 (Numeric Pain Rating Scale).  Associated symptoms include: Swelling Yes, Instability Yes, Pain that affects your sleep Yes, Mechanical Yes, locking/catching No, Neurological No, limited range of motion Yes.     Aggravating activities include bending, ROM, night, stairs.   Alleviating activities include rest.     They denies formal physical therapy for this but is open to this. Previous pertinent orthopedic injuries include none.    No results found for: HGBA1C    Past Medical History:   Past Medical History:   Diagnosis Date    Allergy     Anxiety 4/25/2022    Arthritis     Back pain     Disorder of kidney and ureter     Diverticulosis of colon     GERD (gastroesophageal reflux disease)     Glaucoma     Hyperlipidemia     Hypertension     Ingrown toenail     Klebsiella cystitis     Obesity     Pain in joint involving multiple sites     Pneumonia     Tortuous colon     Trouble in sleeping     Unspecified disorder of autonomic nervous system     Uterine fibroid      Past Surgical History:   Procedure Laterality Date    COLONOSCOPY      COLONOSCOPY N/A 3/15/2017    Procedure: COLONOSCOPY;   Surgeon: Yogi Carbone MD;  Location: [facility];  Service: Endoscopy;  Laterality: N/A;     Family History   Problem Relation Age of Onset    Glaucoma Mother     Hypertension Father     Asthma Daughter     Depression Daughter     Colon cancer Sister     Cancer Sister 70        colon    Diabetes Sister     Hypertension Sister     Hyperlipidemia Sister     Diabetes Brother     Hypertension Brother     Heart disease Brother     Hyperlipidemia Brother     Asthma Paternal Aunt     Diabetes Paternal Uncle     Diabetes Maternal Grandmother     Early death Maternal Grandfather     Early death Paternal Grandfather     Breast cancer Cousin     COPD Neg Hx     Kidney disease Neg Hx     Stroke Neg Hx     Mental illness Neg Hx      Social History     Socioeconomic History    Marital status:    Tobacco Use    Smoking status: Passive Smoke Exposure - Never Smoker    Smokeless tobacco: Never   Substance and Sexual Activity    Alcohol use: No    Drug use: No    Sexual activity: Not Currently     Medication List with Changes/Refills   Current Medications    ASPIRIN (ECOTRIN) 81 MG EC TABLET    Take 81 mg by mouth once daily.    ATORVASTATIN (LIPITOR) 20 MG TABLET    TAKE 1 TABLET EVERY DAY    BLOOD PRESSURE MONITOR KIT        CALCIUM CARBONATE (OS-NERI) 600 MG (1,500 MG) TAB    Take 600 mg by mouth 2 (two) times daily with meals.    CETIRIZINE (ZYRTEC) 10 MG TABLET    Take 10 mg by mouth once daily.    DICLOFENAC SODIUM (VOLTAREN) 1 % GEL    APPLY 2 GRAMS TOPICALLY TWO TIMES DAILY AS NEEDED.    LISINOPRIL (PRINIVIL,ZESTRIL) 20 MG TABLET    TAKE 1 TABLET EVERY DAY    METOPROLOL SUCCINATE (TOPROL-XL) 25 MG 24 HR TABLET    TAKE 1 TABLET EVERY DAY    MULTIVITAMIN CAPSULE    Take 1 capsule by mouth once daily.    OMEPRAZOLE (PRILOSEC) 40 MG CAPSULE    TAKE 1 CAPSULE EVERY DAY    SERTRALINE (ZOLOFT) 25 MG TABLET    Take 1 tablet (25 mg total) by mouth once daily.    TIMOLOL MALEATE 0.5% (TIMOPTIC) 0.5 % DROP         Review of  patient's allergies indicates:  No Known Allergies    Physical Exam:   Body mass index is 30.85 kg/m².    GENERAL: Well appearing, in no acute distress.  HEAD: Normocephalic and atraumatic.  ENT: External ears and nose grossly normal.  EYES: EOMI bilaterally  PULMONARY: Respirations are grossly even and non-labored.  NEURO: Awake, alert, and oriented x 3.  SKIN: No obvious rashes appreciated.  PSYCH: Mood & affect are appropriate.    Detailed MSK exam:   No obvious deformities, no ecchymosis, no erythema   No effusion   Limited knee flexion 5 finger breadths, limited extension to neutral   Patellar Apprehension negative  Hypomobility patellar glides lateral with subtle click  Lachman negative  Valgus @ 0 positive +1  Valgus @ 30 positive +1  Varus negative  Anterior drawer negative  Posterior Drawer negative  Thessaly positive  Pain with maximal passive knee flexion positive  Pain/click Maki positive  Pain with forced hyperextension positive  Hx of catching/locking reported positive  Joint line tenderness positive medial    Imaging:  X-ray Knee Ortho Right  Narrative: EXAMINATION:  XR KNEE ORTHO RIGHT    CLINICAL HISTORY:  Unilateral primary osteoarthritis, right knee    TECHNIQUE:  AP standing of both knees, Merchant views of both knees as well as a lateral view of the right knee were performed.    COMPARISON:  Radiographs from 05/16/2019    FINDINGS:  No acute fracture or dislocation.  Small superior patellar enthesophyte.  Minimal narrowing in the medial compartment right knee..  No patellar tilt.  Joint effusion is noted.  Bones remain demineralized  Impression: See above    Electronically signed by: Trenton Rowe MD  Date:    12/06/2022  Time:    13:50      Relevant imaging results were reviewed and interpreted by me and per my read as above.  This was discussed with the patient and / or family today.     Assessment:  Amber Naranjo is a 77 y.o. female presents today for history of right knee pain consistent  with primary osteoarthritis of the knee.  She is never done any formal therapy has had intra-articular injections in the past.  Has never done viscosupplementation.  Discussed the diagnosis prognosis as well as conservative treatment options moving forward.  We will start formal physical therapy and refer for viscosupplementation of the right knee.  Reviewed her x-rays with her today.    MEDICAL NECESSITY FOR VISCOSUPPLEMETNATION: After thorough evaluation of the patient, I have determined that visco-supplementation is medically necessary. The patient has painful degenerative changes of the knee with failure of conservative treatments including lifestyle modifications and rehabilitation exercises.  Oral analgesis/NSAIDs have not adequately controlled symptoms and there is radiographic evidence of Kellgren Raymundo grade 2 or greater osteoarthritic changes, or in lack of radiographic evidence, there is arthroscopic or other evidence of chondrosis.       Primary osteoarthritis of right knee  -     Ambulatory referral/consult to Orthopedics  -     Prior authorization Order  -     Ambulatory referral/consult to Physical/Occupational Therapy; Future; Expected date: 01/10/2023         A copy of today's visit note has been sent to the referring provider.       Elias Barber MD    Disclaimer: This note was prepared using a voice recognition system and is likely to have sound alike errors within the text.

## 2023-01-03 NOTE — PATIENT INSTRUCTIONS
Assessment:  Amber Naranjo is a 77 y.o. female   Chief Complaint   Patient presents with    Right Knee - Pain       Encounter Diagnosis   Name Primary?    Primary osteoarthritis of right knee         Plan:  Reviewed your x-rays with you today and discussed pertinent findings.   Placed referral for visco-supplementation of right knee. Placed a medication referral for visco-supplementation injections. This is a series of three injections over three weeks. In this procedure, a gel-like fluid called hyaluronic acid is injected into the knee joint. Hyaluronic acid is a naturally occurring substance found in the synovial fluid surrounding joints. People with osteoarthritis have a lower-than-normal concentration of hyaluronic acid in their joints The theory is that adding hyaluronic acid to the arthritic joint will facilitate movement and reduce pain through a cellular level of improvement. The receptors within the knee are then down regulated to reduce the sensitivity and there is a greater stimulation production of hyaluronic acid.   An ambulatory referral to physical therapy was placed today.   Apply topical diclofenac (Voltaren) up to 4 times a day to the affected area.  It can be bought over the counter at any local pharmacy.    Please take Tylenol 1 Gram (2 extra-strength Tylenol tablets) up to 3 times a day.  Please to not take any extra doses of tylenol if you are scheduling in this manner.     General Arthritis info:    -shiny white stuff at end of a chicken bones is cartilage    -arthritis is wearing away of the cartilage that lines the end of your bones    -osteoarthritis is thought to be a wear and tear phenomenon    -symptoms are due to inflammation of joint causing stiffness, aching, and sometimes swelling    -occasionally sharp pain will occur causing a give way sensation    -Risk factors: genetic, weight, female > male, age    Treatment options:    -maintain healthy weight (every pound is 4 pounds of pressure  on the knee)    -daily moderate exercise (walk, bike, swim 30 minutes per day) to keep joints moving    -daily strengthening exercises (through therapy or on own) to keep muscles supporting joint healthy and strong    -glucosamine 1500mg daily (look for USP label on bottle)    -tylenol as needed for pain (follow directions on the bottle)    -anti-inflammatory medication such as alleve may be helpful- take 1-2 tabs twice daily for 7 days. If it helps your pain, continue. If you do not feel any change, you may stop and then take it as needed.    (you may be given a once daily anti-inflammatory such as MOBIC. If given, avoid other anti-inflammatory medications such as advil, ibuprofen, motrin, naprosyn, alleve, etc)    -if swollen and painful, ice, decrease activity, and take anti-inflammatory daily for 5-7 days and if no relief call your doctor for further options    -consider cortisone injection (every 3-4 months at most)- anti- inflammatory steroid medication that can be injected directly into the joint to reduce inflammation    -consider hyaluronic acid injections (eufflexxa, hyalgan, synvisc, supartz) (every 6 months at most)- protein injection that helps decrease pain and irritability in the joint. It is best used to help prolong intervals between cortisone injections to minimize steroid injections. These are currently approved for knee injections. Discuss with your doctor if other joint involved. Call to seek approval prior to the injections.    -long-term treatment may include a total joint replacement (keep diary of good days and bad days, then evaluate as to when you are ready)  Supplements for pain, inflammation, arthritis.    Glucosamine sulfate/chondroitin sulfate has been shown to be safe and effective for knee arthritis (and possibly other joints affected with arthritis). This is an over the counter medication/supplement and usually needs to be taken for 1-2 months before results are seen. If you do not  see any results after this time, consider stopping it. The dose is usually found on the bottle, and is 1500mg to start (first 1-2 months) then you can back down to 1000 mg (current recommendations are 1500mg per day, all at once).    Some people can have stomach problems with this and if you do, you may stop taking it or divide up the doses. If you are ALLERGIC TO SHELLFISH, please do not take. Follow the instructions on the bottle.    Other supplements that have been shown to help with joint and muscle pain include:    (Unless otherwise noted, as these supplements are not FDA controlled, please follow the recommendations on the bottle)    Turmeric 500mg PO BID    Flax seed oil    Avocado soybean unsaponifiables    MARIA LUISA-e    MSM    Cherry juice extract (tart)    Rosehip    Anikinara    Diacerein    If there are any concerns about taking these supplements with other medications you may already be taking, you can speak to your pharmacist, physician, health care provider, or research other medical information available to you. Side effects and interactions are possible with any supplement or medication - be safe!     Follow-up: For injection or sooner if there are any problems between now and then.    Thank you for choosing [facility] and Dr. Elias Barber for your orthopedic & sports medicine care. It is our goal to provide you with exceptional care that will help keep you healthy, active, and get you back in the game.    Please do not hesitate to reach out to us via email, phone, or MyChart with any questions, concerns, or feedback.    If you felt that you received exemplary care today, please consider leaving us feedback on Healthgrades at:  [email or web address]    If you are experiencing pain/discomfort ,or have questions after 5pm and would like to be connected to the [facility] on-call team, please call this number  and specify which Sports Medicine provider is treating you: [phone]      Yes

## 2023-02-07 ENCOUNTER — OFFICE VISIT (OUTPATIENT)
Dept: ORTHOPEDICS | Facility: CLINIC | Age: 78
End: 2023-02-07
Payer: MEDICARE

## 2023-02-07 VITALS — HEIGHT: 62 IN | WEIGHT: 165.81 LBS | BODY MASS INDEX: 30.51 KG/M2 | RESPIRATION RATE: 20 BRPM

## 2023-02-07 DIAGNOSIS — M17.11 PRIMARY OSTEOARTHRITIS OF RIGHT KNEE: Primary | ICD-10-CM

## 2023-02-07 PROCEDURE — 99999 PR PBB SHADOW E&M-EST. PATIENT-LVL III: ICD-10-PCS | Mod: PBBFAC,HCNC,, | Performed by: STUDENT IN AN ORGANIZED HEALTH CARE EDUCATION/TRAINING PROGRAM

## 2023-02-07 PROCEDURE — 1125F AMNT PAIN NOTED PAIN PRSNT: CPT | Mod: HCNC,CPTII,S$GLB, | Performed by: STUDENT IN AN ORGANIZED HEALTH CARE EDUCATION/TRAINING PROGRAM

## 2023-02-07 PROCEDURE — 1125F PR PAIN SEVERITY QUANTIFIED, PAIN PRESENT: ICD-10-PCS | Mod: HCNC,CPTII,S$GLB, | Performed by: STUDENT IN AN ORGANIZED HEALTH CARE EDUCATION/TRAINING PROGRAM

## 2023-02-07 PROCEDURE — 99499 UNLISTED E&M SERVICE: CPT | Mod: HCNC,S$GLB,, | Performed by: STUDENT IN AN ORGANIZED HEALTH CARE EDUCATION/TRAINING PROGRAM

## 2023-02-07 PROCEDURE — 1159F PR MEDICATION LIST DOCUMENTED IN MEDICAL RECORD: ICD-10-PCS | Mod: HCNC,CPTII,S$GLB, | Performed by: STUDENT IN AN ORGANIZED HEALTH CARE EDUCATION/TRAINING PROGRAM

## 2023-02-07 PROCEDURE — 1101F PR PT FALLS ASSESS DOC 0-1 FALLS W/OUT INJ PAST YR: ICD-10-PCS | Mod: HCNC,CPTII,S$GLB, | Performed by: STUDENT IN AN ORGANIZED HEALTH CARE EDUCATION/TRAINING PROGRAM

## 2023-02-07 PROCEDURE — 1101F PT FALLS ASSESS-DOCD LE1/YR: CPT | Mod: HCNC,CPTII,S$GLB, | Performed by: STUDENT IN AN ORGANIZED HEALTH CARE EDUCATION/TRAINING PROGRAM

## 2023-02-07 PROCEDURE — 3288F FALL RISK ASSESSMENT DOCD: CPT | Mod: HCNC,CPTII,S$GLB, | Performed by: STUDENT IN AN ORGANIZED HEALTH CARE EDUCATION/TRAINING PROGRAM

## 2023-02-07 PROCEDURE — 99499 NO LOS: ICD-10-PCS | Mod: HCNC,S$GLB,, | Performed by: STUDENT IN AN ORGANIZED HEALTH CARE EDUCATION/TRAINING PROGRAM

## 2023-02-07 PROCEDURE — 1159F MED LIST DOCD IN RCRD: CPT | Mod: HCNC,CPTII,S$GLB, | Performed by: STUDENT IN AN ORGANIZED HEALTH CARE EDUCATION/TRAINING PROGRAM

## 2023-02-07 PROCEDURE — 20611 LARGE JOINT ASPIRATION/INJECTION: R KNEE: ICD-10-PCS | Mod: HCNC,RT,S$GLB, | Performed by: STUDENT IN AN ORGANIZED HEALTH CARE EDUCATION/TRAINING PROGRAM

## 2023-02-07 PROCEDURE — 20611 DRAIN/INJ JOINT/BURSA W/US: CPT | Mod: HCNC,RT,S$GLB, | Performed by: STUDENT IN AN ORGANIZED HEALTH CARE EDUCATION/TRAINING PROGRAM

## 2023-02-07 PROCEDURE — 99999 PR PBB SHADOW E&M-EST. PATIENT-LVL III: CPT | Mod: PBBFAC,HCNC,, | Performed by: STUDENT IN AN ORGANIZED HEALTH CARE EDUCATION/TRAINING PROGRAM

## 2023-02-07 PROCEDURE — 3288F PR FALLS RISK ASSESSMENT DOCUMENTED: ICD-10-PCS | Mod: HCNC,CPTII,S$GLB, | Performed by: STUDENT IN AN ORGANIZED HEALTH CARE EDUCATION/TRAINING PROGRAM

## 2023-02-07 NOTE — PROCEDURES
Large Joint Aspiration/Injection: R knee    Date/Time: 2/7/2023 8:20 AM  Performed by: Elias Barber MD  Authorized by: Elias Barber MD     Consent Done?:  Yes (Verbal)  Indications:  Arthritis and pain  Site marked: the procedure site was marked    Timeout: prior to procedure the correct patient, procedure, and site was verified    Prep: patient was prepped and draped in usual sterile fashion      Local anesthesia used?: Yes    Local anesthetic:  Topical anesthetic    Details:  Needle Size:  22 G  Ultrasonic Guidance for needle placement?: Yes    Images are saved and documented.  Approach: superior lateral.  Location:  Knee  Site:  R knee  Medications:  30 mg sodium hyaluronate (orthovisc) 30 mg/2 mL  Patient tolerance:  Patient tolerated the procedure well with no immediate complications     Ultrasound guidance was used for needle localization. Images were saved and stored for documentation. The appropriate structures were visualized. Dynamic visualization of the needle was continuous throughout the procedures and maintained good position.     We discussed the proper protocols after the injection such as no submerging pools, baths tubs, or hot tubs for 24 hr.  We discussed red flags such as fevers, chills, red, warm, tender joint at the area of injection to please seek medical care immediately.      MEDICAL NECESSITY FOR VISCOSUPPLEMETNATION: After thorough evaluation of the patient, I have determined that visco-supplementation is medically necessary. The patient has painful degenerative changes of the knee with failure of conservative treatments including lifestyle modifications and rehabilitation exercises.  Oral analgesis/NSAIDs have not adequately controlled symptoms and there is radiographic evidence of Kellgren Raymundo grade 2 or greater osteoarthritic changes, or in lack of radiographic evidence, there is arthroscopic or other evidence of chondrosis.     Orthovisc: R knee 1/3

## 2023-02-08 ENCOUNTER — TELEPHONE (OUTPATIENT)
Dept: ADMINISTRATIVE | Facility: HOSPITAL | Age: 78
End: 2023-02-08
Payer: MEDICARE

## 2023-02-14 ENCOUNTER — OFFICE VISIT (OUTPATIENT)
Dept: ORTHOPEDICS | Facility: CLINIC | Age: 78
End: 2023-02-14
Payer: MEDICARE

## 2023-02-14 VITALS — RESPIRATION RATE: 20 BRPM | WEIGHT: 168.19 LBS | BODY MASS INDEX: 29.8 KG/M2 | HEIGHT: 63 IN

## 2023-02-14 DIAGNOSIS — M17.11 PRIMARY OSTEOARTHRITIS OF RIGHT KNEE: Primary | ICD-10-CM

## 2023-02-14 PROCEDURE — 20611 DRAIN/INJ JOINT/BURSA W/US: CPT | Mod: HCNC,RT,S$GLB, | Performed by: STUDENT IN AN ORGANIZED HEALTH CARE EDUCATION/TRAINING PROGRAM

## 2023-02-14 PROCEDURE — 1101F PR PT FALLS ASSESS DOC 0-1 FALLS W/OUT INJ PAST YR: ICD-10-PCS | Mod: HCNC,CPTII,S$GLB, | Performed by: STUDENT IN AN ORGANIZED HEALTH CARE EDUCATION/TRAINING PROGRAM

## 2023-02-14 PROCEDURE — 1101F PT FALLS ASSESS-DOCD LE1/YR: CPT | Mod: HCNC,CPTII,S$GLB, | Performed by: STUDENT IN AN ORGANIZED HEALTH CARE EDUCATION/TRAINING PROGRAM

## 2023-02-14 PROCEDURE — 1125F PR PAIN SEVERITY QUANTIFIED, PAIN PRESENT: ICD-10-PCS | Mod: HCNC,CPTII,S$GLB, | Performed by: STUDENT IN AN ORGANIZED HEALTH CARE EDUCATION/TRAINING PROGRAM

## 2023-02-14 PROCEDURE — 99999 PR PBB SHADOW E&M-EST. PATIENT-LVL III: CPT | Mod: PBBFAC,HCNC,, | Performed by: STUDENT IN AN ORGANIZED HEALTH CARE EDUCATION/TRAINING PROGRAM

## 2023-02-14 PROCEDURE — 1159F PR MEDICATION LIST DOCUMENTED IN MEDICAL RECORD: ICD-10-PCS | Mod: HCNC,CPTII,S$GLB, | Performed by: STUDENT IN AN ORGANIZED HEALTH CARE EDUCATION/TRAINING PROGRAM

## 2023-02-14 PROCEDURE — 20611 LARGE JOINT ASPIRATION/INJECTION: R KNEE: ICD-10-PCS | Mod: HCNC,RT,S$GLB, | Performed by: STUDENT IN AN ORGANIZED HEALTH CARE EDUCATION/TRAINING PROGRAM

## 2023-02-14 PROCEDURE — 99499 UNLISTED E&M SERVICE: CPT | Mod: HCNC,S$GLB,, | Performed by: STUDENT IN AN ORGANIZED HEALTH CARE EDUCATION/TRAINING PROGRAM

## 2023-02-14 PROCEDURE — 3288F PR FALLS RISK ASSESSMENT DOCUMENTED: ICD-10-PCS | Mod: HCNC,CPTII,S$GLB, | Performed by: STUDENT IN AN ORGANIZED HEALTH CARE EDUCATION/TRAINING PROGRAM

## 2023-02-14 PROCEDURE — 1125F AMNT PAIN NOTED PAIN PRSNT: CPT | Mod: HCNC,CPTII,S$GLB, | Performed by: STUDENT IN AN ORGANIZED HEALTH CARE EDUCATION/TRAINING PROGRAM

## 2023-02-14 PROCEDURE — 3288F FALL RISK ASSESSMENT DOCD: CPT | Mod: HCNC,CPTII,S$GLB, | Performed by: STUDENT IN AN ORGANIZED HEALTH CARE EDUCATION/TRAINING PROGRAM

## 2023-02-14 PROCEDURE — 1159F MED LIST DOCD IN RCRD: CPT | Mod: HCNC,CPTII,S$GLB, | Performed by: STUDENT IN AN ORGANIZED HEALTH CARE EDUCATION/TRAINING PROGRAM

## 2023-02-14 PROCEDURE — 99499 NO LOS: ICD-10-PCS | Mod: HCNC,S$GLB,, | Performed by: STUDENT IN AN ORGANIZED HEALTH CARE EDUCATION/TRAINING PROGRAM

## 2023-02-14 PROCEDURE — 99999 PR PBB SHADOW E&M-EST. PATIENT-LVL III: ICD-10-PCS | Mod: PBBFAC,HCNC,, | Performed by: STUDENT IN AN ORGANIZED HEALTH CARE EDUCATION/TRAINING PROGRAM

## 2023-02-14 NOTE — PROCEDURES
Large Joint Aspiration/Injection: R knee    Date/Time: 2/14/2023 10:20 AM  Performed by: Elias Barber MD  Authorized by: Elias Barber MD     Consent Done?:  Yes (Verbal)  Indications:  Arthritis and pain  Site marked: the procedure site was marked    Timeout: prior to procedure the correct patient, procedure, and site was verified    Prep: patient was prepped and draped in usual sterile fashion      Local anesthesia used?: Yes    Local anesthetic:  Topical anesthetic    Details:  Needle Size:  22 G  Ultrasonic Guidance for needle placement?: Yes    Images are saved and documented.  Approach: Superior lateral.  Location:  Knee  Site:  R knee  Medications:  30 mg sodium hyaluronate (orthovisc) 30 mg/2 mL  Patient tolerance:  Patient tolerated the procedure well with no immediate complications     Ultrasound guidance was used for needle localization. Images were saved and stored for documentation. The appropriate structures were visualized. Dynamic visualization of the needle was continuous throughout the procedures and maintained good position.     We discussed the proper protocols after the injection such as no submerging pools, baths tubs, or hot tubs for 24 hr.  We discussed red flags such as fevers, chills, red, warm, tender joint at the area of injection to please seek medical care immediately.      MEDICAL NECESSITY FOR VISCOSUPPLEMETNATION: After thorough evaluation of the patient, I have determined that visco-supplementation is medically necessary. The patient has painful degenerative changes of the knee with failure of conservative treatments including lifestyle modifications and rehabilitation exercises.  Oral analgesis/NSAIDs have not adequately controlled symptoms and there is radiographic evidence of Kellgren Raymundo grade 2 or greater osteoarthritic changes, or in lack of radiographic evidence, there is arthroscopic or other evidence of chondrosis.     Orthovisc:  Right knee 2/3

## 2023-02-15 ENCOUNTER — OFFICE VISIT (OUTPATIENT)
Dept: INTERNAL MEDICINE | Facility: CLINIC | Age: 78
End: 2023-02-15
Payer: MEDICARE

## 2023-02-15 VITALS
SYSTOLIC BLOOD PRESSURE: 136 MMHG | HEIGHT: 63 IN | TEMPERATURE: 97 F | OXYGEN SATURATION: 95 % | WEIGHT: 168.63 LBS | BODY MASS INDEX: 29.88 KG/M2 | HEART RATE: 60 BPM | DIASTOLIC BLOOD PRESSURE: 68 MMHG

## 2023-02-15 DIAGNOSIS — I70.0 ABDOMINAL AORTIC ATHEROSCLEROSIS: ICD-10-CM

## 2023-02-15 DIAGNOSIS — N18.31 STAGE 3A CHRONIC KIDNEY DISEASE: ICD-10-CM

## 2023-02-15 DIAGNOSIS — R06.09 DYSPNEA ON EXERTION: ICD-10-CM

## 2023-02-15 DIAGNOSIS — I10 ESSENTIAL HYPERTENSION: ICD-10-CM

## 2023-02-15 DIAGNOSIS — D69.2 SENILE PURPURA: ICD-10-CM

## 2023-02-15 DIAGNOSIS — Z63.8 CAREGIVER ROLE STRAIN: ICD-10-CM

## 2023-02-15 DIAGNOSIS — H40.9 GLAUCOMA OF BOTH EYES, UNSPECIFIED GLAUCOMA TYPE: ICD-10-CM

## 2023-02-15 DIAGNOSIS — F41.9 ANXIETY: ICD-10-CM

## 2023-02-15 DIAGNOSIS — E66.3 OVERWEIGHT WITH BODY MASS INDEX (BMI) 25.0-29.9: ICD-10-CM

## 2023-02-15 DIAGNOSIS — K21.9 GASTROESOPHAGEAL REFLUX DISEASE WITHOUT ESOPHAGITIS: ICD-10-CM

## 2023-02-15 DIAGNOSIS — E78.2 MIXED HYPERLIPIDEMIA: ICD-10-CM

## 2023-02-15 DIAGNOSIS — Z00.00 ENCOUNTER FOR PREVENTIVE HEALTH EXAMINATION: Primary | ICD-10-CM

## 2023-02-15 DIAGNOSIS — M85.89 OSTEOPENIA OF MULTIPLE SITES: ICD-10-CM

## 2023-02-15 PROCEDURE — 1126F AMNT PAIN NOTED NONE PRSNT: CPT | Mod: HCNC,CPTII,S$GLB, | Performed by: NURSE PRACTITIONER

## 2023-02-15 PROCEDURE — 99999 PR PBB SHADOW E&M-EST. PATIENT-LVL V: ICD-10-PCS | Mod: PBBFAC,HCNC,, | Performed by: NURSE PRACTITIONER

## 2023-02-15 PROCEDURE — 99999 PR PBB SHADOW E&M-EST. PATIENT-LVL V: CPT | Mod: PBBFAC,HCNC,, | Performed by: NURSE PRACTITIONER

## 2023-02-15 PROCEDURE — 3075F PR MOST RECENT SYSTOLIC BLOOD PRESS GE 130-139MM HG: ICD-10-PCS | Mod: HCNC,CPTII,S$GLB, | Performed by: NURSE PRACTITIONER

## 2023-02-15 PROCEDURE — 1101F PR PT FALLS ASSESS DOC 0-1 FALLS W/OUT INJ PAST YR: ICD-10-PCS | Mod: HCNC,CPTII,S$GLB, | Performed by: NURSE PRACTITIONER

## 2023-02-15 PROCEDURE — 3288F FALL RISK ASSESSMENT DOCD: CPT | Mod: HCNC,CPTII,S$GLB, | Performed by: NURSE PRACTITIONER

## 2023-02-15 PROCEDURE — 1126F PR PAIN SEVERITY QUANTIFIED, NO PAIN PRESENT: ICD-10-PCS | Mod: HCNC,CPTII,S$GLB, | Performed by: NURSE PRACTITIONER

## 2023-02-15 PROCEDURE — 1160F PR REVIEW ALL MEDS BY PRESCRIBER/CLIN PHARMACIST DOCUMENTED: ICD-10-PCS | Mod: HCNC,CPTII,S$GLB, | Performed by: NURSE PRACTITIONER

## 2023-02-15 PROCEDURE — 3075F SYST BP GE 130 - 139MM HG: CPT | Mod: HCNC,CPTII,S$GLB, | Performed by: NURSE PRACTITIONER

## 2023-02-15 PROCEDURE — 3288F PR FALLS RISK ASSESSMENT DOCUMENTED: ICD-10-PCS | Mod: HCNC,CPTII,S$GLB, | Performed by: NURSE PRACTITIONER

## 2023-02-15 PROCEDURE — 1170F FXNL STATUS ASSESSED: CPT | Mod: HCNC,CPTII,S$GLB, | Performed by: NURSE PRACTITIONER

## 2023-02-15 PROCEDURE — 3078F PR MOST RECENT DIASTOLIC BLOOD PRESSURE < 80 MM HG: ICD-10-PCS | Mod: HCNC,CPTII,S$GLB, | Performed by: NURSE PRACTITIONER

## 2023-02-15 PROCEDURE — G0439 PPPS, SUBSEQ VISIT: HCPCS | Mod: HCNC,S$GLB,, | Performed by: NURSE PRACTITIONER

## 2023-02-15 PROCEDURE — G9919 SCRN ND POS ND PROV OF REC: HCPCS | Mod: HCNC,CPTII,S$GLB, | Performed by: NURSE PRACTITIONER

## 2023-02-15 PROCEDURE — 1160F RVW MEDS BY RX/DR IN RCRD: CPT | Mod: HCNC,CPTII,S$GLB, | Performed by: NURSE PRACTITIONER

## 2023-02-15 PROCEDURE — G0439 PR MEDICARE ANNUAL WELLNESS SUBSEQUENT VISIT: ICD-10-PCS | Mod: HCNC,S$GLB,, | Performed by: NURSE PRACTITIONER

## 2023-02-15 PROCEDURE — G9919 PR SCREENING AND POSITIVE: ICD-10-PCS | Mod: HCNC,CPTII,S$GLB, | Performed by: NURSE PRACTITIONER

## 2023-02-15 PROCEDURE — 3078F DIAST BP <80 MM HG: CPT | Mod: HCNC,CPTII,S$GLB, | Performed by: NURSE PRACTITIONER

## 2023-02-15 PROCEDURE — 1101F PT FALLS ASSESS-DOCD LE1/YR: CPT | Mod: HCNC,CPTII,S$GLB, | Performed by: NURSE PRACTITIONER

## 2023-02-15 PROCEDURE — 1159F PR MEDICATION LIST DOCUMENTED IN MEDICAL RECORD: ICD-10-PCS | Mod: HCNC,CPTII,S$GLB, | Performed by: NURSE PRACTITIONER

## 2023-02-15 PROCEDURE — 1159F MED LIST DOCD IN RCRD: CPT | Mod: HCNC,CPTII,S$GLB, | Performed by: NURSE PRACTITIONER

## 2023-02-15 PROCEDURE — 1170F PR FUNCTIONAL STATUS ASSESSED: ICD-10-PCS | Mod: HCNC,CPTII,S$GLB, | Performed by: NURSE PRACTITIONER

## 2023-02-15 SDOH — SOCIAL DETERMINANTS OF HEALTH (SDOH): OTHER SPECIFIED PROBLEMS RELATED TO PRIMARY SUPPORT GROUP: Z63.8

## 2023-02-15 NOTE — PATIENT INSTRUCTIONS
Counseling and Referral of Other Preventative  (Italic type indicates deductible and co-insurance are waived)    Patient Name: Amber Naranjo  Today's Date: 2/15/2023    Health Maintenance       Date Due Completion Date    COVID-19 Vaccine (4 - Booster for Moderna series) 12/28/2021 11/2/2021    DEXA Scan 06/15/2023 6/15/2020    Lipid Panel 09/27/2023 9/27/2022    Mammogram 11/23/2023 11/23/2022    Override on 9/15/2015: Done (PER CARE EVERYWHERE)    Colonoscopy 03/15/2027 3/15/2017    TETANUS VACCINE 05/23/2027 5/23/2017    Override on 5/23/2017: Done        No orders of the defined types were placed in this encounter.    The following information is provided to all patients.  This information is to help you find resources for any of the problems found today that may be affecting your health:                Living healthy guide: www.[facility].[place].[facility]      Understanding Diabetes: www.diabetes.org      Eating healthy: www.cdc.gov/healthyweight      CDC home safety checklist: www.cdc.gov/steadi/patient.html      Agency on Aging: www.goea.[place].[facility]      Alcoholics anonymous (AA): www.aa.org      Physical Activity: www.kenny.nih.gov/lc7muri      Tobacco use: www.quitwithusla.org

## 2023-02-15 NOTE — PROGRESS NOTES
"  Amber Naranjo presented for a  Medicare AWV and comprehensive Health Risk Assessment today. The following components were reviewed and updated:    Medical history  Family History  Social history  Allergies and Current Medications  Health Risk Assessment  Health Maintenance  Care Team     Patient screened moderate and/or high risk for one or more social determinants of health (SDOH). Patient connected to community resources through the ED Navigator.      ** See Completed Assessments for Annual Wellness Visit within the encounter summary.**         The following assessments were completed:  Living Situation  CAGE  Depression Screening  Timed Get Up and Go  Whisper Test  Cognitive Function Screening  Nutrition Screening  ADL Screening  PAQ Screening          Vitals:    02/15/23 0942 02/15/23 1014   BP: (!) 140/64 136/68   BP Location: Left arm Left arm   Patient Position: Sitting Sitting   BP Method: Medium (Manual) Medium (Manual)   Pulse: 60    Temp: 97.3 °F (36.3 °C)    SpO2: 95%    Weight: 76.5 kg (168 lb 10.4 oz)    Height: 5' 3" (1.6 m)      Body mass index is 29.88 kg/m².  Physical Exam  Constitutional:       General: She is not in acute distress.     Appearance: Normal appearance. She is not ill-appearing.   HENT:      Head: Normocephalic.   Eyes:      Conjunctiva/sclera: Conjunctivae normal.   Cardiovascular:      Rate and Rhythm: Normal rate and regular rhythm.      Heart sounds: Normal heart sounds. No murmur heard.  Pulmonary:      Effort: Pulmonary effort is normal. No respiratory distress.      Breath sounds: Normal breath sounds. No wheezing, rhonchi or rales.   Neurological:      Mental Status: She is alert and oriented to person, place, and time.      Gait: Gait normal.   Psychiatric:         Mood and Affect: Mood normal.         Behavior: Behavior normal.               Diagnoses and health risks identified today and associated recommendations/orders:    1. Encounter for preventive health examination  No " concerning findings on exam. Discussed past due health maintenance including COVID booster vaccines which will be completed at pharmacy.     2. Essential hypertension  Stable. Continue current medications as prescribed. Followed by PCP and cardiology      3. Abdominal aortic atherosclerosis  Stable. Asymptomatic. Continue ASA and statin. Followed by PCP and cardiology.    4. Stage 3a chronic kidney disease  Stable. Last GFR 58. Followed by PCP     5. Mixed hyperlipidemia  Stable. Lipid panel UTD. Last LDL 72. Continue current medications as prescribed. Followed by PCP.    6. Gastroesophageal reflux disease without esophagitis  Stable. Continue current medications as prescribed. Followed by PCP    7. Senile purpura  Stable. Denies overt bleeding. Reviewed recent CBC. Followed by PCP    8. Osteopenia of multiple sites  Last Dexa scan 6/2020. Next due in 6/2023. Continue calcium and vitamin D supplement. Followed by PCP    9. Glaucoma of both eyes, unspecified glaucoma type  Stable. Followed by ophthalmology. Eye exam scheduled for tomorrow. Continue timolol.    10. Anxiety  Stable. Continue current medications as prescribed. On Zoloft. Followed by PCP.    11. Caregiver role strain  Stable. Caring for  on hospice. Considering nursing home placement to ease caregiver strain.     12. Dyspnea on exertion  Improved. Echo 9/2021. Followed by cardiology, Dr. Cabezas    13. Overweight with body mass index (BMI) 25.0-29.9  Counseled on importance of diet and exercise. Encouraged patient to have an active lifestyle with regular exercise with healthy eating.       Provided Amber with a 5-10 year written screening schedule and personal prevention plan. Recommendations were developed using the USPSTF age appropriate recommendations. Education, counseling, and referrals were provided as needed. After Visit Summary printed and given to patient which includes a list of additional screenings\tests needed.    No follow-ups on  file.    Mikayla Olivares, FNP-C       I offered to discuss advanced care planning, including how to pick a person who would make decisions for you if you were unable to make them for yourself, called a health care power of , and what kind of decisions you might make such as use of life sustaining treatments such as ventilators and tube feeding when faced with a life limiting illness recorded on a living will that they will need to know. (How you want to be cared for as you near the end of your natural life)     X Patient is interested in learning more about how to make advanced directives.  I provided them paperwork and offered to discuss this with them.

## 2023-02-21 ENCOUNTER — OFFICE VISIT (OUTPATIENT)
Dept: ORTHOPEDICS | Facility: CLINIC | Age: 78
End: 2023-02-21
Payer: MEDICARE

## 2023-02-21 VITALS — RESPIRATION RATE: 20 BRPM | BODY MASS INDEX: 29.88 KG/M2 | WEIGHT: 168.63 LBS | HEIGHT: 63 IN

## 2023-02-21 DIAGNOSIS — M17.11 PRIMARY OSTEOARTHRITIS OF RIGHT KNEE: Primary | ICD-10-CM

## 2023-02-21 PROCEDURE — 1126F AMNT PAIN NOTED NONE PRSNT: CPT | Mod: HCNC,CPTII,S$GLB, | Performed by: STUDENT IN AN ORGANIZED HEALTH CARE EDUCATION/TRAINING PROGRAM

## 2023-02-21 PROCEDURE — 99999 PR PBB SHADOW E&M-EST. PATIENT-LVL III: CPT | Mod: PBBFAC,HCNC,, | Performed by: STUDENT IN AN ORGANIZED HEALTH CARE EDUCATION/TRAINING PROGRAM

## 2023-02-21 PROCEDURE — 1159F MED LIST DOCD IN RCRD: CPT | Mod: HCNC,CPTII,S$GLB, | Performed by: STUDENT IN AN ORGANIZED HEALTH CARE EDUCATION/TRAINING PROGRAM

## 2023-02-21 PROCEDURE — 99499 UNLISTED E&M SERVICE: CPT | Mod: HCNC,S$GLB,, | Performed by: STUDENT IN AN ORGANIZED HEALTH CARE EDUCATION/TRAINING PROGRAM

## 2023-02-21 PROCEDURE — 3288F PR FALLS RISK ASSESSMENT DOCUMENTED: ICD-10-PCS | Mod: HCNC,CPTII,S$GLB, | Performed by: STUDENT IN AN ORGANIZED HEALTH CARE EDUCATION/TRAINING PROGRAM

## 2023-02-21 PROCEDURE — 1101F PR PT FALLS ASSESS DOC 0-1 FALLS W/OUT INJ PAST YR: ICD-10-PCS | Mod: HCNC,CPTII,S$GLB, | Performed by: STUDENT IN AN ORGANIZED HEALTH CARE EDUCATION/TRAINING PROGRAM

## 2023-02-21 PROCEDURE — 1126F PR PAIN SEVERITY QUANTIFIED, NO PAIN PRESENT: ICD-10-PCS | Mod: HCNC,CPTII,S$GLB, | Performed by: STUDENT IN AN ORGANIZED HEALTH CARE EDUCATION/TRAINING PROGRAM

## 2023-02-21 PROCEDURE — 1159F PR MEDICATION LIST DOCUMENTED IN MEDICAL RECORD: ICD-10-PCS | Mod: HCNC,CPTII,S$GLB, | Performed by: STUDENT IN AN ORGANIZED HEALTH CARE EDUCATION/TRAINING PROGRAM

## 2023-02-21 PROCEDURE — 3288F FALL RISK ASSESSMENT DOCD: CPT | Mod: HCNC,CPTII,S$GLB, | Performed by: STUDENT IN AN ORGANIZED HEALTH CARE EDUCATION/TRAINING PROGRAM

## 2023-02-21 PROCEDURE — 99499 NO LOS: ICD-10-PCS | Mod: HCNC,S$GLB,, | Performed by: STUDENT IN AN ORGANIZED HEALTH CARE EDUCATION/TRAINING PROGRAM

## 2023-02-21 PROCEDURE — 20611 DRAIN/INJ JOINT/BURSA W/US: CPT | Mod: HCNC,RT,S$GLB, | Performed by: STUDENT IN AN ORGANIZED HEALTH CARE EDUCATION/TRAINING PROGRAM

## 2023-02-21 PROCEDURE — 1101F PT FALLS ASSESS-DOCD LE1/YR: CPT | Mod: HCNC,CPTII,S$GLB, | Performed by: STUDENT IN AN ORGANIZED HEALTH CARE EDUCATION/TRAINING PROGRAM

## 2023-02-21 PROCEDURE — 99999 PR PBB SHADOW E&M-EST. PATIENT-LVL III: ICD-10-PCS | Mod: PBBFAC,HCNC,, | Performed by: STUDENT IN AN ORGANIZED HEALTH CARE EDUCATION/TRAINING PROGRAM

## 2023-02-21 PROCEDURE — 20611 LARGE JOINT ASPIRATION/INJECTION: R KNEE: ICD-10-PCS | Mod: HCNC,RT,S$GLB, | Performed by: STUDENT IN AN ORGANIZED HEALTH CARE EDUCATION/TRAINING PROGRAM

## 2023-02-21 NOTE — PROCEDURES
Large Joint Aspiration/Injection: R knee    Date/Time: 2/21/2023 10:20 AM  Performed by: Elias Barber MD  Authorized by: Elias Barber MD     Consent Done?:  Yes (Verbal)  Indications:  Arthritis and pain  Site marked: the procedure site was marked    Timeout: prior to procedure the correct patient, procedure, and site was verified    Prep: patient was prepped and draped in usual sterile fashion      Local anesthesia used?: Yes    Local anesthetic:  Topical anesthetic    Details:  Needle Size:  22 G  Ultrasonic Guidance for needle placement?: Yes    Images are saved and documented.  Approach: Superior lateral.  Location:  Knee  Site:  R knee  Medications:  30 mg sodium hyaluronate (orthovisc) 30 mg/2 mL  Patient tolerance:  Patient tolerated the procedure well with no immediate complications     Ultrasound guidance was used for needle localization. Images were saved and stored for documentation. The appropriate structures were visualized. Dynamic visualization of the needle was continuous throughout the procedures and maintained good position.     We discussed the proper protocols after the injection such as no submerging pools, baths tubs, or hot tubs for 24 hr.  We discussed red flags such as fevers, chills, red, warm, tender joint at the area of injection to please seek medical care immediately.      MEDICAL NECESSITY FOR VISCOSUPPLEMETNATION: After thorough evaluation of the patient, I have determined that visco-supplementation is medically necessary. The patient has painful degenerative changes of the knee with failure of conservative treatments including lifestyle modifications and rehabilitation exercises.  Oral analgesis/NSAIDs have not adequately controlled symptoms and there is radiographic evidence of Kellgren Raymundo grade 2 or greater osteoarthritic changes, or in lack of radiographic evidence, there is arthroscopic or other evidence of chondrosis.     Orthovisc: R knee 3/3

## 2023-02-27 ENCOUNTER — TELEPHONE (OUTPATIENT)
Dept: INTERNAL MEDICINE | Facility: CLINIC | Age: 78
End: 2023-02-27
Payer: MEDICARE

## 2023-02-27 DIAGNOSIS — K21.9 GASTROESOPHAGEAL REFLUX DISEASE WITHOUT ESOPHAGITIS: Primary | ICD-10-CM

## 2023-02-27 RX ORDER — OMEPRAZOLE 40 MG/1
40 CAPSULE, DELAYED RELEASE ORAL DAILY
Qty: 90 CAPSULE | Refills: 1 | Status: SHIPPED | OUTPATIENT
Start: 2023-02-27 | End: 2023-08-16

## 2023-02-27 NOTE — TELEPHONE ENCOUNTER
----- Message from Rosario Dumont LPN sent at 2/27/2023 11:42 AM CST -----  Regarding: FW: RX Refill  Contact: Aleyda    ----- Message -----  From: Karrie Penaloza  Sent: 2/27/2023  11:39 AM CST  To: [facility]  Subject: RX Refill                                        .Type:  RX Refill Request    Who Called: aleyda   Refill or New Rx:refill   RX Name and Strength:omeprazole (PRILOSEC) 40 MG capsule  How is the patient currently taking it? (ex. 1XDay):TAKE 1 CAPSULE EVERY DAY  Is this a 30 day or 90 day RX:30    Preferred Pharmacy with phone number:   [facility] - [id number] [id number]  Phone: [phone] Fax: [phone]         Local or Mail Order: mail  Ordering Provider: [facility]  Would the patient rather a call back or a response via [facility]? call  Best Call Back Number: [phone] (home)    Additional Information: aleyda is requesting a refill and the pharmacy has tried

## 2023-03-27 ENCOUNTER — LAB VISIT (OUTPATIENT)
Dept: LAB | Facility: HOSPITAL | Age: 78
End: 2023-03-27
Attending: STUDENT IN AN ORGANIZED HEALTH CARE EDUCATION/TRAINING PROGRAM
Payer: MEDICARE

## 2023-03-27 ENCOUNTER — OFFICE VISIT (OUTPATIENT)
Dept: INTERNAL MEDICINE | Facility: CLINIC | Age: 78
End: 2023-03-27
Payer: MEDICARE

## 2023-03-27 ENCOUNTER — TELEPHONE (OUTPATIENT)
Dept: INTERNAL MEDICINE | Facility: CLINIC | Age: 78
End: 2023-03-27

## 2023-03-27 VITALS
HEIGHT: 63 IN | SYSTOLIC BLOOD PRESSURE: 150 MMHG | OXYGEN SATURATION: 90 % | WEIGHT: 169.75 LBS | TEMPERATURE: 98 F | DIASTOLIC BLOOD PRESSURE: 78 MMHG | HEART RATE: 60 BPM | BODY MASS INDEX: 30.08 KG/M2

## 2023-03-27 DIAGNOSIS — M85.89 OSTEOPENIA OF MULTIPLE SITES: ICD-10-CM

## 2023-03-27 DIAGNOSIS — I10 ESSENTIAL HYPERTENSION: ICD-10-CM

## 2023-03-27 DIAGNOSIS — F41.9 ANXIETY: ICD-10-CM

## 2023-03-27 DIAGNOSIS — K21.9 GASTROESOPHAGEAL REFLUX DISEASE WITHOUT ESOPHAGITIS: ICD-10-CM

## 2023-03-27 DIAGNOSIS — J30.1 SEASONAL ALLERGIC RHINITIS DUE TO POLLEN: ICD-10-CM

## 2023-03-27 DIAGNOSIS — I70.0 ABDOMINAL AORTIC ATHEROSCLEROSIS: ICD-10-CM

## 2023-03-27 DIAGNOSIS — E78.2 MIXED HYPERLIPIDEMIA: ICD-10-CM

## 2023-03-27 DIAGNOSIS — M17.11 PRIMARY OSTEOARTHRITIS OF RIGHT KNEE: ICD-10-CM

## 2023-03-27 DIAGNOSIS — N18.31 STAGE 3A CHRONIC KIDNEY DISEASE: Primary | ICD-10-CM

## 2023-03-27 DIAGNOSIS — N18.31 STAGE 3A CHRONIC KIDNEY DISEASE: ICD-10-CM

## 2023-03-27 LAB
ALBUMIN SERPL BCP-MCNC: 3.7 G/DL (ref 3.5–5.2)
ALP SERPL-CCNC: 107 U/L (ref 55–135)
ALT SERPL W/O P-5'-P-CCNC: 26 U/L (ref 10–44)
ANION GAP SERPL CALC-SCNC: 9 MMOL/L (ref 8–16)
AST SERPL-CCNC: 29 U/L (ref 10–40)
BASOPHILS # BLD AUTO: 0.04 K/UL (ref 0–0.2)
BASOPHILS NFR BLD: 0.5 % (ref 0–1.9)
BILIRUB SERPL-MCNC: 0.7 MG/DL (ref 0.1–1)
BUN SERPL-MCNC: 20 MG/DL (ref 8–23)
CALCIUM SERPL-MCNC: 9.2 MG/DL (ref 8.7–10.5)
CHLORIDE SERPL-SCNC: 102 MMOL/L (ref 95–110)
CO2 SERPL-SCNC: 26 MMOL/L (ref 23–29)
CREAT SERPL-MCNC: 1 MG/DL (ref 0.5–1.4)
DIFFERENTIAL METHOD: NORMAL
EOSINOPHIL # BLD AUTO: 0.3 K/UL (ref 0–0.5)
EOSINOPHIL NFR BLD: 3.8 % (ref 0–8)
ERYTHROCYTE [DISTWIDTH] IN BLOOD BY AUTOMATED COUNT: 13.6 % (ref 11.5–14.5)
EST. GFR  (NO RACE VARIABLE): 58 ML/MIN/1.73 M^2
GLUCOSE SERPL-MCNC: 97 MG/DL (ref 70–110)
HCT VFR BLD AUTO: 38.4 % (ref 37–48.5)
HGB BLD-MCNC: 12.4 G/DL (ref 12–16)
IMM GRANULOCYTES # BLD AUTO: 0.03 K/UL (ref 0–0.04)
IMM GRANULOCYTES NFR BLD AUTO: 0.4 % (ref 0–0.5)
LYMPHOCYTES # BLD AUTO: 1.6 K/UL (ref 1–4.8)
LYMPHOCYTES NFR BLD: 20.2 % (ref 18–48)
MCH RBC QN AUTO: 30.9 PG (ref 27–31)
MCHC RBC AUTO-ENTMCNC: 32.3 G/DL (ref 32–36)
MCV RBC AUTO: 96 FL (ref 82–98)
MONOCYTES # BLD AUTO: 0.6 K/UL (ref 0.3–1)
MONOCYTES NFR BLD: 7.4 % (ref 4–15)
NEUTROPHILS # BLD AUTO: 5.4 K/UL (ref 1.8–7.7)
NEUTROPHILS NFR BLD: 67.7 % (ref 38–73)
NRBC BLD-RTO: 0 /100 WBC
PLATELET # BLD AUTO: 182 K/UL (ref 150–450)
PMV BLD AUTO: 10.9 FL (ref 9.2–12.9)
POTASSIUM SERPL-SCNC: 4.3 MMOL/L (ref 3.5–5.1)
PROT SERPL-MCNC: 7 G/DL (ref 6–8.4)
RBC # BLD AUTO: 4.01 M/UL (ref 4–5.4)
SODIUM SERPL-SCNC: 137 MMOL/L (ref 136–145)
WBC # BLD AUTO: 7.94 K/UL (ref 3.9–12.7)

## 2023-03-27 PROCEDURE — 96372 PR INJECTION,THERAP/PROPH/DIAG2ST, IM OR SUBCUT: ICD-10-PCS | Mod: HCNC,S$GLB,, | Performed by: STUDENT IN AN ORGANIZED HEALTH CARE EDUCATION/TRAINING PROGRAM

## 2023-03-27 PROCEDURE — 99999 PR PBB SHADOW E&M-EST. PATIENT-LVL IV: ICD-10-PCS | Mod: PBBFAC,HCNC,, | Performed by: STUDENT IN AN ORGANIZED HEALTH CARE EDUCATION/TRAINING PROGRAM

## 2023-03-27 PROCEDURE — 1159F PR MEDICATION LIST DOCUMENTED IN MEDICAL RECORD: ICD-10-PCS | Mod: HCNC,CPTII,S$GLB, | Performed by: STUDENT IN AN ORGANIZED HEALTH CARE EDUCATION/TRAINING PROGRAM

## 2023-03-27 PROCEDURE — 99999 PR PBB SHADOW E&M-EST. PATIENT-LVL IV: CPT | Mod: PBBFAC,HCNC,, | Performed by: STUDENT IN AN ORGANIZED HEALTH CARE EDUCATION/TRAINING PROGRAM

## 2023-03-27 PROCEDURE — 3288F FALL RISK ASSESSMENT DOCD: CPT | Mod: HCNC,CPTII,S$GLB, | Performed by: STUDENT IN AN ORGANIZED HEALTH CARE EDUCATION/TRAINING PROGRAM

## 2023-03-27 PROCEDURE — 3078F PR MOST RECENT DIASTOLIC BLOOD PRESSURE < 80 MM HG: ICD-10-PCS | Mod: HCNC,CPTII,S$GLB, | Performed by: STUDENT IN AN ORGANIZED HEALTH CARE EDUCATION/TRAINING PROGRAM

## 2023-03-27 PROCEDURE — 99214 OFFICE O/P EST MOD 30 MIN: CPT | Mod: HCNC,25,S$GLB, | Performed by: STUDENT IN AN ORGANIZED HEALTH CARE EDUCATION/TRAINING PROGRAM

## 2023-03-27 PROCEDURE — 3077F SYST BP >= 140 MM HG: CPT | Mod: HCNC,CPTII,S$GLB, | Performed by: STUDENT IN AN ORGANIZED HEALTH CARE EDUCATION/TRAINING PROGRAM

## 2023-03-27 PROCEDURE — 3077F PR MOST RECENT SYSTOLIC BLOOD PRESSURE >= 140 MM HG: ICD-10-PCS | Mod: HCNC,CPTII,S$GLB, | Performed by: STUDENT IN AN ORGANIZED HEALTH CARE EDUCATION/TRAINING PROGRAM

## 2023-03-27 PROCEDURE — 1101F PT FALLS ASSESS-DOCD LE1/YR: CPT | Mod: HCNC,CPTII,S$GLB, | Performed by: STUDENT IN AN ORGANIZED HEALTH CARE EDUCATION/TRAINING PROGRAM

## 2023-03-27 PROCEDURE — 1125F AMNT PAIN NOTED PAIN PRSNT: CPT | Mod: HCNC,CPTII,S$GLB, | Performed by: STUDENT IN AN ORGANIZED HEALTH CARE EDUCATION/TRAINING PROGRAM

## 2023-03-27 PROCEDURE — 1101F PR PT FALLS ASSESS DOC 0-1 FALLS W/OUT INJ PAST YR: ICD-10-PCS | Mod: HCNC,CPTII,S$GLB, | Performed by: STUDENT IN AN ORGANIZED HEALTH CARE EDUCATION/TRAINING PROGRAM

## 2023-03-27 PROCEDURE — 1125F PR PAIN SEVERITY QUANTIFIED, PAIN PRESENT: ICD-10-PCS | Mod: HCNC,CPTII,S$GLB, | Performed by: STUDENT IN AN ORGANIZED HEALTH CARE EDUCATION/TRAINING PROGRAM

## 2023-03-27 PROCEDURE — 1159F MED LIST DOCD IN RCRD: CPT | Mod: HCNC,CPTII,S$GLB, | Performed by: STUDENT IN AN ORGANIZED HEALTH CARE EDUCATION/TRAINING PROGRAM

## 2023-03-27 PROCEDURE — 99214 PR OFFICE/OUTPT VISIT, EST, LEVL IV, 30-39 MIN: ICD-10-PCS | Mod: HCNC,25,S$GLB, | Performed by: STUDENT IN AN ORGANIZED HEALTH CARE EDUCATION/TRAINING PROGRAM

## 2023-03-27 PROCEDURE — 3288F PR FALLS RISK ASSESSMENT DOCUMENTED: ICD-10-PCS | Mod: HCNC,CPTII,S$GLB, | Performed by: STUDENT IN AN ORGANIZED HEALTH CARE EDUCATION/TRAINING PROGRAM

## 2023-03-27 PROCEDURE — 96372 THER/PROPH/DIAG INJ SC/IM: CPT | Mod: HCNC,S$GLB,, | Performed by: STUDENT IN AN ORGANIZED HEALTH CARE EDUCATION/TRAINING PROGRAM

## 2023-03-27 PROCEDURE — 3078F DIAST BP <80 MM HG: CPT | Mod: HCNC,CPTII,S$GLB, | Performed by: STUDENT IN AN ORGANIZED HEALTH CARE EDUCATION/TRAINING PROGRAM

## 2023-03-27 PROCEDURE — 85025 COMPLETE CBC W/AUTO DIFF WBC: CPT | Mod: HCNC,PO | Performed by: STUDENT IN AN ORGANIZED HEALTH CARE EDUCATION/TRAINING PROGRAM

## 2023-03-27 PROCEDURE — 80053 COMPREHEN METABOLIC PANEL: CPT | Mod: HCNC,PO | Performed by: STUDENT IN AN ORGANIZED HEALTH CARE EDUCATION/TRAINING PROGRAM

## 2023-03-27 PROCEDURE — 36415 COLL VENOUS BLD VENIPUNCTURE: CPT | Mod: HCNC,PO | Performed by: STUDENT IN AN ORGANIZED HEALTH CARE EDUCATION/TRAINING PROGRAM

## 2023-03-27 RX ORDER — NAPROXEN 500 MG/1
500 TABLET ORAL 2 TIMES DAILY WITH MEALS
Qty: 14 TABLET | Refills: 0 | Status: SHIPPED | OUTPATIENT
Start: 2023-03-27 | End: 2023-04-03

## 2023-03-27 RX ORDER — KETOROLAC TROMETHAMINE 30 MG/ML
30 INJECTION, SOLUTION INTRAMUSCULAR; INTRAVENOUS
Status: COMPLETED | OUTPATIENT
Start: 2023-03-27 | End: 2023-03-27

## 2023-03-27 RX ADMIN — KETOROLAC TROMETHAMINE 30 MG: 30 INJECTION, SOLUTION INTRAMUSCULAR; INTRAVENOUS at 08:03

## 2023-03-27 NOTE — PROGRESS NOTES
HPI: Amber Naranjo is a 77 y.o. female  with Pmhx listed below who presents to clinic for follow up on her chronic condition. She has been following orthopedics for primary osteoarthritis of her right knee. She recently had ortho-viscous injection done on 02/174/2023 with slight improvement. She was sent to PT which she has not heard any thing. Will resend her to one today again.Reviewed x ray of from 12/15/2022 which revealed mild OA changes. Initially she was treated with Voltaren gel which made her funny and stopped using. She has been taking OTC tylenol as needed for the same reason with some relief. Today she reports that her pain is 9/10 on intensity while at rest and 10/10 in intensity when she moves around.  Also with regards to her HTN, her blood pressure is elevated to 160/72. He attributes it to stress and in pain . She reports that her  is in nursing home 2/2 alzheimer's disease and reports that they are not treating him well due to which she is in lot of stress. She follows dr. Cabezas as well and reports that her last visit was 1 month back and was told that everything is normal.    Problem List:  Patient Active Problem List   Diagnosis    Essential hypertension    Diverticulosis of colon    Tortuous colon    Uterine fibroid    Hyperlipidemia    Glaucoma    GERD (gastroesophageal reflux disease)    Arthritis    Seasonal allergic rhinitis due to pollen    Overweight (BMI 25.0-29.9)    Osteopenia of multiple sites    Postmenopausal    Primary osteoarthritis of right knee    Benign paroxysmal positional vertigo of left ear    Spondylolisthesis at L5-S1 level    Abdominal aortic atherosclerosis    Dyspnea on exertion    Stage 3a chronic kidney disease    Caregiver role strain    Anxiety    Senile purpura     ROS: Negative except as noted above.     Current Meds:  Current Outpatient Medications   Medication Sig Dispense Refill    aspirin (ECOTRIN) 81 MG EC tablet Take 81 mg by mouth once daily.       atorvastatin (LIPITOR) 20 MG tablet TAKE 1 TABLET EVERY DAY 90 tablet 2    blood pressure monitor Kit       calcium carbonate (OS-NERI) 600 mg (1,500 mg) Tab Take 600 mg by mouth 2 (two) times daily with meals.      cetirizine (ZYRTEC) 10 MG tablet Take 10 mg by mouth once daily.      diclofenac sodium (VOLTAREN) 1 % Gel APPLY 2 GRAMS TOPICALLY TWO TIMES DAILY AS NEEDED. 100 g 0    lisinopriL (PRINIVIL,ZESTRIL) 20 MG tablet TAKE 1 TABLET EVERY DAY 90 tablet 3    metoprolol succinate (TOPROL-XL) 25 MG 24 hr tablet TAKE 1 TABLET EVERY DAY 90 tablet 3    multivitamin capsule Take 1 capsule by mouth once daily.      omeprazole (PRILOSEC) 40 MG capsule Take 1 capsule (40 mg total) by mouth once daily. 90 capsule 1    sertraline (ZOLOFT) 25 MG tablet TAKE 1 TABLET EVERY DAY 90 tablet 1    timolol maleate 0.5% (TIMOPTIC) 0.5 % Drop        No current facility-administered medications for this visit.      PE:                There is no height or weight on file to calculate BMI.    Wt Readings from Last 5 Encounters:   02/21/23 76.5 kg (168 lb 10.4 oz)   02/15/23 76.5 kg (168 lb 10.4 oz)   02/14/23 76.3 kg (168 lb 3.4 oz)   02/07/23 75.2 kg (165 lb 12.6 oz)   01/03/23 76.5 kg (168 lb 10.4 oz)     General appearance: alert and cooperative, not in acute distress  Head: normocephalic, without obvious abnormality, atraumatic  Eyes: conjunctivae/corneas clear. PERRL, EOM's intact.  Ears: clear tympanic membranes   Neck: no adenopathy, supple, symmetrical, trachea midline and thyroid not enlarged, symmetric, no tenderness/mass/nodules, no JVD  Throat: lips, mucosa, and tongue normal; teeth and gums normal; no thrush  Chest: no reproducible chest pain   Heart: regular rate and rhythm, S1, S2 normal, no murmur, click, rub or gallop  Lungs: unlabored respiration, bilateral equal air entry, normal vesicular breath sound heard, no wheezing, rhonchi   Abdomen: soft, non-tender, non-distended; bowel sounds +; no masses,  no organomegaly,  no ascites   Extremities: normal, atraumatic, no cyanosis or edema noted B/L upper and lower extremities.  Skin: skin color, texture, turgor normal. No rashes or lesions noted.  Neurologic: grossly intact        Lab:  Lab Results   Component Value Date    WBC 7.68 09/27/2022    HGB 11.8 (L) 09/27/2022    HCT 36.4 (L) 09/27/2022    MCV 94 09/27/2022     09/27/2022     09/27/2022    K 4.2 09/27/2022     09/27/2022    CO2 26 09/27/2022    BUN 18 09/27/2022     09/27/2022    CALCIUM 9.2 09/27/2022    AST 25 09/27/2022    ALT 20 09/27/2022    CHOL 132 09/27/2022    HDL 44 09/27/2022    LDLCALC 72.4 09/27/2022    TRIG 78 09/27/2022       Impression:  1. Stage 3a chronic kidney disease  -- counseled on increase water intake at least 3 litre of H20 to remain hydrated  - stay away from nephrotoxic drugs like Ibuprofen and NSAID  - Counseled on the need to control HTN and Blood glucose to prevent the disease progression  - low salt diet  - dietary modification: renal diet encouraged  - repeat Renal function test today  - reviewed lab from 9/27/2023 which showed GFR at 58.4 with BUN at 18 and creat at 1.0    2. Essential hypertension  - .Low salt diet.  Enouraged to increase in physical activity at least 30 minutes of brisk walking/day , 5 days a week  Advised weight loss    Advised for DASH diet - The Dietary Approaches to Stop Hypertension (DASH) is high in vegetables, fruits, low-fat dairy products, whole grains, poultry, fish, and nuts and low in sweets, sugar-sweetened beverages, and red meats   Stop smoking.  Limit caffeine intake  Limit alcohol intake <3/day for men and <2/day for women.  Advised to be compliant with medication.  Please monitor your blood pressure regularly at home   Return precaution provided  -Hypertensive today 162/70  - bp log  - on lisinopril 20 mg po daily   -continue metoprolol        3. Mixed hyperlipidemia  - lab work from 09/27/2022 reviewed which was normal  -  Continue Lipitor    4. Abdominal aortic atherosclerosis  - Continue lipitor    5. Anxiety  - On zoloft to be continued    6. Primary osteoarthritis of right knee  - Following orthopedics and getting ortho viscous supplementation in the joint space   - will send her to PT again at [facility]  - naproxen 500 bid to be added for pain relief.      7. Osteopenia of multiple sites  - Continue with calcium supplementation    8. Seasonal allergic rhinitis due to pollen  - continue zyrtec     9. Gastroesophageal reflux disease without esophagitis  - can take OTC Pepcid as and when needed basis.    RTC in 2 week with bp log      Jarrett Peck MD

## 2023-03-27 NOTE — TELEPHONE ENCOUNTER
----- Message from Amber Michaels sent at 3/27/2023  3:18 PM CDT -----  Contact: pt  Type:  Patient Returning Call    Who Called:pt  Who Left Message for Patient: nurse  Does the patient know what this is regarding?: poss results  Would the patient rather a call back or a response via [facility]? phone  Best Call Back Number: [phone]  Additional Information:

## 2023-03-28 ENCOUNTER — TELEPHONE (OUTPATIENT)
Dept: INTERNAL MEDICINE | Facility: CLINIC | Age: 78
End: 2023-03-28
Payer: MEDICARE

## 2023-03-28 NOTE — TELEPHONE ENCOUNTER
Called and spoke to pt she reports that she feels toradol shot that was given on yesterday has caused her b/p to rise. Message sent to Dr Peck with numbers.

## 2023-04-12 ENCOUNTER — OFFICE VISIT (OUTPATIENT)
Dept: INTERNAL MEDICINE | Facility: CLINIC | Age: 78
End: 2023-04-12
Payer: MEDICARE

## 2023-04-12 VITALS
BODY MASS INDEX: 30.82 KG/M2 | HEIGHT: 63 IN | WEIGHT: 173.94 LBS | OXYGEN SATURATION: 95 % | DIASTOLIC BLOOD PRESSURE: 78 MMHG | HEART RATE: 66 BPM | SYSTOLIC BLOOD PRESSURE: 160 MMHG | TEMPERATURE: 97 F

## 2023-04-12 DIAGNOSIS — I70.0 ABDOMINAL AORTIC ATHEROSCLEROSIS: ICD-10-CM

## 2023-04-12 DIAGNOSIS — I10 ESSENTIAL HYPERTENSION: ICD-10-CM

## 2023-04-12 DIAGNOSIS — E78.2 MIXED HYPERLIPIDEMIA: ICD-10-CM

## 2023-04-12 DIAGNOSIS — N18.31 STAGE 3A CHRONIC KIDNEY DISEASE: Primary | ICD-10-CM

## 2023-04-12 DIAGNOSIS — F41.9 ANXIETY: ICD-10-CM

## 2023-04-12 PROCEDURE — 1125F AMNT PAIN NOTED PAIN PRSNT: CPT | Mod: HCNC,CPTII,S$GLB, | Performed by: STUDENT IN AN ORGANIZED HEALTH CARE EDUCATION/TRAINING PROGRAM

## 2023-04-12 PROCEDURE — 3288F FALL RISK ASSESSMENT DOCD: CPT | Mod: HCNC,CPTII,S$GLB, | Performed by: STUDENT IN AN ORGANIZED HEALTH CARE EDUCATION/TRAINING PROGRAM

## 2023-04-12 PROCEDURE — 3077F SYST BP >= 140 MM HG: CPT | Mod: HCNC,CPTII,S$GLB, | Performed by: STUDENT IN AN ORGANIZED HEALTH CARE EDUCATION/TRAINING PROGRAM

## 2023-04-12 PROCEDURE — 3288F PR FALLS RISK ASSESSMENT DOCUMENTED: ICD-10-PCS | Mod: HCNC,CPTII,S$GLB, | Performed by: STUDENT IN AN ORGANIZED HEALTH CARE EDUCATION/TRAINING PROGRAM

## 2023-04-12 PROCEDURE — 1101F PR PT FALLS ASSESS DOC 0-1 FALLS W/OUT INJ PAST YR: ICD-10-PCS | Mod: HCNC,CPTII,S$GLB, | Performed by: STUDENT IN AN ORGANIZED HEALTH CARE EDUCATION/TRAINING PROGRAM

## 2023-04-12 PROCEDURE — 3078F DIAST BP <80 MM HG: CPT | Mod: HCNC,CPTII,S$GLB, | Performed by: STUDENT IN AN ORGANIZED HEALTH CARE EDUCATION/TRAINING PROGRAM

## 2023-04-12 PROCEDURE — 99214 PR OFFICE/OUTPT VISIT, EST, LEVL IV, 30-39 MIN: ICD-10-PCS | Mod: HCNC,S$GLB,, | Performed by: STUDENT IN AN ORGANIZED HEALTH CARE EDUCATION/TRAINING PROGRAM

## 2023-04-12 PROCEDURE — 99214 OFFICE O/P EST MOD 30 MIN: CPT | Mod: HCNC,S$GLB,, | Performed by: STUDENT IN AN ORGANIZED HEALTH CARE EDUCATION/TRAINING PROGRAM

## 2023-04-12 PROCEDURE — 3078F PR MOST RECENT DIASTOLIC BLOOD PRESSURE < 80 MM HG: ICD-10-PCS | Mod: HCNC,CPTII,S$GLB, | Performed by: STUDENT IN AN ORGANIZED HEALTH CARE EDUCATION/TRAINING PROGRAM

## 2023-04-12 PROCEDURE — 99999 PR PBB SHADOW E&M-EST. PATIENT-LVL IV: CPT | Mod: PBBFAC,HCNC,, | Performed by: STUDENT IN AN ORGANIZED HEALTH CARE EDUCATION/TRAINING PROGRAM

## 2023-04-12 PROCEDURE — 3077F PR MOST RECENT SYSTOLIC BLOOD PRESSURE >= 140 MM HG: ICD-10-PCS | Mod: HCNC,CPTII,S$GLB, | Performed by: STUDENT IN AN ORGANIZED HEALTH CARE EDUCATION/TRAINING PROGRAM

## 2023-04-12 PROCEDURE — 1125F PR PAIN SEVERITY QUANTIFIED, PAIN PRESENT: ICD-10-PCS | Mod: HCNC,CPTII,S$GLB, | Performed by: STUDENT IN AN ORGANIZED HEALTH CARE EDUCATION/TRAINING PROGRAM

## 2023-04-12 PROCEDURE — 1159F MED LIST DOCD IN RCRD: CPT | Mod: HCNC,CPTII,S$GLB, | Performed by: STUDENT IN AN ORGANIZED HEALTH CARE EDUCATION/TRAINING PROGRAM

## 2023-04-12 PROCEDURE — 1101F PT FALLS ASSESS-DOCD LE1/YR: CPT | Mod: HCNC,CPTII,S$GLB, | Performed by: STUDENT IN AN ORGANIZED HEALTH CARE EDUCATION/TRAINING PROGRAM

## 2023-04-12 PROCEDURE — 99999 PR PBB SHADOW E&M-EST. PATIENT-LVL IV: ICD-10-PCS | Mod: PBBFAC,HCNC,, | Performed by: STUDENT IN AN ORGANIZED HEALTH CARE EDUCATION/TRAINING PROGRAM

## 2023-04-12 PROCEDURE — 1159F PR MEDICATION LIST DOCUMENTED IN MEDICAL RECORD: ICD-10-PCS | Mod: HCNC,CPTII,S$GLB, | Performed by: STUDENT IN AN ORGANIZED HEALTH CARE EDUCATION/TRAINING PROGRAM

## 2023-04-12 RX ORDER — AMLODIPINE BESYLATE 5 MG/1
5 TABLET ORAL DAILY
Qty: 30 TABLET | Refills: 0 | Status: SHIPPED | OUTPATIENT
Start: 2023-04-12 | End: 2023-05-10

## 2023-04-12 RX ORDER — LISINOPRIL 40 MG/1
40 TABLET ORAL DAILY
Qty: 30 TABLET | Refills: 0 | Status: SHIPPED | OUTPATIENT
Start: 2023-04-12 | End: 2023-05-10

## 2023-04-12 NOTE — PROGRESS NOTES
Chief Complaint   Patient presents with    Hypertension     HPI: Amber Naranjo is a 77 y.o. female  with Pmhx listed below who presents to clinic for Follow up on her HTN. I saw her 2 weeks back for the same reason during which her blood pressure was elevated. She reported it to be due to pain and stress. I asked her to keep BP log and to bring it along with her on this visit. She has not done that. She reports that her blood pressure is still high in the morning. She reports that it ranges in between 160-170/80-90. She also noticed that she has light headache early in the morning which goes away as the day passes. She denied having cough, chest pain , leg swelling, palpitation, sob, dizziness, and other complain today.    Problem List:  Patient Active Problem List   Diagnosis    Essential hypertension    Diverticulosis of colon    Tortuous colon    Uterine fibroid    Hyperlipidemia    Glaucoma    GERD (gastroesophageal reflux disease)    Arthritis    Seasonal allergic rhinitis due to pollen    Overweight (BMI 25.0-29.9)    Osteopenia of multiple sites    Postmenopausal    Primary osteoarthritis of right knee    Benign paroxysmal positional vertigo of left ear    Spondylolisthesis at L5-S1 level    Abdominal aortic atherosclerosis    Dyspnea on exertion    Stage 3a chronic kidney disease    Caregiver role strain    Anxiety    Senile purpura     ROS: Negative except as noted above.     Current Meds:  Current Outpatient Medications   Medication Sig Dispense Refill    aspirin (ECOTRIN) 81 MG EC tablet Take 81 mg by mouth once daily.      atorvastatin (LIPITOR) 20 MG tablet TAKE 1 TABLET EVERY DAY 90 tablet 2    blood pressure monitor Kit       calcium carbonate (OS-NERI) 600 mg (1,500 mg) Tab Take 600 mg by mouth 2 (two) times daily with meals.      cetirizine (ZYRTEC) 10 MG tablet Take 10 mg by mouth once daily.      diclofenac sodium (VOLTAREN) 1 % Gel APPLY 2 GRAMS TOPICALLY TWO TIMES DAILY AS NEEDED. 100 g 0     lisinopriL (PRINIVIL,ZESTRIL) 20 MG tablet TAKE 1 TABLET EVERY DAY 90 tablet 3    metoprolol succinate (TOPROL-XL) 25 MG 24 hr tablet TAKE 1 TABLET EVERY DAY 90 tablet 3    multivitamin capsule Take 1 capsule by mouth once daily.      omeprazole (PRILOSEC) 40 MG capsule Take 1 capsule (40 mg total) by mouth once daily. 90 capsule 1    sertraline (ZOLOFT) 25 MG tablet TAKE 1 TABLET EVERY DAY 90 tablet 1    timolol maleate 0.5% (TIMOPTIC) 0.5 % Drop       amLODIPine (NORVASC) 5 MG tablet Take 1 tablet (5 mg total) by mouth once daily. 30 tablet 0    lisinopriL (PRINIVIL,ZESTRIL) 40 MG tablet Take 1 tablet (40 mg total) by mouth once daily. 30 tablet 0     No current facility-administered medications for this visit.      PE:  BP: (!) 160/78  Pulse: 66     Temp: 97 °F (36.1 °C)  Weight: 78.9 kg (173 lb 15.1 oz) Body mass index is 30.81 kg/m².    Wt Readings from Last 5 Encounters:   04/12/23 78.9 kg (173 lb 15.1 oz)   03/27/23 77 kg (169 lb 12.1 oz)   02/21/23 76.5 kg (168 lb 10.4 oz)   02/15/23 76.5 kg (168 lb 10.4 oz)   02/14/23 76.3 kg (168 lb 3.4 oz)     General appearance: alert and cooperative, not in acute distress  Head: normocephalic, without obvious abnormality, atraumatic  Eyes: conjunctivae/corneas clear. PERRL, EOM's intact.  Ears: clear tympanic membranes   Neck: no adenopathy, supple, symmetrical, trachea midline and thyroid not enlarged, symmetric, no tenderness/mass/nodules, no JVD  Throat: lips, mucosa, and tongue normal; teeth and gums normal; no thrush  Chest: no reproducible chest pain   Heart: regular rate and rhythm, S1, S2 normal, no murmur, click, rub or gallop  Lungs: unlabored respiration, bilateral equal air entry, normal vesicular breath sound heard, no wheezing, rhonchi   Abdomen: soft, non-tender, non-distended; bowel sounds +; no masses,  no organomegaly, no ascites   Extremities: normal, atraumatic, no cyanosis or edema noted B/L upper and lower extremities.  Skin: skin color, texture,  turgor normal. No rashes or lesions noted.  Neurologic: grossly intact        Lab:  Lab Results   Component Value Date    WBC 7.94 03/27/2023    HGB 12.4 03/27/2023    HCT 38.4 03/27/2023    MCV 96 03/27/2023     03/27/2023     03/27/2023    K 4.3 03/27/2023     03/27/2023    CO2 26 03/27/2023    BUN 20 03/27/2023    GLU 97 03/27/2023    CALCIUM 9.2 03/27/2023    AST 29 03/27/2023    ALT 26 03/27/2023    CHOL 132 09/27/2022    HDL 44 09/27/2022    LDLCALC 72.4 09/27/2022    TRIG 78 09/27/2022       Impression:    ICD-10-CM ICD-9-CM    1. Stage 3a chronic kidney disease  N18.31 585.3 lisinopriL (PRINIVIL,ZESTRIL) 40 MG tablet      2. Essential hypertension  I10 401.9 amLODIPine (NORVASC) 5 MG tablet      lisinopriL (PRINIVIL,ZESTRIL) 40 MG tablet      3. Mixed hyperlipidemia  E78.2 272.2       4. Abdominal aortic atherosclerosis  I70.0 440.0       5. Anxiety  F41.9 300.00       1. Essential hypertension  - blood pressure not controlled  - will increase the dose of lisinopril to 40 in the morning  - she needs to take that along with her metoprolol  - will add Norvasc at night as well  Ask to keep BP log  Please bring the bp log during our next visit  - amLODIPine (NORVASC) 5 MG tablet; Take 1 tablet (5 mg total) by mouth once daily.  Dispense: 30 tablet; Refill: 0  - lisinopriL (PRINIVIL,ZESTRIL) 40 MG tablet; Take 1 tablet (40 mg total) by mouth once daily.  Dispense: 30 tablet; Refill: 0    2. Stage 3a chronic kidney disease  - - counseled on increase water intake at least 3 litre of H20 to remain hydrated  - stay away from nephrotoxic drugs like Ibuprofen and NSAID  - Counseled on the need to control HTN and Blood glucose to prevent the disease progression  - low salt diet  - dietary modification: renal diet encouraged  Reviewed creatinine and GFR from 03/27/2023 which is stable    - lisinopriL (PRINIVIL,ZESTRIL) 40 MG tablet; Take 1 tablet (40 mg total) by mouth once daily.  Dispense: 30 tablet;  Refill: 0    3. Mixed hyperlipidemia  - continue with Lipitor    4. Abdominal aortic atherosclerosis  - continue with lipitor    5. Anxiety  Continue sertraline      RTC in 2 weeks with bp log    Future Appointments   Date Time Provider Department Center   5/2/2023  8:40 AM [facility]

## 2023-05-02 ENCOUNTER — OFFICE VISIT (OUTPATIENT)
Dept: INTERNAL MEDICINE | Facility: CLINIC | Age: 78
End: 2023-05-02
Payer: MEDICARE

## 2023-05-02 VITALS
TEMPERATURE: 97 F | HEART RATE: 65 BPM | WEIGHT: 172.81 LBS | SYSTOLIC BLOOD PRESSURE: 130 MMHG | BODY MASS INDEX: 30.62 KG/M2 | HEIGHT: 63 IN | OXYGEN SATURATION: 97 % | DIASTOLIC BLOOD PRESSURE: 60 MMHG

## 2023-05-02 DIAGNOSIS — N18.31 STAGE 3A CHRONIC KIDNEY DISEASE: ICD-10-CM

## 2023-05-02 DIAGNOSIS — I10 ESSENTIAL HYPERTENSION: Primary | ICD-10-CM

## 2023-05-02 DIAGNOSIS — E78.2 MIXED HYPERLIPIDEMIA: ICD-10-CM

## 2023-05-02 DIAGNOSIS — K21.9 GASTROESOPHAGEAL REFLUX DISEASE WITHOUT ESOPHAGITIS: ICD-10-CM

## 2023-05-02 PROCEDURE — 3078F DIAST BP <80 MM HG: CPT | Mod: HCNC,CPTII,S$GLB, | Performed by: STUDENT IN AN ORGANIZED HEALTH CARE EDUCATION/TRAINING PROGRAM

## 2023-05-02 PROCEDURE — 99213 OFFICE O/P EST LOW 20 MIN: CPT | Mod: HCNC,S$GLB,, | Performed by: STUDENT IN AN ORGANIZED HEALTH CARE EDUCATION/TRAINING PROGRAM

## 2023-05-02 PROCEDURE — 1126F AMNT PAIN NOTED NONE PRSNT: CPT | Mod: HCNC,CPTII,S$GLB, | Performed by: STUDENT IN AN ORGANIZED HEALTH CARE EDUCATION/TRAINING PROGRAM

## 2023-05-02 PROCEDURE — 1101F PT FALLS ASSESS-DOCD LE1/YR: CPT | Mod: HCNC,CPTII,S$GLB, | Performed by: STUDENT IN AN ORGANIZED HEALTH CARE EDUCATION/TRAINING PROGRAM

## 2023-05-02 PROCEDURE — 1101F PR PT FALLS ASSESS DOC 0-1 FALLS W/OUT INJ PAST YR: ICD-10-PCS | Mod: HCNC,CPTII,S$GLB, | Performed by: STUDENT IN AN ORGANIZED HEALTH CARE EDUCATION/TRAINING PROGRAM

## 2023-05-02 PROCEDURE — 3075F PR MOST RECENT SYSTOLIC BLOOD PRESS GE 130-139MM HG: ICD-10-PCS | Mod: HCNC,CPTII,S$GLB, | Performed by: STUDENT IN AN ORGANIZED HEALTH CARE EDUCATION/TRAINING PROGRAM

## 2023-05-02 PROCEDURE — 1126F PR PAIN SEVERITY QUANTIFIED, NO PAIN PRESENT: ICD-10-PCS | Mod: HCNC,CPTII,S$GLB, | Performed by: STUDENT IN AN ORGANIZED HEALTH CARE EDUCATION/TRAINING PROGRAM

## 2023-05-02 PROCEDURE — 3288F PR FALLS RISK ASSESSMENT DOCUMENTED: ICD-10-PCS | Mod: HCNC,CPTII,S$GLB, | Performed by: STUDENT IN AN ORGANIZED HEALTH CARE EDUCATION/TRAINING PROGRAM

## 2023-05-02 PROCEDURE — 99999 PR PBB SHADOW E&M-EST. PATIENT-LVL IV: ICD-10-PCS | Mod: PBBFAC,HCNC,, | Performed by: STUDENT IN AN ORGANIZED HEALTH CARE EDUCATION/TRAINING PROGRAM

## 2023-05-02 PROCEDURE — 3075F SYST BP GE 130 - 139MM HG: CPT | Mod: HCNC,CPTII,S$GLB, | Performed by: STUDENT IN AN ORGANIZED HEALTH CARE EDUCATION/TRAINING PROGRAM

## 2023-05-02 PROCEDURE — 3288F FALL RISK ASSESSMENT DOCD: CPT | Mod: HCNC,CPTII,S$GLB, | Performed by: STUDENT IN AN ORGANIZED HEALTH CARE EDUCATION/TRAINING PROGRAM

## 2023-05-02 PROCEDURE — 99213 PR OFFICE/OUTPT VISIT, EST, LEVL III, 20-29 MIN: ICD-10-PCS | Mod: HCNC,S$GLB,, | Performed by: STUDENT IN AN ORGANIZED HEALTH CARE EDUCATION/TRAINING PROGRAM

## 2023-05-02 PROCEDURE — 1159F PR MEDICATION LIST DOCUMENTED IN MEDICAL RECORD: ICD-10-PCS | Mod: HCNC,CPTII,S$GLB, | Performed by: STUDENT IN AN ORGANIZED HEALTH CARE EDUCATION/TRAINING PROGRAM

## 2023-05-02 PROCEDURE — 1159F MED LIST DOCD IN RCRD: CPT | Mod: HCNC,CPTII,S$GLB, | Performed by: STUDENT IN AN ORGANIZED HEALTH CARE EDUCATION/TRAINING PROGRAM

## 2023-05-02 PROCEDURE — 99999 PR PBB SHADOW E&M-EST. PATIENT-LVL IV: CPT | Mod: PBBFAC,HCNC,, | Performed by: STUDENT IN AN ORGANIZED HEALTH CARE EDUCATION/TRAINING PROGRAM

## 2023-05-02 PROCEDURE — 3078F PR MOST RECENT DIASTOLIC BLOOD PRESSURE < 80 MM HG: ICD-10-PCS | Mod: HCNC,CPTII,S$GLB, | Performed by: STUDENT IN AN ORGANIZED HEALTH CARE EDUCATION/TRAINING PROGRAM

## 2023-05-02 NOTE — PROGRESS NOTES
Chief Complaint   Patient presents with    Hypertension     HPI: Amber Naranjo is a 77 y.o. female  with Pmhx listed below who presents to clinic for follow up on her HTN. I have been following up on her for last 1 month for the same reason. During these period, multiple adjustment were made on her medication and she is finally on Norvasc and lisinopril 40. She was asked to keep BP log which she has brought along with her today. It shows that her blood pressure is pretty well control after she takes her medication. Her blood pressure was well controlled while she is here in clinic. She reports to be doing well. Denies having any shortness of breath, chest pain, abdominal pain, palpitation, leg swelling, syncopal episode, nausea, vomiting, fever and other complains at present. Medication list has been reviewed and updated along with her. She reports to be compliant with her medication and has no issues taking it. No other questions and queries today.      Problem List:  Patient Active Problem List   Diagnosis    Essential hypertension    Diverticulosis of colon    Tortuous colon    Uterine fibroid    Hyperlipidemia    Glaucoma    GERD (gastroesophageal reflux disease)    Arthritis    Seasonal allergic rhinitis due to pollen    Overweight (BMI 25.0-29.9)    Osteopenia of multiple sites    Postmenopausal    Primary osteoarthritis of right knee    Benign paroxysmal positional vertigo of left ear    Spondylolisthesis at L5-S1 level    Abdominal aortic atherosclerosis    Dyspnea on exertion    Stage 3a chronic kidney disease    Caregiver role strain    Anxiety    Senile purpura     ROS: Negative except as noted above.     Current Meds:  Current Outpatient Medications   Medication Sig Dispense Refill    amLODIPine (NORVASC) 5 MG tablet Take 1 tablet (5 mg total) by mouth once daily. 30 tablet 0    aspirin (ECOTRIN) 81 MG EC tablet Take 81 mg by mouth once daily.      atorvastatin (LIPITOR) 20 MG tablet TAKE 1 TABLET EVERY  DAY 90 tablet 2    blood pressure monitor Kit       calcium carbonate (OS-NERI) 600 mg (1,500 mg) Tab Take 600 mg by mouth 2 (two) times daily with meals.      cetirizine (ZYRTEC) 10 MG tablet Take 10 mg by mouth once daily.      diclofenac sodium (VOLTAREN) 1 % Gel APPLY 2 GRAMS TOPICALLY TWO TIMES DAILY AS NEEDED. 100 g 0    lisinopriL (PRINIVIL,ZESTRIL) 40 MG tablet Take 1 tablet (40 mg total) by mouth once daily. 30 tablet 0    metoprolol succinate (TOPROL-XL) 25 MG 24 hr tablet TAKE 1 TABLET EVERY DAY 90 tablet 3    multivitamin capsule Take 1 capsule by mouth once daily.      omeprazole (PRILOSEC) 40 MG capsule Take 1 capsule (40 mg total) by mouth once daily. 90 capsule 1    sertraline (ZOLOFT) 25 MG tablet TAKE 1 TABLET EVERY DAY 90 tablet 1    timolol maleate 0.5% (TIMOPTIC) 0.5 % Drop       lisinopriL (PRINIVIL,ZESTRIL) 20 MG tablet TAKE 1 TABLET EVERY DAY 90 tablet 3     No current facility-administered medications for this visit.      PE:  BP: (!) 140/70  Pulse: 65     Temp: 96.6 °F (35.9 °C)  Weight: 78.4 kg (172 lb 13.5 oz) Body mass index is 30.62 kg/m².    Wt Readings from Last 5 Encounters:   05/02/23 78.4 kg (172 lb 13.5 oz)   04/12/23 78.9 kg (173 lb 15.1 oz)   03/27/23 77 kg (169 lb 12.1 oz)   02/21/23 76.5 kg (168 lb 10.4 oz)   02/15/23 76.5 kg (168 lb 10.4 oz)     General appearance: alert and cooperative, not in acute distress  Head: normocephalic, without obvious abnormality, atraumatic  Eyes: conjunctivae/corneas clear. PERRL, EOM's intact.  Ears: clear tympanic membranes   Neck: no adenopathy, supple, symmetrical, trachea midline and thyroid not enlarged, symmetric, no tenderness/mass/nodules, no JVD  Throat: lips, mucosa, and tongue normal; teeth and gums normal; no thrush  Chest: no reproducible chest pain   Heart: regular rate and rhythm, S1, S2 normal, no murmur, click, rub or gallop  Lungs: unlabored respiration, bilateral equal air entry, normal vesicular breath sound heard, no  wheezing, rhonchi   Abdomen: soft, non-tender, non-distended; bowel sounds +; no masses,  no organomegaly, no ascites   Extremities: normal, atraumatic, no cyanosis or edema noted B/L upper and lower extremities.  Skin: skin color, texture, turgor normal. No rashes or lesions noted.  Neurologic: grossly intact        Lab:  Lab Results   Component Value Date    WBC 7.94 03/27/2023    HGB 12.4 03/27/2023    HCT 38.4 03/27/2023    MCV 96 03/27/2023     03/27/2023     03/27/2023    K 4.3 03/27/2023     03/27/2023    CO2 26 03/27/2023    BUN 20 03/27/2023    GLU 97 03/27/2023    CALCIUM 9.2 03/27/2023    AST 29 03/27/2023    ALT 26 03/27/2023    CHOL 132 09/27/2022    HDL 44 09/27/2022    LDLCALC 72.4 09/27/2022    TRIG 78 09/27/2022       Impression:  1. Essential hypertension  -Low salt diet.  Enouraged to increase in physical activity at least 30 minutes of brisk walking/day , 5 days a week  Advised weight loss    Advised for DASH diet - The Dietary Approaches to Stop Hypertension (DASH) is high in vegetables, fruits, low-fat dairy products, whole grains, poultry, fish, and nuts and low in sweets, sugar-sweetened beverages, and red meats   Stop smoking.  Limit caffeine intake  Limit alcohol intake <3/day for men and <2/day for women.  Advised to be compliant with medication.  Please monitor your blood pressure regularly at home   Return precaution provided  Normotensive today  Continue Norvasc, lisinopril and Toprol XL    2. Mixed hyperlipidemia  - continue Lipitor    3. Gastroesophageal reflux disease without esophagitis  Continue Prilosec chronic and stable condition    4. Stage 3a chronic kidney disease   Reviewed CMP From 3/27/2023 which was  stable  - counseled on increase water intake at least 3 litre of H20 to remain hydrated  - stay away from nephrotoxic drugs like Ibuprofen and NSAID  - Counseled on the need to control HTN and Blood glucose to prevent the disease progression  - low salt  diet  - dietary modification: renal diet encouraged        I spent a total of 28 minutes on the day of the visit.This includes face to face time and non-face to face time preparing to see the patient (eg, review of tests), obtaining and/or reviewing separately obtained history, documenting clinical information in the electronic or other health record, independently interpreting results and communicating results to the patient/family/caregiver, or care coordinator.     Jarrett Peck MD

## 2023-05-09 DIAGNOSIS — I10 ESSENTIAL HYPERTENSION: ICD-10-CM

## 2023-05-09 DIAGNOSIS — N18.31 STAGE 3A CHRONIC KIDNEY DISEASE: ICD-10-CM

## 2023-05-10 RX ORDER — AMLODIPINE BESYLATE 5 MG/1
5 TABLET ORAL DAILY
Qty: 90 TABLET | Refills: 0 | Status: SHIPPED | OUTPATIENT
Start: 2023-05-10 | End: 2023-10-09

## 2023-05-10 RX ORDER — LISINOPRIL 40 MG/1
40 TABLET ORAL DAILY
Qty: 90 TABLET | Refills: 0 | Status: SHIPPED | OUTPATIENT
Start: 2023-05-10 | End: 2023-10-09

## 2023-08-02 ENCOUNTER — LAB VISIT (OUTPATIENT)
Dept: LAB | Facility: HOSPITAL | Age: 78
End: 2023-08-02
Attending: STUDENT IN AN ORGANIZED HEALTH CARE EDUCATION/TRAINING PROGRAM
Payer: MEDICARE

## 2023-08-02 ENCOUNTER — OFFICE VISIT (OUTPATIENT)
Dept: INTERNAL MEDICINE | Facility: CLINIC | Age: 78
End: 2023-08-02
Payer: MEDICARE

## 2023-08-02 VITALS
TEMPERATURE: 98 F | OXYGEN SATURATION: 95 % | WEIGHT: 170.19 LBS | HEART RATE: 67 BPM | BODY MASS INDEX: 30.16 KG/M2 | DIASTOLIC BLOOD PRESSURE: 58 MMHG | SYSTOLIC BLOOD PRESSURE: 120 MMHG | HEIGHT: 63 IN

## 2023-08-02 DIAGNOSIS — M17.11 PRIMARY OSTEOARTHRITIS OF RIGHT KNEE: ICD-10-CM

## 2023-08-02 DIAGNOSIS — I10 ESSENTIAL HYPERTENSION: Primary | ICD-10-CM

## 2023-08-02 DIAGNOSIS — E78.2 MIXED HYPERLIPIDEMIA: ICD-10-CM

## 2023-08-02 DIAGNOSIS — K21.9 GASTROESOPHAGEAL REFLUX DISEASE WITHOUT ESOPHAGITIS: ICD-10-CM

## 2023-08-02 DIAGNOSIS — N18.31 STAGE 3A CHRONIC KIDNEY DISEASE: ICD-10-CM

## 2023-08-02 DIAGNOSIS — I70.0 ABDOMINAL AORTIC ATHEROSCLEROSIS: ICD-10-CM

## 2023-08-02 LAB
ALBUMIN SERPL BCP-MCNC: 3.6 G/DL (ref 3.5–5.2)
ALP SERPL-CCNC: 111 U/L (ref 55–135)
ALT SERPL W/O P-5'-P-CCNC: 23 U/L (ref 10–44)
ANION GAP SERPL CALC-SCNC: 11 MMOL/L (ref 8–16)
AST SERPL-CCNC: 25 U/L (ref 10–40)
BILIRUB SERPL-MCNC: 0.3 MG/DL (ref 0.1–1)
BUN SERPL-MCNC: 29 MG/DL (ref 8–23)
CALCIUM SERPL-MCNC: 9.1 MG/DL (ref 8.7–10.5)
CHLORIDE SERPL-SCNC: 107 MMOL/L (ref 95–110)
CO2 SERPL-SCNC: 23 MMOL/L (ref 23–29)
CREAT SERPL-MCNC: 1.2 MG/DL (ref 0.5–1.4)
EST. GFR  (NO RACE VARIABLE): 46.6 ML/MIN/1.73 M^2
GLUCOSE SERPL-MCNC: 124 MG/DL (ref 70–110)
POTASSIUM SERPL-SCNC: 4.4 MMOL/L (ref 3.5–5.1)
PROT SERPL-MCNC: 6.7 G/DL (ref 6–8.4)
SODIUM SERPL-SCNC: 141 MMOL/L (ref 136–145)

## 2023-08-02 PROCEDURE — 80061 LIPID PANEL: CPT | Mod: HCNC | Performed by: STUDENT IN AN ORGANIZED HEALTH CARE EDUCATION/TRAINING PROGRAM

## 2023-08-02 PROCEDURE — 36415 COLL VENOUS BLD VENIPUNCTURE: CPT | Mod: HCNC,PO | Performed by: STUDENT IN AN ORGANIZED HEALTH CARE EDUCATION/TRAINING PROGRAM

## 2023-08-02 PROCEDURE — 99999 PR PBB SHADOW E&M-EST. PATIENT-LVL IV: CPT | Mod: PBBFAC,HCNC,, | Performed by: STUDENT IN AN ORGANIZED HEALTH CARE EDUCATION/TRAINING PROGRAM

## 2023-08-02 PROCEDURE — 1126F AMNT PAIN NOTED NONE PRSNT: CPT | Mod: HCNC,CPTII,S$GLB, | Performed by: STUDENT IN AN ORGANIZED HEALTH CARE EDUCATION/TRAINING PROGRAM

## 2023-08-02 PROCEDURE — 99999 PR PBB SHADOW E&M-EST. PATIENT-LVL IV: ICD-10-PCS | Mod: PBBFAC,HCNC,, | Performed by: STUDENT IN AN ORGANIZED HEALTH CARE EDUCATION/TRAINING PROGRAM

## 2023-08-02 PROCEDURE — 1159F MED LIST DOCD IN RCRD: CPT | Mod: HCNC,CPTII,S$GLB, | Performed by: STUDENT IN AN ORGANIZED HEALTH CARE EDUCATION/TRAINING PROGRAM

## 2023-08-02 PROCEDURE — 3074F PR MOST RECENT SYSTOLIC BLOOD PRESSURE < 130 MM HG: ICD-10-PCS | Mod: HCNC,CPTII,S$GLB, | Performed by: STUDENT IN AN ORGANIZED HEALTH CARE EDUCATION/TRAINING PROGRAM

## 2023-08-02 PROCEDURE — 3074F SYST BP LT 130 MM HG: CPT | Mod: HCNC,CPTII,S$GLB, | Performed by: STUDENT IN AN ORGANIZED HEALTH CARE EDUCATION/TRAINING PROGRAM

## 2023-08-02 PROCEDURE — 80053 COMPREHEN METABOLIC PANEL: CPT | Mod: HCNC,PO | Performed by: STUDENT IN AN ORGANIZED HEALTH CARE EDUCATION/TRAINING PROGRAM

## 2023-08-02 PROCEDURE — 1160F RVW MEDS BY RX/DR IN RCRD: CPT | Mod: HCNC,CPTII,S$GLB, | Performed by: STUDENT IN AN ORGANIZED HEALTH CARE EDUCATION/TRAINING PROGRAM

## 2023-08-02 PROCEDURE — 3078F DIAST BP <80 MM HG: CPT | Mod: HCNC,CPTII,S$GLB, | Performed by: STUDENT IN AN ORGANIZED HEALTH CARE EDUCATION/TRAINING PROGRAM

## 2023-08-02 PROCEDURE — 3288F FALL RISK ASSESSMENT DOCD: CPT | Mod: HCNC,CPTII,S$GLB, | Performed by: STUDENT IN AN ORGANIZED HEALTH CARE EDUCATION/TRAINING PROGRAM

## 2023-08-02 PROCEDURE — 99214 PR OFFICE/OUTPT VISIT, EST, LEVL IV, 30-39 MIN: ICD-10-PCS | Mod: HCNC,S$GLB,, | Performed by: STUDENT IN AN ORGANIZED HEALTH CARE EDUCATION/TRAINING PROGRAM

## 2023-08-02 PROCEDURE — 99214 OFFICE O/P EST MOD 30 MIN: CPT | Mod: HCNC,S$GLB,, | Performed by: STUDENT IN AN ORGANIZED HEALTH CARE EDUCATION/TRAINING PROGRAM

## 2023-08-02 PROCEDURE — 1126F PR PAIN SEVERITY QUANTIFIED, NO PAIN PRESENT: ICD-10-PCS | Mod: HCNC,CPTII,S$GLB, | Performed by: STUDENT IN AN ORGANIZED HEALTH CARE EDUCATION/TRAINING PROGRAM

## 2023-08-02 PROCEDURE — 1159F PR MEDICATION LIST DOCUMENTED IN MEDICAL RECORD: ICD-10-PCS | Mod: HCNC,CPTII,S$GLB, | Performed by: STUDENT IN AN ORGANIZED HEALTH CARE EDUCATION/TRAINING PROGRAM

## 2023-08-02 PROCEDURE — 1101F PT FALLS ASSESS-DOCD LE1/YR: CPT | Mod: HCNC,CPTII,S$GLB, | Performed by: STUDENT IN AN ORGANIZED HEALTH CARE EDUCATION/TRAINING PROGRAM

## 2023-08-02 PROCEDURE — 3288F PR FALLS RISK ASSESSMENT DOCUMENTED: ICD-10-PCS | Mod: HCNC,CPTII,S$GLB, | Performed by: STUDENT IN AN ORGANIZED HEALTH CARE EDUCATION/TRAINING PROGRAM

## 2023-08-02 PROCEDURE — 1160F PR REVIEW ALL MEDS BY PRESCRIBER/CLIN PHARMACIST DOCUMENTED: ICD-10-PCS | Mod: HCNC,CPTII,S$GLB, | Performed by: STUDENT IN AN ORGANIZED HEALTH CARE EDUCATION/TRAINING PROGRAM

## 2023-08-02 PROCEDURE — 1101F PR PT FALLS ASSESS DOC 0-1 FALLS W/OUT INJ PAST YR: ICD-10-PCS | Mod: HCNC,CPTII,S$GLB, | Performed by: STUDENT IN AN ORGANIZED HEALTH CARE EDUCATION/TRAINING PROGRAM

## 2023-08-02 PROCEDURE — 3078F PR MOST RECENT DIASTOLIC BLOOD PRESSURE < 80 MM HG: ICD-10-PCS | Mod: HCNC,CPTII,S$GLB, | Performed by: STUDENT IN AN ORGANIZED HEALTH CARE EDUCATION/TRAINING PROGRAM

## 2023-08-02 RX ORDER — HYALURONATE SODIUM 30 MG/2 ML
SYRINGE (ML) INTRAARTICULAR
COMMUNITY
Start: 2023-02-21 | End: 2024-02-21

## 2023-08-02 RX ORDER — KETOROLAC TROMETHAMINE 30 MG/ML
INJECTION, SOLUTION INTRAMUSCULAR; INTRAVENOUS
COMMUNITY
Start: 2023-03-27 | End: 2023-08-02

## 2023-08-02 RX ORDER — MODERNA COVID-19 VACCINE, BIVALENT 25; 25 UG/.5ML; UG/.5ML
INJECTION, SUSPENSION INTRAMUSCULAR
COMMUNITY
Start: 2023-02-15 | End: 2024-02-21

## 2023-08-02 NOTE — PROGRESS NOTES
Chief Complaint   Patient presents with    Follow-up     3 MONTH     HPI: Amber Naranjo is a 77 y.o. female  with Pmhx listed below who presents to clinic for Routine Follow Up. She reports to be doing well. Denies having any shortness of breath, chest pain, abdominal pain, palpitation, leg swelling, syncopal episode, nausea, vomiting, fever and other complains at present. Medication list has been reviewed and updated along with her. She reports to be compliant with her medication and has no issues taking it. Lab work to be repeated today.       Problem List:  Patient Active Problem List   Diagnosis    Essential hypertension    Diverticulosis of colon    Tortuous colon    Uterine fibroid    Hyperlipidemia    Glaucoma    GERD (gastroesophageal reflux disease)    Arthritis    Seasonal allergic rhinitis due to pollen    Overweight (BMI 25.0-29.9)    Osteopenia of multiple sites    Postmenopausal    Primary osteoarthritis of right knee    Benign paroxysmal positional vertigo of left ear    Spondylolisthesis at L5-S1 level    Abdominal aortic atherosclerosis    Dyspnea on exertion    Stage 3a chronic kidney disease    Caregiver role strain    Anxiety    Senile purpura       ROS: Negative except as noted above.       Current Meds:  Current Outpatient Medications   Medication Sig Dispense Refill    amLODIPine (NORVASC) 5 MG tablet Take 1 tablet (5 mg total) by mouth once daily. 90 tablet 0    aspirin (ECOTRIN) 81 MG EC tablet Take 81 mg by mouth once daily.      atorvastatin (LIPITOR) 20 MG tablet TAKE 1 TABLET EVERY DAY 90 tablet 2    blood pressure monitor Kit       calcium carbonate (OS-NERI) 600 mg (1,500 mg) Tab Take 600 mg by mouth 2 (two) times daily with meals.      cetirizine (ZYRTEC) 10 MG tablet Take 10 mg by mouth once daily.      diclofenac sodium (VOLTAREN) 1 % Gel APPLY 2 GRAMS TOPICALLY TWO TIMES DAILY AS NEEDED. 100 g 0    lisinopriL (PRINIVIL,ZESTRIL) 40 MG tablet Take 1 tablet (40 mg total) by mouth once  daily. 90 tablet 0    metoprolol succinate (TOPROL-XL) 25 MG 24 hr tablet TAKE 1 TABLET EVERY DAY 90 tablet 3    multivitamin capsule Take 1 capsule by mouth once daily.      omeprazole (PRILOSEC) 40 MG capsule Take 1 capsule (40 mg total) by mouth once daily. 90 capsule 1    sertraline (ZOLOFT) 25 MG tablet TAKE 1 TABLET EVERY DAY 90 tablet 1    timolol maleate 0.5% (TIMOPTIC) 0.5 % Drop       MODERNA COVID BIVAL,6M UP,,PF, 50 mcg/0.5 mL injection       ORTHOVISC 30 mg/2 mL        No current facility-administered medications for this visit.      PE:  BP: (!) 120/58  Pulse: 67     Temp: 98.1 °F (36.7 °C)  Weight: 77.2 kg (170 lb 3.1 oz) Body mass index is 30.15 kg/m².    Wt Readings from Last 5 Encounters:   08/02/23 77.2 kg (170 lb 3.1 oz)   05/02/23 78.4 kg (172 lb 13.5 oz)   04/12/23 78.9 kg (173 lb 15.1 oz)   03/27/23 77 kg (169 lb 12.1 oz)   02/21/23 76.5 kg (168 lb 10.4 oz)     General appearance: alert and cooperative, not in acute distress  Head: normocephalic, without obvious abnormality, atraumatic  Eyes: conjunctivae/corneas clear. PERRL, EOM's intact.  Ears: clear tympanic membranes   Neck: no adenopathy, supple, symmetrical, trachea midline and thyroid not enlarged, symmetric, no tenderness/mass/nodules, no JVD  Throat: lips, mucosa, and tongue normal; teeth and gums normal; no thrush  Chest: no reproducible chest pain   Heart: regular rate and rhythm, S1, S2 normal, no murmur, click, rub or gallop  Lungs: unlabored respiration, bilateral equal air entry, normal vesicular breath sound heard, no wheezing, rhonchi   Abdomen: soft, non-tender, non-distended; bowel sounds +; no masses,  no organomegaly, no ascites   Extremities: normal, atraumatic, no cyanosis or edema noted B/L upper and lower extremities.  Skin: skin color, texture, turgor normal. No rashes or lesions noted.  Neurologic: grossly intact      Lab:  Lab Results   Component Value Date    WBC 7.94 03/27/2023    HGB 12.4 03/27/2023    HCT 38.4  03/27/2023    MCV 96 03/27/2023     03/27/2023     03/27/2023    K 4.3 03/27/2023     03/27/2023    CO2 26 03/27/2023    BUN 20 03/27/2023    GLU 97 03/27/2023    CALCIUM 9.2 03/27/2023    AST 29 03/27/2023    ALT 26 03/27/2023    CHOL 132 09/27/2022    HDL 44 09/27/2022    LDLCALC 72.4 09/27/2022    TRIG 78 09/27/2022       Impression:    ICD-10-CM ICD-9-CM    1. Essential hypertension  I10 401.9       2. Stage 3a chronic kidney disease  N18.31 585.3       3. Mixed hyperlipidemia  E78.2 272.2 COMPREHENSIVE METABOLIC PANEL      LIPID PANEL      4. Gastroesophageal reflux disease without esophagitis  K21.9 530.81       5. Abdominal aortic atherosclerosis  I70.0 440.0       6. Primary osteoarthritis of right knee  M17.11 715.16       1. Essential hypertension  Low salt diet.  Enouraged to increase in physical activity at least 30 minutes of brisk walking/day , 5 days a week  Advised weight loss    Advised for DASH diet - The Dietary Approaches to Stop Hypertension (DASH) is high in vegetables, fruits, low-fat dairy products, whole grains, poultry, fish, and nuts and low in sweets, sugar-sweetened beverages, and red meats   Stop smoking.  Limit caffeine intake  Limit alcohol intake <3/day for men and <2/day for women.  Advised to be compliant with medication.  Please monitor your blood pressure regularly at home   Return precaution provided  Blood pressure well controlled  Bp log reviewed  Continue with Norvasc and lisinopril  Continued with Toprol xl    2. Stage 3a chronic kidney disease  - counseled on increase water intake at least 3 litre of H20 to remain hydrated  - stay away from nephrotoxic drugs like Ibuprofen and NSAID  - Counseled on the need to control HTN and Blood glucose to prevent the disease progression  - low salt diet  - dietary modification: renal diet encouraged  Reviewed lab work form 03/13/2023, stable creatinine and GFR  Repeat renal function panel today.    3. Mixed  hyperlipidemia  On Lipitor to be continued  - COMPREHENSIVE METABOLIC PANEL; Future  - LIPID PANEL; Future    4. Gastroesophageal reflux disease without esophagitis  Continue Prilosec    5. Abdominal aortic atherosclerosis  On Lipitor to be continued.    6. Primary osteoarthritis of right knee  S/p PT doing well   No concerns at this time      Future Appointments   Date Time Provider Department Center   8/2/2023  3:20 PM Jarrett Peck MD [facility]   RTC in 3 months.      Jarrett Peck MD

## 2023-08-03 ENCOUNTER — TELEPHONE (OUTPATIENT)
Dept: INTERNAL MEDICINE | Facility: CLINIC | Age: 78
End: 2023-08-03
Payer: MEDICARE

## 2023-08-03 LAB
CHOLEST SERPL-MCNC: 141 MG/DL (ref 120–199)
CHOLEST/HDLC SERPL: 3.4 {RATIO} (ref 2–5)
HDLC SERPL-MCNC: 42 MG/DL (ref 40–75)
HDLC SERPL: 29.8 % (ref 20–50)
LDLC SERPL CALC-MCNC: 72.8 MG/DL (ref 63–159)
NONHDLC SERPL-MCNC: 99 MG/DL
TRIGL SERPL-MCNC: 131 MG/DL (ref 30–150)

## 2023-08-03 NOTE — TELEPHONE ENCOUNTER
Patient returned call to office for result, result was given Kidney function has slightly decreased from previous labs. Make sure you are staying well hydrated. Avoid NSAIDs such as ibuprofen, Advil, Aleve, etc.      Other labs are normal/stable. Let me know if you have any questions or concerns.and understanding expressed

## 2023-08-03 NOTE — TELEPHONE ENCOUNTER
----- Message from Harsha Knox sent at 8/3/2023  2:55 PM CDT -----  Contact: DEA  .Type:  Patient Returning Call    Who Called:DEA   Who Left Message for Patient:NURSE  Does the patient know what this is regarding?:YES  Would the patient rather a call back or a response via [facility]? CALL  Best Call Back Number: .[phone]         Thanks

## 2023-08-16 DIAGNOSIS — K21.9 GASTROESOPHAGEAL REFLUX DISEASE WITHOUT ESOPHAGITIS: ICD-10-CM

## 2023-08-16 RX ORDER — OMEPRAZOLE 40 MG/1
40 CAPSULE, DELAYED RELEASE ORAL
Qty: 90 CAPSULE | Refills: 1 | Status: SHIPPED | OUTPATIENT
Start: 2023-08-16

## 2023-09-20 DIAGNOSIS — I10 ESSENTIAL HYPERTENSION: ICD-10-CM

## 2023-09-20 RX ORDER — METOPROLOL SUCCINATE 25 MG/1
TABLET, EXTENDED RELEASE ORAL
Qty: 90 TABLET | Refills: 3 | Status: SHIPPED | OUTPATIENT
Start: 2023-09-20

## 2023-10-09 DIAGNOSIS — I10 ESSENTIAL HYPERTENSION: ICD-10-CM

## 2023-10-09 DIAGNOSIS — N18.31 STAGE 3A CHRONIC KIDNEY DISEASE: ICD-10-CM

## 2023-10-09 DIAGNOSIS — F41.9 ANXIETY: ICD-10-CM

## 2023-10-09 DIAGNOSIS — Z63.8 CAREGIVER ROLE STRAIN: ICD-10-CM

## 2023-10-09 RX ORDER — SERTRALINE HYDROCHLORIDE 25 MG/1
TABLET, FILM COATED ORAL
Qty: 90 TABLET | Refills: 0 | Status: SHIPPED | OUTPATIENT
Start: 2023-10-09

## 2023-10-09 RX ORDER — LISINOPRIL 40 MG/1
40 TABLET ORAL DAILY
Qty: 90 TABLET | Refills: 0 | Status: SHIPPED | OUTPATIENT
Start: 2023-10-09 | End: 2024-04-03 | Stop reason: SDUPTHER

## 2023-10-09 RX ORDER — AMLODIPINE BESYLATE 5 MG/1
5 TABLET ORAL
Qty: 90 TABLET | Refills: 0 | Status: SHIPPED | OUTPATIENT
Start: 2023-10-09 | End: 2024-04-03 | Stop reason: SDUPTHER

## 2023-10-09 SDOH — SOCIAL DETERMINANTS OF HEALTH (SDOH): OTHER SPECIFIED PROBLEMS RELATED TO PRIMARY SUPPORT GROUP: Z63.8

## 2023-11-13 ENCOUNTER — OFFICE VISIT (OUTPATIENT)
Dept: INTERNAL MEDICINE | Facility: CLINIC | Age: 78
End: 2023-11-13
Payer: MEDICARE

## 2023-11-13 ENCOUNTER — LAB VISIT (OUTPATIENT)
Dept: LAB | Facility: HOSPITAL | Age: 78
End: 2023-11-13
Attending: STUDENT IN AN ORGANIZED HEALTH CARE EDUCATION/TRAINING PROGRAM
Payer: MEDICARE

## 2023-11-13 VITALS
TEMPERATURE: 98 F | SYSTOLIC BLOOD PRESSURE: 106 MMHG | HEART RATE: 61 BPM | WEIGHT: 171.75 LBS | RESPIRATION RATE: 18 BRPM | BODY MASS INDEX: 30.43 KG/M2 | OXYGEN SATURATION: 97 % | DIASTOLIC BLOOD PRESSURE: 66 MMHG | HEIGHT: 63 IN

## 2023-11-13 DIAGNOSIS — R73.9 HYPERGLYCEMIA: ICD-10-CM

## 2023-11-13 DIAGNOSIS — N18.31 STAGE 3A CHRONIC KIDNEY DISEASE: ICD-10-CM

## 2023-11-13 DIAGNOSIS — K21.9 GASTROESOPHAGEAL REFLUX DISEASE WITHOUT ESOPHAGITIS: ICD-10-CM

## 2023-11-13 DIAGNOSIS — E78.2 MIXED HYPERLIPIDEMIA: ICD-10-CM

## 2023-11-13 DIAGNOSIS — F41.9 ANXIETY: ICD-10-CM

## 2023-11-13 DIAGNOSIS — I10 ESSENTIAL HYPERTENSION: Primary | ICD-10-CM

## 2023-11-13 DIAGNOSIS — I70.0 ABDOMINAL AORTIC ATHEROSCLEROSIS: ICD-10-CM

## 2023-11-13 LAB
ALBUMIN SERPL BCP-MCNC: 3.8 G/DL (ref 3.5–5.2)
ALP SERPL-CCNC: 125 U/L (ref 55–135)
ALT SERPL W/O P-5'-P-CCNC: 25 U/L (ref 10–44)
ANION GAP SERPL CALC-SCNC: 10 MMOL/L (ref 8–16)
AST SERPL-CCNC: 26 U/L (ref 10–40)
BILIRUB SERPL-MCNC: 0.5 MG/DL (ref 0.1–1)
BUN SERPL-MCNC: 18 MG/DL (ref 8–23)
CALCIUM SERPL-MCNC: 9.2 MG/DL (ref 8.7–10.5)
CHLORIDE SERPL-SCNC: 103 MMOL/L (ref 95–110)
CO2 SERPL-SCNC: 26 MMOL/L (ref 23–29)
CREAT SERPL-MCNC: 0.9 MG/DL (ref 0.5–1.4)
EST. GFR  (NO RACE VARIABLE): >60 ML/MIN/1.73 M^2
ESTIMATED AVG GLUCOSE: 111 MG/DL (ref 68–131)
GLUCOSE SERPL-MCNC: 94 MG/DL (ref 70–110)
HBA1C MFR BLD: 5.5 % (ref 4–5.6)
POTASSIUM SERPL-SCNC: 4.6 MMOL/L (ref 3.5–5.1)
PROT SERPL-MCNC: 7.1 G/DL (ref 6–8.4)
SODIUM SERPL-SCNC: 139 MMOL/L (ref 136–145)

## 2023-11-13 PROCEDURE — 1126F AMNT PAIN NOTED NONE PRSNT: CPT | Mod: HCNC,CPTII,S$GLB, | Performed by: STUDENT IN AN ORGANIZED HEALTH CARE EDUCATION/TRAINING PROGRAM

## 2023-11-13 PROCEDURE — 1126F PR PAIN SEVERITY QUANTIFIED, NO PAIN PRESENT: ICD-10-PCS | Mod: HCNC,CPTII,S$GLB, | Performed by: STUDENT IN AN ORGANIZED HEALTH CARE EDUCATION/TRAINING PROGRAM

## 2023-11-13 PROCEDURE — 36415 COLL VENOUS BLD VENIPUNCTURE: CPT | Mod: HCNC,PO | Performed by: STUDENT IN AN ORGANIZED HEALTH CARE EDUCATION/TRAINING PROGRAM

## 2023-11-13 PROCEDURE — 80053 COMPREHEN METABOLIC PANEL: CPT | Mod: HCNC,PO | Performed by: STUDENT IN AN ORGANIZED HEALTH CARE EDUCATION/TRAINING PROGRAM

## 2023-11-13 PROCEDURE — 1101F PR PT FALLS ASSESS DOC 0-1 FALLS W/OUT INJ PAST YR: ICD-10-PCS | Mod: HCNC,CPTII,S$GLB, | Performed by: STUDENT IN AN ORGANIZED HEALTH CARE EDUCATION/TRAINING PROGRAM

## 2023-11-13 PROCEDURE — 99999 PR PBB SHADOW E&M-EST. PATIENT-LVL III: ICD-10-PCS | Mod: PBBFAC,HCNC,, | Performed by: STUDENT IN AN ORGANIZED HEALTH CARE EDUCATION/TRAINING PROGRAM

## 2023-11-13 PROCEDURE — 3078F PR MOST RECENT DIASTOLIC BLOOD PRESSURE < 80 MM HG: ICD-10-PCS | Mod: HCNC,CPTII,S$GLB, | Performed by: STUDENT IN AN ORGANIZED HEALTH CARE EDUCATION/TRAINING PROGRAM

## 2023-11-13 PROCEDURE — 83036 HEMOGLOBIN GLYCOSYLATED A1C: CPT | Mod: HCNC | Performed by: STUDENT IN AN ORGANIZED HEALTH CARE EDUCATION/TRAINING PROGRAM

## 2023-11-13 PROCEDURE — 1101F PT FALLS ASSESS-DOCD LE1/YR: CPT | Mod: HCNC,CPTII,S$GLB, | Performed by: STUDENT IN AN ORGANIZED HEALTH CARE EDUCATION/TRAINING PROGRAM

## 2023-11-13 PROCEDURE — 99214 PR OFFICE/OUTPT VISIT, EST, LEVL IV, 30-39 MIN: ICD-10-PCS | Mod: HCNC,S$GLB,, | Performed by: STUDENT IN AN ORGANIZED HEALTH CARE EDUCATION/TRAINING PROGRAM

## 2023-11-13 PROCEDURE — 3078F DIAST BP <80 MM HG: CPT | Mod: HCNC,CPTII,S$GLB, | Performed by: STUDENT IN AN ORGANIZED HEALTH CARE EDUCATION/TRAINING PROGRAM

## 2023-11-13 PROCEDURE — 3288F FALL RISK ASSESSMENT DOCD: CPT | Mod: HCNC,CPTII,S$GLB, | Performed by: STUDENT IN AN ORGANIZED HEALTH CARE EDUCATION/TRAINING PROGRAM

## 2023-11-13 PROCEDURE — 99999 PR PBB SHADOW E&M-EST. PATIENT-LVL III: CPT | Mod: PBBFAC,HCNC,, | Performed by: STUDENT IN AN ORGANIZED HEALTH CARE EDUCATION/TRAINING PROGRAM

## 2023-11-13 PROCEDURE — 99214 OFFICE O/P EST MOD 30 MIN: CPT | Mod: HCNC,S$GLB,, | Performed by: STUDENT IN AN ORGANIZED HEALTH CARE EDUCATION/TRAINING PROGRAM

## 2023-11-13 PROCEDURE — 3074F SYST BP LT 130 MM HG: CPT | Mod: HCNC,CPTII,S$GLB, | Performed by: STUDENT IN AN ORGANIZED HEALTH CARE EDUCATION/TRAINING PROGRAM

## 2023-11-13 PROCEDURE — 3074F PR MOST RECENT SYSTOLIC BLOOD PRESSURE < 130 MM HG: ICD-10-PCS | Mod: HCNC,CPTII,S$GLB, | Performed by: STUDENT IN AN ORGANIZED HEALTH CARE EDUCATION/TRAINING PROGRAM

## 2023-11-13 PROCEDURE — 3288F PR FALLS RISK ASSESSMENT DOCUMENTED: ICD-10-PCS | Mod: HCNC,CPTII,S$GLB, | Performed by: STUDENT IN AN ORGANIZED HEALTH CARE EDUCATION/TRAINING PROGRAM

## 2023-11-13 RX ORDER — MULTIVIT WITH MINERALS/HERBS
1 TABLET ORAL DAILY
COMMUNITY
End: 2024-02-21

## 2023-11-13 RX ORDER — FERROUS GLUCONATE 324(38)MG
65 TABLET ORAL
COMMUNITY
End: 2024-02-21

## 2023-11-13 NOTE — PROGRESS NOTES
Chief Complaint   Patient presents with    Hypertension     HPI: Amber Naranjo is a 78 y.o. female  with Pmhx listed below who presents to clinic for routine follow up. She reports to be doing well. Denies having any shortness of breath, chest pain, abdominal pain, palpitation, leg swelling, syncopal episode, nausea, vomiting, fever and other complains at present. Medication list has been reviewed and updated along with her. She reports to be compliant with her medication and has no issues taking it. No other questions and queries today.         Problem List:  Patient Active Problem List   Diagnosis    Essential hypertension    Diverticulosis of colon    Tortuous colon    Uterine fibroid    Hyperlipidemia    Glaucoma    GERD (gastroesophageal reflux disease)    Arthritis    Seasonal allergic rhinitis due to pollen    Overweight (BMI 25.0-29.9)    Osteopenia of multiple sites    Postmenopausal    Primary osteoarthritis of right knee    Benign paroxysmal positional vertigo of left ear    Spondylolisthesis at L5-S1 level    Abdominal aortic atherosclerosis    Dyspnea on exertion    Stage 3a chronic kidney disease    Caregiver role strain    Anxiety    Senile purpura       ROS: Negative except as noted above.       Current Meds:  Current Outpatient Medications   Medication Sig Dispense Refill    amLODIPine (NORVASC) 5 MG tablet TAKE 1 TABLET ONE TIME DAILY 90 tablet 0    aspirin (ECOTRIN) 81 MG EC tablet Take 81 mg by mouth once daily.      atorvastatin (LIPITOR) 20 MG tablet TAKE 1 TABLET EVERY DAY 90 tablet 2    b complex vitamins tablet Take 1 tablet by mouth once daily.      blood pressure monitor Kit       calcium carbonate (OS-NERI) 600 mg (1,500 mg) Tab Take 600 mg by mouth 2 (two) times daily with meals.      cetirizine (ZYRTEC) 10 MG tablet Take 10 mg by mouth once daily.      ferrous gluconate (FERGON) 324 MG tablet Take 65 mg by mouth daily with breakfast.      lisinopriL (PRINIVIL,ZESTRIL) 40 MG tablet Take 1  tablet (40 mg total) by mouth once daily. 90 tablet 0    metoprolol succinate (TOPROL-XL) 25 MG 24 hr tablet TAKE 1 TABLET EVERY DAY 90 tablet 3    multivitamin capsule Take 1 capsule by mouth once daily.      omeprazole (PRILOSEC) 40 MG capsule TAKE 1 CAPSULE EVERY DAY 90 capsule 1    sertraline (ZOLOFT) 25 MG tablet TAKE 1 TABLET EVERY DAY 90 tablet 0    timolol maleate 0.5% (TIMOPTIC) 0.5 % Drop       diclofenac sodium (VOLTAREN) 1 % Gel APPLY 2 GRAMS TOPICALLY TWO TIMES DAILY AS NEEDED. (Patient not taking: Reported on 11/13/2023) 100 g 0    MODERNA COVID BIVAL,6M UP,,PF, 50 mcg/0.5 mL injection       ORTHOVISC 30 mg/2 mL        No current facility-administered medications for this visit.      PE:  BP: 106/66  Pulse: 61 Resp: 18   Temp: 97.9 °F (36.6 °C)  Weight: 77.9 kg (171 lb 11.8 oz) Body mass index is 30.42 kg/m².    Wt Readings from Last 5 Encounters:   11/13/23 77.9 kg (171 lb 11.8 oz)   08/02/23 77.2 kg (170 lb 3.1 oz)   05/02/23 78.4 kg (172 lb 13.5 oz)   04/12/23 78.9 kg (173 lb 15.1 oz)   03/27/23 77 kg (169 lb 12.1 oz)     General appearance: alert and cooperative, not in acute distress  Head: normocephalic, without obvious abnormality, atraumatic  Eyes: conjunctivae/corneas clear. PERRL, EOM's intact.  Ears: clear tympanic membranes   Neck: no adenopathy, supple, symmetrical, trachea midline and thyroid not enlarged, symmetric, no tenderness/mass/nodules, no JVD  Throat: lips, mucosa, and tongue normal; teeth and gums normal; no thrush  Chest: no reproducible chest pain   Heart: regular rate and rhythm, S1, S2 normal, no murmur, click, rub or gallop  Lungs: unlabored respiration, bilateral equal air entry, normal vesicular breath sound heard, no wheezing, rhonchi   Abdomen: soft, non-tender, non-distended; bowel sounds +; no masses,  no organomegaly, no ascites   Extremities: normal, atraumatic, no cyanosis or edema noted B/L upper and lower extremities.  Skin: skin color, texture, turgor normal. No  rashes or lesions noted.  Neurologic: grossly intact      Lab:  Lab Results   Component Value Date    WBC 7.94 03/27/2023    HGB 12.4 03/27/2023    HCT 38.4 03/27/2023    MCV 96 03/27/2023     03/27/2023     08/02/2023    K 4.4 08/02/2023     08/02/2023    CO2 23 08/02/2023    BUN 29 (H) 08/02/2023     (H) 08/02/2023    CALCIUM 9.1 08/02/2023    AST 25 08/02/2023    ALT 23 08/02/2023    CHOL 141 08/02/2023    HDL 42 08/02/2023    LDLCALC 72.8 08/02/2023    TRIG 131 08/02/2023       Impression:    ICD-10-CM ICD-9-CM    1. Essential hypertension  I10 401.9       2. Stage 3a chronic kidney disease  N18.31 585.3 COMPREHENSIVE METABOLIC PANEL      3. Mixed hyperlipidemia  E78.2 272.2       4. Gastroesophageal reflux disease without esophagitis  K21.9 530.81       5. Abdominal aortic atherosclerosis  I70.0 440.0       6. Anxiety  F41.9 300.00       7. Hyperglycemia  R73.9 790.29 HEMOGLOBIN A1C        1. Essential hypertension  -Low salt diet.  Enouraged to increase in physical activity at least 30 minutes of brisk walking/day , 5 days a week  Advised weight loss    Advised for DASH diet - The Dietary Approaches to Stop Hypertension (DASH) is high in vegetables, fruits, low-fat dairy products, whole grains, poultry, fish, and nuts and low in sweets, sugar-sweetened beverages, and red meats   Stop smoking.  Limit caffeine intake  Limit alcohol intake <3/day for men and <2/day for women.  Advised to be compliant with medication.  Please monitor your blood pressure regularly at home   Return precaution provided  Continue with Norvasc and lisinopril  Continued with Toprol xl       2. Stage 3a chronic kidney disease   counseled on increase water intake at least 3 litre of H20 to remain hydrated  - stay away from nephrotoxic drugs like Ibuprofen and NSAID  - Counseled on the need to control HTN and Blood glucose to prevent the disease progression  - low salt diet  - dietary modification: renal diet  encouraged  Reviewed lab work form 08/02/2023, stable creatinine and GFR  - COMPREHENSIVE METABOLIC PANEL; Future    3. Mixed hyperlipidemia  On Lipitor to be continued   Reviewed lab from 8/02/2023: stable    4. Gastroesophageal reflux disease without esophagitis  Continue Prilosec       5. Abdominal aortic atherosclerosis  On Lipitor to be continued.     6. Anxiety  Stable on Zoloft to be continued.    7. Hyperglycemia  - HEMOGLOBIN A1C; Future    Future Appointments   Date Time Provider Department Center   11/13/2023 11:50 AM [facility] [place]   5/13/2024 10:00 AM Jarrett Peck MD [facility] [place]     Rtc in 6 months.    Jarrett ePck MD

## 2023-11-14 ENCOUNTER — TELEPHONE (OUTPATIENT)
Dept: INTERNAL MEDICINE | Facility: CLINIC | Age: 78
End: 2023-11-14
Payer: MEDICARE

## 2023-11-14 DIAGNOSIS — Z12.31 SCREENING MAMMOGRAM FOR BREAST CANCER: Primary | ICD-10-CM

## 2023-11-14 NOTE — TELEPHONE ENCOUNTER
Pt called states that she she received a letter that she is due for a mammo, requesting an order so that she may schedule. Message sent to Dr Peck to advise.

## 2023-11-14 NOTE — TELEPHONE ENCOUNTER
Pt was called and informed that it has not been a year since last mammogram was done and it had to be after 11/22/23. Pt states that she would call back after 11/22/23.

## 2023-12-14 ENCOUNTER — TELEPHONE (OUTPATIENT)
Dept: INTERNAL MEDICINE | Facility: CLINIC | Age: 78
End: 2023-12-14
Payer: MEDICARE

## 2023-12-14 DIAGNOSIS — Z12.31 SCREENING MAMMOGRAM FOR BREAST CANCER: Primary | ICD-10-CM

## 2023-12-18 ENCOUNTER — HOSPITAL ENCOUNTER (OUTPATIENT)
Dept: RADIOLOGY | Facility: HOSPITAL | Age: 78
Discharge: HOME | End: 2023-12-18
Attending: STUDENT IN AN ORGANIZED HEALTH CARE EDUCATION/TRAINING PROGRAM
Payer: MEDICARE

## 2023-12-18 VITALS — WEIGHT: 171.75 LBS | HEIGHT: 63 IN | BODY MASS INDEX: 30.43 KG/M2

## 2023-12-18 DIAGNOSIS — Z12.31 SCREENING MAMMOGRAM FOR BREAST CANCER: ICD-10-CM

## 2023-12-18 PROCEDURE — 77063 MAMMO DIGITAL SCREENING BILAT WITH TOMO: ICD-10-PCS | Mod: 26,HCNC,, | Performed by: RADIOLOGY

## 2023-12-18 PROCEDURE — 77063 BREAST TOMOSYNTHESIS BI: CPT | Mod: 26,HCNC,, | Performed by: RADIOLOGY

## 2023-12-18 PROCEDURE — 77067 MAMMO DIGITAL SCREENING BILAT WITH TOMO: ICD-10-PCS | Mod: 26,HCNC,, | Performed by: RADIOLOGY

## 2023-12-18 PROCEDURE — 77067 SCR MAMMO BI INCL CAD: CPT | Mod: TC,HCNC,PO

## 2023-12-18 PROCEDURE — 77067 SCR MAMMO BI INCL CAD: CPT | Mod: 26,HCNC,, | Performed by: RADIOLOGY

## 2024-01-31 DIAGNOSIS — Z78.0 MENOPAUSE: ICD-10-CM

## 2024-02-04 ENCOUNTER — HOSPITAL ENCOUNTER (EMERGENCY)
Facility: HOSPITAL | Age: 79
Discharge: HOME | End: 2024-02-04
Attending: EMERGENCY MEDICINE
Payer: MEDICARE

## 2024-02-04 VITALS
SYSTOLIC BLOOD PRESSURE: 124 MMHG | BODY MASS INDEX: 31.95 KG/M2 | HEART RATE: 61 BPM | TEMPERATURE: 98 F | OXYGEN SATURATION: 98 % | RESPIRATION RATE: 16 BRPM | HEIGHT: 62 IN | WEIGHT: 173.63 LBS | DIASTOLIC BLOOD PRESSURE: 56 MMHG

## 2024-02-04 DIAGNOSIS — I95.9 HYPOTENSION: ICD-10-CM

## 2024-02-04 DIAGNOSIS — J40 BRONCHITIS: Primary | ICD-10-CM

## 2024-02-04 LAB
ALBUMIN SERPL BCP-MCNC: 3.3 G/DL (ref 3.5–5.2)
ALP SERPL-CCNC: 115 U/L (ref 55–135)
ALT SERPL W/O P-5'-P-CCNC: 18 U/L (ref 10–44)
ANION GAP SERPL CALC-SCNC: 10 MMOL/L (ref 8–16)
AST SERPL-CCNC: 24 U/L (ref 10–40)
BASOPHILS # BLD AUTO: 0.04 K/UL (ref 0–0.2)
BASOPHILS NFR BLD: 0.5 % (ref 0–1.9)
BILIRUB SERPL-MCNC: 0.6 MG/DL (ref 0.1–1)
BNP SERPL-MCNC: 185 PG/ML (ref 0–99)
BUN SERPL-MCNC: 21 MG/DL (ref 8–23)
CALCIUM SERPL-MCNC: 8.5 MG/DL (ref 8.7–10.5)
CHLORIDE SERPL-SCNC: 105 MMOL/L (ref 95–110)
CO2 SERPL-SCNC: 21 MMOL/L (ref 23–29)
CREAT SERPL-MCNC: 1.1 MG/DL (ref 0.5–1.4)
CTP QC/QA: YES
DIFFERENTIAL METHOD BLD: ABNORMAL
EOSINOPHIL # BLD AUTO: 0.4 K/UL (ref 0–0.5)
EOSINOPHIL NFR BLD: 5.1 % (ref 0–8)
ERYTHROCYTE [DISTWIDTH] IN BLOOD BY AUTOMATED COUNT: 13.4 % (ref 11.5–14.5)
EST. GFR  (NO RACE VARIABLE): 51.4 ML/MIN/1.73 M^2
GLUCOSE SERPL-MCNC: 152 MG/DL (ref 70–110)
HCT VFR BLD AUTO: 33.7 % (ref 37–48.5)
HGB BLD-MCNC: 10.9 G/DL (ref 12–16)
IMM GRANULOCYTES # BLD AUTO: 0.03 K/UL (ref 0–0.04)
IMM GRANULOCYTES NFR BLD AUTO: 0.4 % (ref 0–0.5)
LACTATE SERPL-SCNC: 1.8 MMOL/L (ref 0.5–2.2)
LYMPHOCYTES # BLD AUTO: 1.1 K/UL (ref 1–4.8)
LYMPHOCYTES NFR BLD: 14.1 % (ref 18–48)
MAGNESIUM SERPL-MCNC: 2 MG/DL (ref 1.6–2.6)
MCH RBC QN AUTO: 31 PG (ref 27–31)
MCHC RBC AUTO-ENTMCNC: 32.3 G/DL (ref 32–36)
MCV RBC AUTO: 96 FL (ref 82–98)
MONOCYTES # BLD AUTO: 0.6 K/UL (ref 0.3–1)
MONOCYTES NFR BLD: 8.1 % (ref 4–15)
NEUTROPHILS # BLD AUTO: 5.5 K/UL (ref 1.8–7.7)
NEUTROPHILS NFR BLD: 71.8 % (ref 38–73)
NRBC BLD-RTO: 0 /100 WBC
PHOSPHATE SERPL-MCNC: 2.7 MG/DL (ref 2.7–4.5)
PLATELET # BLD AUTO: 154 K/UL (ref 150–450)
PMV BLD AUTO: 10.8 FL (ref 9.2–12.9)
POC MOLECULAR INFLUENZA A AGN: NEGATIVE
POC MOLECULAR INFLUENZA B AGN: NEGATIVE
POTASSIUM SERPL-SCNC: 4.2 MMOL/L (ref 3.5–5.1)
PROCALCITONIN SERPL IA-MCNC: 0.05 NG/ML
PROT SERPL-MCNC: 6.6 G/DL (ref 6–8.4)
RBC # BLD AUTO: 3.52 M/UL (ref 4–5.4)
SODIUM SERPL-SCNC: 136 MMOL/L (ref 136–145)
TROPONIN I SERPL DL<=0.01 NG/ML-MCNC: <0.006 NG/ML (ref 0–0.03)
WBC # BLD AUTO: 7.64 K/UL (ref 3.9–12.7)

## 2024-02-04 PROCEDURE — 99285 EMERGENCY DEPT VISIT HI MDM: CPT | Mod: 25,HCNC,ER

## 2024-02-04 PROCEDURE — 85025 COMPLETE CBC W/AUTO DIFF WBC: CPT | Mod: HCNC,ER | Performed by: EMERGENCY MEDICINE

## 2024-02-04 PROCEDURE — 87040 BLOOD CULTURE FOR BACTERIA: CPT | Mod: 59,HCNC | Performed by: EMERGENCY MEDICINE

## 2024-02-04 PROCEDURE — 96360 HYDRATION IV INFUSION INIT: CPT | Mod: HCNC,ER

## 2024-02-04 PROCEDURE — 93005 ELECTROCARDIOGRAM TRACING: CPT | Mod: HCNC,ER

## 2024-02-04 PROCEDURE — 83735 ASSAY OF MAGNESIUM: CPT | Mod: HCNC,ER | Performed by: EMERGENCY MEDICINE

## 2024-02-04 PROCEDURE — 84484 ASSAY OF TROPONIN QUANT: CPT | Mod: HCNC,ER | Performed by: EMERGENCY MEDICINE

## 2024-02-04 PROCEDURE — 86803 HEPATITIS C AB TEST: CPT | Mod: HCNC | Performed by: EMERGENCY MEDICINE

## 2024-02-04 PROCEDURE — 87389 HIV-1 AG W/HIV-1&-2 AB AG IA: CPT | Mod: HCNC | Performed by: EMERGENCY MEDICINE

## 2024-02-04 PROCEDURE — 87502 INFLUENZA DNA AMP PROBE: CPT | Mod: HCNC,ER

## 2024-02-04 PROCEDURE — 84145 PROCALCITONIN (PCT): CPT | Mod: HCNC,ER | Performed by: EMERGENCY MEDICINE

## 2024-02-04 PROCEDURE — 80053 COMPREHEN METABOLIC PANEL: CPT | Mod: HCNC,ER | Performed by: EMERGENCY MEDICINE

## 2024-02-04 PROCEDURE — 93010 ELECTROCARDIOGRAM REPORT: CPT | Mod: HCNC,,, | Performed by: STUDENT IN AN ORGANIZED HEALTH CARE EDUCATION/TRAINING PROGRAM

## 2024-02-04 PROCEDURE — 83605 ASSAY OF LACTIC ACID: CPT | Mod: HCNC,ER | Performed by: EMERGENCY MEDICINE

## 2024-02-04 PROCEDURE — 84100 ASSAY OF PHOSPHORUS: CPT | Mod: HCNC,ER | Performed by: EMERGENCY MEDICINE

## 2024-02-04 PROCEDURE — 83880 ASSAY OF NATRIURETIC PEPTIDE: CPT | Mod: HCNC,ER | Performed by: EMERGENCY MEDICINE

## 2024-02-04 PROCEDURE — 25000003 PHARM REV CODE 250: Mod: HCNC,ER | Performed by: EMERGENCY MEDICINE

## 2024-02-04 RX ORDER — PROMETHAZINE HYDROCHLORIDE AND DEXTROMETHORPHAN HYDROBROMIDE 6.25; 15 MG/5ML; MG/5ML
5 SYRUP ORAL EVERY 4 HOURS PRN
Qty: 118 ML | Refills: 0 | Status: SHIPPED | OUTPATIENT
Start: 2024-02-04 | End: 2024-02-14

## 2024-02-04 RX ORDER — SODIUM CHLORIDE 9 MG/ML
1000 INJECTION, SOLUTION INTRAVENOUS
Status: COMPLETED | OUTPATIENT
Start: 2024-02-04 | End: 2024-02-04

## 2024-02-04 RX ORDER — AMOXICILLIN AND CLAVULANATE POTASSIUM 875; 125 MG/1; MG/1
1 TABLET, FILM COATED ORAL 2 TIMES DAILY
Qty: 14 TABLET | Refills: 0 | Status: SHIPPED | OUTPATIENT
Start: 2024-02-04 | End: 2024-02-21

## 2024-02-04 RX ORDER — AZITHROMYCIN 250 MG/1
TABLET, FILM COATED ORAL
Qty: 6 TABLET | Refills: 0 | Status: SHIPPED | OUTPATIENT
Start: 2024-02-04 | End: 2024-02-21

## 2024-02-04 RX ADMIN — SODIUM CHLORIDE 1000 ML: 9 INJECTION, SOLUTION INTRAVENOUS at 09:02

## 2024-02-04 NOTE — ED PROVIDER NOTES
Encounter Date: 2/4/2024       History     Chief Complaint   Patient presents with    Cough     Cough, Chest hurts from coughing, Bilateral ear pain for a few days.     The history is provided by the patient.   Cough  This is a new problem. The current episode started yesterday. The problem occurs hourly. The problem has been unchanged. The cough is Non-productive. Associated symptoms include chest pain, chills, ear congestion, rhinorrhea and myalgias. Pertinent negatives include no headaches, no sore throat, no shortness of breath and no wheezing.     Review of patient's allergies indicates:  No Known Allergies  Past Medical History:   Diagnosis Date    Allergy     Anxiety 4/25/2022    Arthritis     Back pain     Disorder of kidney and ureter     Diverticulosis of colon     GERD (gastroesophageal reflux disease)     Glaucoma     Hyperlipidemia     Hypertension     Ingrown toenail     Klebsiella cystitis     Obesity     Pain in joint involving multiple sites     Pneumonia     Tortuous colon     Trouble in sleeping     Unspecified disorder of autonomic nervous system     Uterine fibroid      Past Surgical History:   Procedure Laterality Date    COLONOSCOPY      COLONOSCOPY N/A 3/15/2017    Procedure: COLONOSCOPY;  Surgeon: Yogi Carbone MD;  Location: [facility];  Service: Endoscopy;  Laterality: N/A;     Family History   Problem Relation Age of Onset    Glaucoma Mother     Hypertension Father     Asthma Daughter     Depression Daughter     Colon cancer Sister     Cancer Sister 70        colon    Diabetes Sister     Hypertension Sister     Hyperlipidemia Sister     Diabetes Brother     Hypertension Brother     Heart disease Brother     Hyperlipidemia Brother     Asthma Paternal Aunt     Diabetes Paternal Uncle     Diabetes Maternal Grandmother     Early death Maternal Grandfather     Early death Paternal Grandfather     Breast cancer Cousin     COPD Neg Hx     Kidney disease Neg Hx     Stroke Neg Hx     Mental illness  Neg Hx      Social History     Tobacco Use    Smoking status: Never     Passive exposure: Yes    Smokeless tobacco: Never   Substance Use Topics    Alcohol use: No    Drug use: No     Review of Systems   Constitutional:  Positive for chills. Negative for fever.   HENT:  Positive for congestion and rhinorrhea. Negative for sore throat.    Respiratory:  Positive for cough. Negative for shortness of breath and wheezing.    Cardiovascular:  Positive for chest pain.   Gastrointestinal:  Negative for nausea.   Genitourinary:  Negative for dysuria.   Musculoskeletal:  Positive for myalgias. Negative for back pain.   Skin:  Negative for rash.   Neurological:  Negative for weakness and headaches.   Hematological:  Does not bruise/bleed easily.       Physical Exam     Initial Vitals   BP Pulse Resp Temp SpO2   02/04/24 0849 02/04/24 0849 02/04/24 0848 02/04/24 0849 02/04/24 0849   (S) (!) 86/49 61 17 98.3 °F (36.8 °C) 97 %      MAP       --                Physical Exam    Nursing note and vitals reviewed.  Constitutional: She appears well-developed and well-nourished. No distress.   Elderly   HENT:   Head: Normocephalic and atraumatic.   Mouth/Throat: Oropharynx is clear and moist.   Eyes: Conjunctivae and EOM are normal. Pupils are equal, round, and reactive to light.   Neck: Neck supple.   Normal range of motion.  Cardiovascular:  Normal rate, regular rhythm and normal heart sounds.           Pulmonary/Chest: Breath sounds normal. No respiratory distress.   Abdominal: Abdomen is soft. Bowel sounds are normal. She exhibits no distension. There is no abdominal tenderness.   Musculoskeletal:         General: Normal range of motion.      Cervical back: Normal range of motion and neck supple.     Neurological: She is alert and oriented to person, place, and time. She has normal strength.   Skin: Skin is warm and dry.   Psychiatric: She has a normal mood and affect. Thought content normal.         ED Course   Procedures  Labs  Reviewed   CBC W/ AUTO DIFFERENTIAL - Abnormal; Notable for the following components:       Result Value    RBC 3.52 (*)     Hemoglobin 10.9 (*)     Hematocrit 33.7 (*)     Lymph % 14.1 (*)     All other components within normal limits    Narrative:     Release to patient->Immediate   COMPREHENSIVE METABOLIC PANEL - Abnormal; Notable for the following components:    CO2 21 (*)     Glucose 152 (*)     Calcium 8.5 (*)     Albumin 3.3 (*)     eGFR 51.4 (*)     All other components within normal limits    Narrative:     Release to patient->Immediate   B-TYPE NATRIURETIC PEPTIDE - Abnormal; Notable for the following components:     (*)     All other components within normal limits    Narrative:     Release to patient->Immediate   CULTURE, BLOOD   CULTURE, BLOOD   LACTIC ACID, PLASMA   MAGNESIUM    Narrative:     Release to patient->Immediate   PHOSPHORUS    Narrative:     Release to patient->Immediate   TROPONIN I    Narrative:     Release to patient->Immediate   PROCALCITONIN    Narrative:     Release to patient->Immediate   HIV 1 / 2 ANTIBODY   HEPATITIS C ANTIBODY   HEP C VIRUS HOLD SPECIMEN   URINALYSIS, REFLEX TO URINE CULTURE   POCT INFLUENZA A/B MOLECULAR   SARS-COV-2 RDRP GENE     Results for orders placed or performed during the hospital encounter of 02/04/24   CBC auto differential   Result Value Ref Range    WBC 7.64 3.90 - 12.70 K/uL    RBC 3.52 (L) 4.00 - 5.40 M/uL    Hemoglobin 10.9 (L) 12.0 - 16.0 g/dL    Hematocrit 33.7 (L) 37.0 - 48.5 %    MCV 96 82 - 98 fL    MCH 31.0 27.0 - 31.0 pg    MCHC 32.3 32.0 - 36.0 g/dL    RDW 13.4 11.5 - 14.5 %    Platelets 154 150 - 450 K/uL    MPV 10.8 9.2 - 12.9 fL    Immature Granulocytes 0.4 0.0 - 0.5 %    Gran # (ANC) 5.5 1.8 - 7.7 K/uL    Immature Grans (Abs) 0.03 0.00 - 0.04 K/uL    Lymph # 1.1 1.0 - 4.8 K/uL    Mono # 0.6 0.3 - 1.0 K/uL    Eos # 0.4 0.0 - 0.5 K/uL    Baso # 0.04 0.00 - 0.20 K/uL    nRBC 0 0 /100 WBC    Gran % 71.8 38.0 - 73.0 %    Lymph % 14.1  (L) 18.0 - 48.0 %    Mono % 8.1 4.0 - 15.0 %    Eosinophil % 5.1 0.0 - 8.0 %    Basophil % 0.5 0.0 - 1.9 %    Differential Method Automated    Comprehensive metabolic panel   Result Value Ref Range    Sodium 136 136 - 145 mmol/L    Potassium 4.2 3.5 - 5.1 mmol/L    Chloride 105 95 - 110 mmol/L    CO2 21 (L) 23 - 29 mmol/L    Glucose 152 (H) 70 - 110 mg/dL    BUN 21 8 - 23 mg/dL    Creatinine 1.1 0.5 - 1.4 mg/dL    Calcium 8.5 (L) 8.7 - 10.5 mg/dL    Total Protein 6.6 6.0 - 8.4 g/dL    Albumin 3.3 (L) 3.5 - 5.2 g/dL    Total Bilirubin 0.6 0.1 - 1.0 mg/dL    Alkaline Phosphatase 115 55 - 135 U/L    AST 24 10 - 40 U/L    ALT 18 10 - 44 U/L    eGFR 51.4 (A) >60 mL/min/1.73 m^2    Anion Gap 10 8 - 16 mmol/L   Lactic acid, plasma #1   Result Value Ref Range    Lactate (Lactic Acid) 1.8 0.5 - 2.2 mmol/L   Magnesium   Result Value Ref Range    Magnesium 2.0 1.6 - 2.6 mg/dL   Phosphorus   Result Value Ref Range    Phosphorus 2.7 2.7 - 4.5 mg/dL   Brain natriuretic peptide   Result Value Ref Range     (H) 0 - 99 pg/mL   Troponin I   Result Value Ref Range    Troponin I <0.006 0.000 - 0.026 ng/mL   Procalcitonin   Result Value Ref Range    Procalcitonin 0.05 <0.25 ng/mL   POCT Influenza A/B Molecular   Result Value Ref Range    POC Molecular Influenza A Ag Negative Negative, Not Reported    POC Molecular Influenza B Ag Negative Negative, Not Reported     Acceptable Yes        EKG Readings: (Independently Interpreted)   Rhythm: Sinus Arrhythmia. Heart Rate: 65. Ectopy: No Ectopy. Conduction: 1st Degree AV Block and RBBB. ST Segments: Normal ST Segments. T Waves: Normal. Axis: Normal. Clinical Impression: Sinus Arrhythmia and AV Block - 1st Degree       Imaging Results              X-Ray Chest AP Portable (Final result)  Result time 02/04/24 09:47:59      Final result by Emile Wade MD (02/04/24 09:47:59)                   Impression:      No acute findings.      Electronically signed by: Emile Wade,  MD  Date:    02/04/2024  Time:    09:47               Narrative:    EXAMINATION:  XR CHEST AP PORTABLE    CLINICAL HISTORY:  Sepsis;    TECHNIQUE:  Single frontal view of the chest was performed.    COMPARISON:  09/20/2021    FINDINGS:  The cardiomediastinal silhouette is normal.  Aortic atherosclerosis.    Stable left upper lobe suprahilar linear scarring.  No acute infiltrates or effusions.    No acute osseous findings.  No advanced arthritic changes.  Carotid atherosclerosis                                  10:32 AM Shared decision making with patient regarding symptoms, low blood pressure, pt is choosing to cover c Antibiotics pending Blood Cultures and close follow up when a withdrawal of abx can be contemplated.    10:34 AM - Counseling: Spoke with the patient and discussed todays findings, in addition to providing specific details for the plan of care and counseling regarding the diagnosis and prognosis. Questions are answered at this time.        Medications   0.9%  NaCl infusion (0 mLs Intravenous Stopped 2/4/24 1021)     Medical Decision Making  DDx: Pneumonia, Sepsis, ACS, Bronchitis    Problems Addressed:  Bronchitis: acute illness or injury  Hypotension: acute illness or injury    Amount and/or Complexity of Data Reviewed  Labs: ordered.  Radiology: ordered and independent interpretation performed.     Details: Agree c radiology NAF  ECG/medicine tests: ordered and independent interpretation performed. Decision-making details documented in ED Course.    Risk  Prescription drug management.                                      Clinical Impression:  Final diagnoses:  [I95.9] Hypotension  [J40] Bronchitis (Primary)          ED Disposition Condition    Discharge Stable          ED Prescriptions       Medication Sig Dispense Start Date End Date Auth. Provider    amoxicillin-clavulanate 875-125mg (AUGMENTIN) 875-125 mg per tablet Take 1 tablet by mouth 2 (two) times daily. 14 tablet 2/4/2024 -- Jessica  All DAVIES MD    azithromycin (ZITHROMAX Z-ROSANA) 250 MG tablet 2 tabs po d #1, then 1 po d #2-5 6 tablet 2/4/2024 -- All Lopez MD    promethazine-dextromethorphan (PROMETHAZINE-DM) 6.25-15 mg/5 mL Syrp Take 5 mLs by mouth every 4 (four) hours as needed. 118 mL 2/4/2024 2/14/2024 All Lopez MD          Follow-up Information       Follow up With Specialties Details Why Contact Info    Jarrett Peck MD Internal Medicine, Family Medicine Call in 2 days  [id number] [address]  [place] [id number] [phone]      [facility] Emergency Dept Emergency Medicine  If symptoms worsen [id number] [facility] [address]  [place] [id number] [phone]             All Lopez MD  02/04/24 2892

## 2024-02-05 ENCOUNTER — TELEPHONE (OUTPATIENT)
Dept: INTERNAL MEDICINE | Facility: CLINIC | Age: 79
End: 2024-02-05
Payer: MEDICARE

## 2024-02-05 LAB
HCV AB SERPL QL IA: NEGATIVE
HEP C VIRUS HOLD SPECIMEN: NORMAL
HIV 1+2 AB+HIV1 P24 AG SERPL QL IA: NEGATIVE

## 2024-02-05 NOTE — TELEPHONE ENCOUNTER
----- Message from Dorothea Burnett sent at 2/5/2024  8:12 AM CST -----  Type:  Needs Medical Advice    Who Called: pt   Symptoms (please be specific):bad cough   How long has patient had these symptoms:  1 week    Would the patient rather a call back or a response via [facility]? Call back  Best Call Back Number: [phone]  Additional Information: pt would like to get a return call pt was treated in the ER on 02/04/24 and she would like to ask a question before getting scheduled

## 2024-02-05 NOTE — TELEPHONE ENCOUNTER
Spoke with patient and she advised me of her recent ER visit for Bronchitis and stated the ER said she needs a hospital follow up but she doesn't feel the need to be seen. I advised her that if she does not get better or has any concerns she is more than welcome to give us a call back to schedule a visit. I advised her that I would send this information to Dr. Peck.

## 2024-02-09 LAB
BACTERIA BLD CULT: NORMAL
BACTERIA BLD CULT: NORMAL

## 2024-02-21 ENCOUNTER — OFFICE VISIT (OUTPATIENT)
Dept: HOME HEALTH SERVICES | Facility: CLINIC | Age: 79
End: 2024-02-21
Payer: MEDICARE

## 2024-02-21 VITALS
WEIGHT: 177 LBS | HEIGHT: 62 IN | BODY MASS INDEX: 32.57 KG/M2 | SYSTOLIC BLOOD PRESSURE: 124 MMHG | HEART RATE: 64 BPM | TEMPERATURE: 98 F | OXYGEN SATURATION: 99 % | DIASTOLIC BLOOD PRESSURE: 65 MMHG

## 2024-02-21 DIAGNOSIS — R06.09 DYSPNEA ON EXERTION: ICD-10-CM

## 2024-02-21 DIAGNOSIS — N18.31 STAGE 3A CHRONIC KIDNEY DISEASE: ICD-10-CM

## 2024-02-21 DIAGNOSIS — Z00.00 ENCOUNTER FOR PREVENTIVE HEALTH EXAMINATION: Primary | ICD-10-CM

## 2024-02-21 DIAGNOSIS — F41.9 ANXIETY: ICD-10-CM

## 2024-02-21 DIAGNOSIS — D69.2 SENILE PURPURA: ICD-10-CM

## 2024-02-21 DIAGNOSIS — M43.17 SPONDYLOLISTHESIS AT L5-S1 LEVEL: ICD-10-CM

## 2024-02-21 DIAGNOSIS — M17.11 PRIMARY OSTEOARTHRITIS OF RIGHT KNEE: ICD-10-CM

## 2024-02-21 DIAGNOSIS — K21.9 GASTROESOPHAGEAL REFLUX DISEASE WITHOUT ESOPHAGITIS: ICD-10-CM

## 2024-02-21 DIAGNOSIS — I10 ESSENTIAL HYPERTENSION: ICD-10-CM

## 2024-02-21 DIAGNOSIS — I70.0 ABDOMINAL AORTIC ATHEROSCLEROSIS: ICD-10-CM

## 2024-02-21 DIAGNOSIS — E66.9 OBESITY (BMI 30.0-34.9): ICD-10-CM

## 2024-02-21 DIAGNOSIS — K57.30 DIVERTICULOSIS OF COLON: ICD-10-CM

## 2024-02-21 DIAGNOSIS — D25.9 UTERINE LEIOMYOMA, UNSPECIFIED LOCATION: ICD-10-CM

## 2024-02-21 DIAGNOSIS — E78.2 MIXED HYPERLIPIDEMIA: ICD-10-CM

## 2024-02-21 DIAGNOSIS — J30.1 SEASONAL ALLERGIC RHINITIS DUE TO POLLEN: ICD-10-CM

## 2024-02-21 DIAGNOSIS — H40.9 GLAUCOMA OF BOTH EYES, UNSPECIFIED GLAUCOMA TYPE: ICD-10-CM

## 2024-02-21 DIAGNOSIS — M85.89 OSTEOPENIA OF MULTIPLE SITES: ICD-10-CM

## 2024-02-21 PROBLEM — H81.12 BENIGN PAROXYSMAL POSITIONAL VERTIGO OF LEFT EAR: Status: RESOLVED | Noted: 2019-05-16 | Resolved: 2024-02-21

## 2024-02-21 PROBLEM — Z63.8 CAREGIVER ROLE STRAIN: Status: RESOLVED | Noted: 2022-04-25 | Resolved: 2024-02-21

## 2024-02-21 PROCEDURE — 3078F DIAST BP <80 MM HG: CPT | Mod: CPTII,S$GLB,, | Performed by: NURSE PRACTITIONER

## 2024-02-21 PROCEDURE — G0439 PPPS, SUBSEQ VISIT: HCPCS | Mod: S$GLB,,, | Performed by: NURSE PRACTITIONER

## 2024-02-21 PROCEDURE — 1126F AMNT PAIN NOTED NONE PRSNT: CPT | Mod: CPTII,S$GLB,, | Performed by: NURSE PRACTITIONER

## 2024-02-21 PROCEDURE — 1160F RVW MEDS BY RX/DR IN RCRD: CPT | Mod: CPTII,S$GLB,, | Performed by: NURSE PRACTITIONER

## 2024-02-21 PROCEDURE — G9919 SCRN ND POS ND PROV OF REC: HCPCS | Mod: CPTII,S$GLB,, | Performed by: NURSE PRACTITIONER

## 2024-02-21 PROCEDURE — 1159F MED LIST DOCD IN RCRD: CPT | Mod: CPTII,S$GLB,, | Performed by: NURSE PRACTITIONER

## 2024-02-21 PROCEDURE — 3074F SYST BP LT 130 MM HG: CPT | Mod: CPTII,S$GLB,, | Performed by: NURSE PRACTITIONER

## 2024-02-21 PROCEDURE — 3288F FALL RISK ASSESSMENT DOCD: CPT | Mod: CPTII,S$GLB,, | Performed by: NURSE PRACTITIONER

## 2024-02-21 PROCEDURE — 1170F FXNL STATUS ASSESSED: CPT | Mod: CPTII,S$GLB,, | Performed by: NURSE PRACTITIONER

## 2024-02-21 PROCEDURE — 1101F PT FALLS ASSESS-DOCD LE1/YR: CPT | Mod: CPTII,S$GLB,, | Performed by: NURSE PRACTITIONER

## 2024-02-21 RX ORDER — BUTALB/ACETAMINOPHEN/CAFFEINE 50-325-40
1 TABLET ORAL 2 TIMES DAILY
COMMUNITY

## 2024-02-21 NOTE — PATIENT INSTRUCTIONS
Counseling and Referral of Other Preventative  (Italic type indicates deductible and co-insurance are waived)    Patient Name: Amber Naranjo  Today's Date: 2/21/2024    Health Maintenance       Date Due Completion Date    RSV Vaccine (Age 60+ and Pregnant patients) (1 - 1-dose 60+ series) Never done ---    DEXA Scan 06/15/2023 6/15/2020    COVID-19 Vaccine (6 - 2023-24 season) 09/01/2024 (Originally 10/5/2023) 8/10/2023    Lipid Panel 08/02/2024 8/2/2023    Mammogram 12/18/2024 12/18/2023    Override on 9/15/2015: Done (PER CARE EVERYWHERE)    Colonoscopy 03/15/2027 3/15/2017    TETANUS VACCINE 05/23/2027 5/23/2017    Override on 5/23/2017: Done        No orders of the defined types were placed in this encounter.    The following information is provided to all patients.  This information is to help you find resources for any of the problems found today that may be affecting your health:                  Living healthy guide: www.[facility].[place].gov      Understanding Diabetes: www.diabetes.org      Eating healthy: www.cdc.gov/healthyweight      CDC home safety checklist: www.cdc.gov/steadi/patient.html      Agency on Aging: www.goea.[place].gov      Alcoholics anonymous (AA): www.aa.org      Physical Activity: www.kenny.nih.gov/yf4hxwm      Tobacco use: www.quitwithusla.org

## 2024-02-21 NOTE — Clinical Note
Medicare awv complete. Health maintenance:  rsv vaccine due-encouraged pt to obtain at a pharmacy.  Dexa scan due and already ordered. Message sent to ma to call pt to schedule.  Pt states lisinopril may be causing a dry cough/scratchy throat.

## 2024-02-21 NOTE — PROGRESS NOTES
"Amber Naranjo presented for a follow-up Medicare AWV today. The following components were reviewed and updated:    Medical history  Family History  Social history  Allergies and Current Medications  Health Risk Assessment  Health Maintenance  Care Team    **See Completed Assessments for Annual Wellness visit with in the encounter summary    The following assessments were completed:  Depression Screening  Cognitive function Screening    Timed Get Up Test  Whisper Test      Opioid documentation:      Patient does not have a current opioid prescription.          Vitals:    02/21/24 1137   BP: 124/65   Pulse: 64   Temp: 97.9 °F (36.6 °C)   TempSrc: Temporal   SpO2: 99%   Weight: 80.3 kg (177 lb)   Height: 5' 2" (1.575 m)     Body mass index is 32.37 kg/m².       Physical Exam  HENT:      Mouth/Throat:      Mouth: Mucous membranes are moist.   Cardiovascular:      Rate and Rhythm: Normal rate and regular rhythm.      Pulses: Normal pulses.      Heart sounds: Normal heart sounds.   Pulmonary:      Effort: Pulmonary effort is normal.      Breath sounds: Normal breath sounds.   Skin:     General: Skin is warm and dry.      Findings: Bruising (right forearm) present.   Neurological:      General: No focal deficit present.      Mental Status: She is alert and oriented to person, place, and time.   Psychiatric:         Mood and Affect: Mood normal.         Behavior: Behavior normal.           Diagnoses and health risks identified today and associated recommendations/orders:  1. Encounter for preventive health examination  Medicare awv complete. Health maintenance:  rsv vaccine due-encouraged pt to obtain at a pharmacy.  Dexa scan due and already ordered. Message sent to ma to call pt to schedule.   Pt states lisinopril may be causing a dry cough/scratchy throat.     2. Abdominal aortic atherosclerosis  Stable. Asymptomatic. Continue ASA and statin. Followed by PCP and cardiology.     3. Stage 3a chronic kidney disease   Latest " Reference Range & Units 09/27/22 08:55 03/27/23 09:07 08/02/23 15:14 11/13/23 10:38 02/04/24 09:10   eGFR >60 mL/min/1.73 m^2 58.4 ! 58.0 ! 46.6 ! >60.0 51.4 !   !: Data is abnormal  Chronic and stable. Continue current management. Recommend avoid nsaids and other nephrotoxic medications. Follow up with PCP/nephrologist.      4. Senile purpura  Chronic and stable. Continue current management. No acute bleeding issues. Cbc reviewed. See photo above. Follow up with PCP.     5. Essential hypertension  Chronic and stable on BP medication.  Continue current management.  On amlodipine, lisinopril and toprol. See med list above. Recommend low sodium diet. Follow up with PCP.       6. Mixed hyperlipidemia  Lab Results   Component Value Date    CHOL 141 08/02/2023    CHOL 132 09/27/2022    CHOL 140 11/22/2021     Lab Results   Component Value Date    HDL 42 08/02/2023    HDL 44 09/27/2022    HDL 41 11/22/2021     Lab Results   Component Value Date    LDLCALC 72.8 08/02/2023    LDLCALC 72.4 09/27/2022    LDLCALC 77.6 11/22/2021     Lab Results   Component Value Date    TRIG 131 08/02/2023    TRIG 78 09/27/2022    TRIG 107 11/22/2021       Lab Results   Component Value Date    CHOLHDL 29.8 08/02/2023    CHOLHDL 33.3 09/27/2022    CHOLHDL 29.3 11/22/2021    Chronic and stable on statin medication. Lipitor. Continue current. F/u with pcp.      7. Dyspnea on exertion  Chronic and stable. Continue current management. Not on any medications. She states her doctors are aware and is normal for her. Sob is with extensive exertions. O2 sats normal. See vitals. Follow up with PCP and cardiology.     8. Glaucoma of both eyes, unspecified glaucoma type  Chronic and stable. Continue current management. Timolol eye drops. See med list above. Follow up with ophthalmology.      9. Seasonal allergic rhinitis due to pollen  Chronic and stable. Continue current management. Zyrtec. See med list. Follow up with PCP.     10. Anxiety  Chronic and  stable. Continue current management. On zoloft. See med list. Follow up with PCP.     12. Obesity (BMI 30.0-34.9)  Chronic. Recommend diet and exercise to lose weight. Follow up with your PCP as planned to discuss adjustments to your treatment plan.      13. Gastroesophageal reflux disease without esophagitis  Chronic and stable. Continue current management. Omeprazole. See med list. Follow up with PCP.     14. Diverticulosis of colon  Stable. No acute issues. F/u with pcp.     15. Spondylolisthesis at L5-S1 level  Stable. No acute issues. F/u with pcp.     16. Primary osteoarthritis of right knee  Stable. No acute issues. F/u with pcp.     17. Osteopenia of multiple sites  Chronic and stable on current management. Continue vitamin d, calcium in the diet, and weight bearing exercise. See med list above. Follow up with PCP.             Provided Amber with a 5-10 year written screening schedule and personal prevention plan. Recommendations were developed using the USPSTF age appropriate recommendations. Education, counseling, and referrals were provided as needed.  After Visit Summary printed and given to patient which includes a list of additional screenings\tests needed.    Follow up in about 1 year (around 2/21/2025) for annual wellness visit.      ANAYELI Angel      I offered to discuss advanced care planning, including how to pick a person who would make decisions for you if you were unable to make them for yourself, called a health care power of , and what kind of decisions you might make such as use of life sustaining treatments such as ventilators and tube feeding when faced with a life limiting illness recorded on a living will that they will need to know. (How you want to be cared for as you near the end of your natural life)     X  Patient has advanced directives written and agrees to provide copies to the institution.

## 2024-02-22 ENCOUNTER — TELEPHONE (OUTPATIENT)
Dept: ADMINISTRATIVE | Facility: CLINIC | Age: 79
End: 2024-02-22
Payer: MEDICARE

## 2024-02-22 NOTE — TELEPHONE ENCOUNTER
----- Message from ANAYELI Angel sent at 2/21/2024 11:58 AM CST -----  Please call pt to schedule her dexa scan.

## 2024-02-22 NOTE — TELEPHONE ENCOUNTER
Called pt; no answer; left message informing pt I was calling to schedule her an appt for her dexa scan per provider's message and to return my call; left my name and number

## 2024-03-04 ENCOUNTER — APPOINTMENT (OUTPATIENT)
Dept: RADIOLOGY | Facility: HOSPITAL | Age: 79
End: 2024-03-04
Attending: STUDENT IN AN ORGANIZED HEALTH CARE EDUCATION/TRAINING PROGRAM
Payer: MEDICARE

## 2024-03-04 DIAGNOSIS — Z78.0 MENOPAUSE: ICD-10-CM

## 2024-03-04 DIAGNOSIS — M85.89 OSTEOPENIA OF MULTIPLE SITES: Primary | ICD-10-CM

## 2024-03-04 PROCEDURE — 77080 DXA BONE DENSITY AXIAL: CPT | Mod: TC

## 2024-03-04 PROCEDURE — 77080 DXA BONE DENSITY AXIAL: CPT | Mod: 26,HCNC,ICN, | Performed by: RADIOLOGY

## 2024-03-04 RX ORDER — ALENDRONATE SODIUM 70 MG/1
70 TABLET ORAL
Qty: 4 TABLET | Refills: 11 | Status: SHIPPED | OUTPATIENT
Start: 2024-03-04 | End: 2025-03-04

## 2024-03-18 ENCOUNTER — HOSPITAL ENCOUNTER (EMERGENCY)
Facility: HOSPITAL | Age: 79
Discharge: HOME | End: 2024-03-18
Attending: EMERGENCY MEDICINE
Payer: MEDICARE

## 2024-03-18 VITALS
OXYGEN SATURATION: 99 % | RESPIRATION RATE: 18 BRPM | TEMPERATURE: 98 F | SYSTOLIC BLOOD PRESSURE: 126 MMHG | DIASTOLIC BLOOD PRESSURE: 60 MMHG | HEART RATE: 65 BPM

## 2024-03-18 DIAGNOSIS — M79.671 PAIN OF RIGHT HEEL: ICD-10-CM

## 2024-03-18 PROCEDURE — 99283 EMERGENCY DEPT VISIT LOW MDM: CPT | Mod: 25,HCNC,ER

## 2024-03-18 NOTE — ED PROVIDER NOTES
History      Chief Complaint   Patient presents with    Heel Pain     Right, began today        Review of patient's allergies indicates:  No Known Allergies     HPI   HPI    3/18/2024, 6:21 PM   History obtained from the patient      History of Present Illness: Amber Naranjo is a 78 y.o. female patient who presents to the Emergency Department for right posterior heel pain onset today. Denies injury, fever.  Able to weight bear.  No further complaints or concerns at this time.           PCP: Jarrett Peck MD       Past Medical History:  Past Medical History:   Diagnosis Date    Allergy     Anxiety 4/25/2022    Arthritis     Back pain     Disorder of kidney and ureter     Diverticulosis of colon     GERD (gastroesophageal reflux disease)     Glaucoma     Hyperlipidemia     Hypertension     Ingrown toenail     Klebsiella cystitis     Obesity     Pain in joint involving multiple sites     Pneumonia     Tortuous colon     Trouble in sleeping     Unspecified disorder of autonomic nervous system     Uterine fibroid          Past Surgical History:  Past Surgical History:   Procedure Laterality Date    COLONOSCOPY      COLONOSCOPY N/A 3/15/2017    Procedure: COLONOSCOPY;  Surgeon: Yogi Carbone MD;  Location: [facility];  Service: Endoscopy;  Laterality: N/A;           Family History:  Family History   Problem Relation Age of Onset    Glaucoma Mother     Hypertension Father     Asthma Daughter     Depression Daughter     Colon cancer Sister     Cancer Sister 70        colon    Diabetes Sister     Hypertension Sister     Hyperlipidemia Sister     Diabetes Brother     Hypertension Brother     Heart disease Brother     Hyperlipidemia Brother     Asthma Paternal Aunt     Diabetes Paternal Uncle     Diabetes Maternal Grandmother     Early death Maternal Grandfather     Early death Paternal Grandfather     Breast cancer Cousin     COPD Neg Hx     Kidney disease Neg Hx     Stroke Neg Hx     Mental illness Neg Hx            Social  History:  Social History     Tobacco Use    Smoking status: Never     Passive exposure: Yes    Smokeless tobacco: Never   Substance and Sexual Activity    Alcohol use: No    Drug use: No    Sexual activity: Not Currently       ROS     Review of Systems   Constitutional:  Negative for fever.   Musculoskeletal:  Positive for arthralgias.       Physical Exam      Initial Vitals [03/18/24 1812]   BP Pulse Resp Temp SpO2   126/60 65 18 97.6 °F (36.4 °C) 99 %      MAP       --         Physical Exam  Vital signs and nursing notes reviewed.  Constitutional: Patient is in NAD. Awake and alert. Well-developed and well-nourished.  Head: Atraumatic. Normocephalic.  Eyes:  EOM intact. Conjunctivae nl. No scleral icterus.  ENT: Mucous membranes are moist.   Neck: Supple.  No meningismus  Cardiovascular: Regular rate and rhythm. No murmurs, rubs, or gallops.   Pulmonary/Chest: No respiratory distress. Clear to auscultation bilaterally. No wheezing, rales, or rhonchi.  Musculoskeletal: Moves all extremities. No edema.  Right foot with tenderness over posterior heel/achilles insertion.  FROM of ankle.  No heat edema or erythema  Skin: Warm and dry.  Neurological: Awake and alert. No acute focal neurological deficits are appreciated.  Psychiatric: Normal affect. Good eye contact. Appropriate in content.      ED Course          Procedures  ED Vital Signs:  Vitals:    03/18/24 1812   BP: 126/60   Pulse: 65   Resp: 18   Temp: 97.6 °F (36.4 °C)   TempSrc: Oral   SpO2: 99%                 Imaging Results:  Imaging Results              X-Ray Calcaneus 2 View Right (Final result)  Result time 03/18/24 19:10:08      Final result by Sadie Gutierrez MD (03/18/24 19:10:08)                   Impression:      Two-view exam    No acute fracture or destructive bony finding.  Calcaneal plantar spur and Achilles enthesophyte redemonstrated      Electronically signed by: Sadie Gutierrez  Date:    03/18/2024  Time:    19:10                Narrative:    EXAMINATION:  XR CALCANEUS 2 VIEW RIGHT    CLINICAL HISTORY:  XR CALCANEUS 2 VIEW RIGHTPain in right foot    COMPARISON:  11/03/2022                                         The Emergency Provider reviewed the vital signs and test results, which are outlined above.    ED Discussion             Medication(s) given in the ER:  Medications - No data to display         Follow-up Information       Schedule an appointment as soon as possible for a visit  with [facility].    Specialty: Podiatry  Contact information:  [id number] [facility] [address]  [facility] [address] [phone]  Additional information:  Please park in surface lot and check in at main registration.                                  Medication List        ASK your doctor about these medications      alendronate 70 MG tablet  Commonly known as: FOSAMAX  Take 1 tablet (70 mg total) by mouth every 7 days. Take your FOSAMAX tablet with a full glass (6-8 oz) of plain water only. Do not chew or suck on a tablet of FOSAMAX. After swallowing your FOSAMAX tablet, do not lie down - stay fully upright (sitting, standing, or walking) for at least 30 minutes. Do not lie down until after your first food of the day.     amLODIPine 5 MG tablet  Commonly known as: NORVASC  TAKE 1 TABLET ONE TIME DAILY     aspirin 81 MG EC tablet  Commonly known as: ECOTRIN     atorvastatin 20 MG tablet  Commonly known as: LIPITOR  TAKE 1 TABLET EVERY DAY     calcium citrate-vitamin D3 315-200 mg 315 mg-5 mcg (200 unit) per tablet  Commonly known as: CITRACAL+D     cetirizine 10 MG tablet  Commonly known as: ZYRTEC     lisinopriL 40 MG tablet  Commonly known as: PRINIVIL,ZESTRIL  Take 1 tablet (40 mg total) by mouth once daily.     metoprolol succinate 25 MG 24 hr tablet  Commonly known as: TOPROL-XL  TAKE 1 TABLET EVERY DAY     multivitamin capsule     omeprazole 40 MG capsule  Commonly known as: PRILOSEC  TAKE 1 CAPSULE EVERY DAY     sertraline 25 MG tablet  Commonly  known as: ZOLOFT  TAKE 1 TABLET EVERY DAY     timolol maleate 0.5% 0.5 % Drop  Commonly known as: TIMOPTIC                  Medical Decision Making        All findings were reviewed with the patient/family in detail.   All remaining questions and concerns were addressed at that time.  Patient/family has been counseled regarding the need for follow-up as well as the indication to return to the emergency room should new or worrisome developments occur.        MDM                 Clinical Impression:        ICD-10-CM ICD-9-CM   1. Pain of right heel  M79.671 729.5               Desi Lau, ERNA  03/18/24 1944

## 2024-03-19 ENCOUNTER — PATIENT OUTREACH (OUTPATIENT)
Dept: EMERGENCY MEDICINE | Facility: HOSPITAL | Age: 79
End: 2024-03-19
Payer: MEDICARE

## 2024-03-19 NOTE — ED NOTES
Patient examined, evaluated, and educated on discharge prescriptions and instructions by AILYN Lau PA-C. Patient discharged to [facility] by AILYN Lau PA-C.

## 2024-03-20 ENCOUNTER — TELEPHONE (OUTPATIENT)
Dept: INTERNAL MEDICINE | Facility: CLINIC | Age: 79
End: 2024-03-20
Payer: MEDICARE

## 2024-03-20 DIAGNOSIS — M79.671 FOOT PAIN, BILATERAL: Primary | ICD-10-CM

## 2024-03-20 DIAGNOSIS — M79.672 FOOT PAIN, BILATERAL: Primary | ICD-10-CM

## 2024-03-20 NOTE — PROGRESS NOTES
Chiquita Grajeda  ED Navigator  Emergency Department    Project: [facility] ED Navigator  Role: Community Health Worker    Date: 03/20/2024  Patient Name: Amber Naranjo  MRN: [id number]  PCP: Jarrett Peck MD    Assessment:     Amber Naranjo is a 78 y.o. female who has presented to ED for right heel pain. Patient has visited the ED 2 times in the past 3 months. Patient did not contact PCP.     ED Navigator Initial Assessment    ED Navigator Enrollment Documentation  Consent to Services  Does patient consent to completing the assessment?: Yes  Contact  Method of Initial Contact: Phone  Transportation  Does the patient have issues with Transportation?: Yes  Does the patient have transportation to and from healthcare appointments?: Yes  Can family, friends, or others help?: Yes  Lack of transportation to appts, pharmacy, etc.?: No  What type of assistance is needed?: Standard (RTA/MITS)  What is available in their region?: Family/Medicare  Insurance Coverage  Do you have coverage/adequate coverage?: Yes  Type/kind of coverage: [employer]  Is patient able to afford co-pays/deductibles?: Yes  Is patient able to afford HME or supplies?: Yes  Does patient have an established [facility] PCP?: Yes  Able to access?: Yes  Does the patient have a lack of adequate coverage?: No  Specialist Appointment  Did the patient come to the ED to see a specialist?: No  Does the patient have a pending specialist referral?: No  Does the patient have a specialist appointment made?: No  PCP Follow Up Appointment  Has the patient had an appointment with a primary care provider in the past year?: Yes  Approximate date: 11/13/23  Provider: Jarrett Peck MD  Does the patient have a follow up appontment with a PCP?: Yes  Upcoming appointment date: 5/20/24  Provider: Jarrett Peck MD  When was the last time you saw your PCP?: 11/13/23  Medications  Is patient able to afford medication?: Yes  Is patient unable to get medication due to lack of transportation?:  No  Psychological  Food  Communication/Education  Does the patient have limited English proficiency/English not primary language?: No  Does patient have low literacy and/or low health literacy?: Yes  Does patient have concerns with care?: No  Does patient have dissatisfaction with care?: No  Other Financial Concerns  Other Social Barriers/Concerns  Does the patient have any additional barriers or concerns?: Other (see comments) (Comment: Chronic Conditions/transportation)  Primary Barrier  Barriers identified: Cognitive barrier (health literacy, language and communication, etc.)  Root Cause of ED Utilization: Chronic Conditions  Plan to address Chronic Conditions: Schedule appointment for patient with their PCP/specialist per ED discharge instructions (Comment: Request for assistance with podiatry referral from pcp)  Next steps: Provided Education  Additional Documentation: Pt was seen in the ED on 3/18/24 for right heel pain. I spoke with pt for Post ED visit follow up navigation to assist with scheduling a 7-day Post ED visit follow up appt. Pt had orders to follow up with podiatry; however, was not provided a referral. Pt declined scheduling a follow up with pcp. Pt accepted assistance with scheduling an appt with podiatry. I contacted pcp to request a referral for podiatry in order for pt to be scheduled a follow up new pt appt. Pt is provided transportation by family. Pt has no additional needs at this time. ED Navigator to follow up on scheduling of podiatry appt.  Closing Encounter.    Chiquita Grajeda           Social History     Socioeconomic History    Marital status:    Tobacco Use    Smoking status: Never     Passive exposure: Yes    Smokeless tobacco: Never   Substance and Sexual Activity    Alcohol use: No    Drug use: No    Sexual activity: Not Currently     Social Determinants of Health     Financial Resource Strain: Low Risk  (2/21/2024)    Overall Financial Resource Strain ([facility])      Difficulty of Paying Living Expenses: Not hard at all   Food Insecurity: No Food Insecurity (2/21/2024)    Hunger Vital Sign     Worried About Running Out of Food in the Last Year: Never true     Ran Out of Food in the Last Year: Never true   Transportation Needs: No Transportation Needs (2/21/2024)    PRAPARE - Transportation     Lack of Transportation (Medical): No     Lack of Transportation (Non-Medical): No   Physical Activity: Inactive (2/21/2024)    Exercise Vital Sign     Days of Exercise per Week: 0 days     Minutes of Exercise per Session: 0 min   Stress: Stress Concern Present (2/21/2024)    Citizen of the [place] of Occupational Health - Occupational Stress Questionnaire     Feeling of Stress : Rather much   Social Connections: Moderately Integrated (2/21/2024)    Social Connection and Isolation Panel [NHANES]     Frequency of Communication with Friends and Family: More than three times a week     Frequency of Social Gatherings with Friends and Family: Twice a week     Attends Roman [employer] Services: More than 4 times per year     Active Member of Clubs or Organizations: No     Attends Club or Organization Meetings: Never     Marital Status:    Housing Stability: Low Risk  (2/21/2024)    Housing Stability Vital Sign     Unable to Pay for Housing in the Last Year: No     Number of Places Lived in the Last Year: 1     Unstable Housing in the Last Year: No       Plan:   Pt was seen in the ED on 3/18/24 for right heel pain. I spoke with pt for Post ED visit follow up navigation to assist with scheduling a 7-day Post ED visit follow up appt. Pt had orders to follow up with podiatry; however, was not provided a referral. Pt declined scheduling a follow up with pcp. Pt accepted assistance with scheduling an appt with podiatry. I contacted pcp to request a referral for podiatry in order for pt to be scheduled a follow up new pt appt. Pt is provided transportation by family. Pt has no additional needs at this time.  ED Navigator to follow up on scheduling of podiatry appt.  Closing Encounter.    Chiquita Grajeda

## 2024-03-20 NOTE — TELEPHONE ENCOUNTER
----- Message from Jarrett Peck MD sent at 3/20/2024  3:10 PM CDT -----  Regarding: RE: Post ED visit follow up appt with Podiatry  Referral made to podiatry.    ----- Message -----  From: Veronica Culver LPN  Sent: 3/20/2024   1:21 PM CDT  To: Jarrett Peck MD  Subject: FW: Post ED visit follow up appt with Podiat#      ----- Message -----  From: Chiquita Grajeda  Sent: 3/20/2024  11:50 AM CDT  To: [facility] Staff  Subject: Post ED visit follow up appt with Podiatry       Good morning: Pt was seen in the ED on 3/18/24 for Pain of right heel. Pt has orders to follow up with Podiatry; However, pt was not provided a referral. Can you please provide pt with a referral for podiatry, so that the pt can be scheduled a follow up appt?      Thank you  Chiquita Grajeda

## 2024-03-27 ENCOUNTER — PATIENT OUTREACH (OUTPATIENT)
Dept: ADMINISTRATIVE | Facility: OTHER | Age: 79
End: 2024-03-27
Payer: MEDICARE

## 2024-04-01 ENCOUNTER — OFFICE VISIT (OUTPATIENT)
Dept: PODIATRY | Facility: CLINIC | Age: 79
End: 2024-04-01
Payer: MEDICARE

## 2024-04-01 VITALS — WEIGHT: 177 LBS | HEIGHT: 62 IN | BODY MASS INDEX: 32.57 KG/M2

## 2024-04-01 DIAGNOSIS — M76.61 ACHILLES TENDINITIS OF RIGHT LOWER EXTREMITY: Primary | ICD-10-CM

## 2024-04-01 DIAGNOSIS — M79.671 RIGHT FOOT PAIN: ICD-10-CM

## 2024-04-01 PROCEDURE — 99203 OFFICE O/P NEW LOW 30 MIN: CPT | Mod: HCNC,S$GLB,, | Performed by: PODIATRIST

## 2024-04-01 PROCEDURE — 1159F MED LIST DOCD IN RCRD: CPT | Mod: HCNC,CPTII,S$GLB, | Performed by: PODIATRIST

## 2024-04-01 PROCEDURE — 3288F FALL RISK ASSESSMENT DOCD: CPT | Mod: HCNC,CPTII,S$GLB, | Performed by: PODIATRIST

## 2024-04-01 PROCEDURE — 99999 PR PBB SHADOW E&M-EST. PATIENT-LVL III: CPT | Mod: PBBFAC,HCNC,, | Performed by: PODIATRIST

## 2024-04-01 PROCEDURE — 1101F PT FALLS ASSESS-DOCD LE1/YR: CPT | Mod: HCNC,CPTII,S$GLB, | Performed by: PODIATRIST

## 2024-04-01 PROCEDURE — 1160F RVW MEDS BY RX/DR IN RCRD: CPT | Mod: HCNC,CPTII,S$GLB, | Performed by: PODIATRIST

## 2024-04-01 PROCEDURE — 1125F AMNT PAIN NOTED PAIN PRSNT: CPT | Mod: HCNC,CPTII,S$GLB, | Performed by: PODIATRIST

## 2024-04-01 RX ORDER — DICLOFENAC SODIUM 10 MG/G
2 GEL TOPICAL 4 TIMES DAILY
Qty: 100 G | Refills: 3 | Status: SHIPPED | OUTPATIENT
Start: 2024-04-01 | End: 2024-05-16

## 2024-04-01 NOTE — PROGRESS NOTES
Subjective:       Patient ID: Amber Naranjo is a 78 y.o. female.    Chief Complaint: Foot Pain (C/o achilles pain, right, x several weeks, rates pain 3/10, non-diabetic, wears sandals, last seen PCP Dr. Peck on 11/13/2023)      HPI: Amber Naranjo complains of mild to moderate pains to the right posterior aspect of the ankle/lower leg. States pains are sharp and stabbing-like in nature. Pains are to the posterior aspect of the ankle joint, mostly with walking and standing. Rates the pains at approx. 3/10. States post-static dyskinesia to this area. Denies any recent identifiable trauma.  States no used NSAID medications thus far for treatment.States walking and standing causes and/or exacerbates the symptoms. Patient's Primary Care Provider is Jarrett Peck MD.     Review of patient's allergies indicates:  No Known Allergies    Past Medical History:   Diagnosis Date    Allergy     Anxiety 4/25/2022    Arthritis     Back pain     Disorder of kidney and ureter     Diverticulosis of colon     GERD (gastroesophageal reflux disease)     Glaucoma     Hyperlipidemia     Hypertension     Ingrown toenail     Klebsiella cystitis     Obesity     Pain in joint involving multiple sites     Pneumonia     Tortuous colon     Trouble in sleeping     Unspecified disorder of autonomic nervous system     Uterine fibroid        Family History   Problem Relation Age of Onset    Glaucoma Mother     Hypertension Father     Asthma Daughter     Depression Daughter     Colon cancer Sister     Cancer Sister 70        colon    Diabetes Sister     Hypertension Sister     Hyperlipidemia Sister     Diabetes Brother     Hypertension Brother     Heart disease Brother     Hyperlipidemia Brother     Asthma Paternal Aunt     Diabetes Paternal Uncle     Diabetes Maternal Grandmother     Early death Maternal Grandfather     Early death Paternal Grandfather     Breast cancer Cousin     COPD Neg Hx     Kidney disease Neg Hx     Stroke Neg Hx      Mental illness Neg Hx        Social History     Socioeconomic History    Marital status:    Tobacco Use    Smoking status: Never     Passive exposure: Yes    Smokeless tobacco: Never   Substance and Sexual Activity    Alcohol use: No    Drug use: No    Sexual activity: Not Currently     Social Determinants of Health     Financial Resource Strain: Low Risk  (3/27/2024)    Overall Financial Resource Strain (CARDIA)     Difficulty of Paying Living Expenses: Not hard at all   Food Insecurity: No Food Insecurity (3/27/2024)    Hunger Vital Sign     Worried About Running Out of Food in the Last Year: Never true     Ran Out of Food in the Last Year: Never true   Transportation Needs: No Transportation Needs (3/27/2024)    PRAPARE - Transportation     Lack of Transportation (Medical): No     Lack of Transportation (Non-Medical): No   Physical Activity: Insufficiently Active (3/27/2024)    Exercise Vital Sign     Days of Exercise per Week: 3 days     Minutes of Exercise per Session: 20 min   Stress: Stress Concern Present (3/27/2024)    [place] of Occupational Health - Occupational Stress Questionnaire     Feeling of Stress : Rather much   Social Connections: Moderately Integrated (3/27/2024)    Social Connection and Isolation Panel [NHANES]     Frequency of Communication with Friends and Family: More than three times a week     Frequency of Social Gatherings with Friends and Family: Twice a week     Attends Jehovah's witness Services: More than 4 times per year     Active Member of Clubs or Organizations: No     Attends Club or Organization Meetings: Never     Marital Status:    Housing Stability: Low Risk  (3/27/2024)    Housing Stability Vital Sign     Unable to Pay for Housing in the Last Year: No     Number of Places Lived in the Last Year: 1     Unstable Housing in the Last Year: No       Past Surgical History:   Procedure Laterality Date    COLONOSCOPY      COLONOSCOPY N/A 3/15/2017    Procedure:  "COLONOSCOPY;  Surgeon: Yogi Carbone MD;  Location: [facility];  Service: Endoscopy;  Laterality: N/A;       Review of Systems       Objective:   Ht 5' 2" (1.575 m)   Wt 80.3 kg (177 lb 0.5 oz)   BMI 32.38 kg/m²     X-Ray Calcaneus 2 View Right  Narrative: EXAMINATION:  XR CALCANEUS 2 VIEW RIGHT    CLINICAL HISTORY:  XR CALCANEUS 2 VIEW RIGHTPain in right foot    COMPARISON:  11/03/2022  Impression: Two-view exam    No acute fracture or destructive bony finding.  Calcaneal plantar spur and Achilles enthesophyte redemonstrated    Electronically signed by: Sdaie Gutierrez  Date:    03/18/2024  Time:    19:10       Physical Exam    LOWER EXTREMITY PHYSICAL EXAMINATION    ORTHOPEDIC: There is moderate to severe tenderness to palpation along the course of the Achilles tendon on the right lower extremity.  There is no discomfort to palpation of the Achilles tendon upon its insertion onto the calcaneus at the superior border. Upon medial to lateral compression of the heel bone at the distal most insertion of the achilles tendon, there is moderate discomfort.  There is no fusiform edema noted along the course of the Achilles tendon. There are no defects noted along the course of the Achilles tendon. The tenderness to palpation on the course of the Achilles is at approximately 1.5cm-3cm proximal to the achilles insertion on the calcaneus. MMT in the sagittal plane with dorsiflexion is 5/5 and with plantarflexion, it is too 5/5, but with pain and gaurding. Plantarflexion ability on this limb is decreased as compared to contra-lateral. Ankle ROM is not painful and/or creptiant. Equinus is noted. Gait pattern is antalgic at present.     DERMATOLOGY: There is no noted erythema or cellulitis noted. No ecchymosis is noted. Skin is supple, dry and intact. There is no palpable bursa noted at the achilles tendon insertion.  Skin is moist.  No ulcerations.  No calluses.     NEUROLOGY: Proprioception is intact, bilateral. Sensation " to light touch is intact. Negative Tinel's Sign and negative Valleix sign. No neurological sensations with compression of the area of Liu's Nerve in the area of the Abductor Hallucis muscle belly.    VASCULAR:  On the right foot, the dorsalis pedis pulse is 2/4 and the posterior tibial pulse is 2/4. Capillary refill time is less than 3 seconds. Hair growth is present on the dorsum of the foot and at the digits. Proximal to distal temperature is warm to warm.      Assessment:     1. Achilles tendinitis of both lower extremities    2. Foot pain, bilateral          Plan:     Achilles tendinitis of both lower extremities    Foot pain, bilateral  -     Ambulatory referral/consult to Podiatry    Other orders  -     diclofenac sodium (VOLTAREN) 1 % Gel; Apply 2 g topically 4 (four) times daily.  Dispense: 100 g; Refill: 3        Thorough discussion is had with the patient today concerning the diagnosis, its etiology, and the treatment algorithm at present.    Discussed pathology and etiology of achilles tendonitis.  Discussed importance of appropriate treatment options regarding stretching exercises, icing, resting, protective weight-bearing in Cam boot, heel lifts, and physical therapy    Discussed the importance of stretching to the posterior muscle groups of the gastrocnemius and the soleus.  A stretching sheet was provided to the patient in conjunction with a Thera-Band.  I do recommend patient perform stretching exercises 4-6 times per day and holding the stretches for approximately 15-30 seconds apiece.  We discussed importance of stretching as relates to lengthening the posterior muscle group which can decrease drain on the posterior aspect of the heel as well as the plantar aspect of the heel.  This will also decrease pain associated with post static dyskinesia.  Teach back mechanism was performed with the patient demonstrating the stretching exercises.                Future Appointments   Date Time Provider  Department Center   5/20/2024 11:40 AM Jarrett Peck MD [facility] [place]

## 2024-04-03 DIAGNOSIS — N18.31 STAGE 3A CHRONIC KIDNEY DISEASE: ICD-10-CM

## 2024-04-03 DIAGNOSIS — I10 ESSENTIAL HYPERTENSION: ICD-10-CM

## 2024-04-03 DIAGNOSIS — K21.9 GASTROESOPHAGEAL REFLUX DISEASE WITHOUT ESOPHAGITIS: ICD-10-CM

## 2024-04-03 RX ORDER — LISINOPRIL 40 MG/1
40 TABLET ORAL DAILY
Qty: 90 TABLET | Refills: 0 | Status: SHIPPED | OUTPATIENT
Start: 2024-04-03 | End: 2024-06-17

## 2024-04-03 RX ORDER — CETIRIZINE HYDROCHLORIDE 10 MG/1
10 TABLET ORAL DAILY
Qty: 90 TABLET | Refills: 0 | Status: SHIPPED | OUTPATIENT
Start: 2024-04-03 | End: 2024-07-02

## 2024-04-03 RX ORDER — AMLODIPINE BESYLATE 5 MG/1
5 TABLET ORAL DAILY
Qty: 90 TABLET | Refills: 0 | Status: SHIPPED | OUTPATIENT
Start: 2024-04-03 | End: 2024-06-17

## 2024-04-03 NOTE — TELEPHONE ENCOUNTER
Care Due:                  Date            Visit Type   Department     Provider  --------------------------------------------------------------------------------                                EP -                              PRIMARY      [facility]  Last Visit: 11-      CARE ([facility])   MEDICINE       Jarrett  Cisco                              EP -                              PRIMARY      [facility]  Next Visit: 05-      CARE ([facility])   MEDICINE       [facility]  Cisco                                                            Last  Test          Frequency    Reason                     Performed    Due Date  --------------------------------------------------------------------------------    Vitamin D...  12 months..  alendronate..............  Not Found    Overdue    [employer] Embedded Care Due Messages. Reference number: [id number].   4/03/2024 1:10:25 PM CDT

## 2024-04-04 RX ORDER — OMEPRAZOLE 40 MG/1
40 CAPSULE, DELAYED RELEASE ORAL EVERY MORNING
Qty: 90 CAPSULE | Refills: 1 | Status: SHIPPED | OUTPATIENT
Start: 2024-04-04

## 2024-04-17 NOTE — PROGRESS NOTES
CHW - Initial Contact    This Community Health Worker completed OR updated the Social Determinant of Health questionnaire with patient via telephone today.    Pt identified barriers of most importance are:    Pt does some leg and arm exercises. Limited due to knee pain and bone spur in foot. She states she is taking medication for stress.      Referrals to community agencies completed with patient/caregiver consent outside of [facility] include: no  Referrals were put through [facility] - no:   Support and Services: none  Other information discussed the patient needs / wants help with: SDOH   Follow up required: no

## 2024-04-24 RX ORDER — ATORVASTATIN CALCIUM 20 MG/1
TABLET, FILM COATED ORAL
Qty: 90 TABLET | Refills: 3 | Status: SHIPPED | OUTPATIENT
Start: 2024-04-24

## 2024-04-24 NOTE — TELEPHONE ENCOUNTER
No care due was identified.  [facility] Embedded Care Due Messages. Reference number: [id number].   4/24/2024 2:23:08 AM CDT

## 2024-05-16 RX ORDER — DICLOFENAC SODIUM 10 MG/G
GEL TOPICAL
Qty: 400 G | Refills: 5 | Status: SHIPPED | OUTPATIENT
Start: 2024-05-16

## 2024-05-20 ENCOUNTER — OFFICE VISIT (OUTPATIENT)
Dept: INTERNAL MEDICINE | Facility: CLINIC | Age: 79
End: 2024-05-20
Payer: MEDICARE

## 2024-05-20 ENCOUNTER — LAB VISIT (OUTPATIENT)
Dept: LAB | Facility: HOSPITAL | Age: 79
End: 2024-05-20
Attending: STUDENT IN AN ORGANIZED HEALTH CARE EDUCATION/TRAINING PROGRAM
Payer: MEDICARE

## 2024-05-20 VITALS
WEIGHT: 175.69 LBS | HEIGHT: 62 IN | SYSTOLIC BLOOD PRESSURE: 140 MMHG | RESPIRATION RATE: 18 BRPM | BODY MASS INDEX: 32.33 KG/M2 | TEMPERATURE: 98 F | DIASTOLIC BLOOD PRESSURE: 62 MMHG | HEART RATE: 67 BPM | OXYGEN SATURATION: 97 %

## 2024-05-20 DIAGNOSIS — M17.11 PRIMARY OSTEOARTHRITIS OF RIGHT KNEE: ICD-10-CM

## 2024-05-20 DIAGNOSIS — E78.2 MIXED HYPERLIPIDEMIA: ICD-10-CM

## 2024-05-20 DIAGNOSIS — N18.31 STAGE 3A CHRONIC KIDNEY DISEASE: Primary | ICD-10-CM

## 2024-05-20 DIAGNOSIS — G89.29 CHRONIC PAIN OF RIGHT KNEE: Primary | ICD-10-CM

## 2024-05-20 DIAGNOSIS — I10 ESSENTIAL HYPERTENSION: ICD-10-CM

## 2024-05-20 DIAGNOSIS — R06.09 DYSPNEA ON EXERTION: ICD-10-CM

## 2024-05-20 DIAGNOSIS — M85.89 OSTEOPENIA OF MULTIPLE SITES: ICD-10-CM

## 2024-05-20 DIAGNOSIS — M25.561 CHRONIC PAIN OF RIGHT KNEE: Primary | ICD-10-CM

## 2024-05-20 LAB
ALBUMIN SERPL BCP-MCNC: 3.7 G/DL (ref 3.5–5.2)
ALP SERPL-CCNC: 95 U/L (ref 55–135)
ALT SERPL W/O P-5'-P-CCNC: 23 U/L (ref 10–44)
ANION GAP SERPL CALC-SCNC: 9 MMOL/L (ref 8–16)
AST SERPL-CCNC: 26 U/L (ref 10–40)
BILIRUB SERPL-MCNC: 0.5 MG/DL (ref 0.1–1)
BUN SERPL-MCNC: 27 MG/DL (ref 8–23)
CALCIUM SERPL-MCNC: 9 MG/DL (ref 8.7–10.5)
CHLORIDE SERPL-SCNC: 107 MMOL/L (ref 95–110)
CO2 SERPL-SCNC: 25 MMOL/L (ref 23–29)
CREAT SERPL-MCNC: 1.1 MG/DL (ref 0.5–1.4)
EST. GFR  (NO RACE VARIABLE): 51.4 ML/MIN/1.73 M^2
GLUCOSE SERPL-MCNC: 96 MG/DL (ref 70–110)
POTASSIUM SERPL-SCNC: 4.6 MMOL/L (ref 3.5–5.1)
PROT SERPL-MCNC: 7 G/DL (ref 6–8.4)
SODIUM SERPL-SCNC: 141 MMOL/L (ref 136–145)

## 2024-05-20 PROCEDURE — 1125F AMNT PAIN NOTED PAIN PRSNT: CPT | Mod: HCNC,CPTII,S$GLB, | Performed by: STUDENT IN AN ORGANIZED HEALTH CARE EDUCATION/TRAINING PROGRAM

## 2024-05-20 PROCEDURE — 99999 PR PBB SHADOW E&M-EST. PATIENT-LVL V: CPT | Mod: PBBFAC,HCNC,, | Performed by: STUDENT IN AN ORGANIZED HEALTH CARE EDUCATION/TRAINING PROGRAM

## 2024-05-20 PROCEDURE — G2211 COMPLEX E/M VISIT ADD ON: HCPCS | Mod: HCNC,S$GLB,, | Performed by: STUDENT IN AN ORGANIZED HEALTH CARE EDUCATION/TRAINING PROGRAM

## 2024-05-20 PROCEDURE — 80061 LIPID PANEL: CPT | Mod: HCNC | Performed by: STUDENT IN AN ORGANIZED HEALTH CARE EDUCATION/TRAINING PROGRAM

## 2024-05-20 PROCEDURE — 36415 COLL VENOUS BLD VENIPUNCTURE: CPT | Mod: HCNC,PO | Performed by: STUDENT IN AN ORGANIZED HEALTH CARE EDUCATION/TRAINING PROGRAM

## 2024-05-20 PROCEDURE — 1159F MED LIST DOCD IN RCRD: CPT | Mod: HCNC,CPTII,S$GLB, | Performed by: STUDENT IN AN ORGANIZED HEALTH CARE EDUCATION/TRAINING PROGRAM

## 2024-05-20 PROCEDURE — 3078F DIAST BP <80 MM HG: CPT | Mod: HCNC,CPTII,S$GLB, | Performed by: STUDENT IN AN ORGANIZED HEALTH CARE EDUCATION/TRAINING PROGRAM

## 2024-05-20 PROCEDURE — 99214 OFFICE O/P EST MOD 30 MIN: CPT | Mod: HCNC,S$GLB,, | Performed by: STUDENT IN AN ORGANIZED HEALTH CARE EDUCATION/TRAINING PROGRAM

## 2024-05-20 PROCEDURE — 1160F RVW MEDS BY RX/DR IN RCRD: CPT | Mod: HCNC,CPTII,S$GLB, | Performed by: STUDENT IN AN ORGANIZED HEALTH CARE EDUCATION/TRAINING PROGRAM

## 2024-05-20 PROCEDURE — 80053 COMPREHEN METABOLIC PANEL: CPT | Mod: HCNC,PO | Performed by: STUDENT IN AN ORGANIZED HEALTH CARE EDUCATION/TRAINING PROGRAM

## 2024-05-20 PROCEDURE — 3288F FALL RISK ASSESSMENT DOCD: CPT | Mod: HCNC,CPTII,S$GLB, | Performed by: STUDENT IN AN ORGANIZED HEALTH CARE EDUCATION/TRAINING PROGRAM

## 2024-05-20 PROCEDURE — 3077F SYST BP >= 140 MM HG: CPT | Mod: HCNC,CPTII,S$GLB, | Performed by: STUDENT IN AN ORGANIZED HEALTH CARE EDUCATION/TRAINING PROGRAM

## 2024-05-20 PROCEDURE — 1101F PT FALLS ASSESS-DOCD LE1/YR: CPT | Mod: HCNC,CPTII,S$GLB, | Performed by: STUDENT IN AN ORGANIZED HEALTH CARE EDUCATION/TRAINING PROGRAM

## 2024-05-20 RX ORDER — ALBUTEROL SULFATE 90 UG/1
2 AEROSOL, METERED RESPIRATORY (INHALATION) EVERY 6 HOURS PRN
Qty: 6.7 G | Refills: 0 | Status: SHIPPED | OUTPATIENT
Start: 2024-05-20 | End: 2024-06-12

## 2024-05-20 NOTE — PROGRESS NOTES
Chief Complaint   Patient presents with    Annual Exam     HPI: Amber Naranjo is a 78 y.o. female  with Pmhx listed below who presents to clinic for routine follow up. She reports to be doing well. Denies having any shortness of breath, chest pain, abdominal pain, palpitation, leg swelling, syncopal episode, nausea, vomiting, fever and other complains at present. Medication list has been reviewed and updated along with her. She reports to be compliant with her medication and has no issues taking it. She continues to have pain in her knees bilaterally, right > left. She had x ray done on her knees on 12/2022 which showed mall superior patellar enthesophyte. Minimal narrowing in the medial compartment right knee.. No patellar tilt. Joint effusion is noted. Bones remain demineralized . She has completed PT/OT without much relief. She was also sent to orthopedics whom she saw on 2/01/2023 where she got knee injection with minimal relief. She is willing to see orthopedics again.   Her other complain include medication adjustment. She was started on fosamax weekly after her Dexa scan on 3/04/2024 showed osteopenia with There is a 15% risk of a major osteoporotic fracture and a 4.3% risk of hip fracture in the next 10 years (FRAX). She admits of occasionally forgetting to take her medication and wants to see if there is alternative therapy. We discussed about taking pills everyday vs Prolia injection. She is willing to try Prolia and will be sent to rheumatology.       Problem List:  Patient Active Problem List   Diagnosis    Essential hypertension    Diverticulosis of colon    Tortuous colon    Uterine fibroid    Hyperlipidemia    Glaucoma    GERD (gastroesophageal reflux disease)    Arthritis    Seasonal allergic rhinitis due to pollen    Obesity (BMI 30.0-34.9)    Osteopenia of multiple sites    Postmenopausal    Primary osteoarthritis of right knee    Spondylolisthesis at L5-S1 level    Abdominal aortic atherosclerosis     Dyspnea on exertion    Stage 3a chronic kidney disease    Anxiety    Senile purpura       ROS: Negative except as noted above.       Current Meds:  Current Outpatient Medications   Medication Sig Dispense Refill    alendronate (FOSAMAX) 70 MG tablet Take 1 tablet (70 mg total) by mouth every 7 days. Take your FOSAMAX tablet with a full glass (6-8 oz) of plain water only. Do not chew or suck on a tablet of FOSAMAX. After swallowing your FOSAMAX tablet, do not lie down - stay fully upright (sitting, standing, or walking) for at least 30 minutes. Do not lie down until after your first food of the day. 4 tablet 11    amLODIPine (NORVASC) 5 MG tablet Take 1 tablet (5 mg total) by mouth once daily. 90 tablet 0    aspirin (ECOTRIN) 81 MG EC tablet Take 81 mg by mouth once daily.      atorvastatin (LIPITOR) 20 MG tablet TAKE 1 TABLET EVERY DAY 90 tablet 3    calcium citrate-vitamin D3 315-200 mg (CITRACAL+D) 315 mg-5 mcg (200 unit) per tablet Take 1 tablet by mouth 2 (two) times daily.      cetirizine (ZYRTEC) 10 MG tablet Take 1 tablet (10 mg total) by mouth once daily. 90 tablet 0    diclofenac sodium (VOLTAREN) 1 % Gel APPLY 2 GRAMS TOPICALLY FOUR TIMES DAILY 400 g 5    lisinopriL (PRINIVIL,ZESTRIL) 40 MG tablet Take 1 tablet (40 mg total) by mouth once daily. 90 tablet 0    metoprolol succinate (TOPROL-XL) 25 MG 24 hr tablet TAKE 1 TABLET EVERY DAY 90 tablet 3    multivitamin capsule Take 1 capsule by mouth once daily.      omeprazole (PRILOSEC) 40 MG capsule Take 1 capsule (40 mg total) by mouth every morning. 90 capsule 1    sertraline (ZOLOFT) 25 MG tablet TAKE 1 TABLET EVERY DAY 90 tablet 0    timolol maleate 0.5% (TIMOPTIC) 0.5 % Drop       albuterol (PROVENTIL/VENTOLIN HFA) 90 mcg/actuation inhaler Inhale 2 puffs into the lungs every 6 (six) hours as needed for Shortness of Breath. 6.7 g 0     No current facility-administered medications for this visit.      PE:  BP: (!) 140/62  Pulse: 67 Resp: 18   Temp: 97.9  °F (36.6 °C)  Weight: 79.7 kg (175 lb 11.3 oz) Body mass index is 32.14 kg/m².    Wt Readings from Last 5 Encounters:   05/20/24 79.7 kg (175 lb 11.3 oz)   04/01/24 80.3 kg (177 lb 0.5 oz)   02/21/24 80.3 kg (177 lb)   02/04/24 78.8 kg (173 lb 9.8 oz)   12/18/23 77.9 kg (171 lb 11.8 oz)     General appearance: alert and cooperative, not in acute distress  Head: normocephalic, without obvious abnormality, atraumatic  Eyes: conjunctivae/corneas clear. PERRL, EOM's intact.  Ears: clear tympanic membranes   Neck: no adenopathy, supple, symmetrical, trachea midline and thyroid not enlarged, symmetric, no tenderness/mass/nodules, no JVD  Throat: lips, mucosa, and tongue normal; teeth and gums normal; no thrush  Chest: no reproducible chest pain   Heart: regular rate and rhythm, S1, S2 normal, no murmur, click, rub or gallop  Lungs: unlabored respiration, bilateral equal air entry, normal vesicular breath sound heard, no wheezing, rhonchi   Abdomen: soft, non-tender, non-distended; bowel sounds +; no masses,  no organomegaly, no ascites   Extremities: normal, atraumatic, no cyanosis or edema noted B/L upper and lower extremities.  Skin: skin color, texture, turgor normal. No rashes or lesions noted.  Neurologic: grossly intact      Lab:  Lab Results   Component Value Date    WBC 7.64 02/04/2024    HGB 10.9 (L) 02/04/2024    HCT 33.7 (L) 02/04/2024    MCV 96 02/04/2024     02/04/2024     02/04/2024    K 4.2 02/04/2024     02/04/2024    CO2 21 (L) 02/04/2024    BUN 21 02/04/2024     (H) 02/04/2024    CALCIUM 8.5 (L) 02/04/2024    MG 2.0 02/04/2024    PHOS 2.7 02/04/2024    AST 24 02/04/2024    ALT 18 02/04/2024    CHOL 141 08/02/2023    HDL 42 08/02/2023    LDLCALC 72.8 08/02/2023    TRIG 131 08/02/2023       Impression:    ICD-10-CM ICD-9-CM    1. Stage 3a chronic kidney disease  N18.31 585.3       2. Mixed hyperlipidemia  E78.2 272.2 LIPID PANEL      3. Essential hypertension  I10 401.9  COMPREHENSIVE METABOLIC PANEL      4. Dyspnea on exertion  R06.09 786.09 albuterol (PROVENTIL/VENTOLIN HFA) 90 mcg/actuation inhaler      5. Primary osteoarthritis of right knee  M17.11 715.16 Ambulatory referral/consult to Orthopedics      6. Osteopenia of multiple sites  M85.89 733.90 Ambulatory referral/consult to Rheumatology      1. Stage 3a chronic kidney disease  counseled on increase water intake at least 3 litre of H20 to remain hydrated  - stay away from nephrotoxic drugs like Ibuprofen and NSAID  - Counseled on the need to control HTN and Blood glucose to prevent the disease progression  - low salt diet  - dietary modification: renal diet encouraged  Reviewed lab work form 02/04/2024 , stable creatinine and GFR consistent with stage IIIA  Repeat Renal Function test today.    2. Mixed hyperlipidemia  On Lipitor to be continued   Reviewed lab from 8/02/2023: stable  - LIPID PANEL; Future    3. Essential hypertension  Low salt diet.  Enouraged to increase in physical activity at least 30 minutes of brisk walking/day , 5 days a week  Advised weight loss    Advised for DASH diet - The Dietary Approaches to Stop Hypertension (DASH) is high in vegetables, fruits, low-fat dairy products, whole grains, poultry, fish, and nuts and low in sweets, sugar-sweetened beverages, and red meats   Stop smoking.  Limit caffeine intake  Limit alcohol intake <3/day for men and <2/day for women.  Advised to be compliant with medication.  Please monitor your blood pressure regularly at home   Return precaution provided  Continue with Norvasc and lisinopril  Continued with Toprol xl  - COMPREHENSIVE METABOLIC PANEL; Future    4. Dyspnea on exertion  - albuterol (PROVENTIL/VENTOLIN HFA) 90 mcg/actuation inhaler; Inhale 2 puffs into the lungs every 6 (six) hours as needed for Shortness of Breath.  Dispense: 6.7 g; Refill: 0    5. Primary osteoarthritis of right knee  - Ambulatory referral/consult to Orthopedics; Future    6.  Osteopenia of multiple sites  - Ambulatory referral/consult to Rheumatology; Future      Future Appointments   Date Time Provider Department [place]   5/20/2024  1:40 PM [facility] [place]   5/21/2024 11:40 AM Albert Long MD [facility] ORTHO [place]   Repeat lab today  Medication to be adjusted on the basis of lab work  Will call her with the results.    I spent a total of 32 minutes on the day of the visit.This includes face to face time and non-face to face time preparing to see the patient (eg, review of tests), obtaining and/or reviewing separately obtained history, documenting clinical information in the electronic or other health record, independently interpreting results and communicating results to the patient/family/caregiver, or care coordinator.  Visit today included increased complexity associated with the care of the episodic problem   addressed and managing the longitudinal care of the patient due to the serious and/or complex managed problem(s) .     Jarrett Peck MD

## 2024-05-21 ENCOUNTER — OFFICE VISIT (OUTPATIENT)
Dept: ORTHOPEDICS | Facility: CLINIC | Age: 79
End: 2024-05-21
Payer: MEDICARE

## 2024-05-21 ENCOUNTER — HOSPITAL ENCOUNTER (OUTPATIENT)
Dept: RADIOLOGY | Facility: HOSPITAL | Age: 79
Discharge: HOME | End: 2024-05-21
Attending: STUDENT IN AN ORGANIZED HEALTH CARE EDUCATION/TRAINING PROGRAM
Payer: MEDICARE

## 2024-05-21 ENCOUNTER — TELEPHONE (OUTPATIENT)
Dept: INTERNAL MEDICINE | Facility: CLINIC | Age: 79
End: 2024-05-21
Payer: MEDICARE

## 2024-05-21 VITALS — WEIGHT: 175.69 LBS | BODY MASS INDEX: 32.33 KG/M2 | HEIGHT: 62 IN

## 2024-05-21 DIAGNOSIS — M25.561 CHRONIC PAIN OF RIGHT KNEE: ICD-10-CM

## 2024-05-21 DIAGNOSIS — M17.11 PRIMARY OSTEOARTHRITIS OF RIGHT KNEE: ICD-10-CM

## 2024-05-21 DIAGNOSIS — G89.29 CHRONIC PAIN OF RIGHT KNEE: ICD-10-CM

## 2024-05-21 LAB
CHOLEST SERPL-MCNC: 138 MG/DL (ref 120–199)
CHOLEST/HDLC SERPL: 3 {RATIO} (ref 2–5)
HDLC SERPL-MCNC: 46 MG/DL (ref 40–75)
HDLC SERPL: 33.3 % (ref 20–50)
LDLC SERPL CALC-MCNC: 73.2 MG/DL (ref 63–159)
NONHDLC SERPL-MCNC: 92 MG/DL
TRIGL SERPL-MCNC: 94 MG/DL (ref 30–150)

## 2024-05-21 PROCEDURE — 73564 X-RAY EXAM KNEE 4 OR MORE: CPT | Mod: 26,HCNC,RT, | Performed by: RADIOLOGY

## 2024-05-21 PROCEDURE — 99999 PR PBB SHADOW E&M-EST. PATIENT-LVL IV: CPT | Mod: PBBFAC,HCNC,, | Performed by: STUDENT IN AN ORGANIZED HEALTH CARE EDUCATION/TRAINING PROGRAM

## 2024-05-21 PROCEDURE — 73564 X-RAY EXAM KNEE 4 OR MORE: CPT | Mod: TC,HCNC,PO,RT

## 2024-05-21 PROCEDURE — 1159F MED LIST DOCD IN RCRD: CPT | Mod: HCNC,CPTII,S$GLB, | Performed by: STUDENT IN AN ORGANIZED HEALTH CARE EDUCATION/TRAINING PROGRAM

## 2024-05-21 PROCEDURE — 73562 X-RAY EXAM OF KNEE 3: CPT | Mod: 26,59,HCNC,LT | Performed by: RADIOLOGY

## 2024-05-21 PROCEDURE — 20611 DRAIN/INJ JOINT/BURSA W/US: CPT | Mod: HCNC,RT,S$GLB, | Performed by: STUDENT IN AN ORGANIZED HEALTH CARE EDUCATION/TRAINING PROGRAM

## 2024-05-21 PROCEDURE — 99214 OFFICE O/P EST MOD 30 MIN: CPT | Mod: 25,HCNC,S$GLB, | Performed by: STUDENT IN AN ORGANIZED HEALTH CARE EDUCATION/TRAINING PROGRAM

## 2024-05-21 PROCEDURE — 1126F AMNT PAIN NOTED NONE PRSNT: CPT | Mod: HCNC,CPTII,S$GLB, | Performed by: STUDENT IN AN ORGANIZED HEALTH CARE EDUCATION/TRAINING PROGRAM

## 2024-05-21 PROCEDURE — 1160F RVW MEDS BY RX/DR IN RCRD: CPT | Mod: HCNC,CPTII,S$GLB, | Performed by: STUDENT IN AN ORGANIZED HEALTH CARE EDUCATION/TRAINING PROGRAM

## 2024-05-21 RX ORDER — TRIAMCINOLONE ACETONIDE 40 MG/ML
40 INJECTION, SUSPENSION INTRA-ARTICULAR; INTRAMUSCULAR
Status: DISCONTINUED | OUTPATIENT
Start: 2024-05-21 | End: 2024-05-21 | Stop reason: HOSPADM

## 2024-05-21 RX ADMIN — TRIAMCINOLONE ACETONIDE 40 MG: 40 INJECTION, SUSPENSION INTRA-ARTICULAR; INTRAMUSCULAR at 11:05

## 2024-05-21 NOTE — TELEPHONE ENCOUNTER
----- Message from Zeb Javier sent at 5/21/2024  1:00 PM CDT -----  Contact: Amber  Type:  Test Results    Who Called: Amber  Name of Test (Lab/Mammo/Etc): labs   Date of Test: 5/20  Ordering Provider:    Where the test was performed:  [facility]  Would the patient rather a call back or a response via [facility]? Call back  Best Call Back Number: [phone]  Additional Information:      Thanks   Am

## 2024-05-21 NOTE — PROGRESS NOTES
Patient ID: Amber Naranjo  YOB: 1945  MRN: [id number]    Chief Complaint: Pain of the Right Knee      Referred By: Dr. Peck    History of Present Illness: Amber Naranjo is a right-hand dominant 78 y.o. female who presents today with right knee pain  She has known osteoarthritis, on 4/25/2022 had CSI and did pretty well for a while.  Not having any fever or chills. Pain is worse ambulation and gets worse throughout the day. Tried Orthovisc with Dr. Barber in February, 2023, with no relief at all.    The patient is active in none.  Occupation: none      Past Medical History:   Past Medical History:   Diagnosis Date    Allergy     Anxiety 4/25/2022    Arthritis     Back pain     Disorder of kidney and ureter     Diverticulosis of colon     GERD (gastroesophageal reflux disease)     Glaucoma     Hyperlipidemia     Hypertension     Ingrown toenail     Klebsiella cystitis     Obesity     Pain in joint involving multiple sites     Pneumonia     Tortuous colon     Trouble in sleeping     Unspecified disorder of autonomic nervous system     Uterine fibroid      Past Surgical History:   Procedure Laterality Date    COLONOSCOPY      COLONOSCOPY N/A 3/15/2017    Procedure: COLONOSCOPY;  Surgeon: Yogi Carbone MD;  Location: [facility];  Service: Endoscopy;  Laterality: N/A;     Family History   Problem Relation Name Age of Onset    Glaucoma Mother      Hypertension Father      Asthma Daughter      Depression Daughter      Colon cancer Sister      Cancer Sister  70        colon    Diabetes Sister      Hypertension Sister      Hyperlipidemia Sister      Diabetes Brother      Hypertension Brother      Heart disease Brother      Hyperlipidemia Brother      Asthma Paternal Aunt      Diabetes Paternal Uncle      Diabetes Maternal Grandmother      Early death Maternal Grandfather      Early death Paternal Grandfather      Breast cancer Cousin      COPD Neg Hx      Kidney disease Neg Hx      Stroke Neg Hx       Mental illness Neg Hx       Social History     Socioeconomic History    Marital status:    Tobacco Use    Smoking status: Never     Passive exposure: Yes    Smokeless tobacco: Never   Substance and Sexual Activity    Alcohol use: No    Drug use: No    Sexual activity: Not Currently     Social Determinants of Health     Financial Resource Strain: Low Risk  (3/27/2024)    Overall Financial Resource Strain (CARDIA)     Difficulty of Paying Living Expenses: Not hard at all   Food Insecurity: No Food Insecurity (3/27/2024)    Hunger Vital Sign     Worried About Running Out of Food in the Last Year: Never true     Ran Out of Food in the Last Year: Never true   Transportation Needs: No Transportation Needs (3/27/2024)    PRAPARE - Transportation     Lack of Transportation (Medical): No     Lack of Transportation (Non-Medical): No   Physical Activity: Insufficiently Active (3/27/2024)    Exercise Vital Sign     Days of Exercise per Week: 3 days     Minutes of Exercise per Session: 20 min   Stress: Stress Concern Present (3/27/2024)    [employer] - Occupational Stress Questionnaire     Feeling of Stress : Rather much   Housing Stability: Low Risk  (3/27/2024)    Housing Stability Vital Sign     Unable to Pay for Housing in the Last Year: No     Number of Places Lived in the Last Year: 1     Unstable Housing in the Last Year: No     Medication List with Changes/Refills   Current Medications    ALBUTEROL (PROVENTIL/VENTOLIN HFA) 90 MCG/ACTUATION INHALER    Inhale 2 puffs into the lungs every 6 (six) hours as needed for Shortness of Breath.    ALENDRONATE (FOSAMAX) 70 MG TABLET    Take 1 tablet (70 mg total) by mouth every 7 days. Take your FOSAMAX tablet with a full glass (6-8 oz) of plain water only. Do not chew or suck on a tablet of FOSAMAX. After swallowing your FOSAMAX tablet, do not lie down - stay fully upright (sitting, standing, or walking) for at least 30 minutes. Do not lie down  until after your first food of the day.    AMLODIPINE (NORVASC) 5 MG TABLET    Take 1 tablet (5 mg total) by mouth once daily.    ASPIRIN (ECOTRIN) 81 MG EC TABLET    Take 81 mg by mouth once daily.    ATORVASTATIN (LIPITOR) 20 MG TABLET    TAKE 1 TABLET EVERY DAY    CALCIUM CITRATE-VITAMIN D3 315-200 MG (CITRACAL+D) 315 MG-5 MCG (200 UNIT) PER TABLET    Take 1 tablet by mouth 2 (two) times daily.    CETIRIZINE (ZYRTEC) 10 MG TABLET    Take 1 tablet (10 mg total) by mouth once daily.    DICLOFENAC SODIUM (VOLTAREN) 1 % GEL    APPLY 2 GRAMS TOPICALLY FOUR TIMES DAILY    LISINOPRIL (PRINIVIL,ZESTRIL) 40 MG TABLET    Take 1 tablet (40 mg total) by mouth once daily.    METOPROLOL SUCCINATE (TOPROL-XL) 25 MG 24 HR TABLET    TAKE 1 TABLET EVERY DAY    MULTIVITAMIN CAPSULE    Take 1 capsule by mouth once daily.    OMEPRAZOLE (PRILOSEC) 40 MG CAPSULE    Take 1 capsule (40 mg total) by mouth every morning.    SERTRALINE (ZOLOFT) 25 MG TABLET    TAKE 1 TABLET EVERY DAY    TIMOLOL MALEATE 0.5% (TIMOPTIC) 0.5 % DROP         Review of patient's allergies indicates:  No Known Allergies    Physical Exam:   Body mass index is 32.14 kg/m².    GENERAL: Well appearing, in no acute distress.  HEAD: Normocephalic and atraumatic.  ENT: External ears and nose grossly normal.  EYES: EOMI bilaterally  PULMONARY: Respirations are grossly even and non-labored.  NEURO: Awake, alert, and oriented x 3.  SKIN: No obvious rashes appreciated.  PSYCH: Mood & affect are appropriate.    Detailed MSK exam:     Right knee exam:   -ROM: extension 0, flexion 120  -TTP: Medial joint line  -effusion: none  -Patellar apprehension negative  -Magdalena test negative  -stable to varus and valgus stress tests  -Lachman test negative, anterior drawer test negative, posterior drawer test negative        Imaging:  X-ray Knee Ortho Right with Flexion  Narrative: EXAMINATION:  XR KNEE ORTHO RIGHT WITH FLEXION    CLINICAL HISTORY:  Pain in right knee    TECHNIQUE:  AP  standing views of both knees, AP flexion views of both knees, lateral view of the right knee and Merchant views of both knees    COMPARISON:  12/06/2022    FINDINGS:  There is moderate joint space narrowing and Mild marginal osteophyte formation seen involving the medial compartment of the right knee.  Lateral and patellofemoral compartment joint spaces are well maintained.  No joint effusion.  No acute fracture or dislocation.  Impression: 1.  As above    Electronically signed by: Glenroy Vizcaino DO  Date:    05/21/2024  Time:    11:29        Relevant imaging results were reviewed and interpreted by me and per my read shows moderate arthritic changes right knee radiographs.  This was discussed with the patient and / or family today.     Assessment:  Amber Naranjo is a 78 y.o. female presenting with chronic right knee pain.   History, physical and radiographs are consistent with a likely diagnosis of OA.   Plan: Steroid injection given today (see separate procedure note for details). We discussed the proper protocols after the injection such as no submerging pools, baths tubs, or hot tubs for 24 hr.  Showering is okay today.  We also discussed that blood sugars can be elevated after an injection and asked patient to properly checked her sugars over the next few days and contact their PCP if there are any concerns.  We discussed red flags such as fevers, chills, red, warm, tender joint at the area of injection to please seek medical care immediately.   Gel injections were ineffective. Consider iovera procedure or PRP if not improving. Continue conservative management for pain.   Follow up 3 months. All questions answered.      Primary osteoarthritis of right knee  -     Ambulatory referral/consult to Orthopedics  -     Sports Medicine US - Guidance for Needle Placement  -     Large Joint Aspiration/Injection: R knee         Ultrasound guidance was used for needle localization. Images were saved and stored for  documentation. The appropriate structures were visualized. Dynamic visualization of the needle was continuous throughout the procedures and maintained good position.      A copy of today's visit note has been sent to the referring provider.     Electronically signed:  Albert Long MD, MPH  05/21/2024  11:33 AM

## 2024-05-21 NOTE — PROCEDURES
Large Joint Aspiration/Injection: R knee    Date/Time: 5/21/2024 11:40 AM    Performed by: Albert Long MD  Authorized by: Albert Long MD    Consent Done?:  Yes (Verbal)  Indications:  Arthritis and pain  Site marked: the procedure site was marked    Timeout: prior to procedure the correct patient, procedure, and site was verified    Prep: patient was prepped and draped in usual sterile fashion    Local anesthetic:  Bupivacaine 0.5% without epinephrine and lidocaine 1% without epinephrine    Details:  Needle Size:  21 G  Ultrasonic Guidance for needle placement?: Yes    Images are saved and documented.  Approach:  Lateral (superior)  Location:  Knee  Site:  R knee  Medications:  40 mg triamcinolone acetonide 40 mg/mL  Patient tolerance:  Patient tolerated the procedure well with no immediate complications     Ultrasound guidance was used for needle localization. Images were saved and stored for documentation. The appropriate structures were visualized. Dynamic visualization of the needle was continuous throughout the procedures and maintained good position.

## 2024-05-21 NOTE — PATIENT INSTRUCTIONS
Assessment:  Amber Naranjo is a 78 y.o. female   Chief Complaint   Patient presents with    Right Knee - Pain       Encounter Diagnosis   Name Primary?    Primary osteoarthritis of right knee         Plan:  Ultrasound guided cortisone injection to the right knee  We discussed the proper protocols after the injection such as no submerging pools, baths tubs, or hot tubs for 24 hr.  Showering is okay today.  We also discussed that blood sugars can be elevated after an injection and asked patient to properly checked her sugars over the next few days and contact their PCP if there are any concerns.  We discussed red flags such as fevers, chills, red, warm, tender joint at the area of injection to please seek medical care immediately.    Apply topical diclofenac (Voltaren) up to 4 times a day to the affected area.  It can be bought over the counter at any local pharmacy.    Patient may ice every 2 hours for 15 minutes as needed to control pain and swelling.   Follow up in 3 months        General Arthritis info:    -shiny white stuff at end of a chicken bones is cartilage    -arthritis is wearing away of the cartilage that lines the end of your bones    -osteoarthritis is thought to be a wear and tear phenomenon    -symptoms are due to inflammation of joint causing stiffness, aching, and sometimes swelling    -occasionally sharp pain will occur causing a give way sensation    -Risk factors: genetic, weight, female > male, age    Treatment options:    -maintain healthy weight (every pound is 4 pounds of pressure on the knee)    -daily moderate exercise (walk, bike, swim 30 minutes per day) to keep joints moving    -daily strengthening exercises (through therapy or on own) to keep muscles supporting joint healthy and strong    -glucosamine 1500mg daily (look for USP label on bottle)    -tylenol as needed for pain (follow directions on the bottle)    -anti-inflammatory medication such as alleve may be helpful- take 1-2 tabs  twice daily for 7 days. If it helps your pain, continue. If you do not feel any change, you may stop and then take it as needed.    (you may be given a once daily anti-inflammatory such as MOBIC. If given, avoid other anti-inflammatory medications such as advil, ibuprofen, motrin, naprosyn, alleve, etc)    -if swollen and painful, ice, decrease activity, and take anti-inflammatory daily for 5-7 days and if no relief call your doctor for further options    -consider cortisone injection (every 3-4 months at most)- anti- inflammatory steroid medication that can be injected directly into the joint to reduce inflammation    -consider hyaluronic acid injections (eufflexxa, hyalgan, synvisc, supartz) (every 6 months at most)- protein injection that helps decrease pain and irritability in the joint. It is best used to help prolong intervals between cortisone injections to minimize steroid injections. These are currently approved for knee injections. Discuss with your doctor if other joint involved. Call to seek approval prior to the injections.    -long-term treatment may include a total joint replacement (keep diary of good days and bad days, then evaluate as to when you are ready)      Follow-up: 3 months or sooner if there are problems between now and then.    Thank you for choosing [facility] and Dr. Albert Long for your orthopedic & sports medicine care. It is our goal to provide you with exceptional care that will help keep you healthy, active, and get you back in the game.    Please do not hesitate to reach out to us via email, phone, or MyChart with any questions, concerns, or feedback.    If you felt that you received exemplary care today, please consider leaving us feedback on [employer] at:  [email or web address]    If you are experiencing pain/discomfort ,or have questions after 5pm and would like to be connected to the [facility]  [place] on-call team, please call this number and specify which Sports Medicine provider is treating you: [phone]

## 2024-05-21 NOTE — PROCEDURES
Sports Medicine US - Guidance for Needle Placement    Date/Time: 5/21/2024 11:40 AM    Performed by: Albert Long MD  Authorized by: Albert Long MD  Preparation: Patient was prepped and draped in the usual sterile fashion.  Local anesthesia used: no    Anesthesia:  Local anesthesia used: no    Sedation:  Patient sedated: no    Patient tolerance: patient tolerated the procedure well with no immediate complications  Comments: Ultrasound guidance was used for needle localization. Images were saved and stored for documentation. The appropriate structures were visualized. Dynamic visualization of the needle was continuous throughout the procedures and maintained good position.

## 2024-06-12 DIAGNOSIS — R06.09 DYSPNEA ON EXERTION: ICD-10-CM

## 2024-06-12 RX ORDER — ALBUTEROL SULFATE 90 UG/1
2 AEROSOL, METERED RESPIRATORY (INHALATION) EVERY 6 HOURS PRN
Qty: 54 G | Refills: 3 | Status: SHIPPED | OUTPATIENT
Start: 2024-06-12

## 2024-06-12 NOTE — TELEPHONE ENCOUNTER
Refill Routing Note   Medication(s) are not appropriate for processing by [facility] for the following reason(s):        New or recently adjusted medication    [facility] action(s):  Defer             Appointments  past 12m or future 3m with PCP    Date Provider   Last Visit   5/20/2024 Jarrett Peck MD   Next Visit   11/25/2024 Jarrett Peck MD   ED visits in past 90 days: 1        Note composed:12:43 PM 06/12/2024

## 2024-06-12 NOTE — TELEPHONE ENCOUNTER
Care Due:                  Date            Visit Type   Department     Provider  --------------------------------------------------------------------------------                                EP -                              PRIMARY      [facility] INTER[facility]  Last Visit: 05-      CARE ([facility])   MEDICINE       Jarrett  Cisco                              EP -                              PRIMARY      [facility] INTER[facility]  Next Visit: 11-      CARE ([facility])   MEDICINE       [facility]  Cisco                                                            Last  Test          Frequency    Reason                     Performed    Due Date  --------------------------------------------------------------------------------    Vitamin D...  12 months..  alendronate..............  Not Found    Overdue    [employer] Embedded Care Due Messages. Reference number: [id number].   6/12/2024 12:41:23 PM CDT

## 2024-06-16 DIAGNOSIS — I10 ESSENTIAL HYPERTENSION: ICD-10-CM

## 2024-06-16 DIAGNOSIS — N18.31 STAGE 3A CHRONIC KIDNEY DISEASE: ICD-10-CM

## 2024-06-16 NOTE — TELEPHONE ENCOUNTER
Refill Routing Note   Medication(s) are not appropriate for processing by [facility] for the following reason(s):        Required vitals abnormal    [facility] action(s):  Defer        Medication Therapy Plan: BP 5/20/24 (!) 140/62      Appointments  past 12m or future 3m with PCP    Date Provider   Last Visit   5/20/2024 Jarrett Peck MD   Next Visit   11/25/2024 Jarrett Peck MD   ED visits in past 90 days: 1        Note composed:12:04 PM 06/16/2024

## 2024-06-16 NOTE — TELEPHONE ENCOUNTER
No care due was identified.  [facility] Embedded Care Due Messages. Reference number: [id number].   6/16/2024 12:59:49 AM CDT

## 2024-06-17 RX ORDER — AMLODIPINE BESYLATE 5 MG/1
5 TABLET ORAL
Qty: 90 TABLET | Refills: 3 | Status: SHIPPED | OUTPATIENT
Start: 2024-06-17

## 2024-06-17 RX ORDER — LISINOPRIL 40 MG/1
40 TABLET ORAL
Qty: 90 TABLET | Refills: 3 | Status: SHIPPED | OUTPATIENT
Start: 2024-06-17

## 2024-06-21 DIAGNOSIS — J30.1 SEASONAL ALLERGIC RHINITIS DUE TO POLLEN: Primary | ICD-10-CM

## 2024-06-21 NOTE — TELEPHONE ENCOUNTER
No care due was identified.  Health [facility] Embedded Care Due Messages. Reference number: [id number].   6/21/2024 3:17:05 PM CDT

## 2024-06-22 RX ORDER — CETIRIZINE HYDROCHLORIDE 10 MG/1
10 TABLET ORAL
Qty: 90 TABLET | Refills: 3 | Status: SHIPPED | OUTPATIENT
Start: 2024-06-22

## 2024-06-22 NOTE — TELEPHONE ENCOUNTER
Refill Routing Note   Medication(s) are not appropriate for processing by [facility] for the following reason(s):        New or recently adjusted medication    [facility] action(s):  Defer               Appointments  past 12m or future 3m with PCP    Date Provider   Last Visit   5/20/2024 Jarrett Peck MD   Next Visit   11/25/2024 Jarrett Peck MD   ED visits in past 90 days: 0        Note composed:8:37 PM 06/21/2024

## 2024-08-07 ENCOUNTER — HOSPITAL ENCOUNTER (EMERGENCY)
Facility: HOSPITAL | Age: 79
Discharge: HOME | End: 2024-08-07
Attending: EMERGENCY MEDICINE
Payer: MEDICARE

## 2024-08-07 VITALS
TEMPERATURE: 99 F | BODY MASS INDEX: 32.2 KG/M2 | OXYGEN SATURATION: 100 % | SYSTOLIC BLOOD PRESSURE: 158 MMHG | HEART RATE: 69 BPM | HEIGHT: 62 IN | WEIGHT: 175 LBS | RESPIRATION RATE: 18 BRPM | DIASTOLIC BLOOD PRESSURE: 65 MMHG

## 2024-08-07 DIAGNOSIS — U07.1 COVID-19 VIRUS DETECTED: ICD-10-CM

## 2024-08-07 DIAGNOSIS — U07.1 COVID: Primary | ICD-10-CM

## 2024-08-07 LAB
CTP QC/QA: YES
CTP QC/QA: YES
POC MOLECULAR INFLUENZA A AGN: NEGATIVE
POC MOLECULAR INFLUENZA B AGN: NEGATIVE
SARS-COV-2 RDRP RESP QL NAA+PROBE: POSITIVE

## 2024-08-07 PROCEDURE — 87635 SARS-COV-2 COVID-19 AMP PRB: CPT | Mod: HCNC,ER | Performed by: PHYSICIAN ASSISTANT

## 2024-08-07 PROCEDURE — 99283 EMERGENCY DEPT VISIT LOW MDM: CPT | Mod: HCNC,ER

## 2024-08-07 PROCEDURE — 87502 INFLUENZA DNA AMP PROBE: CPT | Mod: HCNC,ER

## 2024-08-07 NOTE — ED PROVIDER NOTES
History      Chief Complaint   Patient presents with    Cough     Flu like symptoms onset yesterday, headache       Review of patient's allergies indicates:  No Known Allergies     HPI   HPI    8/7/2024, 6:20 PM   History obtained from the patient      History of Present Illness: Amber Naranjo is a 78 y.o. female patient who presents to the Emergency Department for flu-like symptoms, cough, nasal congestion, chills, headache, body aches, since yesterday. Denies vomiting, cp or sob.          PCP: Jarrett Peck MD       Past Medical History:  Past Medical History:   Diagnosis Date    Allergy     Anxiety 4/25/2022    Arthritis     Back pain     Disorder of kidney and ureter     Diverticulosis of colon     GERD (gastroesophageal reflux disease)     Glaucoma     Hyperlipidemia     Hypertension     Ingrown toenail     Klebsiella cystitis     Obesity     Pain in joint involving multiple sites     Pneumonia     Tortuous colon     Trouble in sleeping     Unspecified disorder of autonomic nervous system     Uterine fibroid          Past Surgical History:  Past Surgical History:   Procedure Laterality Date    COLONOSCOPY      COLONOSCOPY N/A 3/15/2017    Procedure: COLONOSCOPY;  Surgeon: Yogi Carbone MD;  Location: [facility];  Service: Endoscopy;  Laterality: N/A;           Family History:  Family History   Problem Relation Name Age of Onset    Glaucoma Mother      Hypertension Father      Asthma Daughter      Depression Daughter      Colon cancer Sister      Cancer Sister  70        colon    Diabetes Sister      Hypertension Sister      Hyperlipidemia Sister      Diabetes Brother      Hypertension Brother      Heart disease Brother      Hyperlipidemia Brother      Asthma Paternal Aunt      Diabetes Paternal Uncle      Diabetes Maternal Grandmother      Early death Maternal Grandfather      Early death Paternal Grandfather      Breast cancer Cousin      COPD Neg Hx      Kidney disease Neg Hx      Stroke Neg Hx      Mental  "illness Neg Hx             Social History:  Social History     Tobacco Use    Smoking status: Never     Passive exposure: Yes    Smokeless tobacco: Never   Substance and Sexual Activity    Alcohol use: No    Drug use: No    Sexual activity: Not Currently       ROS   Review of Systems       Review of Systems   Constitutional:  Positive for chills.   HENT:  Positive for congestion.    Respiratory:  Positive for cough.    Gastrointestinal:  Negative for abdominal pain and vomiting.   Neurological:  Positive for headaches.       Physical Exam        Initial Vitals [08/07/24 1725]   BP Pulse Resp Temp SpO2   (!) 158/65 69 18 98.8 °F (37.1 °C) 100 %      MAP       --         Physical Exam  Vital signs and nursing notes reviewed.  Constitutional: Patient is in NAD. Awake and alert. Well-developed and well-nourished.  Head: Atraumatic. Normocephalic.  Eyes: PERRL. EOM intact. Conjunctivae nl. No scleral icterus.  ENT: Mucous membranes are moist.  Nasal congestion.    Neck: Supple.   No meningismus  Cardiovascular: Regular rate and rhythm. No murmurs, rubs, or gallops. Distal pulses are 2+ and symmetric.  Pulmonary/Chest: No respiratory distress. Clear to auscultation bilaterally. No wheezing, rales, or rhonchi.  Abdominal: Soft. Non-distended. No TTP. No rebound, guarding, or rigidity. Good bowel sounds.  Genitourinary: No CVA tenderness  Musculoskeletal: Moves all extremities. No edema.   Skin: Warm and dry.  No rash  Neurological: Awake and alert. No acute focal neurological deficits are appreciated.  Psychiatric: Normal affect. Good eye contact. Appropriate in content.      ED Course      Procedures  ED Vital Signs:  Vitals:    08/07/24 1725   BP: (!) 158/65   Pulse: 69   Resp: 18   Temp: 98.8 °F (37.1 °C)   TempSrc: Oral   SpO2: 100%   Weight: 79.4 kg (175 lb)   Height: 5' 2" (1.575 m)         Results for orders placed or performed during the hospital encounter of 08/07/24   POCT Influenza A/B Molecular   Result Value " Ref Range    POC Molecular Influenza A Ag Negative Negative    POC Molecular Influenza B Ag Negative Negative     Acceptable Yes    POCT COVID-19 Rapid Screening   Result Value Ref Range    POC Rapid COVID Positive (A) Negative     Acceptable Yes              Imaging Results:  Imaging Results    None            The Emergency Provider reviewed the vital signs and test results, which are outlined above.    ED Discussion         All findings were reviewed with the patient/family in detail.   All remaining questions and concerns were addressed at that time.  Patient/family has been counseled regarding the need for follow-up as well as the indication to return to the emergency room should new or worrisome developments occur.        Medication(s) given in the ER:  Medications - No data to display         Follow-up Information       Jarrett Peck MD In 2 days.    Specialties: Internal Medicine, Family Medicine  Contact information:  [address] [address] [address] [phone]                                    Medication List        START taking these medications      molnupiravir 200 mg capsule (EUA)  Take 4 capsules (800 mg total) by mouth every 12 (twelve) hours. for 5 days            ASK your doctor about these medications      albuterol 90 mcg/actuation inhaler  Commonly known as: PROVENTIL/VENTOLIN HFA  INHALE 2 PUFFS INTO THE LUNGS EVERY 6 (SIX) HOURS AS NEEDED FOR SHORTNESS OF BREATH.     alendronate 70 MG tablet  Commonly known as: FOSAMAX  Take 1 tablet (70 mg total) by mouth every 7 days. Take your FOSAMAX tablet with a full glass (6-8 oz) of plain water only. Do not chew or suck on a tablet of FOSAMAX. After swallowing your FOSAMAX tablet, do not lie down - stay fully upright (sitting, standing, or walking) for at least 30 minutes. Do not lie down until after your first food of the day.     amLODIPine 5 MG tablet  Commonly known as: NORVASC  TAKE 1 TABLET ONE TIME  DAILY     aspirin 81 MG EC tablet  Commonly known as: ECOTRIN     atorvastatin 20 MG tablet  Commonly known as: LIPITOR  TAKE 1 TABLET EVERY DAY     calcium citrate-vitamin D3 315-200 mg 315 mg-5 mcg (200 unit) per tablet  Commonly known as: CITRACAL+D     cetirizine 10 MG tablet  Commonly known as: ZYRTEC  TAKE 1 TABLET ONE TIME DAILY     diclofenac sodium 1 % Gel  Commonly known as: VOLTAREN  APPLY 2 GRAMS TOPICALLY FOUR TIMES DAILY     lisinopriL 40 MG tablet  Commonly known as: PRINIVIL,ZESTRIL  TAKE 1 TABLET ONE TIME DAILY     metoprolol succinate 25 MG 24 hr tablet  Commonly known as: TOPROL-XL  TAKE 1 TABLET EVERY DAY     multivitamin capsule     omeprazole 40 MG capsule  Commonly known as: PRILOSEC  Take 1 capsule (40 mg total) by mouth every morning.     sertraline 25 MG tablet  Commonly known as: ZOLOFT  TAKE 1 TABLET EVERY DAY     timolol maleate 0.5% 0.5 % Drop  Commonly known as: TIMOPTIC               Where to Get Your Medications        You can get these medications from any pharmacy    Bring a paper prescription for each of these medications  molnupiravir 200 mg capsule (EUA)             Medical Decision Making        MDM     Amount and/or Complexity of Data Reviewed  Clinical lab tests: ordered and reviewed                   Clinical Impression:        ICD-10-CM ICD-9-CM   1. COVID  U07.1 079.89              Desi Lau PA-C  08/07/24 1821

## 2024-08-08 ENCOUNTER — PATIENT OUTREACH (OUTPATIENT)
Dept: EMERGENCY MEDICINE | Facility: HOSPITAL | Age: 79
End: 2024-08-08
Payer: MEDICARE

## 2024-08-20 ENCOUNTER — OFFICE VISIT (OUTPATIENT)
Dept: ORTHOPEDICS | Facility: CLINIC | Age: 79
End: 2024-08-20
Payer: MEDICARE

## 2024-08-20 VITALS — WEIGHT: 175 LBS | BODY MASS INDEX: 32.2 KG/M2 | HEIGHT: 62 IN

## 2024-08-20 DIAGNOSIS — M17.11 PRIMARY OSTEOARTHRITIS OF RIGHT KNEE: Primary | ICD-10-CM

## 2024-08-20 PROCEDURE — 99999 PR PBB SHADOW E&M-EST. PATIENT-LVL III: CPT | Mod: PBBFAC,HCNC,, | Performed by: STUDENT IN AN ORGANIZED HEALTH CARE EDUCATION/TRAINING PROGRAM

## 2024-08-20 PROCEDURE — 1101F PT FALLS ASSESS-DOCD LE1/YR: CPT | Mod: HCNC,CPTII,S$GLB, | Performed by: STUDENT IN AN ORGANIZED HEALTH CARE EDUCATION/TRAINING PROGRAM

## 2024-08-20 PROCEDURE — 99214 OFFICE O/P EST MOD 30 MIN: CPT | Mod: 25,HCNC,S$GLB, | Performed by: STUDENT IN AN ORGANIZED HEALTH CARE EDUCATION/TRAINING PROGRAM

## 2024-08-20 PROCEDURE — 1159F MED LIST DOCD IN RCRD: CPT | Mod: HCNC,CPTII,S$GLB, | Performed by: STUDENT IN AN ORGANIZED HEALTH CARE EDUCATION/TRAINING PROGRAM

## 2024-08-20 PROCEDURE — 1125F AMNT PAIN NOTED PAIN PRSNT: CPT | Mod: HCNC,CPTII,S$GLB, | Performed by: STUDENT IN AN ORGANIZED HEALTH CARE EDUCATION/TRAINING PROGRAM

## 2024-08-20 PROCEDURE — 20611 DRAIN/INJ JOINT/BURSA W/US: CPT | Mod: HCNC,RT,S$GLB, | Performed by: STUDENT IN AN ORGANIZED HEALTH CARE EDUCATION/TRAINING PROGRAM

## 2024-08-20 PROCEDURE — 1160F RVW MEDS BY RX/DR IN RCRD: CPT | Mod: HCNC,CPTII,S$GLB, | Performed by: STUDENT IN AN ORGANIZED HEALTH CARE EDUCATION/TRAINING PROGRAM

## 2024-08-20 PROCEDURE — 3288F FALL RISK ASSESSMENT DOCD: CPT | Mod: HCNC,CPTII,S$GLB, | Performed by: STUDENT IN AN ORGANIZED HEALTH CARE EDUCATION/TRAINING PROGRAM

## 2024-08-20 RX ORDER — TRIAMCINOLONE ACETONIDE 40 MG/ML
40 INJECTION, SUSPENSION INTRA-ARTICULAR; INTRAMUSCULAR
Status: DISCONTINUED | OUTPATIENT
Start: 2024-08-20 | End: 2024-08-20 | Stop reason: HOSPADM

## 2024-08-20 RX ADMIN — TRIAMCINOLONE ACETONIDE 40 MG: 40 INJECTION, SUSPENSION INTRA-ARTICULAR; INTRAMUSCULAR at 10:08

## 2024-08-20 NOTE — PROCEDURES
Large Joint Aspiration/Injection: R knee    Date/Time: 8/20/2024 10:20 AM    Performed by: Albert Long MD  Authorized by: Albert Long MD    Consent Done?:  Yes (Verbal)  Indications:  Arthritis and pain  Site marked: the procedure site was marked    Timeout: prior to procedure the correct patient, procedure, and site was verified    Prep: patient was prepped and draped in usual sterile fashion    Local anesthetic:  Bupivacaine 0.5% without epinephrine and lidocaine 1% without epinephrine    Details:  Needle Size:  21 G  Ultrasonic Guidance for needle placement?: Yes    Images are saved and documented.  Approach:  Lateral (superior)  Location:  Knee  Site:  R knee  Medications:  40 mg triamcinolone acetonide 40 mg/mL  Patient tolerance:  Patient tolerated the procedure well with no immediate complications     Ultrasound guidance was used for needle localization. Images were saved and stored for documentation. The appropriate structures were visualized. Dynamic visualization of the needle was continuous throughout the procedures and maintained good position.

## 2024-08-20 NOTE — PATIENT INSTRUCTIONS
Assessment:  Amber Naranjo is a 78 y.o. female   Chief Complaint   Patient presents with    Right Knee - Pain       No diagnosis found.     Plan:  Cortizone steroid injection given today in right knee  We discussed the proper protocols after the injection such as no submerging pools, baths tubs, or hot tubs for 24 hr.  Showering is okay today.  We also discussed that blood sugars can be elevated after an injection and asked patient to properly checked her sugars over the next few days and contact their PCP if there are any concerns.  We discussed red flags such as fevers, chills, red, warm, tender joint at the area of injection to please seek medical care immediately.        Follow-up: 3 months or sooner if there are problems between now and then.    Thank you for choosing [facility] and Dr. Albert Long for your orthopedic & sports medicine care. It is our goal to provide you with exceptional care that will help keep you healthy, active, and get you back in the game.    Please do not hesitate to reach out to us via email, phone, or MyChart with any questions, concerns, or feedback.    If you felt that you received exemplary care today, please consider leaving us feedback on [employer] at:  [email or web address]    If you are experiencing pain/discomfort ,or have questions after 5pm and would like to be connected to the [facility] on-call team, please call this number and specify which Sports Medicine provider is treating you: [phone]

## 2024-08-20 NOTE — PROCEDURES
Sports Medicine US - Guidance for Needle Placement    Date/Time: 8/20/2024 10:20 AM    Performed by: Albert Long MD  Authorized by: Albert Long MD  Preparation: Patient was prepped and draped in the usual sterile fashion.  Local anesthesia used: no    Anesthesia:  Local anesthesia used: no    Sedation:  Patient sedated: no    Patient tolerance: patient tolerated the procedure well with no immediate complications  Comments: Ultrasound guidance was used for needle localization. Images were saved and stored for documentation. The appropriate structures were visualized. Dynamic visualization of the needle was continuous throughout the procedures and maintained good position.

## 2024-08-20 NOTE — PROGRESS NOTES
Patient ID: Amber Naranjo  YOB: 1945  MRN: [id number]    Chief Complaint: Pain of the Right Knee      History of Present Illness: Amber Naranjo is a right-hand dominant 78 y.o. female who presents today with right knee pain.  Patient last seen in clinic on 5/21/2024 and received a CSI to the right knee.  Reports that the CSI provided almost 3 months of relief, with pain just recently returning.  Rates her pain today at a 5/10 and only present when she is up and moving.  No pain at rest.  Interested in repeating CSI today.     5/21/2024 History of Present Illness: Amber Naranjo is a right-hand dominant 78 y.o. female who presents today with right knee pain  She has known osteoarthritis, on 4/25/2022 had CSI and did pretty well for a while.  Not having any fever or chills. Pain is worse ambulation and gets worse throughout the day. Tried Orthovisc with Dr. Barber in February, 2023, with no relief at all.     The patient is active in none.  Occupation:       Past Medical History:   Past Medical History:   Diagnosis Date    Allergy     Anxiety 4/25/2022    Arthritis     Back pain     Disorder of kidney and ureter     Diverticulosis of colon     GERD (gastroesophageal reflux disease)     Glaucoma     Hyperlipidemia     Hypertension     Ingrown toenail     Klebsiella cystitis     Obesity     Pain in joint involving multiple sites     Pneumonia     Tortuous colon     Trouble in sleeping     Unspecified disorder of autonomic nervous system     Uterine fibroid      Past Surgical History:   Procedure Laterality Date    COLONOSCOPY      COLONOSCOPY N/A 3/15/2017    Procedure: COLONOSCOPY;  Surgeon: Yogi Carbone MD;  Location: [facility];  Service: Endoscopy;  Laterality: N/A;     Family History   Problem Relation Name Age of Onset    Glaucoma Mother      Hypertension Father      Asthma Daughter      Depression Daughter      Colon cancer Sister      Cancer Sister  70        colon    Diabetes Sister       Hypertension Sister      Hyperlipidemia Sister      Diabetes Brother      Hypertension Brother      Heart disease Brother      Hyperlipidemia Brother      Asthma Paternal Aunt      Diabetes Paternal Uncle      Diabetes Maternal Grandmother      Early death Maternal Grandfather      Early death Paternal Grandfather      Breast cancer Cousin      COPD Neg Hx      Kidney disease Neg Hx      Stroke Neg Hx      Mental illness Neg Hx       Social History     Socioeconomic History    Marital status:    Tobacco Use    Smoking status: Never     Passive exposure: Yes    Smokeless tobacco: Never   Substance and Sexual Activity    Alcohol use: No    Drug use: No    Sexual activity: Not Currently     Social Determinants of Health     Financial Resource Strain: Low Risk  (3/27/2024)    Overall Financial Resource Strain (CARDIA)     Difficulty of Paying Living Expenses: Not hard at all   Food Insecurity: No Food Insecurity (3/27/2024)    Hunger Vital Sign     Worried About Running Out of Food in the Last Year: Never true     Ran Out of Food in the Last Year: Never true   Transportation Needs: No Transportation Needs (3/27/2024)    PRAPARE - Transportation     Lack of Transportation (Medical): No     Lack of Transportation (Non-Medical): No   Physical Activity: Insufficiently Active (3/27/2024)    Exercise Vital Sign     Days of Exercise per Week: 3 days     Minutes of Exercise per Session: 20 min   Stress: Stress Concern Present (3/27/2024)    [employer] [place] [employer] - Occupational Stress Questionnaire     Feeling of Stress : Rather much   Housing Stability: Low Risk  (3/27/2024)    Housing Stability Vital Sign     Unable to Pay for Housing in the Last Year: No     Number of Places Lived in the Last Year: 1     Unstable Housing in the Last Year: No     Medication List with Changes/Refills   Current Medications    ALBUTEROL (PROVENTIL/VENTOLIN HFA) 90 MCG/ACTUATION INHALER    INHALE 2 PUFFS INTO THE LUNGS  EVERY 6 (SIX) HOURS AS NEEDED FOR SHORTNESS OF BREATH.    ALENDRONATE (FOSAMAX) 70 MG TABLET    Take 1 tablet (70 mg total) by mouth every 7 days. Take your FOSAMAX tablet with a full glass (6-8 oz) of plain water only. Do not chew or suck on a tablet of FOSAMAX. After swallowing your FOSAMAX tablet, do not lie down - stay fully upright (sitting, standing, or walking) for at least 30 minutes. Do not lie down until after your first food of the day.    AMLODIPINE (NORVASC) 5 MG TABLET    TAKE 1 TABLET ONE TIME DAILY    ASPIRIN (ECOTRIN) 81 MG EC TABLET    Take 81 mg by mouth once daily.    ATORVASTATIN (LIPITOR) 20 MG TABLET    TAKE 1 TABLET EVERY DAY    CALCIUM CITRATE-VITAMIN D3 315-200 MG (CITRACAL+D) 315 MG-5 MCG (200 UNIT) PER TABLET    Take 1 tablet by mouth 2 (two) times daily.    CETIRIZINE (ZYRTEC) 10 MG TABLET    TAKE 1 TABLET ONE TIME DAILY    DICLOFENAC SODIUM (VOLTAREN) 1 % GEL    APPLY 2 GRAMS TOPICALLY FOUR TIMES DAILY    LISINOPRIL (PRINIVIL,ZESTRIL) 40 MG TABLET    TAKE 1 TABLET ONE TIME DAILY    METOPROLOL SUCCINATE (TOPROL-XL) 25 MG 24 HR TABLET    TAKE 1 TABLET EVERY DAY    MULTIVITAMIN CAPSULE    Take 1 capsule by mouth once daily.    OMEPRAZOLE (PRILOSEC) 40 MG CAPSULE    Take 1 capsule (40 mg total) by mouth every morning.    SERTRALINE (ZOLOFT) 25 MG TABLET    TAKE 1 TABLET EVERY DAY    TIMOLOL MALEATE 0.5% (TIMOPTIC) 0.5 % DROP         Review of patient's allergies indicates:  No Known Allergies    Physical Exam:   Body mass index is 32.01 kg/m².    GENERAL: Well appearing, in no acute distress.  HEAD: Normocephalic and atraumatic.  ENT: External ears and nose grossly normal.  EYES: EOMI bilaterally  PULMONARY: Respirations are grossly even and non-labored.  NEURO: Awake, alert, and oriented x 3.  SKIN: No obvious rashes appreciated.  PSYCH: Mood & affect are appropriate.    Detailed MSK exam:     Right knee exam:   -ROM: extension 0, flexion 120  -TTP: Medial joint line  -effusion:  none  -Patellar apprehension negative  -Magdalena test negative  -stable to varus and valgus stress tests  -Lachman test negative, anterior drawer test negative, posterior drawer test negative    Imaging:  Sports Medicine US - Guidance for Needle Placement  Albert Long MD     5/21/2024 11:49 AM  Sports Medicine US - Guidance for Needle Placement    Date/Time: 5/21/2024 11:40 AM    Performed by: Albert Long MD  Authorized by: Albert Long MD  Preparation: Patient was prepped and   draped in the usual sterile fashion.  Local anesthesia used: no    Anesthesia:  Local anesthesia used: no    Sedation:  Patient sedated: no    Patient tolerance: patient tolerated the procedure well with no immediate   complications  Comments: Ultrasound guidance was used for needle localization. Images   were saved and stored for documentation. The appropriate structures were   visualized. Dynamic visualization of the needle was continuous throughout   the procedures and maintained good position.   X-ray Knee Ortho Right with Flexion  Narrative: EXAMINATION:  XR KNEE ORTHO RIGHT WITH FLEXION    CLINICAL HISTORY:  Pain in right knee    TECHNIQUE:  AP standing views of both knees, AP flexion views of both knees, lateral view of the right knee and Merchant views of both knees    COMPARISON:  12/06/2022    FINDINGS:  There is moderate joint space narrowing and Mild marginal osteophyte formation seen involving the medial compartment of the right knee.  Lateral and patellofemoral compartment joint spaces are well maintained.  No joint effusion.  No acute fracture or dislocation.  Impression: 1.  As above    Electronically signed by: Glenroy Vizcaino DO  Date:    05/21/2024  Time:    11:29        Relevant imaging results were reviewed and interpreted by me and per my read shows moderate arthritic changes.  This was discussed with the patient and / or family today.     Assessment:  Amber Naranjo is a 78 y.o. female following up for  right knee pain. Steroid injection lasted until last week and is interested in repeating again today. Gel injections were ineffective in the past.   Plan: Steroid injection given today (see separate procedure note for details). We discussed the proper protocols after the injection such as no submerging pools, baths tubs, or hot tubs for 24 hr.  Showering is okay today.  We also discussed that blood sugars can be elevated after an injection and asked patient to properly checked her sugars over the next few days and contact their PCP if there are any concerns.  We discussed red flags such as fevers, chills, red, warm, tender joint at the area of injection to please seek medical care immediately.   Continue conservative management for pain.   Follow up 3 months. All questions answered.     Primary osteoarthritis of right knee  -     Sports Medicine US - Guidance for Needle Placement  -     Large Joint Aspiration/Injection: R knee         Ultrasound guidance was used for needle localization. Images were saved and stored for documentation. The appropriate structures were visualized. Dynamic visualization of the needle was continuous throughout the procedures and maintained good position.      Electronically signed:  Albert Long MD, MPH  08/20/2024  10:18 AM

## 2024-08-29 DIAGNOSIS — K21.9 GASTROESOPHAGEAL REFLUX DISEASE WITHOUT ESOPHAGITIS: ICD-10-CM

## 2024-08-29 RX ORDER — OMEPRAZOLE 40 MG/1
40 CAPSULE, DELAYED RELEASE ORAL EVERY MORNING
Qty: 90 CAPSULE | Refills: 3 | Status: SHIPPED | OUTPATIENT
Start: 2024-08-29

## 2024-08-29 NOTE — TELEPHONE ENCOUNTER
No care due was identified.  [facility] Embedded Care Due Messages. Reference number: [id number].   8/29/2024 1:51:51 AM CDT

## 2024-09-05 ENCOUNTER — TELEPHONE (OUTPATIENT)
Dept: INTERNAL MEDICINE | Facility: CLINIC | Age: 79
End: 2024-09-05
Payer: MEDICARE

## 2024-09-29 DIAGNOSIS — I10 ESSENTIAL HYPERTENSION: ICD-10-CM

## 2024-09-29 NOTE — TELEPHONE ENCOUNTER
No care due was identified.  [facility] Embedded Care Due Messages. Reference number: [id number].   9/29/2024 6:17:57 AM CDT

## 2024-09-30 RX ORDER — METOPROLOL SUCCINATE 25 MG/1
TABLET, EXTENDED RELEASE ORAL
Qty: 90 TABLET | Refills: 3 | Status: SHIPPED | OUTPATIENT
Start: 2024-09-30

## 2024-11-03 ENCOUNTER — HOSPITAL ENCOUNTER (OUTPATIENT)
Facility: HOSPITAL | Age: 79
Discharge: HOME | End: 2024-11-05
Attending: EMERGENCY MEDICINE | Admitting: HOSPITALIST
Payer: MEDICARE

## 2024-11-03 DIAGNOSIS — R06.02 SOB (SHORTNESS OF BREATH): ICD-10-CM

## 2024-11-03 DIAGNOSIS — I50.9 CHF EXACERBATION: ICD-10-CM

## 2024-11-03 DIAGNOSIS — R79.89 ELEVATED BRAIN NATRIURETIC PEPTIDE (BNP) LEVEL: ICD-10-CM

## 2024-11-03 DIAGNOSIS — I48.91 A-FIB: ICD-10-CM

## 2024-11-03 DIAGNOSIS — I50.9 ACUTE CONGESTIVE HEART FAILURE, UNSPECIFIED HEART FAILURE TYPE: Primary | ICD-10-CM

## 2024-11-03 DIAGNOSIS — I48.91 ATRIAL FIBRILLATION, NEW ONSET: ICD-10-CM

## 2024-11-03 LAB
ALBUMIN SERPL BCP-MCNC: 3.4 G/DL (ref 3.5–5.2)
ALP SERPL-CCNC: 99 U/L (ref 40–150)
ALT SERPL W/O P-5'-P-CCNC: 23 U/L (ref 10–44)
ANION GAP SERPL CALC-SCNC: 11 MMOL/L (ref 8–16)
AST SERPL-CCNC: 26 U/L (ref 10–40)
BASOPHILS # BLD AUTO: 0.05 K/UL (ref 0–0.2)
BASOPHILS NFR BLD: 0.6 % (ref 0–1.9)
BILIRUB SERPL-MCNC: 0.5 MG/DL (ref 0.1–1)
BNP SERPL-MCNC: 655 PG/ML (ref 0–99)
BUN SERPL-MCNC: 15 MG/DL (ref 8–23)
CALCIUM SERPL-MCNC: 8.3 MG/DL (ref 8.7–10.5)
CHLORIDE SERPL-SCNC: 105 MMOL/L (ref 95–110)
CO2 SERPL-SCNC: 23 MMOL/L (ref 23–29)
CREAT SERPL-MCNC: 0.9 MG/DL (ref 0.5–1.4)
CTP QC/QA: YES
CTP QC/QA: YES
D DIMER PPP IA.FEU-MCNC: 0.69 MG/L FEU
DIFFERENTIAL METHOD BLD: ABNORMAL
EOSINOPHIL # BLD AUTO: 0.3 K/UL (ref 0–0.5)
EOSINOPHIL NFR BLD: 3.1 % (ref 0–8)
ERYTHROCYTE [DISTWIDTH] IN BLOOD BY AUTOMATED COUNT: 14.1 % (ref 11.5–14.5)
EST. GFR  (NO RACE VARIABLE): >60 ML/MIN/1.73 M^2
GLUCOSE SERPL-MCNC: 110 MG/DL (ref 70–110)
HCT VFR BLD AUTO: 34.8 % (ref 37–48.5)
HGB BLD-MCNC: 11.3 G/DL (ref 12–16)
IMM GRANULOCYTES # BLD AUTO: 0.04 K/UL (ref 0–0.04)
IMM GRANULOCYTES NFR BLD AUTO: 0.5 % (ref 0–0.5)
LYMPHOCYTES # BLD AUTO: 1.6 K/UL (ref 1–4.8)
LYMPHOCYTES NFR BLD: 19.4 % (ref 18–48)
MCH RBC QN AUTO: 31 PG (ref 27–31)
MCHC RBC AUTO-ENTMCNC: 32.5 G/DL (ref 32–36)
MCV RBC AUTO: 95 FL (ref 82–98)
MONOCYTES # BLD AUTO: 0.7 K/UL (ref 0.3–1)
MONOCYTES NFR BLD: 8.3 % (ref 4–15)
NEUTROPHILS # BLD AUTO: 5.7 K/UL (ref 1.8–7.7)
NEUTROPHILS NFR BLD: 68.1 % (ref 38–73)
NRBC BLD-RTO: 0 /100 WBC
PLATELET # BLD AUTO: 172 K/UL (ref 150–450)
PMV BLD AUTO: 11.2 FL (ref 9.2–12.9)
POC MOLECULAR INFLUENZA A AGN: NEGATIVE
POC MOLECULAR INFLUENZA B AGN: NEGATIVE
POTASSIUM SERPL-SCNC: 3.9 MMOL/L (ref 3.5–5.1)
PROT SERPL-MCNC: 6.6 G/DL (ref 6–8.4)
RBC # BLD AUTO: 3.65 M/UL (ref 4–5.4)
SARS-COV-2 RDRP RESP QL NAA+PROBE: NEGATIVE
SODIUM SERPL-SCNC: 139 MMOL/L (ref 136–145)
TROPONIN I SERPL DL<=0.01 NG/ML-MCNC: 0.01 NG/ML (ref 0–0.03)
WBC # BLD AUTO: 8.41 K/UL (ref 3.9–12.7)

## 2024-11-03 PROCEDURE — 94640 AIRWAY INHALATION TREATMENT: CPT | Mod: HCNC,ER

## 2024-11-03 PROCEDURE — 25000242 PHARM REV CODE 250 ALT 637 W/ HCPCS: Mod: HCNC,ER | Performed by: EMERGENCY MEDICINE

## 2024-11-03 PROCEDURE — 85025 COMPLETE CBC W/AUTO DIFF WBC: CPT | Mod: HCNC,ER | Performed by: EMERGENCY MEDICINE

## 2024-11-03 PROCEDURE — 87635 SARS-COV-2 COVID-19 AMP PRB: CPT | Mod: HCNC,ER | Performed by: EMERGENCY MEDICINE

## 2024-11-03 PROCEDURE — 84484 ASSAY OF TROPONIN QUANT: CPT | Mod: HCNC,ER | Performed by: EMERGENCY MEDICINE

## 2024-11-03 PROCEDURE — 93010 ELECTROCARDIOGRAM REPORT: CPT | Mod: HCNC,,, | Performed by: INTERNAL MEDICINE

## 2024-11-03 PROCEDURE — 87502 INFLUENZA DNA AMP PROBE: CPT | Mod: HCNC,ER

## 2024-11-03 PROCEDURE — G0378 HOSPITAL OBSERVATION PER HR: HCPCS | Mod: HCNC,ER

## 2024-11-03 PROCEDURE — 96374 THER/PROPH/DIAG INJ IV PUSH: CPT | Mod: HCNC,ER

## 2024-11-03 PROCEDURE — 85379 FIBRIN DEGRADATION QUANT: CPT | Mod: HCNC,ER | Performed by: EMERGENCY MEDICINE

## 2024-11-03 PROCEDURE — G0378 HOSPITAL OBSERVATION PER HR: HCPCS | Mod: HCNC

## 2024-11-03 PROCEDURE — 94761 N-INVAS EAR/PLS OXIMETRY MLT: CPT | Mod: HCNC,ER

## 2024-11-03 PROCEDURE — 25500020 PHARM REV CODE 255: Mod: HCNC | Performed by: HOSPITALIST

## 2024-11-03 PROCEDURE — 63600175 PHARM REV CODE 636 W HCPCS: Mod: HCNC,ER | Performed by: EMERGENCY MEDICINE

## 2024-11-03 PROCEDURE — 93005 ELECTROCARDIOGRAM TRACING: CPT | Mod: HCNC,ER

## 2024-11-03 PROCEDURE — 83880 ASSAY OF NATRIURETIC PEPTIDE: CPT | Mod: HCNC,ER | Performed by: EMERGENCY MEDICINE

## 2024-11-03 PROCEDURE — 99285 EMERGENCY DEPT VISIT HI MDM: CPT | Mod: 25,HCNC,ER

## 2024-11-03 PROCEDURE — 80053 COMPREHEN METABOLIC PANEL: CPT | Mod: HCNC,ER | Performed by: EMERGENCY MEDICINE

## 2024-11-03 RX ORDER — FUROSEMIDE 40 MG/1
40 TABLET ORAL DAILY
Qty: 30 TABLET | Refills: 0 | Status: SHIPPED | OUTPATIENT
Start: 2024-11-03 | End: 2024-11-05

## 2024-11-03 RX ORDER — IPRATROPIUM BROMIDE AND ALBUTEROL SULFATE 2.5; .5 MG/3ML; MG/3ML
3 SOLUTION RESPIRATORY (INHALATION)
Status: COMPLETED | OUTPATIENT
Start: 2024-11-03 | End: 2024-11-03

## 2024-11-03 RX ORDER — SODIUM CHLORIDE 0.9 % (FLUSH) 0.9 %
10 SYRINGE (ML) INJECTION
Status: DISCONTINUED | OUTPATIENT
Start: 2024-11-04 | End: 2024-11-05 | Stop reason: HOSPADM

## 2024-11-03 RX ORDER — FUROSEMIDE 10 MG/ML
60 INJECTION INTRAMUSCULAR; INTRAVENOUS
Status: COMPLETED | OUTPATIENT
Start: 2024-11-03 | End: 2024-11-03

## 2024-11-03 RX ORDER — ENOXAPARIN SODIUM 100 MG/ML
40 INJECTION SUBCUTANEOUS EVERY 24 HOURS
Status: DISCONTINUED | OUTPATIENT
Start: 2024-11-04 | End: 2024-11-05

## 2024-11-03 RX ADMIN — IPRATROPIUM BROMIDE AND ALBUTEROL SULFATE 3 ML: 2.5; .5 SOLUTION RESPIRATORY (INHALATION) at 05:11

## 2024-11-03 RX ADMIN — FUROSEMIDE 60 MG: 10 INJECTION, SOLUTION INTRAMUSCULAR; INTRAVENOUS at 05:11

## 2024-11-03 RX ADMIN — IOHEXOL 100 ML: 350 INJECTION, SOLUTION INTRAVENOUS at 11:11

## 2024-11-03 NOTE — Clinical Note
Diagnosis: Acute congestive heart failure, unspecified heart failure type [1548848]   Future Attending Provider: ANA WHITESIDE [[id number]]   Is the patient being admitted to ED TeleObservation?: No   Is the Patient being Admitted to ED Teleobservation? If the answer is No: Patient acuity level too high   Special Needs:: No Special Needs [1]

## 2024-11-03 NOTE — ED PROVIDER NOTES
Encounter Date: 11/3/2024       History     Chief Complaint   Patient presents with    Shortness of Breath     With a cough since yesterday.      Here with her daughter who is an employee at this facility for dyspnea.  She reports problems ever since having COVID about a month ago, underlying history of bronchitis, never a smoker, has been prescribed hand-held bronchodilators and has used a family member's nebulizer machine in the past.  No definite diagnosis of COPD or asthma.  Denies any known diagnosis of congestive heart failure or other heart disease.  Relatively subacute development of dyspnea at rest and with light exertion, worse today with no real identified trigger or heavy exertion.  Some lower extremity edema which seems to be new.  Denies chest pain, palpitations, fever, sputum production, abdominal pain, nausea, vomiting, diarrhea, dysuria, or other complaints.  At the time of arrival, lungs are fairly clear with room air oxygen saturations between 96 and 97%, no tachypnea or visible dyspnea, normal heart rate, slightly elevated blood pressure.  Workup undertaken.    The history is provided by the patient and a relative. No  was used.     Review of patient's allergies indicates:  No Known Allergies  Past Medical History:   Diagnosis Date    Allergy     Anxiety 4/25/2022    Arthritis     Back pain     Disorder of kidney and ureter     Diverticulosis of colon     GERD (gastroesophageal reflux disease)     Glaucoma     Hyperlipidemia     Hypertension     Ingrown toenail     Klebsiella cystitis     Obesity     Pain in joint involving multiple sites     Pneumonia     Tortuous colon     Trouble in sleeping     Unspecified disorder of autonomic nervous system     Uterine fibroid      Past Surgical History:   Procedure Laterality Date    COLONOSCOPY      COLONOSCOPY N/A 3/15/2017    Procedure: COLONOSCOPY;  Surgeon: Yogi Carbone MD;  Location: [facility];  Service: Endoscopy;  Laterality:  N/A;     Family History   Problem Relation Name Age of Onset    Glaucoma Mother      Hypertension Father      Asthma Daughter      Depression Daughter      Colon cancer Sister      Cancer Sister  70        colon    Diabetes Sister      Hypertension Sister      Hyperlipidemia Sister      Diabetes Brother      Hypertension Brother      Heart disease Brother      Hyperlipidemia Brother      Asthma Paternal Aunt      Diabetes Paternal Uncle      Diabetes Maternal Grandmother      Early death Maternal Grandfather      Early death Paternal Grandfather      Breast cancer Cousin      COPD Neg Hx      Kidney disease Neg Hx      Stroke Neg Hx      Mental illness Neg Hx       Social History     Tobacco Use    Smoking status: Never     Passive exposure: Yes    Smokeless tobacco: Never   Substance Use Topics    Alcohol use: No    Drug use: No     Review of Systems   Constitutional:  Negative for chills and fever.   Respiratory:  Positive for cough and shortness of breath.    Cardiovascular:  Positive for leg swelling. Negative for chest pain.   Neurological:  Negative for weakness.       Physical Exam     Initial Vitals [11/03/24 1639]   BP Pulse Resp Temp SpO2   (!) 158/72 70 19 97.9 °F (36.6 °C) 96 %      MAP       --         Physical Exam    Nursing note and vitals reviewed.  Constitutional: She appears well-developed and well-nourished. No distress.   HENT:   Head: Normocephalic and atraumatic.   Eyes: EOM are normal. Pupils are equal, round, and reactive to light.   Neck: Neck supple.   Normal range of motion.  Cardiovascular:  Normal rate. An irregularly irregular rhythm present.           Pulmonary/Chest: Breath sounds normal. No respiratory distress. She has no wheezes. She has no rales.   Abdominal: Abdomen is soft. Bowel sounds are normal. She exhibits no distension.   Musculoskeletal:         General: Edema present. Normal range of motion.      Cervical back: Normal range of motion and neck supple.      Comments: One  or 2+ pitting edema pretibial, symmetric bilaterally     Neurological: She is alert and oriented to person, place, and time. She has normal strength.   Skin: Skin is warm and dry.   Psychiatric: She has a normal mood and affect. Her behavior is normal. Judgment and thought content normal.         ED Course   Procedures  Labs Reviewed   B-TYPE NATRIURETIC PEPTIDE - Abnormal       Result Value     (*)    CBC W/ AUTO DIFFERENTIAL - Abnormal    WBC 8.41      RBC 3.65 (*)     Hemoglobin 11.3 (*)     Hematocrit 34.8 (*)     MCV 95      MCH 31.0      MCHC 32.5      RDW 14.1      Platelets 172      MPV 11.2      Immature Granulocytes 0.5      Gran # (ANC) 5.7      Immature Grans (Abs) 0.04      Lymph # 1.6      Mono # 0.7      Eos # 0.3      Baso # 0.05      nRBC 0      Gran % 68.1      Lymph % 19.4      Mono % 8.3      Eosinophil % 3.1      Basophil % 0.6      Differential Method Automated     COMPREHENSIVE METABOLIC PANEL - Abnormal    Sodium 139      Potassium 3.9      Chloride 105      CO2 23      Glucose 110      BUN 15      Creatinine 0.9      Calcium 8.3 (*)     Total Protein 6.6      Albumin 3.4 (*)     Total Bilirubin 0.5      Alkaline Phosphatase 99      AST 26      ALT 23      eGFR >60.0      Anion Gap 11     TROPONIN I    Troponin I 0.011     D DIMER, QUANTITATIVE   SARS-COV-2 RDRP GENE    POC Rapid COVID Negative       Acceptable Yes     POCT INFLUENZA A/B MOLECULAR    POC Molecular Influenza A Ag Negative      POC Molecular Influenza B Ag Negative       Acceptable Yes       EKG Readings: (Independently Interpreted)   Rhythm: Atrial Fibrillation. Heart Rate: 66. Ectopy: No Ectopy. Conduction: RBBB (incomplete). T Waves: Normal. Axis: Normal. Other Findings: Prolonged QT Interval. Clinical Impression: Atrial Fibrillation       Imaging Results              X-Ray Chest PA And Lateral (Final result)  Result time 11/03/24 17:13:16      Final result by Joseph Cunningham MD  (11/03/24 17:13:16)                   Impression:      1.  Vascular congestion.  Small effusions.  Findings most likely represent CHF, and less likely an interstitial infectious process such as atypical viral pneumonia.    2.  Stable findings as noted above.      Electronically signed by: Joseph Cunningham MD  Date:    11/03/2024  Time:    17:13               Narrative:    EXAMINATION:  XR CHEST PA AND LATERAL    CLINICAL HISTORY:  Asthma;    COMPARISON:  February 4, 2024, September 20, 2021    FINDINGS:  EKG leads overlie the chest.  Mildly progressive basilar reticular interstitial changes noted.  Stable left suprahilar opacity.  Small effusions.  The lungs are otherwise clear.  The cardiac silhouette size is on the upper limits of normal.  The trachea is midline and the mediastinal width is normal. Negative for scratch pneumothorax.  Pulmonary vasculature is mildly congested.  Negative for osseous abnormalities. Tortuous aorta with calcifications of the aortic knob.  There are degenerative changes of the spine and both shoulder girdles.                                       Medications   albuterol-ipratropium 2.5 mg-0.5 mg/3 mL nebulizer solution 3 mL (3 mLs Nebulization Given 11/3/24 1712)   furosemide injection 60 mg (60 mg Intravenous Given 11/3/24 1756)     Medical Decision Making  DDx CHF, ACS, Pneumonia, Pneumothorax, LADI, HTN    Problems Addressed:  Acute congestive heart failure, unspecified heart failure type: undiagnosed new problem with uncertain prognosis  Atrial fibrillation, new onset: undiagnosed new problem with uncertain prognosis  SOB (shortness of breath): acute illness or injury    Amount and/or Complexity of Data Reviewed  Labs: ordered.  Radiology: ordered.  ECG/medicine tests: ordered and independent interpretation performed. Decision-making details documented in ED Course.  Discussion of management or test interpretation with external provider(s): Cardiology consult discussed case with   Montez- obs obtain Echo in am. No Heparin at this time, Normal trop Lasix     Risk  Prescription drug management.  Decision regarding hospitalization.    6:31 PM Discussed lab/imaging studies with patient and the need for further evaluation/admission for afib/chf. Pt verbalized understanding that this is a stand alone ER and we are unable to admit at this facility. Pt will be transferred to [facility] via [facility] with care en route to include cm. I discussed this case with hs and care was accepted by Dr Montes.                                    Clinical Impression:  Final diagnoses:  [R06.02] SOB (shortness of breath)  [I50.9] Acute congestive heart failure, unspecified heart failure type (Primary)  [I48.91] Atrial fibrillation, new onset          ED Disposition Condition    Observation Stable                    All Lopez MD  11/03/24 [id number]

## 2024-11-04 ENCOUNTER — DOCUMENTATION ONLY (OUTPATIENT)
Dept: CARDIOLOGY | Facility: CLINIC | Age: 79
End: 2024-11-04
Payer: MEDICARE

## 2024-11-04 PROBLEM — I10 ESSENTIAL HYPERTENSION: Chronic | Status: ACTIVE | Noted: 2017-05-23

## 2024-11-04 PROBLEM — R06.02 SHORTNESS OF BREATH: Status: ACTIVE | Noted: 2024-11-04

## 2024-11-04 PROBLEM — I50.33 ACUTE ON CHRONIC DIASTOLIC CONGESTIVE HEART FAILURE: Status: ACTIVE | Noted: 2024-11-04

## 2024-11-04 PROBLEM — F41.9 ANXIETY: Chronic | Status: ACTIVE | Noted: 2022-04-25

## 2024-11-04 LAB
ANION GAP SERPL CALC-SCNC: 7 MMOL/L (ref 8–16)
AORTIC ROOT ANNULUS: 3.05 CM
ASCENDING AORTA: 2.8 CM
AV INDEX (PROSTH): 0.8
AV MEAN GRADIENT: 3.7 MMHG
AV PEAK GRADIENT: 7.8 MMHG
AV VALVE AREA BY VELOCITY RATIO: 2.9 CM²
AV VALVE AREA: 2.5 CM²
AV VELOCITY RATIO: 0.93
BSA FOR ECHO PROCEDURE: 1.86 M2
BUN SERPL-MCNC: 15 MG/DL (ref 8–23)
CALCIUM SERPL-MCNC: 8.1 MG/DL (ref 8.7–10.5)
CHLORIDE SERPL-SCNC: 103 MMOL/L (ref 95–110)
CO2 SERPL-SCNC: 28 MMOL/L (ref 23–29)
CREAT SERPL-MCNC: 0.9 MG/DL (ref 0.5–1.4)
CV ECHO LV RWT: 0.34 CM
DOP CALC AO PEAK VEL: 1.4 M/S
DOP CALC AO VTI: 32.1 CM
DOP CALC LVOT AREA: 3.1 CM2
DOP CALC LVOT DIAMETER: 2 CM
DOP CALC LVOT PEAK VEL: 1.3 M/S
DOP CALC LVOT STROKE VOLUME: 81 CM3
DOP CALC RVOT PEAK VEL: 0.65 M/S
DOP CALC RVOT VTI: 15 CM
DOP CALCLVOT PEAK VEL VTI: 25.8 CM
E WAVE DECELERATION TIME: 186.5 MSEC
E/A RATIO: 3.81
E/E' RATIO: 12.12 M/S
ECHO LV POSTERIOR WALL: 0.9 CM (ref 0.6–1.1)
EJECTION FRACTION: 55 %
EST. GFR  (NO RACE VARIABLE): >60 ML/MIN/1.73 M^2
ESTIMATED AVG GLUCOSE: 111 MG/DL (ref 68–131)
FRACTIONAL SHORTENING: 34 % (ref 28–44)
GLUCOSE SERPL-MCNC: 91 MG/DL (ref 70–110)
HBA1C MFR BLD: 5.5 % (ref 4–5.6)
INTERVENTRICULAR SEPTUM: 0.8 CM (ref 0.6–1.1)
IVC DIAMETER: 1.05 CM
IVRT: 74.22 MSEC
LA MAJOR: 5.18 CM
LA MINOR: 5.09 CM
LA WIDTH: 3.8 CM
LEFT ATRIUM SIZE: 3.15 CM
LEFT ATRIUM VOLUME INDEX: 28.9 ML/M2
LEFT ATRIUM VOLUME: 52.24 CM3
LEFT INTERNAL DIMENSION IN SYSTOLE: 3.5 CM (ref 2.1–4)
LEFT VENTRICLE DIASTOLIC VOLUME INDEX: 76.15 ML/M2
LEFT VENTRICLE DIASTOLIC VOLUME: 137.83 ML
LEFT VENTRICLE MASS INDEX: 89.6 G/M2
LEFT VENTRICLE SYSTOLIC VOLUME INDEX: 27.3 ML/M2
LEFT VENTRICLE SYSTOLIC VOLUME: 49.37 ML
LEFT VENTRICULAR INTERNAL DIMENSION IN DIASTOLE: 5.3 CM (ref 3.5–6)
LEFT VENTRICULAR MASS: 162.1 G
LV LATERAL E/E' RATIO: 11.44 M/S
LV SEPTAL E/E' RATIO: 12.88 M/S
LVED V (TEICH): 137.83 ML
LVES V (TEICH): 49.37 ML
LVOT MG: 2.58 MMHG
LVOT MV: 0.72 CM/S
MAGNESIUM SERPL-MCNC: 1.8 MG/DL (ref 1.6–2.6)
MV PEAK A VEL: 0.27 M/S
MV PEAK E VEL: 1.03 M/S
MV STENOSIS PRESSURE HALF TIME: 54.09 MS
MV VALVE AREA P 1/2 METHOD: 4.07 CM2
OHS QRS DURATION: 118 MS
OHS QTC CALCULATION: 505 MS
PISA MRMAX VEL: 5.16 M/S
PISA TR MAX VEL: 2.58 M/S
POTASSIUM SERPL-SCNC: 3.4 MMOL/L (ref 3.5–5.1)
PV MEAN GRADIENT: 1 MMHG
RA MAJOR: 4.75 CM
RA PRESSURE ESTIMATED: 3 MMHG
RA WIDTH: 2.8 CM
RIGHT VENTRICLE DIASTOLIC MID DIMENSION: 3.2 CM
RV MID DIAMA: 3.24 CM
RV TB RVSP: 6 MMHG
SODIUM SERPL-SCNC: 138 MMOL/L (ref 136–145)
STJ: 2.9 CM
TDI LATERAL: 0.09 M/S
TDI SEPTAL: 0.08 M/S
TDI: 0.09 M/S
TR MAX PG: 27 MMHG
TRICUSPID ANNULAR PLANE SYSTOLIC EXCURSION: 2.04 CM
TV REST PULMONARY ARTERY PRESSURE: 30 MMHG
Z-SCORE OF LEFT VENTRICULAR DIMENSION IN END DIASTOLE: 0.57
Z-SCORE OF LEFT VENTRICULAR DIMENSION IN END SYSTOLE: 0.98

## 2024-11-04 PROCEDURE — 96372 THER/PROPH/DIAG INJ SC/IM: CPT | Performed by: HOSPITALIST

## 2024-11-04 PROCEDURE — 80048 BASIC METABOLIC PNL TOTAL CA: CPT | Mod: HCNC | Performed by: HOSPITALIST

## 2024-11-04 PROCEDURE — G0378 HOSPITAL OBSERVATION PER HR: HCPCS | Mod: HCNC

## 2024-11-04 PROCEDURE — 83735 ASSAY OF MAGNESIUM: CPT | Mod: HCNC | Performed by: HOSPITALIST

## 2024-11-04 PROCEDURE — 63600175 PHARM REV CODE 636 W HCPCS: Mod: HCNC | Performed by: EMERGENCY MEDICINE

## 2024-11-04 PROCEDURE — 25000003 PHARM REV CODE 250: Mod: HCNC | Performed by: EMERGENCY MEDICINE

## 2024-11-04 PROCEDURE — 94761 N-INVAS EAR/PLS OXIMETRY MLT: CPT | Mod: HCNC

## 2024-11-04 PROCEDURE — 83036 HEMOGLOBIN GLYCOSYLATED A1C: CPT | Mod: HCNC | Performed by: HOSPITALIST

## 2024-11-04 PROCEDURE — 96374 THER/PROPH/DIAG INJ IV PUSH: CPT | Mod: 59

## 2024-11-04 PROCEDURE — 36415 COLL VENOUS BLD VENIPUNCTURE: CPT | Mod: HCNC | Performed by: HOSPITALIST

## 2024-11-04 PROCEDURE — 63600175 PHARM REV CODE 636 W HCPCS: Mod: HCNC | Performed by: HOSPITALIST

## 2024-11-04 RX ORDER — FUROSEMIDE 10 MG/ML
40 INJECTION INTRAMUSCULAR; INTRAVENOUS 2 TIMES DAILY
Status: DISCONTINUED | OUTPATIENT
Start: 2024-11-04 | End: 2024-11-05 | Stop reason: HOSPADM

## 2024-11-04 RX ADMIN — POTASSIUM BICARBONATE 50 MEQ: 978 TABLET, EFFERVESCENT ORAL at 01:11

## 2024-11-04 RX ADMIN — ENOXAPARIN SODIUM 40 MG: 40 INJECTION SUBCUTANEOUS at 04:11

## 2024-11-04 RX ADMIN — FUROSEMIDE 40 MG: 10 INJECTION, SOLUTION INTRAMUSCULAR; INTRAVENOUS at 04:11

## 2024-11-04 NOTE — SUBJECTIVE & OBJECTIVE
Past Medical History:   Diagnosis Date    Allergy     Anxiety 4/25/2022    Arthritis     Back pain     Disorder of kidney and ureter     Diverticulosis of colon     GERD (gastroesophageal reflux disease)     Glaucoma     Hyperlipidemia     Hypertension     Ingrown toenail     Klebsiella cystitis     Obesity     Pain in joint involving multiple sites     Pneumonia     Tortuous colon     Trouble in sleeping     Unspecified disorder of autonomic nervous system     Uterine fibroid        Past Surgical History:   Procedure Laterality Date    COLONOSCOPY      COLONOSCOPY N/A 3/15/2017    Procedure: COLONOSCOPY;  Surgeon: Yogi Carbone MD;  Location: [facility];  Service: Endoscopy;  Laterality: N/A;       Review of patient's allergies indicates:  No Known Allergies    No current facility-administered medications on file prior to encounter.     Current Outpatient Medications on File Prior to Encounter   Medication Sig    albuterol (PROVENTIL/VENTOLIN HFA) 90 mcg/actuation inhaler INHALE 2 PUFFS INTO THE LUNGS EVERY 6 (SIX) HOURS AS NEEDED FOR SHORTNESS OF BREATH.    alendronate (FOSAMAX) 70 MG tablet Take 1 tablet (70 mg total) by mouth every 7 days. Take your FOSAMAX tablet with a full glass (6-8 oz) of plain water only. Do not chew or suck on a tablet of FOSAMAX. After swallowing your FOSAMAX tablet, do not lie down - stay fully upright (sitting, standing, or walking) for at least 30 minutes. Do not lie down until after your first food of the day.    amLODIPine (NORVASC) 5 MG tablet TAKE 1 TABLET ONE TIME DAILY    aspirin (ECOTRIN) 81 MG EC tablet Take 81 mg by mouth once daily.    atorvastatin (LIPITOR) 20 MG tablet TAKE 1 TABLET EVERY DAY    calcium citrate-vitamin D3 315-200 mg (CITRACAL+D) 315 mg-5 mcg (200 unit) per tablet Take 1 tablet by mouth 2 (two) times daily.    cetirizine (ZYRTEC) 10 MG tablet TAKE 1 TABLET ONE TIME DAILY    metoprolol succinate (TOPROL-XL) 25 MG 24 hr tablet TAKE 1 TABLET EVERY DAY     multivitamin capsule Take 1 capsule by mouth once daily.    omeprazole (PRILOSEC) 40 MG capsule TAKE 1 CAPSULE EVERY MORNING    sertraline (ZOLOFT) 25 MG tablet TAKE 1 TABLET EVERY DAY    timolol maleate 0.5% (TIMOPTIC) 0.5 % Drop     diclofenac sodium (VOLTAREN) 1 % Gel APPLY 2 GRAMS TOPICALLY FOUR TIMES DAILY    lisinopriL (PRINIVIL,ZESTRIL) 40 MG tablet TAKE 1 TABLET ONE TIME DAILY     Family History       Problem Relation (Age of Onset)    Asthma Daughter, Paternal Aunt    Breast cancer Cousin    Cancer Sister (70)    Colon cancer Sister    Depression Daughter    Diabetes Sister, Brother, Paternal Uncle, Maternal Grandmother    Early death Maternal Grandfather, Paternal Grandfather    Glaucoma Mother    Heart disease Brother    Hyperlipidemia Sister, Brother    Hypertension Father, Sister, Brother          Tobacco Use    Smoking status: Never     Passive exposure: Yes    Smokeless tobacco: Never   Substance and Sexual Activity    Alcohol use: No    Drug use: No    Sexual activity: Not Currently     Review of Systems   All other systems reviewed and are negative.    Objective:     Vital Signs (Most Recent):  Temp: 97.9 °F (36.6 °C) (11/04/24 0117)  Pulse: 71 (11/04/24 0117)  Resp: 16 (11/04/24 0117)  BP: (!) 145/58 (11/04/24 0117)  SpO2: 95 % (11/04/24 0117) Vital Signs (24h Range):  Temp:  [97.9 °F (36.6 °C)-98.2 °F (36.8 °C)] 97.9 °F (36.6 °C)  Pulse:  [63-74] 71  Resp:  [16-23] 16  SpO2:  [94 %-97 %] 95 %  BP: (125-158)/(58-76) 145/58     Weight: 82 kg (180 lb 12.4 oz)  Body mass index is 33.06 kg/m².     Physical Exam  Constitutional:       General: She is not in acute distress.     Appearance: Normal appearance. She is obese. She is not ill-appearing, toxic-appearing or diaphoretic.   HENT:      Head: Normocephalic and atraumatic.      Right Ear: External ear normal.      Left Ear: External ear normal.      Nose: Nose normal. No congestion or rhinorrhea.      Mouth/Throat:      Mouth: Mucous membranes are  moist.      Pharynx: Oropharynx is clear. No oropharyngeal exudate or posterior oropharyngeal erythema.   Eyes:      General: No scleral icterus.     Extraocular Movements: Extraocular movements intact.      Conjunctiva/sclera: Conjunctivae normal.      Pupils: Pupils are equal, round, and reactive to light.   Neck:      Vascular: No carotid bruit.   Cardiovascular:      Rate and Rhythm: Normal rate and regular rhythm.      Pulses: Normal pulses.      Heart sounds: Normal heart sounds. No murmur heard.     No friction rub. No gallop.   Pulmonary:      Effort: Pulmonary effort is normal. No respiratory distress.      Breath sounds: No stridor. Rales present. No wheezing or rhonchi.   Chest:      Chest wall: No tenderness.   Abdominal:      General: Abdomen is flat. Bowel sounds are normal. There is no distension.      Palpations: Abdomen is soft. There is no mass.      Tenderness: There is no abdominal tenderness. There is no right CVA tenderness, left CVA tenderness, guarding or rebound.      Hernia: No hernia is present.   Musculoskeletal:         General: Swelling present. No tenderness, deformity or signs of injury. Normal range of motion.      Cervical back: Normal range of motion and neck supple. No rigidity or tenderness.      Right lower leg: Edema present.      Left lower leg: Edema present.   Lymphadenopathy:      Cervical: No cervical adenopathy.   Skin:     General: Skin is warm and dry.      Capillary Refill: Capillary refill takes less than 2 seconds.      Coloration: Skin is not jaundiced or pale.      Findings: No bruising, erythema, lesion or rash.   Neurological:      General: No focal deficit present.      Mental Status: She is alert and oriented to person, place, and time. Mental status is at baseline.      Cranial Nerves: No cranial nerve deficit.      Sensory: No sensory deficit.      Motor: No weakness.      Coordination: Coordination normal.   Psychiatric:         Mood and Affect: Mood normal.          Behavior: Behavior normal.         Thought Content: Thought content normal.         Judgment: Judgment normal.              CRANIAL NERVES     CN III, IV, VI   Pupils are equal, round, and reactive to light.       Significant Labs: All pertinent labs within the past 24 hours have been reviewed.    Significant Imaging: I have reviewed all pertinent imaging results/findings within the past 24 hours.    LABS:  Recent Results (from the past 24 hours)   D-Dimer, Quantitative    Collection Time: 11/03/24  4:44 PM   Result Value Ref Range    D-Dimer 0.69 (H) <0.50 mg/L FEU   EKG 12-lead    Collection Time: 11/03/24  4:52 PM   Result Value Ref Range    QRS Duration 118 ms    [facility] QTC Calculation 505 ms   BNP    Collection Time: 11/03/24  4:56 PM   Result Value Ref Range     (H) 0 - 99 pg/mL   Troponin I    Collection Time: 11/03/24  4:56 PM   Result Value Ref Range    Troponin I 0.011 0.000 - 0.026 ng/mL   CBC auto differential    Collection Time: 11/03/24  4:56 PM   Result Value Ref Range    WBC 8.41 3.90 - 12.70 K/uL    RBC 3.65 (L) 4.00 - 5.40 M/uL    Hemoglobin 11.3 (L) 12.0 - 16.0 g/dL    Hematocrit 34.8 (L) 37.0 - 48.5 %    MCV 95 82 - 98 fL    MCH 31.0 27.0 - 31.0 pg    MCHC 32.5 32.0 - 36.0 g/dL    RDW 14.1 11.5 - 14.5 %    Platelets 172 150 - 450 K/uL    MPV 11.2 9.2 - 12.9 fL    Immature Granulocytes 0.5 0.0 - 0.5 %    Gran # (ANC) 5.7 1.8 - 7.7 K/uL    Immature Grans (Abs) 0.04 0.00 - 0.04 K/uL    Lymph # 1.6 1.0 - 4.8 K/uL    Mono # 0.7 0.3 - 1.0 K/uL    Eos # 0.3 0.0 - 0.5 K/uL    Baso # 0.05 0.00 - 0.20 K/uL    nRBC 0 0 /100 WBC    Gran % 68.1 38.0 - 73.0 %    Lymph % 19.4 18.0 - 48.0 %    Mono % 8.3 4.0 - 15.0 %    Eosinophil % 3.1 0.0 - 8.0 %    Basophil % 0.6 0.0 - 1.9 %    Differential Method Automated    Comprehensive metabolic panel    Collection Time: 11/03/24  4:56 PM   Result Value Ref Range    Sodium 139 136 - 145 mmol/L    Potassium 3.9 3.5 - 5.1 mmol/L    Chloride 105 95 - 110 mmol/L     CO2 23 23 - 29 mmol/L    Glucose 110 70 - 110 mg/dL    BUN 15 8 - 23 mg/dL    Creatinine 0.9 0.5 - 1.4 mg/dL    Calcium 8.3 (L) 8.7 - 10.5 mg/dL    Total Protein 6.6 6.0 - 8.4 g/dL    Albumin 3.4 (L) 3.5 - 5.2 g/dL    Total Bilirubin 0.5 0.1 - 1.0 mg/dL    Alkaline Phosphatase 99 40 - 150 U/L    AST 26 10 - 40 U/L    ALT 23 10 - 44 U/L    eGFR >60.0 >60 mL/min/1.73 m^2    Anion Gap 11 8 - 16 mmol/L   POCT COVID-19 Rapid Screening    Collection Time: 11/03/24  5:12 PM   Result Value Ref Range    POC Rapid COVID Negative Negative     Acceptable Yes    POCT Influenza A/B Molecular    Collection Time: 11/03/24  5:12 PM   Result Value Ref Range    POC Molecular Influenza A Ag Negative Negative    POC Molecular Influenza B Ag Negative Negative     Acceptable Yes    Magnesium    Collection Time: 11/04/24 12:39 AM   Result Value Ref Range    Magnesium 1.8 1.6 - 2.6 mg/dL       RADIOLOGY  CTA Chest Non-Coronary (PE Studies)    Result Date: 11/4/2024  EXAMINATION: CTA CHEST NON CORONARY (PE STUDIES) CLINICAL HISTORY: Pulmonary embolism (PE) suspected, positive D-dimer; TECHNIQUE: Low dose axial images, sagittal and coronal reformations were obtained from the thoracic inlet to the lung bases following the IV administration of 100 mL of Omnipaque 350.  Contrast timing was optimized to evaluate the pulmonary arteries.  MIP images were performed. COMPARISON: Chest radiograph 11/03/2024 FINDINGS: The visualized soft tissue structures at the base of the neck appear within normal limits allowing from streak artifact from dense contrast bolus. The thoracic aorta maintains normal caliber, contour, and course with mild atherosclerotic calcification within its course.  There is no evidence of aneurysmal dilation or dissection. The heart is mildly enlarged and there is no significant pericardial effusion. The esophagus maintains a normal course and caliber. There is no bulky axillary or mediastinal lymph  node enlargement. The trachea is midline and the proximal airways are patent.  There is peribronchial cuffing/thickening which may reflect small airways infection or inflammation.  Detailed evaluation of the lung parenchyma is limited by respiratory motion artifact. There is no pneumothorax. There are ground-glass opacities throughout the lungs.  There is mild smooth interlobular septal thickening.  There is a subsegmental opacity within the left upper lobe with possible of occlusion of more proximal subsegmental bronchus (axial series 2, image 143).  There are small layering bilateral pleural effusions with adjacent compressive atelectasis. No convincing evidence of pulmonary thromboembolism. Limited images of the upper abdomen obtained during the course of this dedicated thoracic CT demonstrate no acute abnormalities.  There are scattered colonic diverticula present.  There are calcified splenic granuloma.. The osseous structures demonstrate a minimal chronic appearing superior endplate deformity of the T4 vertebral body.  Additionally, there is mild chronic appearing anterior wedging of the T9 vertebral body.  There are multilevel degenerative changes of visualized spine..     No convincing evidence of pulmonary thromboembolism. Smooth interlobular septal thickening, ground-glass opacities, and small layering bilateral pleural effusions.  Findings may reflect edema/CHF. Mild bilateral peribronchial cuffing/thickening which may reflect superimposed small airways inflammation or infection. Subsegmental opacity within the left upper lobe with possible occlusion of the more proximal subsegmental bronchus.  This could reflect mucoid impaction with associated atelectasis, although consider short-term 3 month chest CT follow-up to ensure appropriate resolution and/or stability. Cardiomegaly. Additional findings as above. Electronically signed by: Tyron Mccain MD Date:    11/04/2024 Time:    00:19    X-Ray Chest PA  And Lateral    Result Date: 11/3/2024  EXAMINATION: XR CHEST PA AND LATERAL CLINICAL HISTORY: Asthma; COMPARISON: February 4, 2024, September 20, 2021 FINDINGS: EKG leads overlie the chest.  Mildly progressive basilar reticular interstitial changes noted.  Stable left suprahilar opacity.  Small effusions.  The lungs are otherwise clear.  The cardiac silhouette size is on the upper limits of normal.  The trachea is midline and the mediastinal width is normal. Negative for scratch pneumothorax.  Pulmonary vasculature is mildly congested.  Negative for osseous abnormalities. Tortuous aorta with calcifications of the aortic knob.  There are degenerative changes of the spine and both shoulder girdles.     1.  Vascular congestion.  Small effusions.  Findings most likely represent CHF, and less likely an interstitial infectious process such as atypical viral pneumonia. 2.  Stable findings as noted above. Electronically signed by: Joseph Cunningham MD Date:    11/03/2024 Time:    17:13      EKG    MICROBIOLOGY    MDM

## 2024-11-04 NOTE — ASSESSMENT & PLAN NOTE
Chronic, controlled.  Latest blood pressure and vitals reviewed-   Temp:  [97.9 °F (36.6 °C)-98.2 °F (36.8 °C)]   Pulse:  [63-74]   Resp:  [16-23]   BP: (125-158)/(58-76)   SpO2:  [94 %-97 %] .   Home meds for hypertension were reviewed and noted below.   Hypertension Medications               amLODIPine (NORVASC) 5 MG tablet TAKE 1 TABLET ONE TIME DAILY    furosemide (LASIX) 40 MG tablet Take 1 tablet (40 mg total) by mouth once daily.    lisinopriL (PRINIVIL,ZESTRIL) 40 MG tablet TAKE 1 TABLET ONE TIME DAILY    metoprolol succinate (TOPROL-XL) 25 MG 24 hr tablet TAKE 1 TABLET EVERY DAY     While in the hospital, will manage blood pressure as follows; Continue home antihypertensive regimen    Will utilize p.r.n. blood pressure medication only if patient's blood pressure greater than  180/110 and she develops symptoms such as worsening chest pain or shortness of breath.

## 2024-11-04 NOTE — ASSESSMENT & PLAN NOTE
Patient presented with worsening dyspnea/shortness of breath and was found to have evidence of CHF on labs and imaging. BNP elevated at 655, d-dimer elevated at 0.69, Flu/COVID negative, CXR positive for CHF and possible pneumonia with CTA negative for PE. Patient s/p IV lasix with improvement in symptoms and currently saturating 97% on room air in no acute distress.  Plan:  -continue treatment of CHF as noted below  -titrate oxygen therapy as needed  -incentive spirometry  -best prn

## 2024-11-04 NOTE — HPI
Amber Naranjo is a 79 y.o. female with a PMH  has a past medical history of Allergy, Anxiety (4/25/2022), Arthritis, Back pain, Disorder of kidney and ureter, Diverticulosis of colon, GERD (gastroesophageal reflux disease), Glaucoma, Hyperlipidemia, Hypertension, Ingrown toenail, Klebsiella cystitis, Obesity, Pain in joint involving multiple sites, Pneumonia, Tortuous colon, Trouble in sleeping, Unspecified disorder of autonomic nervous system, and Uterine fibroid. who presented as a transfer from [facility] for higher level of care after patient presented for further evaluation of worsening dyspnea/shortness of breath.  Patient reports issues with ongoing dyspnea since having COVID approximally 1 month ago and reported no relief from home inhalers/nebulizers prompting her to come to the ED for further evaluation.  She denies prior history of smoking or known history of asthma/COPD and follows Dr. Cabezas from Cardiology outpatient for high blood pressure and heart valve monitoring.  Associated symptoms included lower extremity edema, orthopnea, PND, and decreased exercise tolerance but denied endorsing any fever, chills, sweats, nausea, vomiting, chest pain, abdominal pain, dysuria, hematuria, melena, hematochezia, diarrhea, or onset neurological deficits.  She denies use of home oxygen or CPAP and was in his usual state of health prior to onset of symptoms.  Initial workup in the ED prior to transfer revealed patient to be afebrile without leukocytosis, hemodynamically stable, D-dimer elevated at 0.69, , troponin negative, flu/COVID negative, chest x-ray positive for CHF/possible pneumonia, CTA upon arrival negative for PE but again showed evidence of CHF and possible pneumonia.  Patient admitted to Hospital Medicine under observation for continued medical management.      PCP: Jarrett Peck

## 2024-11-04 NOTE — PROGRESS NOTES
"Heart Failure Transitional [facility]) nurse navigator notified of HFTCC candidate in need of education and introduction to 4-6 week program.      PT aao x 3 while lying in bed  with family at bedside. Introduced self to pt as HFTCC nurse navigator.     Patient given "Home Care Guide for Heart Failure Patients" , "Heart Failure Transitional Care Clinic" flyer and "Daily weight and symptom tracker".  Encouraged pt and caregiver to review information.      Reviewed the following key points of HFTCC program with pt and family:   1.) Take your medications as directed.    2.) Weight yourself daily   3.) Follow low salt and limited fluid diet.    4.) Stop smoking and start exercising   5.) Go to your appointments and call your team.      Pt reminded to follow Symptom tracker and to call at the onset of symptoms according to tracker.     Reviewed plan for follow up once discharged to include phone calls, in person and virtual visits to assist pt optimizing their heart failure medication regimen and encouraging healthy lifestyle modifications.  Reminded pt that program will assist them over the next 4-6 weeks and then patient will be transferred to long term care provider .  Reminded pt how to contact HFTCC navigator via phone and or via [employer].     Pt given appointment or instructed appointment will be printed on hospital discharge paperwork.     Pt also reminded HF nurse will call 48-72 hours after discharge to check on them.     PT and family verbalize read back of information given.  Encouraged pt and family to read over information often and contact team with any questions or concerns.      "

## 2024-11-04 NOTE — PLAN OF CARE
Pt is AAOX4. Pt is on RA. Plan of care reviewed with patient. Pt verbalizes understanding. Fall precautions maintained. Call light within reach, bed in lowest position, instructed pt to call for assistance. Continue plan of care.     Problem: Adult Inpatient Plan of Care  Goal: Plan of Care Review  Outcome: Progressing  Goal: Patient-Specific Goal (Individualized)  Outcome: Progressing  Goal: Absence of Hospital-Acquired Illness or Injury  Outcome: Progressing  Goal: Optimal Comfort and Wellbeing  Outcome: Progressing  Goal: Readiness for Transition of Care  Outcome: Progressing

## 2024-11-04 NOTE — PROGRESS NOTES
[facility]  Progress Note    Patient Name: Amber Naranjo  MRN: [id number]  Patient Class: OP- Observation   Admission Date: 11/3/2024  Length of Stay: 0 days  Attending Physician: Tomi Montes MD  Primary Care Provider: Jarrett Peck MD        Subjective:     Principal Problem:Acute on chronic diastolic congestive heart failure        HPI:  Amber Naranjo is a 79 y.o. female with a PMH  has a past medical history of Allergy, Anxiety (4/25/2022), Arthritis, Back pain, Disorder of kidney and ureter, Diverticulosis of colon, GERD (gastroesophageal reflux disease), Glaucoma, Hyperlipidemia, Hypertension, Ingrown toenail, Klebsiella cystitis, Obesity, Pain in joint involving multiple sites, Pneumonia, Tortuous colon, Trouble in sleeping, Unspecified disorder of autonomic nervous system, and Uterine fibroid. who presented as a transfer from [facility] for higher level of care after patient presented for further evaluation of worsening dyspnea/shortness of breath.  Patient reports issues with ongoing dyspnea since having COVID approximally 1 month ago and reported no relief from home inhalers/nebulizers prompting her to come to the ED for further evaluation.  She denies prior history of smoking or known history of asthma/COPD and follows Dr. Cabezas from Cardiology outpatient for high blood pressure and heart valve monitoring.  Associated symptoms included lower extremity edema, orthopnea, PND, and decreased exercise tolerance but denied endorsing any fever, chills, sweats, nausea, vomiting, chest pain, abdominal pain, dysuria, hematuria, melena, hematochezia, diarrhea, or onset neurological deficits.  She denies use of home oxygen or CPAP and was in his usual state of health prior to onset of symptoms.  Initial workup in the ED prior to transfer revealed patient to be afebrile without leukocytosis, hemodynamically stable, D-dimer elevated at 0.69, , troponin negative,  flu/COVID negative, chest x-ray positive for CHF/possible pneumonia, CTA upon arrival negative for PE but again showed evidence of CHF and possible pneumonia.  Patient admitted to Hospital Medicine under observation for continued medical management.      PCP: Jarrett Peck       Overview/Hospital Course:  79 y.o. female with Hx of Obesity, HTN, HLP, Glaucoma, Anxiety, Arthritis, Back pain, Disorder of kidney and ureter, Diverticulosis of colon, GERD, Glaucoma, Klebsiella cystitis transferred from [facility] ER for worsening dyspnea/SOB. Patient reports issues with ongoing dyspnea since having COVID approximally 1 month ago and reported no relief from home inhalers/nebulizers prompting her to come to the ED for further evaluation. No hx of smoking or asthma/COPD and follows Dr. Cabezas from Cardiology outpatient for high blood pressure and heart valve monitoring. Associated symptoms included lower extremity edema, orthopnea, PND, and decreased exercise tolerance. Initial w/u - Afebrile without leukocytosis, hemodynamically stable, D-dimer elevated at 0.69, , troponin negative, flu/COVID negative, CXR positive for CHF/possible pneumonia, CTA upon arrival negative for PE but again showed evidence of CHF and possible pneumonia. Patient admitted to Hospital Medicine under observation for continued medical management.     11/4- looks better, sitting up in chair on RA, still gets easily dyspneic with little movements and has some orthopnea and BLE edema. Cont IV lasix 40 mg bid. Echo-normal EF 55%, no DD, PAS 30 mm Hg- possible SAMEER/OHS. Will consult Cards in am. K 3.4- replaced.     Interval History: looks better, sitting up in chair on RA, still gets easily dyspneic with little movements and has some orthopnea and BLE edema. Cont IV lasix 40 mg bid. Echo-normal EF 55%, no DD, PAS 30 mm Hg- possible SAMEER/OHS. Will consult Cards in am. K 3.4- replaced.     Review of Systems   Constitutional:  Positive for activity change  and fatigue.   HENT: Negative.     Eyes: Negative.  Negative for visual disturbance.   Respiratory:  Positive for shortness of breath. Negative for cough and wheezing.    Cardiovascular:  Positive for leg swelling.   Gastrointestinal: Negative.    Endocrine: Negative.    Genitourinary: Negative.    Musculoskeletal:  Positive for arthralgias, gait problem, joint swelling and myalgias.   Skin: Negative.    Allergic/Immunologic: Negative.    Hematological: Negative.    Psychiatric/Behavioral: Negative.     All other systems reviewed and are negative.    Objective:     Vital Signs (Most Recent):  Temp: 97.9 °F (36.6 °C) (11/04/24 1321)  Pulse: 71 (11/04/24 1500)  Resp: 16 (11/04/24 1321)  BP: 132/63 (11/04/24 1321)  SpO2: 97 % (11/04/24 1321) Vital Signs (24h Range):  Temp:  [97.9 °F (36.6 °C)-98.7 °F (37.1 °C)] 97.9 °F (36.6 °C)  Pulse:  [63-80] 71  Resp:  [16-23] 16  SpO2:  [94 %-97 %] 97 %  BP: (125-158)/(55-76) 132/63     Weight: 79.4 kg (175 lb)  Body mass index is 32.01 kg/m².    Intake/Output Summary (Last 24 hours) at 11/4/2024 1557  Last data filed at 11/3/2024 2004  Gross per 24 hour   Intake --   Output 800 ml   Net -800 ml         Physical Exam  Vitals and nursing note reviewed.   Constitutional:       General: She is not in acute distress.     Appearance: Normal appearance. She is obese. She is not ill-appearing, toxic-appearing or diaphoretic.   HENT:      Head: Normocephalic and atraumatic.      Right Ear: External ear normal.      Left Ear: External ear normal.      Nose: Nose normal. No congestion or rhinorrhea.      Mouth/Throat:      Mouth: Mucous membranes are moist.      Pharynx: Oropharynx is clear. No oropharyngeal exudate or posterior oropharyngeal erythema.   Eyes:      General: No scleral icterus.     Extraocular Movements: Extraocular movements intact.      Conjunctiva/sclera: Conjunctivae normal.      Pupils: Pupils are equal, round, and reactive to light.   Neck:      Vascular: No carotid  bruit.   Cardiovascular:      Rate and Rhythm: Normal rate and regular rhythm.      Pulses: Normal pulses.      Heart sounds: Normal heart sounds. No murmur heard.     No friction rub. No gallop.   Pulmonary:      Effort: Pulmonary effort is normal. No respiratory distress.      Breath sounds: No stridor. Rales present. No wheezing or rhonchi.   Chest:      Chest wall: No tenderness.   Abdominal:      General: Abdomen is flat. Bowel sounds are normal. There is no distension.      Palpations: Abdomen is soft. There is no mass.      Tenderness: There is no abdominal tenderness. There is no right CVA tenderness, left CVA tenderness, guarding or rebound.      Hernia: No hernia is present.   Musculoskeletal:         General: Swelling present. No tenderness, deformity or signs of injury. Normal range of motion.      Cervical back: Normal range of motion and neck supple. No rigidity or tenderness.      Right lower leg: Edema present.      Left lower leg: Edema present.   Lymphadenopathy:      Cervical: No cervical adenopathy.   Skin:     General: Skin is warm and dry.      Capillary Refill: Capillary refill takes less than 2 seconds.      Coloration: Skin is not jaundiced or pale.      Findings: No bruising, erythema, lesion or rash.   Neurological:      General: No focal deficit present.      Mental Status: She is alert and oriented to person, place, and time. Mental status is at baseline.      Cranial Nerves: No cranial nerve deficit.      Sensory: No sensory deficit.      Motor: No weakness.      Coordination: Coordination normal.   Psychiatric:         Mood and Affect: Mood normal.         Behavior: Behavior normal.         Thought Content: Thought content normal.         Judgment: Judgment normal.             Significant Labs: All pertinent labs within the past 24 hours have been reviewed.  BMP:   Recent Labs   Lab 11/04/24  0039 11/04/24  0505   GLU  --  91   NA  --  138   K  --  3.4*   CL  --  103   CO2  --  28    BUN  --  15   CREATININE  --  0.9   CALCIUM  --  8.1*   MG 1.8  --      CBC:   Recent Labs   Lab 11/03/24  1656   WBC 8.41   HGB 11.3*   HCT 34.8*        CMP:   Recent Labs   Lab 11/03/24  1656 11/04/24  0505    138   K 3.9 3.4*    103   CO2 23 28    91   BUN 15 15   CREATININE 0.9 0.9   CALCIUM 8.3* 8.1*   PROT 6.6  --    ALBUMIN 3.4*  --    BILITOT 0.5  --    ALKPHOS 99  --    AST 26  --    ALT 23  --    ANIONGAP 11 7*     Cardiac Markers:   Recent Labs   Lab 11/03/24  1656   *       Troponin:   Recent Labs   Lab 11/03/24  1656   TROPONINI 0.011       Significant Imaging: I have reviewed all pertinent imaging results/findings within the past 24 hours.    Assessment/Plan:      * Acute on chronic diastolic congestive heart failure  Patient has Diastolic (HFpEF) heart failure that is Acute on chronic. On presentation their CHF was decompensated. Evidence of decompensated CHF on presentation includes: edema, crackles on lung auscultation, orthopnea, paroxysmal nocturnal dyspnea (PND), dyspnea on exertion (HUGHES), and shortness of breath. The etiology of their decompensation is likely dietary indiscretion and increased fluid intake. Most recent BNP and echo results are listed below.  Recent Labs     11/03/24  1656   *       Latest ECHO  Results for orders placed during the hospital encounter of 10/11/21    Echo    Interpretation Summary  · The left ventricle is normal in size with concentric hypertrophy and normal systolic function.  · The estimated ejection fraction is 60%.  · Normal left ventricular diastolic function.  · Normal right ventricular size with normal right ventricular systolic function.  · Mild mitral regurgitation.  · Mild to moderate tricuspid regurgitation.  · Normal central venous pressure (3 mmHg).  · The estimated PA systolic pressure is 38 mmHg.    Current Heart Failure Medications  furosemide (LASIX) tablet, Daily, Oral  furosemide injection 40 mg, 2 times  daily, Intravenous    Plan  -Monitor strict I&Os and daily weights.    -Place on telemetry  -Low sodium diet  -Place on fluid restriction of 1.5 L.   -Cardiology has been consulted  -The patient's volume status is improving but not at their baseline as indicated by edema, orthopnea, paroxysmal nocturnal dyspnea (PND), dyspnea on exertion (HUGHES), and shortness of breath    Improved but still symptomatic, cont diuresis  Echo normal  Consult Cardiology    Shortness of breath  Patient presented with worsening dyspnea/shortness of breath and was found to have evidence of CHF on labs and imaging. BNP elevated at 655, d-dimer elevated at 0.69, Flu/COVID negative, CXR positive for CHF and possible pneumonia with CTA negative for PE. Patient s/p IV lasix with improvement in symptoms and currently saturating 97% on room air in no acute distress.  Plan:  -continue treatment of CHF as noted below  -titrate oxygen therapy as needed  -incentive spirometry  -duonebs prn      No S/S of any pneumonia- just CHF  Cont diuresis    Anxiety  Chronic. Stable. Not in acute exacerbation and currently denies endorsing any suicidal/homicidal ideations.   Plan:  -Continue home medications       Obesity (BMI 30.0-34.9)  Body mass index is 33.06 kg/m². Elevation likely secondary to increased calorie intake and sedentary lifestyle. Patient educated on morbidity and mortality in regards to elevated BMI and stressed importance of diet and exercise.   Plan:  -low fat/low calorie diet       GERD (gastroesophageal reflux disease)  Chronic. Stable. Currently asymptomatic. Home medications include PPI/Antacids as needed.  Plan:  -Continue PPI/Antacids as needed       Hyperlipidemia  Patient is chronically on statin.will continue for now. Last Lipid Panel:   Lab Results   Component Value Date    CHOL 138 05/20/2024    HDL 46 05/20/2024    LDLCALC 73.2 05/20/2024    TRIG 94 05/20/2024    CHOLHDL 33.3 05/20/2024   Plan:  -Continue home medication  -low  fat/low calorie diet      Essential hypertension  Chronic, controlled.  Latest blood pressure and vitals reviewed-   Temp:  [97.9 °F (36.6 °C)-98.7 °F (37.1 °C)]   Pulse:  [63-80]   Resp:  [16-23]   BP: (125-158)/(55-76)   SpO2:  [94 %-97 %] .   Home meds for hypertension were reviewed and noted below.   Hypertension Medications               amLODIPine (NORVASC) 5 MG tablet TAKE 1 TABLET ONE TIME DAILY    furosemide (LASIX) 40 MG tablet Take 1 tablet (40 mg total) by mouth once daily.    lisinopriL (PRINIVIL,ZESTRIL) 40 MG tablet TAKE 1 TABLET ONE TIME DAILY    metoprolol succinate (TOPROL-XL) 25 MG 24 hr tablet TAKE 1 TABLET EVERY DAY     While in the hospital, will manage blood pressure as follows; Continue home antihypertensive regimen    Will utilize p.r.n. blood pressure medication only if patient's blood pressure greater than  180/110 and she develops symptoms such as worsening chest pain or shortness of breath.    Stable BP      VTE Risk Mitigation (From admission, onward)           Ordered     enoxaparin injection 40 mg  Daily         11/03/24 2328     IP VTE HIGH RISK PATIENT  Once         11/03/24 2328     Place sequential compression device  Until discontinued         11/03/24 2328                    Discharge Planning   MALIK:      Code Status: Full Code   Is the patient medically ready for discharge?:     Reason for patient still in hospital (select all that apply): Patient trending condition, Laboratory test, Treatment, and Imaging           Edi Robles MD  Department of Hospital Medicine   [facility] - Telemetry ([facility])

## 2024-11-04 NOTE — HOSPITAL COURSE
79 y.o. female with Hx of Obesity, HTN, HLP, Glaucoma, Anxiety, Arthritis, Back pain, Disorder of kidney and ureter, Diverticulosis of colon, GERD, Glaucoma, Klebsiella cystitis transferred from [facility] ER for worsening dyspnea/SOB. Patient reports issues with ongoing dyspnea since having COVID approximally 1 month ago and reported no relief from home inhalers/nebulizers prompting her to come to the ED for further evaluation. No hx of smoking or asthma/COPD and follows Dr. Cabezas from Cardiology outpatient for high blood pressure and heart valve monitoring. Associated symptoms included lower extremity edema, orthopnea, PND, and decreased exercise tolerance. Initial w/u - Afebrile without leukocytosis, hemodynamically stable, D-dimer elevated at 0.69, , troponin negative, flu/COVID negative, CXR positive for CHF/possible pneumonia, CTA upon arrival negative for PE but again showed evidence of CHF and possible pneumonia. Patient admitted to Hospital Medicine under observation for continued medical management.     11/4- looks better, sitting up in chair on RA, still gets easily dyspneic with little movements and has some orthopnea and BLE edema. Cont IV lasix 40 mg bid. Echo-normal EF 55%, no DD, PAS 30 mm Hg- possible SAMEER/OHS. Will consult Cards in am. K 3.4- replaced.     11/5- sitting up in bed, comfortable on RA, no SOB and HUGHES better. Feels ready to go home. She is eating drinking well, walking around well. She was counseled about salt and fluid restrictions which she understands and accepts. Cardiology consulted and noted that the EK showed Afib, new onset, rate controlled. She was started on Eliquis 5 mg PO bid. She see Dr. Cabezas as her cardiologist as OP who was contacted and informed about the new onset Afib and that she may need Cardioversion in near future and he agreed to see her later this week and will evaluate her Afib. Pt and her family in agreement with the plan. She is going to be on lasix 40 mg  daily on a prn basis. She was seen and examined and deemed stable for discharge home today.

## 2024-11-04 NOTE — ASSESSMENT & PLAN NOTE
Chronic, controlled.  Latest blood pressure and vitals reviewed-   Temp:  [97.9 °F (36.6 °C)-98.7 °F (37.1 °C)]   Pulse:  [63-80]   Resp:  [16-23]   BP: (125-158)/(55-76)   SpO2:  [94 %-97 %] .   Home meds for hypertension were reviewed and noted below.   Hypertension Medications               amLODIPine (NORVASC) 5 MG tablet TAKE 1 TABLET ONE TIME DAILY    furosemide (LASIX) 40 MG tablet Take 1 tablet (40 mg total) by mouth once daily.    lisinopriL (PRINIVIL,ZESTRIL) 40 MG tablet TAKE 1 TABLET ONE TIME DAILY    metoprolol succinate (TOPROL-XL) 25 MG 24 hr tablet TAKE 1 TABLET EVERY DAY     While in the hospital, will manage blood pressure as follows; Continue home antihypertensive regimen    Will utilize p.r.n. blood pressure medication only if patient's blood pressure greater than  180/110 and she develops symptoms such as worsening chest pain or shortness of breath.    Stable BP

## 2024-11-04 NOTE — ASSESSMENT & PLAN NOTE
Patient has Diastolic (HFpEF) heart failure that is Acute on chronic. On presentation their CHF was decompensated. Evidence of decompensated CHF on presentation includes: edema, crackles on lung auscultation, orthopnea, paroxysmal nocturnal dyspnea (PND), dyspnea on exertion (HUGHES), and shortness of breath. The etiology of their decompensation is likely dietary indiscretion and increased fluid intake. Most recent BNP and echo results are listed below.  Recent Labs     11/03/24  1656   *     Latest ECHO  Results for orders placed during the hospital encounter of 10/11/21    Echo    Interpretation Summary  · The left ventricle is normal in size with concentric hypertrophy and normal systolic function.  · The estimated ejection fraction is 60%.  · Normal left ventricular diastolic function.  · Normal right ventricular size with normal right ventricular systolic function.  · Mild mitral regurgitation.  · Mild to moderate tricuspid regurgitation.  · Normal central venous pressure (3 mmHg).  · The estimated PA systolic pressure is 38 mmHg.    Current Heart Failure Medications  furosemide (LASIX) tablet, Daily, Oral    Plan  -Monitor strict I&Os and daily weights.    -Place on telemetry  -Low sodium diet  -Place on fluid restriction of 1.5 L.   -Cardiology has been consulted  -The patient's volume status is improving but not at their baseline as indicated by edema, orthopnea, paroxysmal nocturnal dyspnea (PND), dyspnea on exertion (HUGHES), and shortness of breath

## 2024-11-04 NOTE — ASSESSMENT & PLAN NOTE
Patient has Diastolic (HFpEF) heart failure that is Acute on chronic. On presentation their CHF was decompensated. Evidence of decompensated CHF on presentation includes: edema, crackles on lung auscultation, orthopnea, paroxysmal nocturnal dyspnea (PND), dyspnea on exertion (HUGHES), and shortness of breath. The etiology of their decompensation is likely dietary indiscretion and increased fluid intake. Most recent BNP and echo results are listed below.  Recent Labs     11/03/24  1656   *       Latest ECHO  Results for orders placed during the hospital encounter of 10/11/21    Echo    Interpretation Summary  · The left ventricle is normal in size with concentric hypertrophy and normal systolic function.  · The estimated ejection fraction is 60%.  · Normal left ventricular diastolic function.  · Normal right ventricular size with normal right ventricular systolic function.  · Mild mitral regurgitation.  · Mild to moderate tricuspid regurgitation.  · Normal central venous pressure (3 mmHg).  · The estimated PA systolic pressure is 38 mmHg.    Current Heart Failure Medications  furosemide (LASIX) tablet, Daily, Oral  furosemide injection 40 mg, 2 times daily, Intravenous    Plan  -Monitor strict I&Os and daily weights.    -Place on telemetry  -Low sodium diet  -Place on fluid restriction of 1.5 L.   -Cardiology has been consulted  -The patient's volume status is improving but not at their baseline as indicated by edema, orthopnea, paroxysmal nocturnal dyspnea (PND), dyspnea on exertion (HUGHES), and shortness of breath    Improved but still symptomatic, cont diuresis  Echo normal  Consult Cardiology

## 2024-11-04 NOTE — CONSULTS
Food & Nutrition  Education    Diet Education: Sodium/Fluid restriction  Learners: patient       Nutrition Education provided with handouts:   1.) Low Salt diet   2.) Fluid restriction     Comments:  78 y/o female admits with   1. Acute congestive heart failure, unspecified heart failure type    2. SOB (shortness of breath)    3. Atrial fibrillation, new onset    4. Elevated brain natriuretic peptide (BNP) level    5. CHF exacerbation      Consult received for diet education. RD covering remotely. Called patient's phone listed in chart. Left voicemail giving brief overview of low sodium guldeines (<2000 mg/day, 140 mg or less per serving, foods to limit, and weight monitoring for fluid retention). Nutrition handouts attached for discharge. Onsite RD to f/u. Dietitian's contact information provided.

## 2024-11-04 NOTE — ASSESSMENT & PLAN NOTE
Patient presented with worsening dyspnea/shortness of breath and was found to have evidence of CHF on labs and imaging. BNP elevated at 655, d-dimer elevated at 0.69, Flu/COVID negative, CXR positive for CHF and possible pneumonia with CTA negative for PE. Patient s/p IV lasix with improvement in symptoms and currently saturating 97% on room air in no acute distress.  Plan:  -continue treatment of CHF as noted below  -titrate oxygen therapy as needed  -incentive spirometry  -duonebs prn      No S/S of any pneumonia- just CHF  Cont diuresis

## 2024-11-04 NOTE — SUBJECTIVE & OBJECTIVE
Interval History: looks better, sitting up in chair on RA, still gets easily dyspneic with little movements and has some orthopnea and BLE edema. Cont IV lasix 40 mg bid. Echo-normal EF 55%, no DD, PAS 30 mm Hg- possible SAMEER/OHS. Will consult Cards in am. K 3.4- replaced.     Review of Systems   Constitutional:  Positive for activity change and fatigue.   HENT: Negative.     Eyes: Negative.  Negative for visual disturbance.   Respiratory:  Positive for shortness of breath. Negative for cough and wheezing.    Cardiovascular:  Positive for leg swelling.   Gastrointestinal: Negative.    Endocrine: Negative.    Genitourinary: Negative.    Musculoskeletal:  Positive for arthralgias, gait problem, joint swelling and myalgias.   Skin: Negative.    Allergic/Immunologic: Negative.    Hematological: Negative.    Psychiatric/Behavioral: Negative.     All other systems reviewed and are negative.    Objective:     Vital Signs (Most Recent):  Temp: 97.9 °F (36.6 °C) (11/04/24 1321)  Pulse: 71 (11/04/24 1500)  Resp: 16 (11/04/24 1321)  BP: 132/63 (11/04/24 1321)  SpO2: 97 % (11/04/24 1321) Vital Signs (24h Range):  Temp:  [97.9 °F (36.6 °C)-98.7 °F (37.1 °C)] 97.9 °F (36.6 °C)  Pulse:  [63-80] 71  Resp:  [16-23] 16  SpO2:  [94 %-97 %] 97 %  BP: (125-158)/(55-76) 132/63     Weight: 79.4 kg (175 lb)  Body mass index is 32.01 kg/m².    Intake/Output Summary (Last 24 hours) at 11/4/2024 3767  Last data filed at 11/3/2024 2004  Gross per 24 hour   Intake --   Output 800 ml   Net -800 ml         Physical Exam  Vitals and nursing note reviewed.   Constitutional:       General: She is not in acute distress.     Appearance: Normal appearance. She is obese. She is not ill-appearing, toxic-appearing or diaphoretic.   HENT:      Head: Normocephalic and atraumatic.      Right Ear: External ear normal.      Left Ear: External ear normal.      Nose: Nose normal. No congestion or rhinorrhea.      Mouth/Throat:      Mouth: Mucous membranes are  moist.      Pharynx: Oropharynx is clear. No oropharyngeal exudate or posterior oropharyngeal erythema.   Eyes:      General: No scleral icterus.     Extraocular Movements: Extraocular movements intact.      Conjunctiva/sclera: Conjunctivae normal.      Pupils: Pupils are equal, round, and reactive to light.   Neck:      Vascular: No carotid bruit.   Cardiovascular:      Rate and Rhythm: Normal rate and regular rhythm.      Pulses: Normal pulses.      Heart sounds: Normal heart sounds. No murmur heard.     No friction rub. No gallop.   Pulmonary:      Effort: Pulmonary effort is normal. No respiratory distress.      Breath sounds: No stridor. Rales present. No wheezing or rhonchi.   Chest:      Chest wall: No tenderness.   Abdominal:      General: Abdomen is flat. Bowel sounds are normal. There is no distension.      Palpations: Abdomen is soft. There is no mass.      Tenderness: There is no abdominal tenderness. There is no right CVA tenderness, left CVA tenderness, guarding or rebound.      Hernia: No hernia is present.   Musculoskeletal:         General: Swelling present. No tenderness, deformity or signs of injury. Normal range of motion.      Cervical back: Normal range of motion and neck supple. No rigidity or tenderness.      Right lower leg: Edema present.      Left lower leg: Edema present.   Lymphadenopathy:      Cervical: No cervical adenopathy.   Skin:     General: Skin is warm and dry.      Capillary Refill: Capillary refill takes less than 2 seconds.      Coloration: Skin is not jaundiced or pale.      Findings: No bruising, erythema, lesion or rash.   Neurological:      General: No focal deficit present.      Mental Status: She is alert and oriented to person, place, and time. Mental status is at baseline.      Cranial Nerves: No cranial nerve deficit.      Sensory: No sensory deficit.      Motor: No weakness.      Coordination: Coordination normal.   Psychiatric:         Mood and Affect: Mood normal.          Behavior: Behavior normal.         Thought Content: Thought content normal.         Judgment: Judgment normal.             Significant Labs: All pertinent labs within the past 24 hours have been reviewed.  BMP:   Recent Labs   Lab 11/04/24  0039 11/04/24  0505   GLU  --  91   NA  --  138   K  --  3.4*   CL  --  103   CO2  --  28   BUN  --  15   CREATININE  --  0.9   CALCIUM  --  8.1*   MG 1.8  --      CBC:   Recent Labs   Lab 11/03/24 1656   WBC 8.41   HGB 11.3*   HCT 34.8*        CMP:   Recent Labs   Lab 11/03/24  1656 11/04/24  0505    138   K 3.9 3.4*    103   CO2 23 28    91   BUN 15 15   CREATININE 0.9 0.9   CALCIUM 8.3* 8.1*   PROT 6.6  --    ALBUMIN 3.4*  --    BILITOT 0.5  --    ALKPHOS 99  --    AST 26  --    ALT 23  --    ANIONGAP 11 7*     Cardiac Markers:   Recent Labs   Lab 11/03/24  1656   *       Troponin:   Recent Labs   Lab 11/03/24  1656   TROPONINI 0.011       Significant Imaging: I have reviewed all pertinent imaging results/findings within the past 24 hours.

## 2024-11-04 NOTE — ASSESSMENT & PLAN NOTE
Body mass index is 33.06 kg/m². Elevation likely secondary to increased calorie intake and sedentary lifestyle. Patient educated on morbidity and mortality in regards to elevated BMI and stressed importance of diet and exercise.   Plan:  -low fat/low calorie diet

## 2024-11-04 NOTE — H&P
[facility] Medicine  History & Physical    Patient Name: Amber Naranjo  MRN: [id number]  Patient Class: OP- Observation  Admission Date: 11/3/2024  Attending Physician: Tomi Montes MD   Primary Care Provider: Jarrett Peck MD         Patient information was obtained from patient, past medical records, and ER records.     Subjective:     Principal Problem:Shortness of breath    Chief Complaint:   Chief Complaint   Patient presents with    Shortness of Breath     With a cough since yesterday.         HPI: Amber Naranjo is a 79 y.o. female with a PMH  has a past medical history of Allergy, Anxiety (4/25/2022), Arthritis, Back pain, Disorder of kidney and ureter, Diverticulosis of colon, GERD (gastroesophageal reflux disease), Glaucoma, Hyperlipidemia, Hypertension, Ingrown toenail, Klebsiella cystitis, Obesity, Pain in joint involving multiple sites, Pneumonia, Tortuous colon, Trouble in sleeping, Unspecified disorder of autonomic nervous system, and Uterine fibroid. who presented as a transfer from [facility] for higher level of care after patient presented for further evaluation of worsening dyspnea/shortness of breath.  Patient reports issues with ongoing dyspnea since having COVID approximally 1 month ago and reported no relief from home inhalers/nebulizers prompting her to come to the ED for further evaluation.  She denies prior history of smoking or known history of asthma/COPD and follows Dr. Cabezas from Cardiology outpatient for high blood pressure and heart valve monitoring.  Associated symptoms included lower extremity edema, orthopnea, PND, and decreased exercise tolerance but denied endorsing any fever, chills, sweats, nausea, vomiting, chest pain, abdominal pain, dysuria, hematuria, melena, hematochezia, diarrhea, or onset neurological deficits.  She denies use of home oxygen or CPAP and was in his usual state of health prior to onset of symptoms.  Initial workup in the  ED prior to transfer revealed patient to be afebrile without leukocytosis, hemodynamically stable, D-dimer elevated at 0.69, , troponin negative, flu/COVID negative, chest x-ray positive for CHF/possible pneumonia, CTA upon arrival negative for PE but again showed evidence of CHF and possible pneumonia.  Patient admitted to Hospital Medicine under observation for continued medical management.      PCP: Jarrett Peck       Past Medical History:   Diagnosis Date    Allergy     Anxiety 4/25/2022    Arthritis     Back pain     Disorder of kidney and ureter     Diverticulosis of colon     GERD (gastroesophageal reflux disease)     Glaucoma     Hyperlipidemia     Hypertension     Ingrown toenail     Klebsiella cystitis     Obesity     Pain in joint involving multiple sites     Pneumonia     Tortuous colon     Trouble in sleeping     Unspecified disorder of autonomic nervous system     Uterine fibroid        Past Surgical History:   Procedure Laterality Date    COLONOSCOPY      COLONOSCOPY N/A 3/15/2017    Procedure: COLONOSCOPY;  Surgeon: Yogi Carbone MD;  Location: [facility];  Service: Endoscopy;  Laterality: N/A;       Review of patient's allergies indicates:  No Known Allergies    No current facility-administered medications on file prior to encounter.     Current Outpatient Medications on File Prior to Encounter   Medication Sig    albuterol (PROVENTIL/VENTOLIN HFA) 90 mcg/actuation inhaler INHALE 2 PUFFS INTO THE LUNGS EVERY 6 (SIX) HOURS AS NEEDED FOR SHORTNESS OF BREATH.    alendronate (FOSAMAX) 70 MG tablet Take 1 tablet (70 mg total) by mouth every 7 days. Take your FOSAMAX tablet with a full glass (6-8 oz) of plain water only. Do not chew or suck on a tablet of FOSAMAX. After swallowing your FOSAMAX tablet, do not lie down - stay fully upright (sitting, standing, or walking) for at least 30 minutes. Do not lie down until after your first food of the day.    amLODIPine (NORVASC) 5 MG tablet TAKE 1 TABLET  ONE TIME DAILY    aspirin (ECOTRIN) 81 MG EC tablet Take 81 mg by mouth once daily.    atorvastatin (LIPITOR) 20 MG tablet TAKE 1 TABLET EVERY DAY    calcium citrate-vitamin D3 315-200 mg (CITRACAL+D) 315 mg-5 mcg (200 unit) per tablet Take 1 tablet by mouth 2 (two) times daily.    cetirizine (ZYRTEC) 10 MG tablet TAKE 1 TABLET ONE TIME DAILY    metoprolol succinate (TOPROL-XL) 25 MG 24 hr tablet TAKE 1 TABLET EVERY DAY    multivitamin capsule Take 1 capsule by mouth once daily.    omeprazole (PRILOSEC) 40 MG capsule TAKE 1 CAPSULE EVERY MORNING    sertraline (ZOLOFT) 25 MG tablet TAKE 1 TABLET EVERY DAY    timolol maleate 0.5% (TIMOPTIC) 0.5 % Drop     diclofenac sodium (VOLTAREN) 1 % Gel APPLY 2 GRAMS TOPICALLY FOUR TIMES DAILY    lisinopriL (PRINIVIL,ZESTRIL) 40 MG tablet TAKE 1 TABLET ONE TIME DAILY     Family History       Problem Relation (Age of Onset)    Asthma Daughter, Paternal Aunt    Breast cancer Cousin    Cancer Sister (70)    Colon cancer Sister    Depression Daughter    Diabetes Sister, Brother, Paternal Uncle, Maternal Grandmother    Early death Maternal Grandfather, Paternal Grandfather    Glaucoma Mother    Heart disease Brother    Hyperlipidemia Sister, Brother    Hypertension Father, Sister, Brother          Tobacco Use    Smoking status: Never     Passive exposure: Yes    Smokeless tobacco: Never   Substance and Sexual Activity    Alcohol use: No    Drug use: No    Sexual activity: Not Currently     Review of Systems   All other systems reviewed and are negative.    Objective:     Vital Signs (Most Recent):  Temp: 97.9 °F (36.6 °C) (11/04/24 0117)  Pulse: 71 (11/04/24 0117)  Resp: 16 (11/04/24 0117)  BP: (!) 145/58 (11/04/24 0117)  SpO2: 95 % (11/04/24 0117) Vital Signs (24h Range):  Temp:  [97.9 °F (36.6 °C)-98.2 °F (36.8 °C)] 97.9 °F (36.6 °C)  Pulse:  [63-74] 71  Resp:  [16-23] 16  SpO2:  [94 %-97 %] 95 %  BP: (125-158)/(58-76) 145/58     Weight: 82 kg (180 lb 12.4 oz)  Body mass index is  33.06 kg/m².     Physical Exam  Constitutional:       General: She is not in acute distress.     Appearance: Normal appearance. She is obese. She is not ill-appearing, toxic-appearing or diaphoretic.   HENT:      Head: Normocephalic and atraumatic.      Right Ear: External ear normal.      Left Ear: External ear normal.      Nose: Nose normal. No congestion or rhinorrhea.      Mouth/Throat:      Mouth: Mucous membranes are moist.      Pharynx: Oropharynx is clear. No oropharyngeal exudate or posterior oropharyngeal erythema.   Eyes:      General: No scleral icterus.     Extraocular Movements: Extraocular movements intact.      Conjunctiva/sclera: Conjunctivae normal.      Pupils: Pupils are equal, round, and reactive to light.   Neck:      Vascular: No carotid bruit.   Cardiovascular:      Rate and Rhythm: Normal rate and regular rhythm.      Pulses: Normal pulses.      Heart sounds: Normal heart sounds. No murmur heard.     No friction rub. No gallop.   Pulmonary:      Effort: Pulmonary effort is normal. No respiratory distress.      Breath sounds: No stridor. Rales present. No wheezing or rhonchi.   Chest:      Chest wall: No tenderness.   Abdominal:      General: Abdomen is flat. Bowel sounds are normal. There is no distension.      Palpations: Abdomen is soft. There is no mass.      Tenderness: There is no abdominal tenderness. There is no right CVA tenderness, left CVA tenderness, guarding or rebound.      Hernia: No hernia is present.   Musculoskeletal:         General: Swelling present. No tenderness, deformity or signs of injury. Normal range of motion.      Cervical back: Normal range of motion and neck supple. No rigidity or tenderness.      Right lower leg: Edema present.      Left lower leg: Edema present.   Lymphadenopathy:      Cervical: No cervical adenopathy.   Skin:     General: Skin is warm and dry.      Capillary Refill: Capillary refill takes less than 2 seconds.      Coloration: Skin is not  jaundiced or pale.      Findings: No bruising, erythema, lesion or rash.   Neurological:      General: No focal deficit present.      Mental Status: She is alert and oriented to person, place, and time. Mental status is at baseline.      Cranial Nerves: No cranial nerve deficit.      Sensory: No sensory deficit.      Motor: No weakness.      Coordination: Coordination normal.   Psychiatric:         Mood and Affect: Mood normal.         Behavior: Behavior normal.         Thought Content: Thought content normal.         Judgment: Judgment normal.              CRANIAL NERVES     CN III, IV, VI   Pupils are equal, round, and reactive to light.       Significant Labs: All pertinent labs within the past 24 hours have been reviewed.    Significant Imaging: I have reviewed all pertinent imaging results/findings within the past 24 hours.    LABS:  Recent Results (from the past 24 hours)   D-Dimer, Quantitative    Collection Time: 11/03/24  4:44 PM   Result Value Ref Range    D-Dimer 0.69 (H) <0.50 mg/L FEU   EKG 12-lead    Collection Time: 11/03/24  4:52 PM   Result Value Ref Range    QRS Duration 118 ms    [facility] QTC Calculation 505 ms   BNP    Collection Time: 11/03/24  4:56 PM   Result Value Ref Range     (H) 0 - 99 pg/mL   Troponin I    Collection Time: 11/03/24  4:56 PM   Result Value Ref Range    Troponin I 0.011 0.000 - 0.026 ng/mL   CBC auto differential    Collection Time: 11/03/24  4:56 PM   Result Value Ref Range    WBC 8.41 3.90 - 12.70 K/uL    RBC 3.65 (L) 4.00 - 5.40 M/uL    Hemoglobin 11.3 (L) 12.0 - 16.0 g/dL    Hematocrit 34.8 (L) 37.0 - 48.5 %    MCV 95 82 - 98 fL    MCH 31.0 27.0 - 31.0 pg    MCHC 32.5 32.0 - 36.0 g/dL    RDW 14.1 11.5 - 14.5 %    Platelets 172 150 - 450 K/uL    MPV 11.2 9.2 - 12.9 fL    Immature Granulocytes 0.5 0.0 - 0.5 %    Gran # (ANC) 5.7 1.8 - 7.7 K/uL    Immature Grans (Abs) 0.04 0.00 - 0.04 K/uL    Lymph # 1.6 1.0 - 4.8 K/uL    Mono # 0.7 0.3 - 1.0 K/uL    Eos # 0.3 0.0 - 0.5  K/uL    Baso # 0.05 0.00 - 0.20 K/uL    nRBC 0 0 /100 WBC    Gran % 68.1 38.0 - 73.0 %    Lymph % 19.4 18.0 - 48.0 %    Mono % 8.3 4.0 - 15.0 %    Eosinophil % 3.1 0.0 - 8.0 %    Basophil % 0.6 0.0 - 1.9 %    Differential Method Automated    Comprehensive metabolic panel    Collection Time: 11/03/24  4:56 PM   Result Value Ref Range    Sodium 139 136 - 145 mmol/L    Potassium 3.9 3.5 - 5.1 mmol/L    Chloride 105 95 - 110 mmol/L    CO2 23 23 - 29 mmol/L    Glucose 110 70 - 110 mg/dL    BUN 15 8 - 23 mg/dL    Creatinine 0.9 0.5 - 1.4 mg/dL    Calcium 8.3 (L) 8.7 - 10.5 mg/dL    Total Protein 6.6 6.0 - 8.4 g/dL    Albumin 3.4 (L) 3.5 - 5.2 g/dL    Total Bilirubin 0.5 0.1 - 1.0 mg/dL    Alkaline Phosphatase 99 40 - 150 U/L    AST 26 10 - 40 U/L    ALT 23 10 - 44 U/L    eGFR >60.0 >60 mL/min/1.73 m^2    Anion Gap 11 8 - 16 mmol/L   POCT COVID-19 Rapid Screening    Collection Time: 11/03/24  5:12 PM   Result Value Ref Range    POC Rapid COVID Negative Negative     Acceptable Yes    POCT Influenza A/B Molecular    Collection Time: 11/03/24  5:12 PM   Result Value Ref Range    POC Molecular Influenza A Ag Negative Negative    POC Molecular Influenza B Ag Negative Negative     Acceptable Yes    Magnesium    Collection Time: 11/04/24 12:39 AM   Result Value Ref Range    Magnesium 1.8 1.6 - 2.6 mg/dL       RADIOLOGY  CTA Chest Non-Coronary (PE Studies)    Result Date: 11/4/2024  EXAMINATION: CTA CHEST NON CORONARY (PE STUDIES) CLINICAL HISTORY: Pulmonary embolism (PE) suspected, positive D-dimer; TECHNIQUE: Low dose axial images, sagittal and coronal reformations were obtained from the thoracic inlet to the lung bases following the IV administration of 100 mL of Omnipaque 350.  Contrast timing was optimized to evaluate the pulmonary arteries.  MIP images were performed. COMPARISON: Chest radiograph 11/03/2024 FINDINGS: The visualized soft tissue structures at the base of the neck appear within  normal limits allowing from streak artifact from dense contrast bolus. The thoracic aorta maintains normal caliber, contour, and course with mild atherosclerotic calcification within its course.  There is no evidence of aneurysmal dilation or dissection. The heart is mildly enlarged and there is no significant pericardial effusion. The esophagus maintains a normal course and caliber. There is no bulky axillary or mediastinal lymph node enlargement. The trachea is midline and the proximal airways are patent.  There is peribronchial cuffing/thickening which may reflect small airways infection or inflammation.  Detailed evaluation of the lung parenchyma is limited by respiratory motion artifact. There is no pneumothorax. There are ground-glass opacities throughout the lungs.  There is mild smooth interlobular septal thickening.  There is a subsegmental opacity within the left upper lobe with possible of occlusion of more proximal subsegmental bronchus (axial series 2, image 143).  There are small layering bilateral pleural effusions with adjacent compressive atelectasis. No convincing evidence of pulmonary thromboembolism. Limited images of the upper abdomen obtained during the course of this dedicated thoracic CT demonstrate no acute abnormalities.  There are scattered colonic diverticula present.  There are calcified splenic granuloma.. The osseous structures demonstrate a minimal chronic appearing superior endplate deformity of the T4 vertebral body.  Additionally, there is mild chronic appearing anterior wedging of the T9 vertebral body.  There are multilevel degenerative changes of visualized spine..     No convincing evidence of pulmonary thromboembolism. Smooth interlobular septal thickening, ground-glass opacities, and small layering bilateral pleural effusions.  Findings may reflect edema/CHF. Mild bilateral peribronchial cuffing/thickening which may reflect superimposed small airways inflammation or  infection. Subsegmental opacity within the left upper lobe with possible occlusion of the more proximal subsegmental bronchus.  This could reflect mucoid impaction with associated atelectasis, although consider short-term 3 month chest CT follow-up to ensure appropriate resolution and/or stability. Cardiomegaly. Additional findings as above. Electronically signed by: Tyron Mccain MD Date:    11/04/2024 Time:    00:19    X-Ray Chest PA And Lateral    Result Date: 11/3/2024  EXAMINATION: XR CHEST PA AND LATERAL CLINICAL HISTORY: Asthma; COMPARISON: February 4, 2024, September 20, 2021 FINDINGS: EKG leads overlie the chest.  Mildly progressive basilar reticular interstitial changes noted.  Stable left suprahilar opacity.  Small effusions.  The lungs are otherwise clear.  The cardiac silhouette size is on the upper limits of normal.  The trachea is midline and the mediastinal width is normal. Negative for scratch pneumothorax.  Pulmonary vasculature is mildly congested.  Negative for osseous abnormalities. Tortuous aorta with calcifications of the aortic knob.  There are degenerative changes of the spine and both shoulder girdles.     1.  Vascular congestion.  Small effusions.  Findings most likely represent CHF, and less likely an interstitial infectious process such as atypical viral pneumonia. 2.  Stable findings as noted above. Electronically signed by: Joseph Cunningham MD Date:    11/03/2024 Time:    17:13      EKG    MICROBIOLOGY    [facility]    Assessment/Plan:     * Shortness of breath  Patient presented with worsening dyspnea/shortness of breath and was found to have evidence of CHF on labs and imaging. BNP elevated at 655, d-dimer elevated at 0.69, Flu/COVID negative, CXR positive for CHF and possible pneumonia with CTA negative for PE. Patient s/p IV lasix with improvement in symptoms and currently saturating 97% on room air in no acute distress.  Plan:  -continue treatment of CHF as noted below  -titrate oxygen  therapy as needed  -incentive spirometry  -duonebs prn      Acute on chronic diastolic congestive heart failure  Patient has Diastolic (HFpEF) heart failure that is Acute on chronic. On presentation their CHF was decompensated. Evidence of decompensated CHF on presentation includes: edema, crackles on lung auscultation, orthopnea, paroxysmal nocturnal dyspnea (PND), dyspnea on exertion (HUGHES), and shortness of breath. The etiology of their decompensation is likely dietary indiscretion and increased fluid intake. Most recent BNP and echo results are listed below.  Recent Labs     11/03/24  1656   *     Latest ECHO  Results for orders placed during the hospital encounter of 10/11/21    Echo    Interpretation Summary  · The left ventricle is normal in size with concentric hypertrophy and normal systolic function.  · The estimated ejection fraction is 60%.  · Normal left ventricular diastolic function.  · Normal right ventricular size with normal right ventricular systolic function.  · Mild mitral regurgitation.  · Mild to moderate tricuspid regurgitation.  · Normal central venous pressure (3 mmHg).  · The estimated PA systolic pressure is 38 mmHg.    Current Heart Failure Medications  furosemide (LASIX) tablet, Daily, Oral    Plan  -Monitor strict I&Os and daily weights.    -Place on telemetry  -Low sodium diet  -Place on fluid restriction of 1.5 L.   -Cardiology has been consulted  -The patient's volume status is improving but not at their baseline as indicated by edema, orthopnea, paroxysmal nocturnal dyspnea (PND), dyspnea on exertion (HUGHES), and shortness of breath      Essential hypertension  Chronic, controlled.  Latest blood pressure and vitals reviewed-   Temp:  [97.9 °F (36.6 °C)-98.2 °F (36.8 °C)]   Pulse:  [63-74]   Resp:  [16-23]   BP: (125-158)/(58-76)   SpO2:  [94 %-97 %] .   Home meds for hypertension were reviewed and noted below.   Hypertension Medications               amLODIPine (NORVASC) 5 MG  tablet TAKE 1 TABLET ONE TIME DAILY    furosemide (LASIX) 40 MG tablet Take 1 tablet (40 mg total) by mouth once daily.    lisinopriL (PRINIVIL,ZESTRIL) 40 MG tablet TAKE 1 TABLET ONE TIME DAILY    metoprolol succinate (TOPROL-XL) 25 MG 24 hr tablet TAKE 1 TABLET EVERY DAY     While in the hospital, will manage blood pressure as follows; Continue home antihypertensive regimen    Will utilize p.r.n. blood pressure medication only if patient's blood pressure greater than  180/110 and she develops symptoms such as worsening chest pain or shortness of breath.      Anxiety  Chronic. Stable. Not in acute exacerbation and currently denies endorsing any suicidal/homicidal ideations.   Plan:  -Continue home medications       Hyperlipidemia  Patient is chronically on statin.will continue for now. Last Lipid Panel:   Lab Results   Component Value Date    CHOL 138 05/20/2024    HDL 46 05/20/2024    LDLCALC 73.2 05/20/2024    TRIG 94 05/20/2024    CHOLHDL 33.3 05/20/2024   Plan:  -Continue home medication  -low fat/low calorie diet      GERD (gastroesophageal reflux disease)  Chronic. Stable. Currently asymptomatic. Home medications include PPI/Antacids as needed.  Plan:  -Continue PPI/Antacids as needed       Obesity (BMI 30.0-34.9)  Body mass index is 33.06 kg/m². Elevation likely secondary to increased calorie intake and sedentary lifestyle. Patient educated on morbidity and mortality in regards to elevated BMI and stressed importance of diet and exercise.   Plan:  -low fat/low calorie diet         VTE Risk Mitigation (From admission, onward)           Ordered     enoxaparin injection 40 mg  Daily         11/03/24 2328     IP VTE HIGH RISK PATIENT  Once         11/03/24 2328     Place sequential compression device  Until discontinued         11/03/24 2328                  //Core Measures   -DVT proph: SCDs, Lovenox   -Code status: Full    -Surrogate: none provided       Components of this note were documented using a voice  recognition system and are subject to errors not corrected at the time the document was proof read. Please contact the author for any clarifications.       On 11/03/2024, patient should be placed in hospital observation services under my care.       Tomi Montes MD  Department of Hospital Medicine  [facility] - Telemetry ([facility])

## 2024-11-04 NOTE — ASSESSMENT & PLAN NOTE
Patient is chronically on statin.will continue for now. Last Lipid Panel:   Lab Results   Component Value Date    CHOL 138 05/20/2024    HDL 46 05/20/2024    LDLCALC 73.2 05/20/2024    TRIG 94 05/20/2024    CHOLHDL 33.3 05/20/2024   Plan:  -Continue home medication  -low fat/low calorie diet

## 2024-11-05 VITALS
BODY MASS INDEX: 32.2 KG/M2 | HEART RATE: 77 BPM | DIASTOLIC BLOOD PRESSURE: 79 MMHG | OXYGEN SATURATION: 97 % | HEIGHT: 62 IN | WEIGHT: 175 LBS | TEMPERATURE: 99 F | SYSTOLIC BLOOD PRESSURE: 110 MMHG | RESPIRATION RATE: 18 BRPM

## 2024-11-05 PROBLEM — I50.33 ACUTE ON CHRONIC DIASTOLIC CONGESTIVE HEART FAILURE: Status: RESOLVED | Noted: 2024-11-04 | Resolved: 2024-11-05

## 2024-11-05 PROBLEM — I50.31 ACUTE DIASTOLIC HEART FAILURE: Status: ACTIVE | Noted: 2024-11-05

## 2024-11-05 PROBLEM — I48.91 NEW ONSET ATRIAL FIBRILLATION: Status: ACTIVE | Noted: 2024-11-05

## 2024-11-05 PROBLEM — I50.31 ACUTE DIASTOLIC HEART FAILURE: Status: RESOLVED | Noted: 2024-11-05 | Resolved: 2024-11-05

## 2024-11-05 PROBLEM — R06.02 SHORTNESS OF BREATH: Status: RESOLVED | Noted: 2024-11-04 | Resolved: 2024-11-05

## 2024-11-05 LAB
ANION GAP SERPL CALC-SCNC: 10 MMOL/L (ref 8–16)
BNP SERPL-MCNC: 284 PG/ML (ref 0–99)
BUN SERPL-MCNC: 20 MG/DL (ref 8–23)
CALCIUM SERPL-MCNC: 8.3 MG/DL (ref 8.7–10.5)
CHLORIDE SERPL-SCNC: 101 MMOL/L (ref 95–110)
CO2 SERPL-SCNC: 27 MMOL/L (ref 23–29)
CREAT SERPL-MCNC: 1 MG/DL (ref 0.5–1.4)
EST. GFR  (NO RACE VARIABLE): 57 ML/MIN/1.73 M^2
GLUCOSE SERPL-MCNC: 89 MG/DL (ref 70–110)
OHS QRS DURATION: 122 MS
OHS QTC CALCULATION: 499 MS
POTASSIUM SERPL-SCNC: 3.6 MMOL/L (ref 3.5–5.1)
SODIUM SERPL-SCNC: 138 MMOL/L (ref 136–145)

## 2024-11-05 PROCEDURE — 63600175 PHARM REV CODE 636 W HCPCS: Mod: HCNC | Performed by: EMERGENCY MEDICINE

## 2024-11-05 PROCEDURE — 93005 ELECTROCARDIOGRAM TRACING: CPT | Mod: HCNC,ER

## 2024-11-05 PROCEDURE — 93010 ELECTROCARDIOGRAM REPORT: CPT | Mod: HCNC,,, | Performed by: INTERNAL MEDICINE

## 2024-11-05 PROCEDURE — 80048 BASIC METABOLIC PNL TOTAL CA: CPT | Mod: HCNC | Performed by: HOSPITALIST

## 2024-11-05 PROCEDURE — 36415 COLL VENOUS BLD VENIPUNCTURE: CPT | Mod: HCNC

## 2024-11-05 PROCEDURE — 83880 ASSAY OF NATRIURETIC PEPTIDE: CPT | Mod: HCNC

## 2024-11-05 PROCEDURE — 36415 COLL VENOUS BLD VENIPUNCTURE: CPT | Mod: HCNC | Performed by: HOSPITALIST

## 2024-11-05 PROCEDURE — G0378 HOSPITAL OBSERVATION PER HR: HCPCS | Mod: HCNC

## 2024-11-05 PROCEDURE — 96376 TX/PRO/DX INJ SAME DRUG ADON: CPT

## 2024-11-05 PROCEDURE — 25000003 PHARM REV CODE 250: Mod: HCNC | Performed by: EMERGENCY MEDICINE

## 2024-11-05 RX ORDER — ACETAMINOPHEN 325 MG/1
650 TABLET ORAL EVERY 6 HOURS PRN
Status: DISCONTINUED | OUTPATIENT
Start: 2024-11-05 | End: 2024-11-05 | Stop reason: HOSPADM

## 2024-11-05 RX ORDER — FUROSEMIDE 40 MG/1
40 TABLET ORAL DAILY PRN
Qty: 30 TABLET | Refills: 3 | Status: SHIPPED | OUTPATIENT
Start: 2024-11-05 | End: 2025-11-05

## 2024-11-05 RX ORDER — FUROSEMIDE 40 MG/1
40 TABLET ORAL 2 TIMES DAILY
Qty: 60 TABLET | Refills: 11 | Status: SHIPPED | OUTPATIENT
Start: 2024-11-05 | End: 2024-11-05 | Stop reason: DRUGHIGH

## 2024-11-05 RX ADMIN — APIXABAN 5 MG: 2.5 TABLET, FILM COATED ORAL at 02:11

## 2024-11-05 RX ADMIN — FUROSEMIDE 40 MG: 10 INJECTION, SOLUTION INTRAMUSCULAR; INTRAVENOUS at 08:11

## 2024-11-05 NOTE — ASSESSMENT & PLAN NOTE
EKG 11/3 Afib rate controlled  Still in afib on exam today  Add eliquis 5mg BID  Cont BB  Close follow up in clinic  Recommend JEEVAN with [facility]

## 2024-11-05 NOTE — PLAN OF CARE
[facility] - Telemetry (Hospital)  Initial Discharge Assessment       Primary Care Provider: Jarrett Peck MD    Admission Diagnosis: SOB (shortness of breath) [R06.02]  Elevated brain natriuretic peptide (BNP) level [R79.89]  Atrial fibrillation, new onset [I48.91]  Acute congestive heart failure, unspecified heart failure type [I50.9]    Admission Date: 11/3/2024  Expected Discharge Date:     Transition of Care Barriers: (P) None    Payor: [employer] MEDICARE / Plan: [employer] H[employer] PPO SPECIAL NEEDS / Product Type: Medicare Advantage /     Extended Emergency Contact Information  Primary Emergency Contact: chaka person  Work Phone: [phone]  Relation: Daughter   needed? No  Secondary Emergency Contact: Monique Dunlap   [facility]  Home Phone: [phone]  Relation: Daughter    Discharge Plan A: (P) Home with family  Discharge Plan B: (P) Home Health      [facility] - [facility] [address]  [address] [address]  Phone: [phone] Fax: [phone]    [facility] - [place], [place] - [address]  [address] [place] [address]  Phone: [phone] Fax: [phone]      Initial Assessment (most recent)       Adult Discharge Assessment - 11/04/24 1525          Discharge Assessment    Assessment Type Discharge Planning Assessment     Confirmed/corrected address, phone number and insurance No     Source of Information patient;family     When was your last doctors appointment? 05/20/24     Communicated MALIK with patient/caregiver Date not available/Unable to determine     Reason For Admission SOB     People in Home child(mandy), adult     Facility Arrived From: home     Do you expect to return to your current living situation? Yes     Do you have help at home or someone to help you manage your care at home? Yes     Who are your caregiver(s) and their phone number(s)? daughter     Prior to hospitilization  cognitive status: Alert/Oriented     Current cognitive status: Alert/Oriented     Walking or Climbing Stairs Difficulty no     Dressing/Bathing Difficulty no     Equipment Currently Used at Home none     Readmission within 30 days? No     Patient currently being followed by outpatient case management? No     Do you currently have service(s) that help you manage your care at home? No     Do you take prescription medications? Yes     Do you have prescription coverage? Yes     Coverage [employer]     Do you have any problems affording any of your prescribed medications? No     Who is going to help you get home at discharge? daughter     How do you get to doctors appointments? family or friend will provide (P)      Are you on dialysis? No (P)      Do you take coumadin? No (P)      Discharge Plan A Home with family (P)      Discharge Plan B Home Health (P)      DME Needed Upon Discharge  none (P)      Discharge Plan discussed with: Patient;Adult children (P)      Transition of Care Barriers None (P)                    Patient lives with daughter who can be help at home.  Currently no needs

## 2024-11-05 NOTE — ASSESSMENT & PLAN NOTE
BNP trending down  Diuresed well  Recommend OMT  Cont BB  PRN lasix  Low na diet, daily weights  Close follow up with Dr. Cabezas

## 2024-11-05 NOTE — SUBJECTIVE & OBJECTIVE
Past Medical History:   Diagnosis Date    Allergy     Anxiety 4/25/2022    Arthritis     Back pain     Disorder of kidney and ureter     Diverticulosis of colon     GERD (gastroesophageal reflux disease)     Glaucoma     Hyperlipidemia     Hypertension     Ingrown toenail     Klebsiella cystitis     Obesity     Pain in joint involving multiple sites     Pneumonia     Tortuous colon     Trouble in sleeping     Unspecified disorder of autonomic nervous system     Uterine fibroid        Past Surgical History:   Procedure Laterality Date    COLONOSCOPY      COLONOSCOPY N/A 3/15/2017    Procedure: COLONOSCOPY;  Surgeon: Yogi Carbone MD;  Location: [facility];  Service: Endoscopy;  Laterality: N/A;       Review of patient's allergies indicates:  No Known Allergies    No current facility-administered medications on file prior to encounter.     Current Outpatient Medications on File Prior to Encounter   Medication Sig    albuterol (PROVENTIL/VENTOLIN HFA) 90 mcg/actuation inhaler INHALE 2 PUFFS INTO THE LUNGS EVERY 6 (SIX) HOURS AS NEEDED FOR SHORTNESS OF BREATH.    alendronate (FOSAMAX) 70 MG tablet Take 1 tablet (70 mg total) by mouth every 7 days. Take your FOSAMAX tablet with a full glass (6-8 oz) of plain water only. Do not chew or suck on a tablet of FOSAMAX. After swallowing your FOSAMAX tablet, do not lie down - stay fully upright (sitting, standing, or walking) for at least 30 minutes. Do not lie down until after your first food of the day.    amLODIPine (NORVASC) 5 MG tablet TAKE 1 TABLET ONE TIME DAILY    aspirin (ECOTRIN) 81 MG EC tablet Take 81 mg by mouth once daily.    atorvastatin (LIPITOR) 20 MG tablet TAKE 1 TABLET EVERY DAY    calcium citrate-vitamin D3 315-200 mg (CITRACAL+D) 315 mg-5 mcg (200 unit) per tablet Take 1 tablet by mouth 2 (two) times daily.    cetirizine (ZYRTEC) 10 MG tablet TAKE 1 TABLET ONE TIME DAILY    metoprolol succinate (TOPROL-XL) 25 MG 24 hr tablet TAKE 1 TABLET EVERY DAY     multivitamin capsule Take 1 capsule by mouth once daily.    omeprazole (PRILOSEC) 40 MG capsule TAKE 1 CAPSULE EVERY MORNING    sertraline (ZOLOFT) 25 MG tablet TAKE 1 TABLET EVERY DAY    timolol maleate 0.5% (TIMOPTIC) 0.5 % Drop     diclofenac sodium (VOLTAREN) 1 % Gel APPLY 2 GRAMS TOPICALLY FOUR TIMES DAILY    lisinopriL (PRINIVIL,ZESTRIL) 40 MG tablet TAKE 1 TABLET ONE TIME DAILY     Family History       Problem Relation (Age of Onset)    Asthma Daughter, Paternal Aunt    Breast cancer Cousin    Cancer Sister (70)    Colon cancer Sister    Depression Daughter    Diabetes Sister, Brother, Paternal Uncle, Maternal Grandmother    Early death Maternal Grandfather, Paternal Grandfather    Glaucoma Mother    Heart disease Brother    Hyperlipidemia Sister, Brother    Hypertension Father, Sister, Brother          Tobacco Use    Smoking status: Never     Passive exposure: Yes    Smokeless tobacco: Never   Substance and Sexual Activity    Alcohol use: No    Drug use: No    Sexual activity: Not Currently     Review of Systems   Constitutional: Positive for malaise/fatigue.   HENT: Negative.     Eyes: Negative.    Cardiovascular: Negative.    Respiratory:  Positive for shortness of breath.    Skin: Negative.    Musculoskeletal: Negative.    Gastrointestinal: Negative.    Genitourinary: Negative.    Neurological: Negative.    Psychiatric/Behavioral: Negative.       Objective:     Vital Signs (Most Recent):  Temp: 98.8 °F (37.1 °C) (11/05/24 0752)  Pulse: 77 (11/05/24 0800)  Resp: 18 (11/05/24 0752)  BP: 110/79 (11/05/24 0752)  SpO2: 97 % (11/05/24 0752) Vital Signs (24h Range):  Temp:  [97.9 °F (36.6 °C)-98.8 °F (37.1 °C)] 98.8 °F (37.1 °C)  Pulse:  [58-77] 77  Resp:  [16-18] 18  SpO2:  [95 %-97 %] 97 %  BP: (102-147)/(52-79) 110/79     Weight: 79.4 kg (175 lb)  Body mass index is 32.01 kg/m².    SpO2: 97 %       No intake or output data in the 24 hours ending 11/05/24 1229    Lines/Drains/Airways       None              "       Physical Exam  Vitals and nursing note reviewed.   Constitutional:       Appearance: Normal appearance.   HENT:      Head: Normocephalic.   Eyes:      Pupils: Pupils are equal, round, and reactive to light.   Cardiovascular:      Rate and Rhythm: Normal rate. Rhythm irregular.      Heart sounds: Normal heart sounds, S1 normal and S2 normal. No murmur heard.     No S3 or S4 sounds.   Pulmonary:      Effort: Pulmonary effort is normal.      Breath sounds: Normal breath sounds.   Abdominal:      General: Bowel sounds are normal.      Palpations: Abdomen is soft.   Musculoskeletal:         General: Normal range of motion.      Cervical back: Normal range of motion.   Skin:     Capillary Refill: Capillary refill takes less than 2 seconds.   Neurological:      General: No focal deficit present.      Mental Status: She is alert and oriented to person, place, and time.   Psychiatric:         Mood and Affect: Mood normal.         Behavior: Behavior normal.         Thought Content: Thought content normal.          Significant Labs: BMP:   Recent Labs   Lab 11/03/24 1656 11/04/24  0039 11/04/24  0505 11/05/24  0435     --  91 89     --  138 138   K 3.9  --  3.4* 3.6     --  103 101   CO2 23  --  28 27   BUN 15  --  15 20   CREATININE 0.9  --  0.9 1.0   CALCIUM 8.3*  --  8.1* 8.3*   MG  --  1.8  --   --    , CMP   Recent Labs   Lab 11/03/24  1656 11/04/24  0505 11/05/24  0435    138 138   K 3.9 3.4* 3.6    103 101   CO2 23 28 27    91 89   BUN 15 15 20   CREATININE 0.9 0.9 1.0   CALCIUM 8.3* 8.1* 8.3*   PROT 6.6  --   --    ALBUMIN 3.4*  --   --    BILITOT 0.5  --   --    ALKPHOS 99  --   --    AST 26  --   --    ALT 23  --   --    ANIONGAP 11 7* 10   , CBC   Recent Labs   Lab 11/03/24 1656   WBC 8.41   HGB 11.3*   HCT 34.8*      , INR No results for input(s): "INR", "PROTIME" in the last 48 hours., Lipid Panel No results for input(s): "CHOL", "HDL", "LDLCALC", "TRIG", " ""CHOLHDL" in the last 48 hours., Troponin   Recent Labs   Lab 11/03/24  1656   TROPONINI 0.011   , and All pertinent lab results from the last 24 hours have been reviewed.    Significant Imaging: Cardiac Cath: reviewed, Echocardiogram: Transthoracic echo (TTE) complete (Cupid Only):   Results for orders placed or performed during the hospital encounter of 11/03/24   Echo   Result Value Ref Range    BSA 1.86 m2    LA WIDTH 3.8 cm    RA Width 2.8 cm    LVOT stroke volume 81.0 cm3    LVIDd 5.3 3.5 - 6.0 cm    LV Systolic Volume 49.37 mL    LV Systolic Volume Index 27.3 mL/m2    LVIDs 3.5 2.1 - 4.0 cm    LV Diastolic Volume 137.83 mL    LV Diastolic Volume Index 76.15 mL/m2    Left Ventricular End Systolic Volume by Teichholz Method 49.37 mL    Left Ventricular End Diastolic Volume by Teichholz Method 137.83 mL    IVS 0.8 0.6 - 1.1 cm    LVOT diameter 2.0 cm    LVOT area 3.1 cm2    FS 34.0 28 - 44 %    Left Ventricle Relative Wall Thickness 0.34 cm    PW 0.9 0.6 - 1.1 cm    LV mass 162.1 g    LV Mass Index 89.6 g/m2    MV Peak E Paxton 1.03 m/s    TDI LATERAL 0.09 m/s    TDI SEPTAL 0.08 m/s    E/E' ratio 12.12 m/s    MV Peak A Paxton 0.27 m/s    TR Max Paxton 2.58 m/s    E/A ratio 3.81     IVRT 74.22 msec    E wave deceleration time 186.50 msec    LV SEPTAL E/E' RATIO 12.88 m/s    JM 28.9 mL/m2    LV LATERAL E/E' RATIO 11.44 m/s    LA Vol 52.24 cm3    LVOT peak paxton 1.3 m/s    Left Ventricular Outflow Tract Mean Velocity 0.72 cm/s    Left Ventricular Outflow Tract Mean Gradient 2.58 mmHg    RV-red mid d 3.2 cm    RV mid diameter 3.24 cm    RVOT peak VTI 15.0 cm    TAPSE 2.04 cm    LA size 3.15 cm    Left Atrium Minor Axis 5.09 cm    Left Atrium Major Axis 5.18 cm    RA Major Axis 4.75 cm    AV mean gradient 3.7 mmHg    AV peak gradient 7.8 mmHg    Ao peak paxton 1.4 m/s    Ao VTI 32.1 cm    LVOT peak VTI 25.8 cm    AV valve area 2.5 cm²    AV Velocity Ratio 0.93     AV index (prosthetic) 0.80     DEVON by Velocity Ratio 2.9 cm²    " Mr max rojelio 5.16 m/s    MV stenosis pressure 1/2 time 54.09 ms    MV valve area p 1/2 method 4.07 cm2    Triscuspid Valve Regurgitation Peak Gradient 27 mmHg    PV mean gradient 1 mmHg    RVOT peak rojelio 0.65 m/s    Ao root annulus 3.05 cm    STJ 2.90 cm    Ascending aorta 2.80 cm    IVC diameter 1.05 cm    Mean e' 0.09 m/s    ZLVIDS 0.98     ZLVIDD 0.57     EF 55 %    TV resting pulmonary artery pressure 30 mmHg    RV TB RVSP 6 mmHg    Est. RA pres 3 mmHg    Narrative      Left Ventricle: The left ventricle is mildly dilated. Normal wall   thickness. There is normal systolic function. Ejection fraction is   approximately 55%. There is normal diastolic function.    Right Ventricle: Normal right ventricular cavity size. Wall thickness   is normal. Systolic function is normal.    Aortic Valve: There is mild aortic valve sclerosis.    Pulmonary Artery: The estimated pulmonary artery systolic pressure is   30 mmHg.    IVC/SVC: Normal venous pressure at 3 mmHg.     , EKG: reviewed, Stress Test: reviewed, and X-Ray: CXR: X-Ray Chest 1 View (CXR): No results found for this visit on 11/03/24.

## 2024-11-05 NOTE — PLAN OF CARE
A208/A208 ABRAHAM Naranoj is a 79 y.o.female admitted on 11/3/2024 for Acute on chronic diastolic congestive heart failure   Code Status: Full Code MRN: [id number]   Review of patient's allergies indicates:  No Known Allergies  Past Medical History:   Diagnosis Date    Allergy     Anxiety 4/25/2022    Arthritis     Back pain     Disorder of kidney and ureter     Diverticulosis of colon     GERD (gastroesophageal reflux disease)     Glaucoma     Hyperlipidemia     Hypertension     Ingrown toenail     Klebsiella cystitis     Obesity     Pain in joint involving multiple sites     Pneumonia     Tortuous colon     Trouble in sleeping     Unspecified disorder of autonomic nervous system     Uterine fibroid       PRN meds    acetaminophen, 650 mg, Q6H PRN  sodium chloride 0.9%, 10 mL, PRN      AVS Discharge instructions received and reviewed with pt and family at bedside.  Pt voiced understanding and all questions answered to satisfaction.  Stressed importance to making and keeping all follow up appointments.  Medications at bedside and reviewed with pt.  Tele monitor removed and brought to monitor tech.  IV d/c'd with tip intact, pressure dressing applied.  Pt will call when ready to be transported to front of hospital via w/c to be discharged home.         Yasir Coma Scale Score: 15     Lead Monitored: Lead II Rhythm: atrial rhythm    Cardiac/Telemetry Box Number: [id number]  VTE Core Measure: Pharmacological prophylaxis initiated/maintained Last Bowel Movement: 11/03/24  Diet Low Sodium, 2gm Fluid - 1500mL  Diet Cardiac  Voiding Characteristics: urgency, incontinence  Edmundo Score: 22  Fall Risk Score: 4  Accucheck []   Freq?      Lines/Drains/Airways       Peripheral Intravenous Line  Duration                  Peripheral IV - Single Lumen 11/04/24 0021 22 G Anterior;Left Forearm 1 day                       Problem: Adult Inpatient Plan of Care  Goal: Plan of Care Review  Outcome: Met  Goal: Patient-Specific Goal  (Individualized)  Outcome: Met  Goal: Absence of Hospital-Acquired Illness or Injury  Outcome: Met  Goal: Optimal Comfort and Wellbeing  Outcome: Met  Goal: Readiness for Transition of Care  Outcome: Met

## 2024-11-05 NOTE — PLAN OF CARE
[facility] - Telemetry (Hospital)  Discharge Final Note    Primary Care Provider: Jarrett Peck MD    Expected Discharge Date: 11/5/2024    Final Discharge Note (most recent)       Final Note - 11/05/24 1211          Final Note    Assessment Type Final Discharge Note (P)      Anticipated Discharge Disposition Home or Self Care (P)      Hospital Resources/Appts/Education Provided Appointments scheduled and added to AVS (P)         Post-Acute Status    Discharge Delays None known at this time (P)                      Important Message from Medicare             Contact Info       Jarrett Peck MD   Specialty: Internal Medicine, Family Medicine   Relationship: PCP - General    [address] [address] [address]   Phone: [phone]       Next Steps: Schedule an appointment as soon as possible for a visit in 3 day(s)    Instructions: Hospital follow up

## 2024-11-05 NOTE — HPI
79 y.o. female with Hx of Obesity, HTN, HLP, Glaucoma, Anxiety, Arthritis, Back pain, Disorder of kidney and ureter, Diverticulosis of colon, GERD, Glaucoma, Klebsiella cystitis transferred from [facility] ER for worsening dyspnea/SOB. Patient reports issues with ongoing dyspnea since having COVID approximally 1 month ago and reported no relief from home inhalers/nebulizers prompting her to come to the ED for further evaluation. No hx of smoking or asthma/COPD and follows Dr. Cabezas from Cardiology outpatient for high blood pressure and heart valve monitoring. Associated symptoms included lower extremity edema, orthopnea, PND, and decreased exercise tolerance. Initial w/u - Afebrile without leukocytosis, hemodynamically stable, D-dimer elevated at 0.69, , troponin negative, flu/COVID negative, CXR positive for CHF/possible pneumonia, CTA upon arrival negative for PE but again showed evidence of CHF and possible pneumonia. Patient admitted to Hospital Medicine under observation for continued medical management.     Cardiology consulted to assist with management Pt seen and examined today feeling better. Diuresed well BNP trending down. Denies any CP or worsening SOB. Sees Dr. Cabezas in cardiology clinic, EKG on admission Afib which is new for her. Labs reviewed, Chart reviewed. Echo with nml EF.

## 2024-11-05 NOTE — CONSULTS
[place] - Telemetry ([facility])  Cardiology  Consult Note    Patient Name: Amber Naranjo  MRN: [id number]  Admission Date: 11/3/2024  Hospital Length of Stay: 0 days  Code Status: Full Code   Attending Provider: Edi Robles MD   Consulting Provider: Lillie Kendall NP  Primary Care Physician: Jarrett Peck MD  Principal Problem:Acute on chronic diastolic congestive heart failure    Patient information was obtained from patient and ER records.     Inpatient consult to Cardiology  Consult performed by: Lillie Kendall NP  Consult ordered by: Edi Robles MD        Subjective:     Chief Complaint:  SOB     HPI:   79 y.o. female with Hx of Obesity, HTN, HLP, Glaucoma, Anxiety, Arthritis, Back pain, Disorder of kidney and ureter, Diverticulosis of colon, GERD, Glaucoma, Klebsiella cystitis transferred from [facility] ER for worsening dyspnea/SOB. Patient reports issues with ongoing dyspnea since having COVID approximally 1 month ago and reported no relief from home inhalers/nebulizers prompting her to come to the ED for further evaluation. No hx of smoking or asthma/COPD and follows Dr. Cabezas from Cardiology outpatient for high blood pressure and heart valve monitoring. Associated symptoms included lower extremity edema, orthopnea, PND, and decreased exercise tolerance. Initial w/u - Afebrile without leukocytosis, hemodynamically stable, D-dimer elevated at 0.69, , troponin negative, flu/COVID negative, CXR positive for CHF/possible pneumonia, CTA upon arrival negative for PE but again showed evidence of CHF and possible pneumonia. Patient admitted to Hospital Medicine under observation for continued medical management.     Cardiology consulted to assist with management Pt seen and examined today feeling better. Diuresed well BNP trending down. Denies any CP or worsening SOB. Sees Dr. Cabezas in cardiology clinic, EKG on admission Afib which is new for her. Labs reviewed, Chart reviewed. Echo with nml  EF.     Past Medical History:   Diagnosis Date    Allergy     Anxiety 4/25/2022    Arthritis     Back pain     Disorder of kidney and ureter     Diverticulosis of colon     GERD (gastroesophageal reflux disease)     Glaucoma     Hyperlipidemia     Hypertension     Ingrown toenail     Klebsiella cystitis     Obesity     Pain in joint involving multiple sites     Pneumonia     Tortuous colon     Trouble in sleeping     Unspecified disorder of autonomic nervous system     Uterine fibroid        Past Surgical History:   Procedure Laterality Date    COLONOSCOPY      COLONOSCOPY N/A 3/15/2017    Procedure: COLONOSCOPY;  Surgeon: Yogi Carbone MD;  Location: [facility];  Service: Endoscopy;  Laterality: N/A;       Review of patient's allergies indicates:  No Known Allergies    No current facility-administered medications on file prior to encounter.     Current Outpatient Medications on File Prior to Encounter   Medication Sig    albuterol (PROVENTIL/VENTOLIN HFA) 90 mcg/actuation inhaler INHALE 2 PUFFS INTO THE LUNGS EVERY 6 (SIX) HOURS AS NEEDED FOR SHORTNESS OF BREATH.    alendronate (FOSAMAX) 70 MG tablet Take 1 tablet (70 mg total) by mouth every 7 days. Take your FOSAMAX tablet with a full glass (6-8 oz) of plain water only. Do not chew or suck on a tablet of FOSAMAX. After swallowing your FOSAMAX tablet, do not lie down - stay fully upright (sitting, standing, or walking) for at least 30 minutes. Do not lie down until after your first food of the day.    amLODIPine (NORVASC) 5 MG tablet TAKE 1 TABLET ONE TIME DAILY    aspirin (ECOTRIN) 81 MG EC tablet Take 81 mg by mouth once daily.    atorvastatin (LIPITOR) 20 MG tablet TAKE 1 TABLET EVERY DAY    calcium citrate-vitamin D3 315-200 mg (CITRACAL+D) 315 mg-5 mcg (200 unit) per tablet Take 1 tablet by mouth 2 (two) times daily.    cetirizine (ZYRTEC) 10 MG tablet TAKE 1 TABLET ONE TIME DAILY    metoprolol succinate (TOPROL-XL) 25 MG 24 hr tablet TAKE 1 TABLET EVERY DAY     multivitamin capsule Take 1 capsule by mouth once daily.    omeprazole (PRILOSEC) 40 MG capsule TAKE 1 CAPSULE EVERY MORNING    sertraline (ZOLOFT) 25 MG tablet TAKE 1 TABLET EVERY DAY    timolol maleate 0.5% (TIMOPTIC) 0.5 % Drop     diclofenac sodium (VOLTAREN) 1 % Gel APPLY 2 GRAMS TOPICALLY FOUR TIMES DAILY    lisinopriL (PRINIVIL,ZESTRIL) 40 MG tablet TAKE 1 TABLET ONE TIME DAILY     Family History       Problem Relation (Age of Onset)    Asthma Daughter, Paternal Aunt    Breast cancer Cousin    Cancer Sister (70)    Colon cancer Sister    Depression Daughter    Diabetes Sister, Brother, Paternal Uncle, Maternal Grandmother    Early death Maternal Grandfather, Paternal Grandfather    Glaucoma Mother    Heart disease Brother    Hyperlipidemia Sister, Brother    Hypertension Father, Sister, Brother          Tobacco Use    Smoking status: Never     Passive exposure: Yes    Smokeless tobacco: Never   Substance and Sexual Activity    Alcohol use: No    Drug use: No    Sexual activity: Not Currently     Review of Systems   Constitutional: Positive for malaise/fatigue.   HENT: Negative.     Eyes: Negative.    Cardiovascular: Negative.    Respiratory:  Positive for shortness of breath.    Skin: Negative.    Musculoskeletal: Negative.    Gastrointestinal: Negative.    Genitourinary: Negative.    Neurological: Negative.    Psychiatric/Behavioral: Negative.       Objective:     Vital Signs (Most Recent):  Temp: 98.8 °F (37.1 °C) (11/05/24 0752)  Pulse: 77 (11/05/24 0800)  Resp: 18 (11/05/24 0752)  BP: 110/79 (11/05/24 0752)  SpO2: 97 % (11/05/24 0752) Vital Signs (24h Range):  Temp:  [97.9 °F (36.6 °C)-98.8 °F (37.1 °C)] 98.8 °F (37.1 °C)  Pulse:  [58-77] 77  Resp:  [16-18] 18  SpO2:  [95 %-97 %] 97 %  BP: (102-147)/(52-79) 110/79     Weight: 79.4 kg (175 lb)  Body mass index is 32.01 kg/m².    SpO2: 97 %       No intake or output data in the 24 hours ending 11/05/24 1229    Lines/Drains/Airways       None              "       Physical Exam  Vitals and nursing note reviewed.   Constitutional:       Appearance: Normal appearance.   HENT:      Head: Normocephalic.   Eyes:      Pupils: Pupils are equal, round, and reactive to light.   Cardiovascular:      Rate and Rhythm: Normal rate. Rhythm irregular.      Heart sounds: Normal heart sounds, S1 normal and S2 normal. No murmur heard.     No S3 or S4 sounds.   Pulmonary:      Effort: Pulmonary effort is normal.      Breath sounds: Normal breath sounds.   Abdominal:      General: Bowel sounds are normal.      Palpations: Abdomen is soft.   Musculoskeletal:         General: Normal range of motion.      Cervical back: Normal range of motion.   Skin:     Capillary Refill: Capillary refill takes less than 2 seconds.   Neurological:      General: No focal deficit present.      Mental Status: She is alert and oriented to person, place, and time.   Psychiatric:         Mood and Affect: Mood normal.         Behavior: Behavior normal.         Thought Content: Thought content normal.          Significant Labs: BMP:   Recent Labs   Lab 11/03/24 1656 11/04/24  0039 11/04/24  0505 11/05/24  0435     --  91 89     --  138 138   K 3.9  --  3.4* 3.6     --  103 101   CO2 23  --  28 27   BUN 15  --  15 20   CREATININE 0.9  --  0.9 1.0   CALCIUM 8.3*  --  8.1* 8.3*   MG  --  1.8  --   --    , CMP   Recent Labs   Lab 11/03/24  1656 11/04/24  0505 11/05/24  0435    138 138   K 3.9 3.4* 3.6    103 101   CO2 23 28 27    91 89   BUN 15 15 20   CREATININE 0.9 0.9 1.0   CALCIUM 8.3* 8.1* 8.3*   PROT 6.6  --   --    ALBUMIN 3.4*  --   --    BILITOT 0.5  --   --    ALKPHOS 99  --   --    AST 26  --   --    ALT 23  --   --    ANIONGAP 11 7* 10   , CBC   Recent Labs   Lab 11/03/24 1656   WBC 8.41   HGB 11.3*   HCT 34.8*      , INR No results for input(s): "INR", "PROTIME" in the last 48 hours., Lipid Panel No results for input(s): "CHOL", "HDL", "LDLCALC", "TRIG", " ""CHOLHDL" in the last 48 hours., Troponin   Recent Labs   Lab 11/03/24  1656   TROPONINI 0.011   , and All pertinent lab results from the last 24 hours have been reviewed.    Significant Imaging: Cardiac Cath: reviewed, Echocardiogram: Transthoracic echo (TTE) complete (Cupid Only):   Results for orders placed or performed during the hospital encounter of 11/03/24   Echo   Result Value Ref Range    BSA 1.86 m2    LA WIDTH 3.8 cm    RA Width 2.8 cm    LVOT stroke volume 81.0 cm3    LVIDd 5.3 3.5 - 6.0 cm    LV Systolic Volume 49.37 mL    LV Systolic Volume Index 27.3 mL/m2    LVIDs 3.5 2.1 - 4.0 cm    LV Diastolic Volume 137.83 mL    LV Diastolic Volume Index 76.15 mL/m2    Left Ventricular End Systolic Volume by Teichholz Method 49.37 mL    Left Ventricular End Diastolic Volume by Teichholz Method 137.83 mL    IVS 0.8 0.6 - 1.1 cm    LVOT diameter 2.0 cm    LVOT area 3.1 cm2    FS 34.0 28 - 44 %    Left Ventricle Relative Wall Thickness 0.34 cm    PW 0.9 0.6 - 1.1 cm    LV mass 162.1 g    LV Mass Index 89.6 g/m2    MV Peak E Paxton 1.03 m/s    TDI LATERAL 0.09 m/s    TDI SEPTAL 0.08 m/s    E/E' ratio 12.12 m/s    MV Peak A Paxton 0.27 m/s    TR Max Paxton 2.58 m/s    E/A ratio 3.81     IVRT 74.22 msec    E wave deceleration time 186.50 msec    LV SEPTAL E/E' RATIO 12.88 m/s    JM 28.9 mL/m2    LV LATERAL E/E' RATIO 11.44 m/s    LA Vol 52.24 cm3    LVOT peak paxton 1.3 m/s    Left Ventricular Outflow Tract Mean Velocity 0.72 cm/s    Left Ventricular Outflow Tract Mean Gradient 2.58 mmHg    RV-red mid d 3.2 cm    RV mid diameter 3.24 cm    RVOT peak VTI 15.0 cm    TAPSE 2.04 cm    LA size 3.15 cm    Left Atrium Minor Axis 5.09 cm    Left Atrium Major Axis 5.18 cm    RA Major Axis 4.75 cm    AV mean gradient 3.7 mmHg    AV peak gradient 7.8 mmHg    Ao peak paxton 1.4 m/s    Ao VTI 32.1 cm    LVOT peak VTI 25.8 cm    AV valve area 2.5 cm²    AV Velocity Ratio 0.93     AV index (prosthetic) 0.80     DEVON by Velocity Ratio 2.9 cm²    " Mr max rojelio 5.16 m/s    MV stenosis pressure 1/2 time 54.09 ms    MV valve area p 1/2 method 4.07 cm2    Triscuspid Valve Regurgitation Peak Gradient 27 mmHg    PV mean gradient 1 mmHg    RVOT peak rojelio 0.65 m/s    Ao root annulus 3.05 cm    STJ 2.90 cm    Ascending aorta 2.80 cm    IVC diameter 1.05 cm    Mean e' 0.09 m/s    ZLVIDS 0.98     ZLVIDD 0.57     EF 55 %    TV resting pulmonary artery pressure 30 mmHg    RV TB RVSP 6 mmHg    Est. RA pres 3 mmHg    Narrative      Left Ventricle: The left ventricle is mildly dilated. Normal wall   thickness. There is normal systolic function. Ejection fraction is   approximately 55%. There is normal diastolic function.    Right Ventricle: Normal right ventricular cavity size. Wall thickness   is normal. Systolic function is normal.    Aortic Valve: There is mild aortic valve sclerosis.    Pulmonary Artery: The estimated pulmonary artery systolic pressure is   30 mmHg.    IVC/SVC: Normal venous pressure at 3 mmHg.     , EKG: reviewed, Stress Test: reviewed, and X-Ray: CXR: X-Ray Chest 1 View (CXR): No results found for this visit on 11/03/24.  Assessment and Plan:     New onset atrial fibrillation  EKG 11/3 Afib rate controlled  Still in afib on exam today  Add eliquis 5mg BID  Cont BB  Close follow up in clinic  Recommend JEEVAN with DCCV      Acute diastolic heart failure  BNP trending down  Diuresed well  Recommend OMT  Cont BB  PRN lasix  Low na diet, daily weights  Close follow up with Dr. Cabezas    Obesity (BMI 30.0-34.9)  Weight loss    Hyperlipidemia  statin    Essential hypertension  Titrate medications, stable  Resume home regimen        VTE Risk Mitigation (From admission, onward)           Ordered     enoxaparin injection 40 mg  Daily         11/03/24 2328     IP VTE HIGH RISK PATIENT  Once         11/03/24 2328     Place sequential compression device  Until discontinued         11/03/24 2328                    Thank you for your consult. I will follow-up with  patient. Please contact us if you have any additional questions.    Lillie Kendall NP  Cardiology   [place] - Telemetry ([facility])

## 2024-11-06 ENCOUNTER — PATIENT OUTREACH (OUTPATIENT)
Dept: ADMINISTRATIVE | Facility: CLINIC | Age: 79
End: 2024-11-06
Payer: MEDICARE

## 2024-11-06 NOTE — PROGRESS NOTES
C3 nurse spoke with Amber Naranjo for a TCC post hospital discharge follow up call. The patient has a scheduled [facility] appointment with Jarrett Peck on 11/11/24 @ 0485.

## 2024-11-07 ENCOUNTER — TELEPHONE (OUTPATIENT)
Dept: CARDIOLOGY | Facility: CLINIC | Age: 79
End: 2024-11-07
Payer: MEDICARE

## 2024-11-07 NOTE — DISCHARGE SUMMARY
[facility] Medicine  Discharge Summary      Patient Name: Amber Naranjo  MRN: [id number]  STANISLAW: [id number]  Patient Class: OP- Observation  Admission Date: 11/3/2024  Hospital Length of Stay: 0 days  Discharge Date and Time: 11/5/2024  2:17 PM  Attending Physician: Imelda att. providers found   Discharging Provider: Edi Robles MD  Primary Care Provider: Jarrett Peck MD    Primary Care Team: Networked reference to record PCT     HPI:   Amber Naranjo is a 79 y.o. female with a PMH  has a past medical history of Allergy, Anxiety (4/25/2022), Arthritis, Back pain, Disorder of kidney and ureter, Diverticulosis of colon, GERD (gastroesophageal reflux disease), Glaucoma, Hyperlipidemia, Hypertension, Ingrown toenail, Klebsiella cystitis, Obesity, Pain in joint involving multiple sites, Pneumonia, Tortuous colon, Trouble in sleeping, Unspecified disorder of autonomic nervous system, and Uterine fibroid. who presented as a transfer from [facility] for higher level of care after patient presented for further evaluation of worsening dyspnea/shortness of breath.  Patient reports issues with ongoing dyspnea since having COVID approximally 1 month ago and reported no relief from home inhalers/nebulizers prompting her to come to the ED for further evaluation.  She denies prior history of smoking or known history of asthma/COPD and follows Dr. Cabezas from Cardiology outpatient for high blood pressure and heart valve monitoring.  Associated symptoms included lower extremity edema, orthopnea, PND, and decreased exercise tolerance but denied endorsing any fever, chills, sweats, nausea, vomiting, chest pain, abdominal pain, dysuria, hematuria, melena, hematochezia, diarrhea, or onset neurological deficits.  She denies use of home oxygen or CPAP and was in his usual state of health prior to onset of symptoms.  Initial workup in the ED prior to transfer revealed patient to be afebrile without leukocytosis,  hemodynamically stable, D-dimer elevated at 0.69, , troponin negative, flu/COVID negative, chest x-ray positive for CHF/possible pneumonia, CTA upon arrival negative for PE but again showed evidence of CHF and possible pneumonia.  Patient admitted to Hospital Medicine under observation for continued medical management.      PCP: Jarrett Peck       * No surgery found *      Hospital Course:   79 y.o. female with Hx of Obesity, HTN, HLP, Glaucoma, Anxiety, Arthritis, Back pain, Disorder of kidney and ureter, Diverticulosis of colon, GERD, Glaucoma, Klebsiella cystitis transferred from [facility] ER for worsening dyspnea/SOB. Patient reports issues with ongoing dyspnea since having COVID approximally 1 month ago and reported no relief from home inhalers/nebulizers prompting her to come to the ED for further evaluation. No hx of smoking or asthma/COPD and follows Dr. Cabezas from Cardiology outpatient for high blood pressure and heart valve monitoring. Associated symptoms included lower extremity edema, orthopnea, PND, and decreased exercise tolerance. Initial w/u - Afebrile without leukocytosis, hemodynamically stable, D-dimer elevated at 0.69, , troponin negative, flu/COVID negative, CXR positive for CHF/possible pneumonia, CTA upon arrival negative for PE but again showed evidence of CHF and possible pneumonia. Patient admitted to Hospital Medicine under observation for continued medical management.     11/4- looks better, sitting up in chair on RA, still gets easily dyspneic with little movements and has some orthopnea and BLE edema. Cont IV lasix 40 mg bid. Echo-normal EF 55%, no DD, PAS 30 mm Hg- possible SAMEER/OHS. Will consult Cards in am. K 3.4- replaced.     11/5- sitting up in bed, comfortable on RA, no SOB and HUGHES better. Feels ready to go home. She is eating drinking well, walking around well. She was counseled about salt and fluid restrictions which she understands and accepts. Cardiology consulted  and noted that the EK showed Afib, new onset, rate controlled. She was started on Eliquis 5 mg PO bid. She see Dr. Cabezas as her cardiologist as OP who was contacted and informed about the new onset Afib and that she may need Cardioversion in near future and he agreed to see her later this week and will evaluate her Afib. Pt and her family in agreement with the plan. She is going to be on lasix 40 mg daily on a prn basis. She was seen and examined and deemed stable for discharge home today.        Goals of Care Treatment Preferences:  Code Status: Full Code      SDOH Screening:  The patient was screened for utility difficulties, food insecurity, transport difficulties, housing insecurity, and interpersonal safety and there were no concerns identified this admission.     Consults:   Consults (From admission, onward)          Status Ordering Provider     Inpatient consult to Cardiology  Once        Provider:  Moo Fischer MD    Completed DRAGAN TOWNSEND     Inpatient consult to Social Work/Case Management  Once        Provider:  (Not yet assigned)    Completed ANA WHITESIDE     Inpatient consult to Registered Dietitian/Nutritionist  Once        Provider:  (Not yet assigned)    Completed ANA WHITESIDE.            No new Assessment & Plan notes have been filed under this hospital service since the last note was generated.  Service: Hospital Medicine    Final Active Diagnoses:    Diagnosis Date Noted POA    New onset atrial fibrillation [I48.91] 11/05/2024 Yes    Anxiety [F41.9] 04/25/2022 Yes     Chronic    GERD (gastroesophageal reflux disease) [K21.9]  Yes     Chronic    Hyperlipidemia [E78.5]  Yes     Chronic    Obesity (BMI 30.0-34.9) [E66.811]  Yes     Chronic    Essential hypertension [I10] 05/23/2017 Yes     Chronic      Problems Resolved During this Admission:    Diagnosis Date Noted Date Resolved POA    PRINCIPAL PROBLEM:  Acute on chronic diastolic congestive heart failure [I50.33]  11/04/2024 11/05/2024 Yes    Shortness of breath [R06.02] 11/04/2024 11/05/2024 Yes    Acute diastolic heart failure [I50.31] 11/05/2024 11/05/2024 Yes       Discharged Condition: stable    Disposition: Home or Self Care    Follow Up:   Follow-up Information       Jarrett Peck MD. Schedule an appointment as soon as possible for a visit in 3 day(s).    Specialties: Internal Medicine, Family Medicine  Why: Hospital follow up  Contact information:  [address] [address] [address]  [phone]               Lillie Kendall NP Follow up in 1 week(s).    Specialty: Cardiology  Why: Hospital follow up  Contact information:  [address]  [place] [address]  [phone]               Lopez Cabezas MD. Schedule an appointment as soon as possible for a visit in 3 day(s).    Specialty: Cardiology  Why: Hospital follow up  Contact information:  [address] [facility] [address]  [phone]                           Patient Instructions:      Ambulatory referral/consult to Cardiology   Standing Status: Future   Referral Priority: Urgent Referral Type: Consultation   Referral Reason: Specialty Services Required   Requested Specialty: Cardiology   Number of Visits Requested: 1     ACCEPT - Ambulatory referral/consult to Cardiac Electrophysiology   Standing Status: Future   Referral Priority: Routine Referral Type: Consultation   Referral Reason: Specialty Services Required   Requested Specialty: Cardiology   Number of Visits Requested: 1     Diet Cardiac     Activity as tolerated       Significant Diagnostic Studies: N/A    Pending Diagnostic Studies:       None           Medications:  Reconciled Home Medications:      Medication List        START taking these medications      ELIQUIS 5 mg Tab  Generic drug: apixaban  Take 1 tablet (5 mg total) by mouth 2 (two) times daily.     furosemide 40 MG tablet  Commonly known as: LASIX  Take 1 tablet (40 mg total) by mouth daily as needed.  Notes to  patient: FOR LOWER LEG SWELLING            CONTINUE taking these medications      albuterol 90 mcg/actuation inhaler  Commonly known as: PROVENTIL/VENTOLIN HFA  INHALE 2 PUFFS INTO THE LUNGS EVERY 6 (SIX) HOURS AS NEEDED FOR SHORTNESS OF BREATH.     alendronate 70 MG tablet  Commonly known as: FOSAMAX  Take 1 tablet (70 mg total) by mouth every 7 days. Take your FOSAMAX tablet with a full glass (6-8 oz) of plain water only. Do not chew or suck on a tablet of FOSAMAX. After swallowing your FOSAMAX tablet, do not lie down - stay fully upright (sitting, standing, or walking) for at least 30 minutes. Do not lie down until after your first food of the day.     amLODIPine 5 MG tablet  Commonly known as: NORVASC  TAKE 1 TABLET ONE TIME DAILY     aspirin 81 MG EC tablet  Commonly known as: ECOTRIN  Take 81 mg by mouth once daily.     atorvastatin 20 MG tablet  Commonly known as: LIPITOR  TAKE 1 TABLET EVERY DAY     calcium citrate-vitamin D3 315-200 mg 315 mg-5 mcg (200 unit) per tablet  Commonly known as: CITRACAL+D  Take 1 tablet by mouth 2 (two) times daily.     cetirizine 10 MG tablet  Commonly known as: ZYRTEC  TAKE 1 TABLET ONE TIME DAILY     diclofenac sodium 1 % Gel  Commonly known as: VOLTAREN  APPLY 2 GRAMS TOPICALLY FOUR TIMES DAILY     lisinopriL 40 MG tablet  Commonly known as: PRINIVIL,ZESTRIL  TAKE 1 TABLET ONE TIME DAILY     metoprolol succinate 25 MG 24 hr tablet  Commonly known as: TOPROL-XL  TAKE 1 TABLET EVERY DAY     multivitamin capsule  Take 1 capsule by mouth once daily.     omeprazole 40 MG capsule  Commonly known as: PRILOSEC  TAKE 1 CAPSULE EVERY MORNING     sertraline 25 MG tablet  Commonly known as: ZOLOFT  TAKE 1 TABLET EVERY DAY     timolol maleate 0.5% 0.5 % Drop  Commonly known as: TIMOPTIC  Place into both eyes every evening.              Indwelling Lines/Drains at time of discharge:   Lines/Drains/Airways       None                   Time spent on the discharge of patient: 45 minutes          Edi Robles MD  Department of Hospital Medicine  '[place] - Telemetry ([facility])

## 2024-11-07 NOTE — TELEPHONE ENCOUNTER
"[facility]) hospital discharge 48-72 hour phone follow up completed.     Most Recent Hospital Discharge Date: 11/5   Last admission Diagnosis/chief complaint:SOB/CHF     TCC nurse Navigator spoke with pt    Current Patient reported weight: 175.5 lbs    Current Patient reported blood pressure and heart rate: 111/59 ,77    Pt reports the following:  []  Shortness of Breath with Activity  []  Shortness of Breath at rest   []  Fatigue  []  Edema   [] Chest pain or tightness  [] Weight Increase since discharge  [x] None of the above    Medications:   Discharge medication reviewed with pt.  Pt reports having medication list available and has all medications at home for use per list.     Education:   Confirmed pt received "Home Care Guide for Heart Failure Patients" while admitted.   Reviewed key points as listed below.     Recommend 2 -3 gram sodium restriction and 1500 cc-2000 cc fluid restriction.  Encourage physical activity with graded exercise program.  Requested patient to weigh themselves daily, and to notify us if their weight increases by more than 3 lbs in 1 day or 5 lbs in 3 days.   Reminded patient to use "Daily weight and symptom tracker" to record and guide patient on when and how to call [facility]. PT may also use symptom tracker if no scale available  Pt reports being in the green(color) Zone. If in yellow/red, reminded that they should be calling [facility] today or now.     Watch for these Signs and Symptoms: If any of these occur, contact [facility] immediately:   Increase in shortness of breath with movement   Increase in swelling in your legs and ankles   Weight gain of more than 3 pounds in a day or 5 pounds in 3 days.   Difficulty breathing when you are lying down   Worsening fatigue or tiredness   Stomach bloating, a full feeling or a loss of appetite   Increased coughing--especially when you are lying down      Pt was able to verbalize back to nurse in their own words correct " diet/fluid restrictions, necessity for exercise, warning signs and symptoms, when and how to contact their TCC team.      Pt educated on follow-up plan while in [facility] program to include:   Week 1 -  F/u appt with Provider and HF nurse (Date) 11/12 at 11:00 am with Sapphire Mathias.   Week 2-5 - In person/ Virtual/ phone call check ins    Week 5-7 - Pt will discharge from [facility] and transition to longterm care provider (Cardiology/PCP/ Advanced Heart Failure).      Patient active on myChart? Yes      Pt given the following contact information for ease of communication: [phone] (Mon-Fri, 8a-5p) & for urgent issues on the weekend call [facility] on-call [phone] to page the Cardiology MD on call.  Pt also encouraged utilize [facility] messaging as well.      Will follow up with pt at first clinic visit and HF nurse navigator available for pt questions, issues or concerns.

## 2024-11-11 ENCOUNTER — OFFICE VISIT (OUTPATIENT)
Dept: INTERNAL MEDICINE | Facility: CLINIC | Age: 79
End: 2024-11-11
Payer: MEDICARE

## 2024-11-11 VITALS
HEART RATE: 77 BPM | OXYGEN SATURATION: 96 % | HEIGHT: 62 IN | SYSTOLIC BLOOD PRESSURE: 132 MMHG | WEIGHT: 176.81 LBS | BODY MASS INDEX: 32.54 KG/M2 | DIASTOLIC BLOOD PRESSURE: 70 MMHG | TEMPERATURE: 97 F

## 2024-11-11 DIAGNOSIS — I48.91 NEW ONSET ATRIAL FIBRILLATION: Primary | ICD-10-CM

## 2024-11-11 DIAGNOSIS — Z23 NEED FOR VACCINATION: ICD-10-CM

## 2024-11-11 DIAGNOSIS — E78.2 MIXED HYPERLIPIDEMIA: ICD-10-CM

## 2024-11-11 DIAGNOSIS — I10 ESSENTIAL HYPERTENSION: ICD-10-CM

## 2024-11-11 DIAGNOSIS — N18.31 STAGE 3A CHRONIC KIDNEY DISEASE: ICD-10-CM

## 2024-11-11 DIAGNOSIS — I70.0 ABDOMINAL AORTIC ATHEROSCLEROSIS: ICD-10-CM

## 2024-11-11 DIAGNOSIS — K21.9 GASTROESOPHAGEAL REFLUX DISEASE WITHOUT ESOPHAGITIS: ICD-10-CM

## 2024-11-11 PROCEDURE — 99999 PR PBB SHADOW E&M-EST. PATIENT-LVL V: CPT | Mod: PBBFAC,HCNC,, | Performed by: STUDENT IN AN ORGANIZED HEALTH CARE EDUCATION/TRAINING PROGRAM

## 2024-11-11 NOTE — PROGRESS NOTES
Discharge Information   Amber Naranjo, 79 y.o./White/female   Admitted for management of Acute on chronic diastolic congestive heart failure between 11/3/24 and 11/5/24.     Discharge Date:  11/05/2024        HPI:   Amber Naranjo is a 79 y.o. female with a PMH  has a past medical history of Allergy, Anxiety (4/25/2022), Arthritis, Back pain, Disorder of kidney and ureter, Diverticulosis of colon, GERD (gastroesophageal reflux disease), Glaucoma, Hyperlipidemia, Hypertension, Ingrown toenail, Klebsiella cystitis, Obesity, Pain in joint involving multiple sites, Pneumonia, Tortuous colon, Trouble in sleeping, Unspecified disorder of autonomic nervous system, and Uterine fibroid. who presented as a transfer from [facility] for higher level of care after patient presented for further evaluation of worsening dyspnea/shortness of breath.  Patient reports issues with ongoing dyspnea since having COVID approximally 1 month ago and reported no relief from home inhalers/nebulizers prompting her to come to the ED for further evaluation.  She denies prior history of smoking or known history of asthma/COPD and follows Dr. Cabezas from Cardiology outpatient for high blood pressure and heart valve monitoring.  Associated symptoms included lower extremity edema, orthopnea, PND, and decreased exercise tolerance but denied endorsing any fever, chills, sweats, nausea, vomiting, chest pain, abdominal pain, dysuria, hematuria, melena, hematochezia, diarrhea, or onset neurological deficits.  She denies use of home oxygen or CPAP and was in his usual state of health prior to onset of symptoms.  Initial workup in the ED prior to transfer revealed patient to be afebrile without leukocytosis, hemodynamically stable, D-dimer elevated at 0.69, , troponin negative, flu/COVID negative, chest x-ray positive for CHF/possible pneumonia, CTA upon arrival negative for PE but again showed evidence of CHF and possible pneumonia.  Patient admitted to  Hospital Medicine under observation for continued medical management.       PCP: Jarrett Peck        * No surgery found *       Hospital Course:   79 y.o. female with Hx of Obesity, HTN, HLP, Glaucoma, Anxiety, Arthritis, Back pain, Disorder of kidney and ureter, Diverticulosis of colon, GERD, Glaucoma, Klebsiella cystitis transferred from [facility] ER for worsening dyspnea/SOB. Patient reports issues with ongoing dyspnea since having COVID approximally 1 month ago and reported no relief from home inhalers/nebulizers prompting her to come to the ED for further evaluation. No hx of smoking or asthma/COPD and follows Dr. Cabezas from Cardiology outpatient for high blood pressure and heart valve monitoring. Associated symptoms included lower extremity edema, orthopnea, PND, and decreased exercise tolerance. Initial w/u - Afebrile without leukocytosis, hemodynamically stable, D-dimer elevated at 0.69, , troponin negative, flu/COVID negative, CXR positive for CHF/possible pneumonia, CTA upon arrival negative for PE but again showed evidence of CHF and possible pneumonia. Patient admitted to Hospital Medicine under observation for continued medical management.      11/4- looks better, sitting up in chair on RA, still gets easily dyspneic with little movements and has some orthopnea and BLE edema. Cont IV lasix 40 mg bid. Echo-normal EF 55%, no DD, PAS 30 mm Hg- possible SAMEER/OHS. Will consult Cards in am. K 3.4- replaced.      11/5- sitting up in bed, comfortable on RA, no SOB and HUGHES better. Feels ready to go home. She is eating drinking well, walking around well. She was counseled about salt and fluid restrictions which she understands and accepts. Cardiology consulted and noted that the EK showed Afib, new onset, rate controlled. She was started on Eliquis 5 mg PO bid. She see Dr. Cabezas as her cardiologist as OP who was contacted and informed about the new onset Afib and that she may need Cardioversion in near  future and he agreed to see her later this week and will evaluate her Afib. Pt and her family in agreement with the plan. She is going to be on lasix 40 mg daily on a prn basis. She was seen and examined and deemed stable for discharge home today  Primary Discharge Diagnosis:    Final Active Diagnoses:     Diagnosis Date Noted POA    New onset atrial fibrillation [I48.91] 11/05/2024 Yes    Anxiety [F41.9] 04/25/2022 Yes       Chronic    GERD (gastroesophageal reflux disease) [K21.9]   Yes       Chronic    Hyperlipidemia [E78.5]   Yes       Chronic    Obesity (BMI 30.0-34.9) [E66.811]   Yes       Chronic    Essential hypertension [I10] 05/23/2017 Yes       Chronic       Problems Resolved During this Admission:     Diagnosis Date Noted Date Resolved POA    PRINCIPAL PROBLEM:  Acute on chronic diastolic congestive heart failure [I50.33] 11/04/2024 11/05/2024 Yes    Shortness of breath [R06.02] 11/04/2024 11/05/2024 Yes    Acute diastolic heart failure [I50.31] 11/05/2024 11/05/2024 Yes             How patient is feeling since discharge from the hospital?  She reports to be doing well. Denies having any shortness of breath, chest pain, abdominal pain, palpitation, leg swelling, syncopal episode, nausea, vomiting, fever and other complains at present. Medication list has been reviewed and updated along with her. She reports to be compliant with her medication and has no issues taking it. She was asked to follow up with cardiology following discharge . She has appointment with them tomorrow. Referral also provided to dr. Cabezas. She declined home health. She has been weighing her self daily and denies having any abnormal weight gain since being discharge. Overall reports to be doing well with no concerns.            Review of Systems -   Negative except mentioned above    PCP: Jarrett Peck MD  Prior to Admission medications    Medication Sig Start Date End Date Taking? Authorizing Provider   albuterol  (PROVENTIL/VENTOLIN HFA) 90 mcg/actuation inhaler INHALE 2 PUFFS INTO THE LUNGS EVERY 6 (SIX) HOURS AS NEEDED FOR SHORTNESS OF BREATH. 6/12/24  Yes Mikayla Olivares, CRISTOPHERP-C   alendronate (FOSAMAX) 70 MG tablet Take 1 tablet (70 mg total) by mouth every 7 days. Take your FOSAMAX tablet with a full glass (6-8 oz) of plain water only. Do not chew or suck on a tablet of FOSAMAX. After swallowing your FOSAMAX tablet, do not lie down - stay fully upright (sitting, standing, or walking) for at least 30 minutes. Do not lie down until after your first food of the day. 3/4/24 3/4/25 Yes Jarrett Peck MD   amLODIPine (NORVASC) 5 MG tablet TAKE 1 TABLET ONE TIME DAILY 6/17/24  Yes Jarrett Peck MD   apixaban (ELIQUIS) 5 mg Tab Take 1 tablet (5 mg total) by mouth 2 (two) times daily. 11/5/24  Yes Edi Robles MD   aspirin (ECOTRIN) 81 MG EC tablet Take 81 mg by mouth once daily.   Yes Provider, Historical   atorvastatin (LIPITOR) 20 MG tablet TAKE 1 TABLET EVERY DAY 4/24/24  Yes Jarrett Peck MD   calcium citrate-vitamin D3 315-200 mg (CITRACAL+D) 315 mg-5 mcg (200 unit) per tablet Take 1 tablet by mouth 2 (two) times daily.   Yes Provider, Historical   cetirizine (ZYRTEC) 10 MG tablet TAKE 1 TABLET ONE TIME DAILY 6/22/24  Yes Jarertt Peck MD   furosemide (LASIX) 40 MG tablet Take 1 tablet (40 mg total) by mouth daily as needed. 11/5/24 11/5/25 Yes Edi Robles MD   metoprolol succinate (TOPROL-XL) 25 MG 24 hr tablet TAKE 1 TABLET EVERY DAY 9/30/24  Yes Jarrett Peck MD   multivitamin capsule Take 1 capsule by mouth once daily.   Yes Provider, Historical   omeprazole (PRILOSEC) 40 MG capsule TAKE 1 CAPSULE EVERY MORNING 8/29/24  Yes Jarrett Pcek MD   sertraline (ZOLOFT) 25 MG tablet TAKE 1 TABLET EVERY DAY 10/9/23  Yes Jarrett Peck MD   timolol maleate 0.5% (TIMOPTIC) 0.5 % Drop Place into both eyes every evening. 9/4/20  Yes Provider, Historical   diclofenac sodium (VOLTAREN) 1 % Gel  APPLY 2 GRAMS TOPICALLY FOUR TIMES DAILY 5/16/24   Elisha Hernández DPM   lisinopriL (PRINIVIL,ZESTRIL) 40 MG tablet TAKE 1 TABLET ONE TIME DAILY 6/17/24   Jarrett Peck MD       Vitals:    11/11/24 0916   BP: 132/70   Pulse: 77   Temp: 97 °F (36.1 °C)      Body mass index is 32.34 kg/m².     PE:  General: Not in acute distress  HEENT : Atraumatic, EOM normal, no eye or nasal congestion, no ear discharge, no lymphadenopathy  Heart:  RRR. Normal S1 and S2. No S3/S4, no murmur / gallop / rub.   Respiratory: Effort normal. Clear to auscultation bilaterally. No rales/ rhonchi/ wheezing, no use of accessory muscles for breathing.  Abdomen: Positive bowel sounds. Abdomen is soft, nondistended, nontender, no guarding, no rebound, no masses / organomegaly.  Extremities: No edema or erythema noted,  2+ pulse in all extremities.  Neurologic: Oriented to time, place and person, Grossly intact.      Plan:  1. Need for vaccination  - Influenza - Trivalent (Adjuvanted)      1. Need for vaccination  - Influenza - Trivalent (Adjuvanted)    2. Essential hypertension  Low salt diet.  Enouraged to increase in physical activity at least 30 minutes of brisk walking/day , 5 days a week  Advised weight loss    Advised for DASH diet - The Dietary Approaches to Stop Hypertension (DASH) is high in vegetables, fruits, low-fat dairy products, whole grains, poultry, fish, and nuts and low in sweets, sugar-sweetened beverages, and red meats   Stop smoking.  Limit caffeine intake  Limit alcohol intake <3/day for men and <2/day for women.  Advised to be compliant with medication.  Please monitor your blood pressure regularly at home   Return precaution provided  Continue with Norvasc and lisinopril  Continued with Toprol xl    3. Gastroesophageal reflux disease without esophagitis  Stable on Prilosec to be continued.    4. Stage 3a chronic kidney disease   Latest Reference Range & Units 11/05/24 04:35   Sodium 136 - 145 mmol/L 138  "  Potassium 3.5 - 5.1 mmol/L 3.6   Chloride 95 - 110 mmol/L 101   CO2 23 - 29 mmol/L 27   Anion Gap 8 - 16 mmol/L 10   BUN 8 - 23 mg/dL 20   Creatinine 0.5 - 1.4 mg/dL 1.0   eGFR >60 mL/min/1.73 m^2 57 !   Glucose 70 - 110 mg/dL 89   Calcium 8.7 - 10.5 mg/dL 8.3 (L)   !: Data is abnormal  (L): Data is abnormally low  Reviewed renal function test. Stable and consistent with stage IIIA    5. Mixed hyperlipidemia  On Lipitor to be continued     6. Abdominal aortic atherosclerosis  On Lipitor to be continued     7. New onset atrial fibrillation (Primary)  HR stable  On eliquis to be continued.  - Ambulatory referral/consult to Cardiology; Future    Medication & Order Review     Discharge Medication Review:    Medication reconciliation performed Yes   If no, state reason why not performed: N/A       Did patient have any difficulty/problems filling prescriptions?  No           If yes, state reason and steps taken to assist in resolving issue: N/A     Does patient have any questions regarding medications?  No           If yes, state question and steps taken to answer question:  N/A      Was Home Health and/or any equipment ordered for patient upon discharge?  No          Transitional Care Note    Family and/or Caretaker present at visit?  No.  Diagnostic tests reviewed/disposition: I have reviewed all completed as well as pending diagnostic tests at the time of discharge.  Disease/illness education: yes  Home health/community services discussion/referrals: Patient does not have home health established from hospital visit.  They do not need home health.  If needed, we will set up home health for the patient.   Establishment or re-establishment of referral orders for community resources: No other necessary community resources.   Discussion with other health care providers: No discussion with other health care providers necessary.          Red Flag Review     Was patient educated on "red flags" or things to watch for?  Yes  " "     If yes:  "Red flags" patient was told to watch for:     Severe headache/ lightheadedness               SOB               Chest pain                Is patient experiencing any red flags today (or any of these symptoms) (List examples of red flags specifically)?  No        Jarrtet Peck MD                    "

## 2024-11-12 ENCOUNTER — OFFICE VISIT (OUTPATIENT)
Dept: CARDIOLOGY | Facility: CLINIC | Age: 79
End: 2024-11-12
Payer: MEDICARE

## 2024-11-12 VITALS
WEIGHT: 175.94 LBS | BODY MASS INDEX: 32.37 KG/M2 | DIASTOLIC BLOOD PRESSURE: 72 MMHG | HEIGHT: 62 IN | SYSTOLIC BLOOD PRESSURE: 144 MMHG | HEART RATE: 62 BPM

## 2024-11-12 DIAGNOSIS — I48.91 NEW ONSET ATRIAL FIBRILLATION: ICD-10-CM

## 2024-11-12 DIAGNOSIS — I70.0 ABDOMINAL AORTIC ATHEROSCLEROSIS: Primary | ICD-10-CM

## 2024-11-12 PROCEDURE — 3077F SYST BP >= 140 MM HG: CPT | Mod: HCNC,CPTII,S$GLB, | Performed by: PHYSICIAN ASSISTANT

## 2024-11-12 PROCEDURE — 3078F DIAST BP <80 MM HG: CPT | Mod: HCNC,CPTII,S$GLB, | Performed by: PHYSICIAN ASSISTANT

## 2024-11-12 PROCEDURE — 1126F AMNT PAIN NOTED NONE PRSNT: CPT | Mod: HCNC,CPTII,S$GLB, | Performed by: PHYSICIAN ASSISTANT

## 2024-11-12 PROCEDURE — 99204 OFFICE O/P NEW MOD 45 MIN: CPT | Mod: HCNC,S$GLB,, | Performed by: PHYSICIAN ASSISTANT

## 2024-11-12 PROCEDURE — 99999 PR PBB SHADOW E&M-EST. PATIENT-LVL II: CPT | Mod: PBBFAC,HCNC,, | Performed by: PHYSICIAN ASSISTANT

## 2024-11-13 NOTE — PROGRESS NOTES
HF TCC Provider Note (Follow-up) Consult Note      HPI:  Amber Naranjo is a 79 y.o. female with a PMH  has a past medical history of Allergy, Anxiety (4/25/2022), Arthritis, Back pain, Disorder of kidney and ureter, Diverticulosis of colon, GERD (gastroesophageal reflux disease), Glaucoma, Hyperlipidemia, Hypertension, Ingrown toenail, Klebsiella cystitis, Obesity, Pain in joint involving multiple sites, Pneumonia, Tortuous colon, Trouble in sleeping, Unspecified disorder of autonomic nervous system, and Uterine fibroid. who presented as a transfer from [facility] for higher level of care after patient presented for further evaluation of worsening dyspnea/shortness of breath.  Patient reports issues with ongoing dyspnea since having COVID approximally 1 month ago and reported no relief from home inhalers/nebulizers prompting her to come to the ED for further evaluation.  She denies prior history of smoking or known history of asthma/COPD and follows Dr. Cabezas from Cardiology outpatient for high blood pressure and heart valve monitoring.  Associated symptoms included lower extremity edema, orthopnea, PND, and decreased exercise tolerance but denied endorsing any fever, chills, sweats, nausea, vomiting, chest pain, abdominal pain, dysuria, hematuria, melena, hematochezia, diarrhea, or onset neurological deficits.  She denies use of home oxygen or CPAP and was in his usual state of health prior to onset of symptoms.  Initial workup in the ED prior to transfer revealed patient to be afebrile without leukocytosis, hemodynamically stable, D-dimer elevated at 0.69, , troponin negative, flu/COVID negative, chest x-ray positive for CHF/possible pneumonia, CTA upon arrival negative for PE but again showed evidence of CHF and possible pneumonia.  Patient admitted to Hospital Medicine under observation for continued medical management.      79 y.o. female with Hx of Obesity, HTN, HLP, Glaucoma, Anxiety, Arthritis, Back  pain, Disorder of kidney and ureter, Diverticulosis of colon, GERD, Glaucoma, Klebsiella cystitis transferred from [facility] ER for worsening dyspnea/SOB. Patient reports issues with ongoing dyspnea since having COVID approximally 1 month ago and reported no relief from home inhalers/nebulizers prompting her to come to the ED for further evaluation. No hx of smoking or asthma/COPD and follows Dr. Cabezas from Cardiology outpatient for high blood pressure and heart valve monitoring. Associated symptoms included lower extremity edema, orthopnea, PND, and decreased exercise tolerance. Initial w/u - Afebrile without leukocytosis, hemodynamically stable, D-dimer elevated at 0.69, , troponin negative, flu/COVID negative, CXR positive for CHF/possible pneumonia, CTA upon arrival negative for PE but again showed evidence of CHF and possible pneumonia. Patient admitted to Hospital Medicine under observation for continued medical management.      11/4- looks better, sitting up in chair on RA, still gets easily dyspneic with little movements and has some orthopnea and BLE edema. Cont IV lasix 40 mg bid. Echo-normal EF 55%, no DD, PAS 30 mm Hg- possible SAMEER/OHS. Will consult Cards in am. K 3.4- replaced.      11/5- sitting up in bed, comfortable on RA, no SOB and HUGHES better. Feels ready to go home. She is eating drinking well, walking around well. She was counseled about salt and fluid restrictions which she understands and accepts. Cardiology consulted and noted that the EK showed Afib, new onset, rate controlled. She was started on Eliquis 5 mg PO bid. She see Dr. Cabezas as her cardiologist as OP who was contacted and informed about the new onset Afib and that she may need Cardioversion in near future and he agreed to see her later this week and will evaluate her Afib. Pt and her family in agreement with the plan. She is going to be on lasix 40 mg daily on a prn basis. She was seen and examined and deemed stable for discharge  home today.         PHYSICAL:   Vitals:    11/12/24 1032   BP: (!) 144/72   Pulse: 62          JVD: no,    Heart rhythm: irregular  Cardiac murmur: No    S3: no  S4: no  Lungs: clear  Liver span: 10 cm:   Hepatojugular reflux: no  Edema: no,       ASSESSMENT: chronic diastolic HF    PLAN:      Patient Instructions:   Instruct the patient to notify this clinic if HH, a physician or an advanced care provider wants to change medication one of their HF medications   Activity and Diet restrictions:   Recommend 2-3 gram sodium restriction and 1500cc- 2000cc fluid restriction.  Encourage physical activity with graded exercise program.  Requested patient to weigh themselves daily, and to notify us if their weight increases by more than 3 lbs in 1 day or 5 lbs in 3 days.    Assigned dry weight on home scale: daily weight    Medication changes (include current dose and changed dose)  Decrease lasix to QOD  Continue GDMT  CHF education done  To see Dr Cabezas next week  RTC prn  Close HF episode

## 2024-11-14 ENCOUNTER — PATIENT OUTREACH (OUTPATIENT)
Dept: ADMINISTRATIVE | Facility: HOSPITAL | Age: 79
End: 2024-11-14
Payer: MEDICARE

## 2024-11-14 NOTE — PROGRESS NOTES
VBHM Score: 1     Uncontrolled BP                 Patient has PCP appt to address the blood pressure on 11.25.24. follow up in 3 months.

## 2024-11-25 ENCOUNTER — OFFICE VISIT (OUTPATIENT)
Dept: INTERNAL MEDICINE | Facility: CLINIC | Age: 79
End: 2024-11-25
Payer: MEDICARE

## 2024-11-25 VITALS
WEIGHT: 180.13 LBS | HEIGHT: 62 IN | DIASTOLIC BLOOD PRESSURE: 58 MMHG | SYSTOLIC BLOOD PRESSURE: 116 MMHG | TEMPERATURE: 97 F | OXYGEN SATURATION: 97 % | HEART RATE: 64 BPM | BODY MASS INDEX: 33.15 KG/M2

## 2024-11-25 DIAGNOSIS — K21.9 GASTROESOPHAGEAL REFLUX DISEASE WITHOUT ESOPHAGITIS: ICD-10-CM

## 2024-11-25 DIAGNOSIS — I48.91 NEW ONSET ATRIAL FIBRILLATION: Primary | ICD-10-CM

## 2024-11-25 DIAGNOSIS — I10 ESSENTIAL HYPERTENSION: ICD-10-CM

## 2024-11-25 DIAGNOSIS — I50.9 CHRONIC CONGESTIVE HEART FAILURE, UNSPECIFIED HEART FAILURE TYPE: ICD-10-CM

## 2024-11-25 PROCEDURE — 3078F DIAST BP <80 MM HG: CPT | Mod: HCNC,CPTII,S$GLB, | Performed by: STUDENT IN AN ORGANIZED HEALTH CARE EDUCATION/TRAINING PROGRAM

## 2024-11-25 PROCEDURE — G2211 COMPLEX E/M VISIT ADD ON: HCPCS | Mod: HCNC,S$GLB,, | Performed by: STUDENT IN AN ORGANIZED HEALTH CARE EDUCATION/TRAINING PROGRAM

## 2024-11-25 PROCEDURE — 1126F AMNT PAIN NOTED NONE PRSNT: CPT | Mod: HCNC,CPTII,S$GLB, | Performed by: STUDENT IN AN ORGANIZED HEALTH CARE EDUCATION/TRAINING PROGRAM

## 2024-11-25 PROCEDURE — 1101F PT FALLS ASSESS-DOCD LE1/YR: CPT | Mod: HCNC,CPTII,S$GLB, | Performed by: STUDENT IN AN ORGANIZED HEALTH CARE EDUCATION/TRAINING PROGRAM

## 2024-11-25 PROCEDURE — 99999 PR PBB SHADOW E&M-EST. PATIENT-LVL V: CPT | Mod: PBBFAC,HCNC,, | Performed by: STUDENT IN AN ORGANIZED HEALTH CARE EDUCATION/TRAINING PROGRAM

## 2024-11-25 PROCEDURE — 3288F FALL RISK ASSESSMENT DOCD: CPT | Mod: HCNC,CPTII,S$GLB, | Performed by: STUDENT IN AN ORGANIZED HEALTH CARE EDUCATION/TRAINING PROGRAM

## 2024-11-25 PROCEDURE — 1160F RVW MEDS BY RX/DR IN RCRD: CPT | Mod: HCNC,CPTII,S$GLB, | Performed by: STUDENT IN AN ORGANIZED HEALTH CARE EDUCATION/TRAINING PROGRAM

## 2024-11-25 PROCEDURE — 99214 OFFICE O/P EST MOD 30 MIN: CPT | Mod: HCNC,S$GLB,, | Performed by: STUDENT IN AN ORGANIZED HEALTH CARE EDUCATION/TRAINING PROGRAM

## 2024-11-25 PROCEDURE — 3074F SYST BP LT 130 MM HG: CPT | Mod: HCNC,CPTII,S$GLB, | Performed by: STUDENT IN AN ORGANIZED HEALTH CARE EDUCATION/TRAINING PROGRAM

## 2024-11-25 PROCEDURE — 1159F MED LIST DOCD IN RCRD: CPT | Mod: HCNC,CPTII,S$GLB, | Performed by: STUDENT IN AN ORGANIZED HEALTH CARE EDUCATION/TRAINING PROGRAM

## 2024-11-25 RX ORDER — AMIODARONE HYDROCHLORIDE 200 MG/1
200 TABLET ORAL
COMMUNITY
Start: 2024-11-19

## 2024-11-25 RX ORDER — FUROSEMIDE 40 MG/1
40 TABLET ORAL DAILY PRN
Qty: 30 TABLET | Refills: 3 | Status: SHIPPED | OUTPATIENT
Start: 2024-11-25 | End: 2025-11-25

## 2024-11-25 NOTE — PROGRESS NOTES
"  Chief Complaint   Patient presents with    Fatigue    Dizziness     HPI: Amber Naranjo is a 79 y.o. female  with Pmhx listed below who presents to clinic for    History of Present Illness    CHIEF COMPLAINT:  - Ms. Naranjo presents with complaints of dizziness when walking and concerns about low blood pressure.    HPI:  Ms. Naranjo reports dizziness since hospital discharge, occurring specifically during ambulation but not when transitioning from sitting to standing or during nocturnal position changes. She describes the sensation as feeling abnormal when walking, rather than vertigo. The dizziness is not constant, only occurring with movement.    Ms. Naranjo's blood pressure has been low recently. She did not take her blood pressure medication this morning due to a low reading on her home wrist blood pressure monitor.    Ms. Naranjo reports being instructed to limit fluid intake, possibly due to past fluid retention issues. She expresses uncertainty about appropriate water intake.        Flowsheets   11/25/2024    10:00 AM 10:15 AM 10:25 AM 10:44 AM      /70BP. 140/70. Data is abnormal. Taken on 11/25/24 10:00 /70 130/70 116/58BP. 116/58. Data is abnormal. Taken on 11/25/24 10:44 AM   BP Location Left arm Left arm Left arm Left arm   Patient Position Sitting Lying Sitting Standing   Pulse 64 -- -- --   Temp 97 °F (36.1 °C) -- -- --   Temp Source Tympanic -- -- --   Weight 81.7 kg (180 lb 1.9 oz) -- -- --   Height 5' 2" (1.575 m) -- -- --   SpO2 97 % -- -- --   Pain Score 0-No pain -- -- --      Orthostatic vitals were done . It did reveal drop in 10 mm hg diastolic blood pressure indicating possible dizziness to be due to orthostatic hypotension. Advised to stay hydrated.   She had low blood pressure reading when recorded at home as well. With diastolic blood pressure dropping to 40's at times.    Problem List:  Patient Active Problem List   Diagnosis    Essential hypertension    Diverticulosis of colon    Tortuous " colon    Uterine fibroid    Hyperlipidemia    Glaucoma    GERD (gastroesophageal reflux disease)    Arthritis    Seasonal allergic rhinitis due to pollen    Obesity (BMI 30.0-34.9)    Osteopenia of multiple sites    Postmenopausal    Primary osteoarthritis of right knee    Spondylolisthesis at L5-S1 level    Abdominal aortic atherosclerosis    Dyspnea on exertion    Stage 3a chronic kidney disease    Anxiety    Senile purpura    New onset atrial fibrillation       ROS: Negative except as noted above.       Current Meds:  Current Outpatient Medications   Medication Sig Dispense Refill    albuterol (PROVENTIL/VENTOLIN HFA) 90 mcg/actuation inhaler INHALE 2 PUFFS INTO THE LUNGS EVERY 6 (SIX) HOURS AS NEEDED FOR SHORTNESS OF BREATH. 54 g 3    alendronate (FOSAMAX) 70 MG tablet Take 1 tablet (70 mg total) by mouth every 7 days. Take your FOSAMAX tablet with a full glass (6-8 oz) of plain water only. Do not chew or suck on a tablet of FOSAMAX. After swallowing your FOSAMAX tablet, do not lie down - stay fully upright (sitting, standing, or walking) for at least 30 minutes. Do not lie down until after your first food of the day. 4 tablet 11    amiodarone (PACERONE) 200 MG Tab Take 200 mg by mouth.      amLODIPine (NORVASC) 5 MG tablet TAKE 1 TABLET ONE TIME DAILY 90 tablet 3    apixaban (ELIQUIS) 5 mg Tab Take 1 tablet (5 mg total) by mouth 2 (two) times daily. 60 tablet 5    aspirin (ECOTRIN) 81 MG EC tablet Take 81 mg by mouth once daily.      atorvastatin (LIPITOR) 20 MG tablet TAKE 1 TABLET EVERY DAY 90 tablet 3    calcium citrate-vitamin D3 315-200 mg (CITRACAL+D) 315 mg-5 mcg (200 unit) per tablet Take 1 tablet by mouth 2 (two) times daily.      cetirizine (ZYRTEC) 10 MG tablet TAKE 1 TABLET ONE TIME DAILY 90 tablet 3    furosemide (LASIX) 40 MG tablet Take 1 tablet (40 mg total) by mouth daily as needed. 30 tablet 3    metoprolol succinate (TOPROL-XL) 25 MG 24 hr tablet TAKE 1 TABLET EVERY DAY 90 tablet 3     multivitamin capsule Take 1 capsule by mouth once daily.      omeprazole (PRILOSEC) 40 MG capsule TAKE 1 CAPSULE EVERY MORNING 90 capsule 3    sertraline (ZOLOFT) 25 MG tablet TAKE 1 TABLET EVERY DAY 90 tablet 0    timolol maleate 0.5% (TIMOPTIC) 0.5 % Drop Place into both eyes every evening.       No current facility-administered medications for this visit.      PE:  BP: (!) 116/58  Pulse: 64     Temp: 97 °F (36.1 °C)  Weight: 81.7 kg (180 lb 1.9 oz) Body mass index is 32.94 kg/m².    Wt Readings from Last 5 Encounters:   11/25/24 81.7 kg (180 lb 1.9 oz)   11/12/24 79.8 kg (175 lb 14.8 oz)   11/11/24 80.2 kg (176 lb 12.9 oz)   11/04/24 79.4 kg (175 lb)   08/20/24 79.4 kg (175 lb)     General appearance: alert and cooperative, not in acute distress  Head: normocephalic, without obvious abnormality, atraumatic  Eyes: conjunctivae/corneas clear. PERRL, EOM's intact.  Ears: clear tympanic membranes   Neck: no adenopathy, supple, symmetrical, trachea midline and thyroid not enlarged, symmetric, no tenderness/mass/nodules, no JVD  Throat: lips, mucosa, and tongue normal; teeth and gums normal; no thrush  Chest: no reproducible chest pain   Heart: regular rate and rhythm, S1, S2 normal, no murmur, click, rub or gallop  Lungs: unlabored respiration, bilateral equal air entry, normal vesicular breath sound heard, no wheezing, rhonchi   Abdomen: soft, non-tender, non-distended; bowel sounds +; no masses,  no organomegaly, no ascites   Extremities: normal, atraumatic, no cyanosis or edema noted B/L upper and lower extremities.  Skin: skin color, texture, turgor normal. No rashes or lesions noted.  Neurologic: grossly intact      Lab:  Lab Results   Component Value Date    WBC 8.41 11/03/2024    HGB 11.3 (L) 11/03/2024    HCT 34.8 (L) 11/03/2024    MCV 95 11/03/2024     11/03/2024     11/05/2024    K 3.6 11/05/2024     11/05/2024    CO2 27 11/05/2024    BUN 20 11/05/2024    GLU 89 11/05/2024    CALCIUM 8.3  (L) 11/05/2024    MG 1.8 11/04/2024    PHOS 2.7 02/04/2024    AST 26 11/03/2024    ALT 23 11/03/2024    CHOL 138 05/20/2024    HDL 46 05/20/2024    LDLCALC 73.2 05/20/2024    TRIG 94 05/20/2024       Impression:  No diagnosis found.    Assessment & Plan    ORTHOSTATIC HYPOTENSION:  - Considered orthostatic hypotension as potential cause of patient's dizziness when walking, given low diastolic blood pressure readings (frequently in 50s, sometimes 40s).  - Explained compensatory mechanism for blood pressure regulation when standing and its decline with age.  - Orthostatic vital signs taken during the visit.    DIZZINESS AND GIDDINESS:  - Assessed for benign paroxysmal positional vertigo (BPPV) through positional testing, results inconclusive.  - Evaluated hydration status and its potential impact on blood pressure and dizziness.  - Ms. Meridad to monitor blood pressure regularly, especially when experiencing dizziness.    ESSENTIAL HYPERTENSION:  - Determined need for medication adjustment due to consistently low blood pressure readings.  - Hold amlodipine when blood pressure drops below 100/60.  - Discontinued lisinopril.  - Hold lasix when blood pressure drops below 100/60.    HYDRATION MANAGEMENT:  - Discussed importance of proper hydration for blood pressure regulation.  - Ms. Meridad to drink at least 1 liter of water daily, sipping throughout the day to maintain hydration.          1. New onset atrial fibrillation (Primary)  HR stable  On toprol XL to be continued  On amiodarone as well  Following cardiology, Dr. Cabezas where her last visit with him was 6 days back  - apixaban (ELIQUIS) 5 mg Tab; Take 1 tablet (5 mg total) by mouth 2 (two) times daily.  Dispense: 60 tablet; Refill: 5    2. Gastroesophageal reflux disease without esophagitis  Stable on prilosec to be continued.    3. Essential hypertension   Determined need for medication adjustment due to consistently low blood pressure readings.  - Hold amlodipine  when blood pressure drops below 100/60.  - Discontinued lisinopril.  - Hold lasix when blood pressure drops below 100/60.  Low salt diet.  Enouraged to increase in physical activity at least 30 minutes of brisk walking/day , 5 days a week  Advised weight loss    Advised for DASH diet - The Dietary Approaches to Stop Hypertension (DASH) is high in vegetables, fruits, low-fat dairy products, whole grains, poultry, fish, and nuts and low in sweets, sugar-sweetened beverages, and red meats   Stop smoking.  Limit caffeine intake  Limit alcohol intake <3/day for men and <2/day for women.  Advised to be compliant with medication.  Please monitor your blood pressure regularly at home   Return precaution provided      4. Chronic congestive heart failure, unspecified heart failure type  Last echo on 11/04/2024:    Left Ventricle: The left ventricle is mildly dilated. Normal wall thickness. There is normal systolic function. Ejection fraction is approximately 55%. There is normal diastolic function.    Right Ventricle: Normal right ventricular cavity size. Wall thickness is normal. Systolic function is normal.    Aortic Valve: There is mild aortic valve sclerosis.    Pulmonary Artery: The estimated pulmonary artery systolic pressure is 30 mmHg.    IVC/SVC: Normal venous pressure at 3 mmHg.  Continue toprol XL  Now on lasix every other day.  - furosemide (LASIX) 40 MG tablet; Take 1 tablet (40 mg total) by mouth daily as needed.  Dispense: 30 tablet; Refill: 3    Future Appointments   Date Time Provider Department Center   11/26/2024 10:20 AM Albert Long MD [facility] [place]   12/18/2024  9:30 AM Albert Ozuna MD [facility]   2/13/2025 11:00 AM Santiago Wilson MD [facility]       I spent a total of 31 minutes on the day of the visit.This includes face to face time and non-face to face time preparing to see the patient (eg, review of tests), obtaining and/or reviewing separately obtained  history, documenting clinical information in the electronic or other health record, independently interpreting results and communicating results to the patient/family/caregiver, or care coordinator.  Visit today included increased complexity associated with the care of the episodic problem   addressed and managing the longitudinal care of the patient due to the serious and/or complex managed problem(s) .     Jarrett Peck MD    This note was generated with the assistance of ambient listening technology. Verbal consent was obtained by the patient and accompanying visitor(s) for the recording of patient appointment to facilitate this note. I attest to having reviewed and edited the generated note for accuracy, though some syntax or spelling errors may persist. Please contact the author of this note for any clarification.

## 2024-11-26 ENCOUNTER — OFFICE VISIT (OUTPATIENT)
Dept: ORTHOPEDICS | Facility: CLINIC | Age: 79
End: 2024-11-26
Payer: MEDICARE

## 2024-11-26 VITALS — WEIGHT: 180 LBS | HEIGHT: 62 IN | BODY MASS INDEX: 33.13 KG/M2

## 2024-11-26 DIAGNOSIS — M17.11 PRIMARY OSTEOARTHRITIS OF RIGHT KNEE: Primary | ICD-10-CM

## 2024-11-26 PROCEDURE — 99999 PR PBB SHADOW E&M-EST. PATIENT-LVL III: CPT | Mod: PBBFAC,HCNC,, | Performed by: STUDENT IN AN ORGANIZED HEALTH CARE EDUCATION/TRAINING PROGRAM

## 2024-11-26 RX ORDER — TRIAMCINOLONE ACETONIDE 40 MG/ML
40 INJECTION, SUSPENSION INTRA-ARTICULAR; INTRAMUSCULAR
Status: DISCONTINUED | OUTPATIENT
Start: 2024-11-26 | End: 2024-11-26 | Stop reason: HOSPADM

## 2024-11-26 RX ADMIN — TRIAMCINOLONE ACETONIDE 40 MG: 40 INJECTION, SUSPENSION INTRA-ARTICULAR; INTRAMUSCULAR at 10:11

## 2024-11-26 NOTE — PATIENT INSTRUCTIONS
Assessment:  Amber Naranjo is a 79 y.o. female   Chief Complaint   Patient presents with    Right Knee - Pain       Encounter Diagnosis   Name Primary?    Primary osteoarthritis of right knee Yes        Plan:  Ultrasound guided cortisone injection to the right knee  We discussed the proper protocols after the injection such as no submerging pools, baths tubs, or hot tubs for 24 hr.  Showering is okay today.  We also discussed that blood sugars can be elevated after an injection and asked patient to properly checked her sugars over the next few days and contact their PCP if there are any concerns.  We discussed red flags such as fevers, chills, red, warm, tender joint at the area of injection to please seek medical care immediately.    Follow up as needed    General Arthritis info:    -shiny white stuff at end of a chicken bones is cartilage    -arthritis is wearing away of the cartilage that lines the end of your bones    -osteoarthritis is thought to be a wear and tear phenomenon    -symptoms are due to inflammation of joint causing stiffness, aching, and sometimes swelling    -occasionally sharp pain will occur causing a give way sensation    -Risk factors: genetic, weight, female > male, age    Treatment options:    -maintain healthy weight (every pound is 4 pounds of pressure on the knee)    -daily moderate exercise (walk, bike, swim 30 minutes per day) to keep joints moving    -daily strengthening exercises (through therapy or on own) to keep muscles supporting joint healthy and strong    -glucosamine 1500mg daily (look for USP label on bottle)    -tylenol as needed for pain (follow directions on the bottle)    -anti-inflammatory medication such as alleve may be helpful- take 1-2 tabs twice daily for 7 days. If it helps your pain, continue. If you do not feel any change, you may stop and then take it as needed.    (you may be given a once daily anti-inflammatory such as MOBIC. If given, avoid other  anti-inflammatory medications such as advil, ibuprofen, motrin, naprosyn, alleve, etc)    -if swollen and painful, ice, decrease activity, and take anti-inflammatory daily for 5-7 days and if no relief call your doctor for further options    -consider cortisone injection (every 3-4 months at most)- anti- inflammatory steroid medication that can be injected directly into the joint to reduce inflammation    -consider hyaluronic acid injections (eufflexxa, hyalgan, synvisc, supartz) (every 6 months at most)- protein injection that helps decrease pain and irritability in the joint. It is best used to help prolong intervals between cortisone injections to minimize steroid injections. These are currently approved for knee injections. Discuss with your doctor if other joint involved. Call to seek approval prior to the injections.    -long-term treatment may include a total joint replacement (keep diary of good days and bad days, then evaluate as to when you are ready)      Follow-up: as needed.    Thank you for choosing [facility] and Dr. Albert Long for your orthopedic & sports medicine care. It is our goal to provide you with exceptional care that will help keep you healthy, active, and get you back in the game.    Please do not hesitate to reach out to us via email, phone, or MyChart with any questions, concerns, or feedback.    If you felt that you received exemplary care today, please consider leaving us feedback on [employer] at:  [email or web address]    If you are experiencing pain/discomfort ,or have questions after 5pm and would like to be connected to the [facility] on-call team, please call this number and specify which Sports Medicine provider is treating you: [phone]

## 2024-11-26 NOTE — PROCEDURES
Sports Medicine US - Guidance for Needle Placement    Date/Time: 11/26/2024 10:20 AM    Performed by: Albert Long MD  Authorized by: Albert Long MD  Preparation: Patient was prepped and draped in the usual sterile fashion.  Local anesthesia used: no    Anesthesia:  Local anesthesia used: no    Sedation:  Patient sedated: no    Patient tolerance: patient tolerated the procedure well with no immediate complications  Comments: Ultrasound guidance was used for needle localization. Images were saved and stored for documentation. The appropriate structures were visualized. Dynamic visualization of the needle was continuous throughout the procedures and maintained good position.

## 2024-11-26 NOTE — PROCEDURES
Large Joint Aspiration/Injection: R knee    Date/Time: 11/26/2024 10:20 AM    Performed by: Albert Long MD  Authorized by: Albert Long MD    Consent Done?:  Yes (Verbal)  Indications:  Arthritis and pain  Site marked: the procedure site was marked    Timeout: prior to procedure the correct patient, procedure, and site was verified    Prep: patient was prepped and draped in usual sterile fashion    Local anesthetic:  Bupivacaine 0.5% without epinephrine and lidocaine 1% without epinephrine    Details:  Needle Size:  21 G  Ultrasonic Guidance for needle placement?: Yes    Images are saved and documented.  Approach:  Lateral (superior)  Location:  Knee  Site:  R knee  Medications:  40 mg triamcinolone acetonide 40 mg/mL  Patient tolerance:  Patient tolerated the procedure well with no immediate complications     Ultrasound guidance was used for needle localization. Images were saved and stored for documentation. The appropriate structures were visualized. Dynamic visualization of the needle was continuous throughout the procedures and maintained good position.

## 2024-11-26 NOTE — PROGRESS NOTES
Patient ID: Amber Naranjo  YOB: 1945  MRN: [id number]    Chief Complaint: Pain of the Right Knee      History of Present Illness: Amber Naranjo is a right-hand dominant 79 y.o. female who presents today with right knee pain.  Last seen in clinic on 8/20/2024 and received CSI to the right knee.  States that she received little to no relief from CSI.  Pain is an achy pain that is present when she bears weight on the right leg.  Rates the pain at a 7/10.  No issues with sleeping.  Currently taking Tylenol and using Voltaren gel along with an exercise bike at home.  Patient is taking Eloquis.  Patient has not tried PT at this time but doing exercises at home.    8/20/2024 Interval History of Present Illness: Amber Naranjo is a right-hand dominant 78 y.o. female who presents today with right knee pain.  Patient last seen in clinic on 5/21/2024 and received a CSI to the right knee.  Reports that the CSI provided almost 3 months of relief, with pain just recently returning.  Rates her pain today at a 5/10 and only present when she is up and moving.  No pain at rest.  Interested in repeating CSI today.     5/21/2024 History of Present Illness: Amber Naranjo is a right-hand dominant 78 y.o. female who presents today with right knee pain  She has known osteoarthritis, on 4/25/2022 had CSI and did pretty well for a while.  Not having any fever or chills. Pain is worse ambulation and gets worse throughout the day. Tried Orthovisc with Dr. Barber in February, 2023, with no relief at all.      The patient is active in none.  Occupation: Retired      Past Medical History:   Past Medical History:   Diagnosis Date    Allergy     Anxiety 4/25/2022    Arthritis     Back pain     Disorder of kidney and ureter     Diverticulosis of colon     GERD (gastroesophageal reflux disease)     Glaucoma     Hyperlipidemia     Hypertension     Ingrown toenail     Klebsiella cystitis     New onset atrial fibrillation 11/5/2024     Obesity     Pain in joint involving multiple sites     Pneumonia     Tortuous colon     Trouble in sleeping     Unspecified disorder of autonomic nervous system     Uterine fibroid      Past Surgical History:   Procedure Laterality Date    COLONOSCOPY      COLONOSCOPY N/A 3/15/2017    Procedure: COLONOSCOPY;  Surgeon: Yoig Carbone MD;  Location: [facility];  Service: Endoscopy;  Laterality: N/A;     Family History   Problem Relation Name Age of Onset    Glaucoma Mother      Hypertension Father      Asthma Daughter      Depression Daughter      Colon cancer Sister      Cancer Sister  70        colon    Diabetes Sister      Hypertension Sister      Hyperlipidemia Sister      Diabetes Brother      Hypertension Brother      Heart disease Brother      Hyperlipidemia Brother      Asthma Paternal Aunt      Diabetes Paternal Uncle      Diabetes Maternal Grandmother      Early death Maternal Grandfather      Early death Paternal Grandfather      Breast cancer Cousin      COPD Neg Hx      Kidney disease Neg Hx      Stroke Neg Hx      Mental illness Neg Hx       Social History     Socioeconomic History    Marital status:    Tobacco Use    Smoking status: Never     Passive exposure: Yes    Smokeless tobacco: Never   Substance and Sexual Activity    Alcohol use: No    Drug use: No    Sexual activity: Not Currently     Social Drivers of Health     Financial Resource Strain: Low Risk  (11/4/2024)    Overall Financial Resource Strain (CARDIA)     Difficulty of Paying Living Expenses: Not hard at all   Food Insecurity: No Food Insecurity (11/4/2024)    Hunger Vital Sign     Worried About Running Out of Food in the Last Year: Never true     Ran Out of Food in the Last Year: Never true   Transportation Needs: No Transportation Needs (11/4/2024)    TRANSPORTATION NEEDS     Transportation : No   Physical Activity: Insufficiently Active (3/27/2024)    Exercise Vital Sign     Days of Exercise per Week: 3 days     Minutes of Exercise  per Session: 20 min   Stress: No Stress Concern Present (11/4/2024)    [employer] - Occupational Stress Questionnaire     Feeling of Stress : Not at all   Housing Stability: Low Risk  (11/4/2024)    Housing Stability Vital Sign     Unable to Pay for Housing in the Last Year: No     Homeless in the Last Year: No     Medication List with Changes/Refills   Current Medications    ALBUTEROL (PROVENTIL/VENTOLIN HFA) 90 MCG/ACTUATION INHALER    INHALE 2 PUFFS INTO THE LUNGS EVERY 6 (SIX) HOURS AS NEEDED FOR SHORTNESS OF BREATH.    ALENDRONATE (FOSAMAX) 70 MG TABLET    Take 1 tablet (70 mg total) by mouth every 7 days. Take your FOSAMAX tablet with a full glass (6-8 oz) of plain water only. Do not chew or suck on a tablet of FOSAMAX. After swallowing your FOSAMAX tablet, do not lie down - stay fully upright (sitting, standing, or walking) for at least 30 minutes. Do not lie down until after your first food of the day.    AMIODARONE (PACERONE) 200 MG TAB    Take 200 mg by mouth.    AMLODIPINE (NORVASC) 5 MG TABLET    TAKE 1 TABLET ONE TIME DAILY    APIXABAN (ELIQUIS) 5 MG TAB    Take 1 tablet (5 mg total) by mouth 2 (two) times daily.    ASPIRIN (ECOTRIN) 81 MG EC TABLET    Take 81 mg by mouth once daily.    ATORVASTATIN (LIPITOR) 20 MG TABLET    TAKE 1 TABLET EVERY DAY    CALCIUM CITRATE-VITAMIN D3 315-200 MG (CITRACAL+D) 315 MG-5 MCG (200 UNIT) PER TABLET    Take 1 tablet by mouth 2 (two) times daily.    CETIRIZINE (ZYRTEC) 10 MG TABLET    TAKE 1 TABLET ONE TIME DAILY    FUROSEMIDE (LASIX) 40 MG TABLET    Take 1 tablet (40 mg total) by mouth daily as needed.    METOPROLOL SUCCINATE (TOPROL-XL) 25 MG 24 HR TABLET    TAKE 1 TABLET EVERY DAY    MULTIVITAMIN CAPSULE    Take 1 capsule by mouth once daily.    OMEPRAZOLE (PRILOSEC) 40 MG CAPSULE    TAKE 1 CAPSULE EVERY MORNING    SERTRALINE (ZOLOFT) 25 MG TABLET    TAKE 1 TABLET EVERY DAY    TIMOLOL MALEATE 0.5% (TIMOPTIC) 0.5 % DROP    Place into  both eyes every evening.     Review of patient's allergies indicates:  No Known Allergies    Physical Exam:   Body mass index is 32.92 kg/m².    GENERAL: Well appearing, in no acute distress.  HEAD: Normocephalic and atraumatic.  ENT: External ears and nose grossly normal.  EYES: EOMI bilaterally  PULMONARY: Respirations are grossly even and non-labored.  NEURO: Awake, alert, and oriented x 3.  SKIN: No obvious rashes appreciated.  PSYCH: Mood & affect are appropriate.    Detailed MSK exam:     Right knee exam:   -ROM: extension 0, flexion 120  -TTP: Medial joint line  -effusion: none  -Patellar apprehension negative  -Magdalena test negative  -stable to varus and valgus stress tests  -Lachman test negative, anterior drawer test negative, posterior drawer test negative    Imaging:  Echo    Left Ventricle: The left ventricle is mildly dilated. Normal wall   thickness. There is normal systolic function. Ejection fraction is   approximately 55%. There is normal diastolic function.    Right Ventricle: Normal right ventricular cavity size. Wall thickness   is normal. Systolic function is normal.    Aortic Valve: There is mild aortic valve sclerosis.    Pulmonary Artery: The estimated pulmonary artery systolic pressure is   30 mmHg.    IVC/SVC: Normal venous pressure at 3 mmHg.  CTA Chest Non-Coronary (PE Studies)  Narrative: EXAMINATION:  CTA CHEST NON CORONARY (PE STUDIES)    CLINICAL HISTORY:  Pulmonary embolism (PE) suspected, positive D-dimer;    TECHNIQUE:  Low dose axial images, sagittal and coronal reformations were obtained from the thoracic inlet to the lung bases following the IV administration of 100 mL of Omnipaque 350.  Contrast timing was optimized to evaluate the pulmonary arteries.  MIP images were performed.    COMPARISON:  Chest radiograph 11/03/2024    FINDINGS:  The visualized soft tissue structures at the base of the neck appear within normal limits allowing from streak artifact from dense contrast  bolus.    The thoracic aorta maintains normal caliber, contour, and course with mild atherosclerotic calcification within its course.  There is no evidence of aneurysmal dilation or dissection. The heart is mildly enlarged and there is no significant pericardial effusion. The esophagus maintains a normal course and caliber. There is no bulky axillary or mediastinal lymph node enlargement.    The trachea is midline and the proximal airways are patent.  There is peribronchial cuffing/thickening which may reflect small airways infection or inflammation.  Detailed evaluation of the lung parenchyma is limited by respiratory motion artifact. There is no pneumothorax. There are ground-glass opacities throughout the lungs.  There is mild smooth interlobular septal thickening.  There is a subsegmental opacity within the left upper lobe with possible of occlusion of more proximal subsegmental bronchus (axial series 2, image 143).  There are small layering bilateral pleural effusions with adjacent compressive atelectasis.    No convincing evidence of pulmonary thromboembolism.    Limited images of the upper abdomen obtained during the course of this dedicated thoracic CT demonstrate no acute abnormalities.  There are scattered colonic diverticula present.  There are calcified splenic granuloma..    The osseous structures demonstrate a minimal chronic appearing superior endplate deformity of the T4 vertebral body.  Additionally, there is mild chronic appearing anterior wedging of the T9 vertebral body.  There are multilevel degenerative changes of visualized spine..  Impression: No convincing evidence of pulmonary thromboembolism.    Smooth interlobular septal thickening, ground-glass opacities, and small layering bilateral pleural effusions.  Findings may reflect edema/CHF.    Mild bilateral peribronchial cuffing/thickening which may reflect superimposed small airways inflammation or infection.    Subsegmental opacity within  the left upper lobe with possible occlusion of the more proximal subsegmental bronchus.  This could reflect mucoid impaction with associated atelectasis, although consider short-term 3 month chest CT follow-up to ensure appropriate resolution and/or stability.    Cardiomegaly.    Additional findings as above.    Electronically signed by: Tyron Mccain MD  Date:    11/04/2024  Time:    00:19        Relevant imaging results were reviewed and interpreted by me and per my read shows moderate arthritic changes.  This was discussed with the patient and / or family today.     Assessment:  Amber Naranjo is a 79 y.o. female following up for right knee pain. Interested in repeating steroid injection again today.   Plan: Steroid injection given today (see separate procedure note for details). We discussed the proper protocols after the injection such as no submerging pools, baths tubs, or hot tubs for 24 hr.  Showering is okay today.  We also discussed that blood sugars can be elevated after an injection and asked patient to properly checked her sugars over the next few days and contact their PCP if there are any concerns.  We discussed red flags such as fevers, chills, red, warm, tender joint at the area of injection to please seek medical care immediately.   Continue conservative management for pain.   Follow up as needed. All questions answered.     Primary osteoarthritis of right knee  -     Sports Medicine US - Guidance for Needle Placement  -     Large Joint Aspiration/Injection: R knee         Ultrasound guidance was used for needle localization. Images were saved and stored for documentation. The appropriate structures were visualized. Dynamic visualization of the needle was continuous throughout the procedures and maintained good position.      Electronically signed:  Albert Long MD, MPH  11/26/2024  11:06 AM

## 2024-12-11 DIAGNOSIS — I48.0 PAROXYSMAL ATRIAL FIBRILLATION: Primary | ICD-10-CM

## 2024-12-13 ENCOUNTER — ANESTHESIA EVENT (OUTPATIENT)
Dept: SURGERY | Facility: HOSPITAL | Age: 79
End: 2024-12-13
Payer: MEDICARE

## 2024-12-13 ENCOUNTER — ANESTHESIA (OUTPATIENT)
Dept: SURGERY | Facility: HOSPITAL | Age: 79
End: 2024-12-13
Payer: MEDICARE

## 2024-12-13 ENCOUNTER — HOSPITAL ENCOUNTER (OUTPATIENT)
Facility: HOSPITAL | Age: 79
Discharge: HOME | End: 2024-12-13
Attending: INTERNAL MEDICINE | Admitting: INTERNAL MEDICINE
Payer: MEDICARE

## 2024-12-13 VITALS
WEIGHT: 175 LBS | SYSTOLIC BLOOD PRESSURE: 133 MMHG | RESPIRATION RATE: 18 BRPM | OXYGEN SATURATION: 96 % | DIASTOLIC BLOOD PRESSURE: 63 MMHG | HEIGHT: 62 IN | TEMPERATURE: 98 F | HEART RATE: 65 BPM | BODY MASS INDEX: 32.2 KG/M2

## 2024-12-13 DIAGNOSIS — I48.91 ATRIAL FIBRILLATION STATUS POST CARDIOVERSION: Primary | ICD-10-CM

## 2024-12-13 DIAGNOSIS — I48.91 ATRIAL FIBRILLATION: ICD-10-CM

## 2024-12-13 DIAGNOSIS — I48.0 PAROXYSMAL ATRIAL FIBRILLATION: Primary | ICD-10-CM

## 2024-12-13 LAB
OHS QRS DURATION: 130 MS
OHS QTC CALCULATION: 535 MS

## 2024-12-13 PROCEDURE — 25000003 PHARM REV CODE 250: Performed by: NURSE ANESTHETIST, CERTIFIED REGISTERED

## 2024-12-13 PROCEDURE — 93005 ELECTROCARDIOGRAM TRACING: CPT

## 2024-12-13 PROCEDURE — 63600175 PHARM REV CODE 636 W HCPCS: Performed by: NURSE ANESTHETIST, CERTIFIED REGISTERED

## 2024-12-13 PROCEDURE — 93010 ELECTROCARDIOGRAM REPORT: CPT | Mod: ,,, | Performed by: INTERNAL MEDICINE

## 2024-12-13 PROCEDURE — 37000008 HC ANESTHESIA 1ST 15 MINUTES: Performed by: INTERNAL MEDICINE

## 2024-12-13 PROCEDURE — 92960 CARDIOVERSION ELECTRIC EXT: CPT | Performed by: INTERNAL MEDICINE

## 2024-12-13 PROCEDURE — A4216 STERILE WATER/SALINE, 10 ML: HCPCS | Performed by: NURSE ANESTHETIST, CERTIFIED REGISTERED

## 2024-12-13 RX ORDER — SODIUM CHLORIDE 0.9 % (FLUSH) 0.9 %
SYRINGE (ML) INJECTION
Status: DISCONTINUED | OUTPATIENT
Start: 2024-12-13 | End: 2024-12-13

## 2024-12-13 RX ORDER — PROPOFOL 10 MG/ML
INJECTION, EMULSION INTRAVENOUS
Status: DISCONTINUED | OUTPATIENT
Start: 2024-12-13 | End: 2024-12-13

## 2024-12-13 RX ADMIN — PROPOFOL 50 MG: 10 INJECTION, EMULSION INTRAVENOUS at 08:12

## 2024-12-13 RX ADMIN — SODIUM CHLORIDE 10 ML: 9 INJECTION INTRAMUSCULAR; INTRAVENOUS; SUBCUTANEOUS at 08:12

## 2024-12-13 RX ADMIN — PROPOFOL 20 MG: 10 INJECTION, EMULSION INTRAVENOUS at 08:12

## 2024-12-13 NOTE — TRANSFER OF CARE
Anesthesia Transfer of Care Note    Patient: Amber Naranjo    Procedure(s) Performed: Procedure(s) (LRB):  Cardioversion/Defibrillation (N/A)  Cardioversion (N/A)    Patient location: PACU    Anesthesia Type: MAC    Transport from OR: Transported from OR on room air with adequate spontaneous ventilation    Post pain: adequate analgesia    Post assessment: no apparent anesthetic complications    Post vital signs: stable    Level of consciousness: awake    Nausea/Vomiting: no nausea/vomiting    Complications: none    Transfer of care protocol was followed      Last vitals:   109/52  16 RR  62 HR  100% O2 SAT

## 2024-12-13 NOTE — PROCEDURES
Cardiology Service:  Cardioversion Note  [facility]    Patient Name: Amber Naranjo  MRN: [id number]    Admission Date: 12/13/2024  Procedure Date:  12/13/2024    Code Status: Prior     Attending Physician: SHABNAM Cabezas MD  _______________________________________________________________      Vital Signs (Most Recent):  Temp: 97.8 °F (36.6 °C) (12/13/24 0609)  Pulse: 61 (12/13/24 0609)  Resp: 18 (12/13/24 0609)  BP: 127/67 (12/13/24 0609)  SpO2: 98 % (12/13/24 0609) Vital Signs (24h Range):  Temp:  [97.8 °F (36.6 °C)] 97.8 °F (36.6 °C)  Pulse:  [61] 61  Resp:  [18] 18  SpO2:  [98 %] 98 %  BP: (127)/(67) 127/67     Weight: 79.4 kg (175 lb)  Body mass index is 32.01 kg/m².    SpO2: 98 %       No intake or output data in the 24 hours ending 12/13/24 0848    Physical Exam:    General:  Alert and oriented.  The patient is communicating well without focal neurologic deficits.  HEENT:  No JVD.  Lungs:  Clear to auscultation.  Heart:  Irregular regular rhythm; rate at 68 beats per minute.  Extremities:  Warm without edema.      The patient was brought to the post operative recovery area, and prepped as per standard protocol.    All labs were reviewed.   The patient was examined to reassess the stability and appropriateness of elective d/c cardioversion.   Once the patient was deemed stable (based on clinical and hemodynamic criteria), anesthesia was provided by the Anesthesia department.    Pre-cardioversion rhythm:  Atrial fibrillation.    Synchronized shock therapy:  200 joules.    Post cardioversion rhythm:  Sinus rhythm with a ventricular rate of 62 beats per minute    Complications:  None.    Post- cardioversion ECG:  Normal sinus rhythm with a ventricular rate of 62 beats per minute.  First-degree AV block; right bundle-branch block.      The results of the procedure were discussed with the patient as well as family members.    The patient is stable and will be transferred to the outpatient ambulatory service  prior  to discharge.    The patient will be scheduled to follow in the cardiology clinic in 1 week.

## 2024-12-13 NOTE — ANESTHESIA POSTPROCEDURE EVALUATION
Anesthesia Post Evaluation    Patient: Amber Naranjo    Procedure(s) Performed: Procedure(s) (LRB):  Cardioversion/Defibrillation (N/A)  Cardioversion (N/A)    Final Anesthesia Type: MAC      Patient location during evaluation: OPS  Patient participation: Yes- Able to Participate  Level of consciousness: awake  Post-procedure vital signs: reviewed and stable  Pain management: adequate  Airway patency: patent    PONV status at discharge: No PONV  Anesthetic complications: no      Cardiovascular status: blood pressure returned to baseline  Respiratory status: spontaneous ventilation  Hydration status: euvolemic  Follow-up not needed.              Vitals Value Taken Time   /63 12/13/24 0904   Temp 36.9 °C (98.4 °F) 12/13/24 0904   Pulse 65 12/13/24 0904   Resp 18 12/13/24 0904   SpO2 96 % 12/13/24 0904         Event Time   Out of Recovery 07:26:00         Pain/Vickie Score: Vickie Score: 10 (12/13/2024  9:11 AM)

## 2024-12-13 NOTE — DISCHARGE SUMMARY
Cardiology Discharge  [facility]    Patient Name: Amber Naranjo    MRN: [id number]    Code Status: Prior     Attending Physician: Lopez Cabezas MD   Consulting Physician: SHABNAM Cabezas MD  ____________________________________________________________________    Date of admission:  admitted to hospital  Date of Discharge:  12/13/2024    Diagnoses:    Problem List Items Addressed This Visit    None  Visit Diagnoses       Atrial fibrillation        Atrial fibrillation status post cardioversion                Medications:    No current facility-administered medications on file prior to encounter.     Current Outpatient Medications on File Prior to Encounter   Medication Sig Dispense Refill    albuterol (PROVENTIL/VENTOLIN HFA) 90 mcg/actuation inhaler INHALE 2 PUFFS INTO THE LUNGS EVERY 6 (SIX) HOURS AS NEEDED FOR SHORTNESS OF BREATH. 54 g 3    alendronate (FOSAMAX) 70 MG tablet Take 1 tablet (70 mg total) by mouth every 7 days. Take your FOSAMAX tablet with a full glass (6-8 oz) of plain water only. Do not chew or suck on a tablet of FOSAMAX. After swallowing your FOSAMAX tablet, do not lie down - stay fully upright (sitting, standing, or walking) for at least 30 minutes. Do not lie down until after your first food of the day. 4 tablet 11    amiodarone (PACERONE) 200 MG Tab Take 200 mg by mouth.      amLODIPine (NORVASC) 5 MG tablet TAKE 1 TABLET ONE TIME DAILY 90 tablet 3    apixaban (ELIQUIS) 5 mg Tab Take 1 tablet (5 mg total) by mouth 2 (two) times daily. 60 tablet 5    aspirin (ECOTRIN) 81 MG EC tablet Take 81 mg by mouth once daily.      atorvastatin (LIPITOR) 20 MG tablet TAKE 1 TABLET EVERY DAY 90 tablet 3    calcium citrate-vitamin D3 315-200 mg (CITRACAL+D) 315 mg-5 mcg (200 unit) per tablet Take 1 tablet by mouth 2 (two) times daily.      cetirizine (ZYRTEC) 10 MG tablet TAKE 1 TABLET ONE TIME DAILY 90 tablet 3    furosemide (LASIX) 40 MG tablet Take 1 tablet (40 mg total) by mouth daily as needed.  30 tablet 3    metoprolol succinate (TOPROL-XL) 25 MG 24 hr tablet TAKE 1 TABLET EVERY DAY 90 tablet 3    multivitamin capsule Take 1 capsule by mouth once daily.      omeprazole (PRILOSEC) 40 MG capsule TAKE 1 CAPSULE EVERY MORNING 90 capsule 3    sertraline (ZOLOFT) 25 MG tablet TAKE 1 TABLET EVERY DAY 90 tablet 0    timolol maleate 0.5% (TIMOPTIC) 0.5 % Drop Place into both eyes every evening.          History and Hospital Course:  The patient was brought to the procedure room, where she was prepped as per standard protocol.  The patient was was cardioverted using 200 joules.    The patient converted to sinus rhythm with a ventricular rate of 62 beats per minute with a first-degree AV block.      She tolerated the procedure well.    Vital Signs (Most Recent):  Temp: 97.8 °F (36.6 °C) (12/13/24 0609)  Pulse: 61 (12/13/24 0609)  Resp: 18 (12/13/24 0609)  BP: 127/67 (12/13/24 0609)  SpO2: 98 % (12/13/24 0609) Vital Signs (24h Range):  Temp:  [97.8 °F (36.6 °C)] 97.8 °F (36.6 °C)  Pulse:  [61] 61  Resp:  [18] 18  SpO2:  [98 %] 98 %  BP: (127)/(67) 127/67     Weight: 79.4 kg (175 lb)  Body mass index is 32.01 kg/m².    SpO2: 98 %       No intake or output data in the 24 hours ending 12/13/24 0851    Physical Exam  General:  Alert and oriented x3.  No neuro deficits.  Heent:  No JVD.  Lungs:  Clear to auscultation throughout both lung fields.  Heart:  Regular rate rhythm.  Extremities:  Warm; no edema.      Imaging:   EKG: Sinus rhythm with a ventricular rate of 62 beats per minute with a first-degree AV block; right bundle branch block.    ______________________________________________________________________    Assessment and Plan:   1.  Atrial fibrillation:  Status post DC cardioversion which was successful at converting the patient to normal sinus rhythm.  Continue current therapy.    Follow-up in 1 week in the Cardiology Clinic.    Discharge Medications:     Medication List        ASK your doctor about these  medications      albuterol 90 mcg/actuation inhaler  Commonly known as: PROVENTIL/VENTOLIN HFA  INHALE 2 PUFFS INTO THE LUNGS EVERY 6 (SIX) HOURS AS NEEDED FOR SHORTNESS OF BREATH.     alendronate 70 MG tablet  Commonly known as: FOSAMAX  Take 1 tablet (70 mg total) by mouth every 7 days. Take your FOSAMAX tablet with a full glass (6-8 oz) of plain water only. Do not chew or suck on a tablet of FOSAMAX. After swallowing your FOSAMAX tablet, do not lie down - stay fully upright (sitting, standing, or walking) for at least 30 minutes. Do not lie down until after your first food of the day.     amiodarone 200 MG Tab  Commonly known as: PACERONE     amLODIPine 5 MG tablet  Commonly known as: NORVASC  TAKE 1 TABLET ONE TIME DAILY     apixaban 5 mg Tab  Commonly known as: ELIQUIS  Take 1 tablet (5 mg total) by mouth 2 (two) times daily.     aspirin 81 MG EC tablet  Commonly known as: ECOTRIN     atorvastatin 20 MG tablet  Commonly known as: LIPITOR  TAKE 1 TABLET EVERY DAY     calcium citrate-vitamin D3 315-200 mg 315 mg-5 mcg (200 unit) per tablet  Commonly known as: CITRACAL+D     cetirizine 10 MG tablet  Commonly known as: ZYRTEC  TAKE 1 TABLET ONE TIME DAILY     furosemide 40 MG tablet  Commonly known as: LASIX  Take 1 tablet (40 mg total) by mouth daily as needed.     metoprolol succinate 25 MG 24 hr tablet  Commonly known as: TOPROL-XL  TAKE 1 TABLET EVERY DAY     multivitamin capsule     omeprazole 40 MG capsule  Commonly known as: PRILOSEC  TAKE 1 CAPSULE EVERY MORNING     sertraline 25 MG tablet  Commonly known as: ZOLOFT  TAKE 1 TABLET EVERY DAY     timolol maleate 0.5% 0.5 % Drop  Commonly known as: TIMOPTIC               Follow up:  Follow up in the cardiology clinic within the next 1-2 weeks.

## 2024-12-13 NOTE — ANESTHESIA PREPROCEDURE EVALUATION
12/13/2024  Amber Naranjo is a 79 y.o., female.      Pre-op Assessment    I have reviewed the Patient Summary Reports.    I have reviewed the NPO Status.   I have reviewed the Medications.     Review of Systems  Anesthesia Hx:  No problems with previous Anesthesia             Denies Family Hx of Anesthesia complications.    Denies Personal Hx of Anesthesia complications.                    Social:  Non-Smoker       Cardiovascular:     Hypertension, well controlled    Dysrhythmias atrial fibrillation         ECG has been reviewed.                            Pulmonary:  Pneumonia    Shortness of breath                  Renal/:  Chronic Renal Disease, CKD                Hepatic/GI:     GERD, well controlled                Musculoskeletal:  Arthritis               Neurological:  Neurology Normal                                      Endocrine:  Endocrine Normal              Lab Results   Component Value Date    WBC 8.41 11/03/2024    HGB 11.3 (L) 11/03/2024    HCT 34.8 (L) 11/03/2024    MCV 95 11/03/2024     11/03/2024      CMP  Sodium   Date Value Ref Range Status   11/05/2024 138 136 - 145 mmol/L Final     Potassium   Date Value Ref Range Status   11/05/2024 3.6 3.5 - 5.1 mmol/L Final     Chloride   Date Value Ref Range Status   11/05/2024 101 95 - 110 mmol/L Final     CO2   Date Value Ref Range Status   11/05/2024 27 23 - 29 mmol/L Final     Glucose   Date Value Ref Range Status   11/05/2024 89 70 - 110 mg/dL Final     BUN   Date Value Ref Range Status   11/05/2024 20 8 - 23 mg/dL Final     Creatinine   Date Value Ref Range Status   11/05/2024 1.0 0.5 - 1.4 mg/dL Final     Calcium   Date Value Ref Range Status   11/05/2024 8.3 (L) 8.7 - 10.5 mg/dL Final     Total Protein   Date Value Ref Range Status   11/03/2024 6.6 6.0 - 8.4 g/dL Final     Albumin   Date Value Ref Range Status   11/03/2024 3.4 (L) 3.5  - 5.2 g/dL Final     Total Bilirubin   Date Value Ref Range Status   11/03/2024 0.5 0.1 - 1.0 mg/dL Final     Comment:     For infants and newborns, interpretation of results should be based  on gestational age, weight and in agreement with clinical  observations.    Premature Infant recommended reference ranges:  Up to 24 hours.............<8.0 mg/dL  Up to 48 hours............<12.0 mg/dL  3-5 days..................<15.0 mg/dL  6-29 days.................<15.0 mg/dL       Alkaline Phosphatase   Date Value Ref Range Status   11/03/2024 99 40 - 150 U/L Final     AST   Date Value Ref Range Status   11/03/2024 26 10 - 40 U/L Final     ALT   Date Value Ref Range Status   11/03/2024 23 10 - 44 U/L Final     Anion Gap   Date Value Ref Range Status   11/05/2024 10 8 - 16 mmol/L Final     eGFR   Date Value Ref Range Status   11/05/2024 57 (A) >60 mL/min/1.73 m^2 Final        Physical Exam  General: Well nourished    Airway:  Mallampati: II / I  Mouth Opening: Normal  TM Distance: Normal  Tongue: Normal  Neck ROM: Normal ROM    Dental:  Edentulous    Chest/Lungs:  Clear to auscultation    Heart:  Rate: Normal  Rhythm: Regular Rhythm  Sounds: Normal        Anesthesia Plan  Type of Anesthesia, risks & benefits discussed:    Anesthesia Type: MAC  Intra-op Monitoring Plan: Standard ASA Monitors  Post Op Pain Control Plan: multimodal analgesia  Induction:  IV  Airway Plan: Direct  Informed Consent: Informed consent signed with the Patient and all parties understand the risks and agree with anesthesia plan.  All questions answered.   ASA Score: 3  Day of Surgery Review of History & Physical: I have interviewed and examined the patient. I have reviewed the patient's H&P dated: There are no significant changes.     Ready For Surgery From Anesthesia Perspective.     .

## 2024-12-13 NOTE — PLAN OF CARE
Patient prepared for procedure. Daughter at the bedside. No complaints or concerns voiced at present time. Denies any pain. 22g IV inserted to right wrist, saline locked. Bed locked and in lowest position to the floor. Call bell placed in reach for any needs.

## 2024-12-13 NOTE — PLAN OF CARE
Patient was held in PACU for procedure.  There was a delay due to computer/order issues.  Patient was monitored by RN and CRNA during delay

## 2024-12-13 NOTE — H&P
Cardiology Interim Note  Procedure:  Cardioversion    Today's Date:  12/13/2024  Patient Name: Amber Naranjo    MRN: [id number]    Code Status: Prior     Attending Physician: Lopez Cabezas MD   Cardiologist: SHABNAM Cabezas MD    Procedure:  Elective Cardioversion    Indication:  Atrial fibrillation.    Brief Clinical History:  Please review faxed history and physical from office.    Past Medical History:    Atrial fibrillation.    ROS  General:  Alert and oriented x3.  No distress.  Heart:  No chest pain or shortness of breath.    Objective:     Vital Signs (Most Recent):  Temp: 97.8 °F (36.6 °C) (12/13/24 0609)  Pulse: 61 (12/13/24 0609)  Resp: 18 (12/13/24 0609)  BP: 127/67 (12/13/24 0609)  SpO2: 98 % (12/13/24 0609) Vital Signs (24h Range):  Temp:  [97.8 °F (36.6 °C)] 97.8 °F (36.6 °C)  Pulse:  [61] 61  Resp:  [18] 18  SpO2:  [98 %] 98 %  BP: (127)/(67) 127/67     Weight: 79.4 kg (175 lb)  Body mass index is 32.01 kg/m².    SpO2: 98 %       No intake or output data in the 24 hours ending 12/13/24 0837    Physical Exam  Physical exam remains unchanged since last evaluation.    Labs:  Labs reviewed.      Telemetry:  Atrial fibrillation with a ventricular rate of 68 beats per minute.    Assessment and Plan:   Atrial fibrillation:  The patient is clinically and hemodynamically stable.  All preprocedure labs have been reviewed.  The Anesthesia Department, has evaluated the patient, in is at bedside ready to deliver anesthesia.  The patient is is prepped and ready for elective DC cardioversion.

## 2024-12-26 ENCOUNTER — OFFICE VISIT (OUTPATIENT)
Dept: INTERNAL MEDICINE | Facility: CLINIC | Age: 79
End: 2024-12-26
Payer: MEDICARE

## 2024-12-26 ENCOUNTER — HOSPITAL ENCOUNTER (OUTPATIENT)
Dept: RADIOLOGY | Facility: HOSPITAL | Age: 79
Discharge: HOME | End: 2024-12-26
Attending: STUDENT IN AN ORGANIZED HEALTH CARE EDUCATION/TRAINING PROGRAM
Payer: MEDICARE

## 2024-12-26 VITALS
SYSTOLIC BLOOD PRESSURE: 118 MMHG | OXYGEN SATURATION: 96 % | HEART RATE: 64 BPM | DIASTOLIC BLOOD PRESSURE: 60 MMHG | WEIGHT: 179.88 LBS | BODY MASS INDEX: 33.1 KG/M2 | HEIGHT: 62 IN | TEMPERATURE: 97 F

## 2024-12-26 DIAGNOSIS — I10 ESSENTIAL HYPERTENSION: Chronic | ICD-10-CM

## 2024-12-26 DIAGNOSIS — I48.91 NEW ONSET ATRIAL FIBRILLATION: ICD-10-CM

## 2024-12-26 DIAGNOSIS — I50.9 CHRONIC CONGESTIVE HEART FAILURE, UNSPECIFIED HEART FAILURE TYPE: ICD-10-CM

## 2024-12-26 DIAGNOSIS — E78.2 MIXED HYPERLIPIDEMIA: ICD-10-CM

## 2024-12-26 DIAGNOSIS — M79.604 RIGHT LEG PAIN: Primary | ICD-10-CM

## 2024-12-26 DIAGNOSIS — N18.31 STAGE 3A CHRONIC KIDNEY DISEASE: ICD-10-CM

## 2024-12-26 DIAGNOSIS — M79.604 RIGHT LEG PAIN: ICD-10-CM

## 2024-12-26 PROCEDURE — 93971 EXTREMITY STUDY: CPT | Mod: 26,HCNC,RT, | Performed by: RADIOLOGY

## 2024-12-26 PROCEDURE — 99999 PR PBB SHADOW E&M-EST. PATIENT-LVL V: CPT | Mod: PBBFAC,HCNC,, | Performed by: STUDENT IN AN ORGANIZED HEALTH CARE EDUCATION/TRAINING PROGRAM

## 2024-12-26 PROCEDURE — 93971 EXTREMITY STUDY: CPT | Mod: TC,HCNC,PO,RT

## 2024-12-26 RX ORDER — TIZANIDINE 2 MG/1
2 TABLET ORAL EVERY 8 HOURS
Qty: 30 TABLET | Refills: 0 | Status: SHIPPED | OUTPATIENT
Start: 2024-12-26 | End: 2025-01-05

## 2024-12-26 RX ORDER — KETOROLAC TROMETHAMINE 30 MG/ML
30 INJECTION, SOLUTION INTRAMUSCULAR; INTRAVENOUS
Status: COMPLETED | OUTPATIENT
Start: 2024-12-26 | End: 2024-12-26

## 2024-12-26 RX ADMIN — KETOROLAC TROMETHAMINE 30 MG: 30 INJECTION, SOLUTION INTRAMUSCULAR; INTRAVENOUS at 09:12

## 2024-12-26 NOTE — PROGRESS NOTES
Chief Complaint   Patient presents with    Leg Pain     HPI: Amber Naranjo is a 79 y.o. female  with Pmhx listed below who presents to clinic for    History of Present Illness    CHIEF COMPLAINT:  - Ms. Naranjo presents with severe right calf pain that has persisted for five days.    HPI:  Ms. Naranjo reports severe right calf pain that began 5 days ago. The pain is sharp, localized to the calf area, with a severity rating of 10/10. The onset was sudden, occurring during a  when she turned abruptly. The pain has been constant since then, even when lying down, which is unusual as previously this position would alleviate her pain. Tylenol has not been effective for pain management. The pain worsens when wearing certain shoes, causing foot swelling. Ms. Naranjo is on Eliquis and has recently had her heart checked, allowing her to resume walking without dyspnea. She denies any fall, head injury, or pain in other parts of her body, with her left side being asymptomatic.           Problem List:  Patient Active Problem List   Diagnosis    Essential hypertension    Diverticulosis of colon    Tortuous colon    Uterine fibroid    Hyperlipidemia    Glaucoma    GERD (gastroesophageal reflux disease)    Arthritis    Seasonal allergic rhinitis due to pollen    Obesity (BMI 30.0-34.9)    Osteopenia of multiple sites    Postmenopausal    Primary osteoarthritis of right knee    Spondylolisthesis at L5-S1 level    Abdominal aortic atherosclerosis    Dyspnea on exertion    Stage 3a chronic kidney disease    Anxiety    Senile purpura    New onset atrial fibrillation       ROS: Negative except as noted above.       Current Meds:  Current Outpatient Medications   Medication Sig Dispense Refill    albuterol (PROVENTIL/VENTOLIN HFA) 90 mcg/actuation inhaler INHALE 2 PUFFS INTO THE LUNGS EVERY 6 (SIX) HOURS AS NEEDED FOR SHORTNESS OF BREATH. 54 g 3    alendronate (FOSAMAX) 70 MG tablet Take 1 tablet (70 mg total) by mouth every 7 days. Take  your FOSAMAX tablet with a full glass (6-8 oz) of plain water only. Do not chew or suck on a tablet of FOSAMAX. After swallowing your FOSAMAX tablet, do not lie down - stay fully upright (sitting, standing, or walking) for at least 30 minutes. Do not lie down until after your first food of the day. 4 tablet 11    amiodarone (PACERONE) 200 MG Tab Take 200 mg by mouth.      amLODIPine (NORVASC) 5 MG tablet TAKE 1 TABLET ONE TIME DAILY 90 tablet 3    apixaban (ELIQUIS) 5 mg Tab Take 1 tablet (5 mg total) by mouth 2 (two) times daily. 60 tablet 5    aspirin (ECOTRIN) 81 MG EC tablet Take 81 mg by mouth once daily.      atorvastatin (LIPITOR) 20 MG tablet TAKE 1 TABLET EVERY DAY 90 tablet 3    calcium citrate-vitamin D3 315-200 mg (CITRACAL+D) 315 mg-5 mcg (200 unit) per tablet Take 1 tablet by mouth 2 (two) times daily.      cetirizine (ZYRTEC) 10 MG tablet TAKE 1 TABLET ONE TIME DAILY 90 tablet 3    furosemide (LASIX) 40 MG tablet Take 1 tablet (40 mg total) by mouth daily as needed. 30 tablet 3    metoprolol succinate (TOPROL-XL) 25 MG 24 hr tablet TAKE 1 TABLET EVERY DAY 90 tablet 3    multivitamin capsule Take 1 capsule by mouth once daily.      omeprazole (PRILOSEC) 40 MG capsule TAKE 1 CAPSULE EVERY MORNING 90 capsule 3    sertraline (ZOLOFT) 25 MG tablet TAKE 1 TABLET EVERY DAY 90 tablet 0    timolol maleate 0.5% (TIMOPTIC) 0.5 % Drop Place into both eyes every evening.      tiZANidine (ZANAFLEX) 2 MG tablet Take 1 tablet (2 mg total) by mouth every 8 (eight) hours. for 10 days 30 tablet 0     No current facility-administered medications for this visit.      PE:  BP: 118/60  Pulse: 64     Temp: 97 °F (36.1 °C)  Weight: 81.6 kg (179 lb 14.3 oz) Body mass index is 32.9 kg/m².    Wt Readings from Last 5 Encounters:   12/26/24 81.6 kg (179 lb 14.3 oz)   12/12/24 79.4 kg (175 lb)   11/26/24 81.6 kg (180 lb)   11/25/24 81.7 kg (180 lb 1.9 oz)   11/12/24 79.8 kg (175 lb 14.8 oz)     General appearance: alert and  cooperative, not in acute distress  Head: normocephalic, without obvious abnormality, atraumatic  Eyes: conjunctivae/corneas clear. PERRL, EOM's intact.  Ears: clear tympanic membranes   Neck: no adenopathy, supple, symmetrical, trachea midline and thyroid not enlarged, symmetric, no tenderness/mass/nodules, no JVD  Throat: lips, mucosa, and tongue normal; teeth and gums normal; no thrush  Chest: no reproducible chest pain   Heart: regular rate and rhythm, S1, S2 normal, no murmur, click, rub or gallop  Lungs: unlabored respiration, bilateral equal air entry, normal vesicular breath sound heard, no wheezing, rhonchi   Abdomen: soft, non-tender, non-distended; bowel sounds +; no masses,  no organomegaly, no ascites   Extremities: normal, atraumatic, no cyanosis or edema noted B/L upper and lower extremities.  Skin: skin color, texture, turgor normal. No rashes or lesions noted.  Neurologic: grossly intact      Lab:  Lab Results   Component Value Date    WBC 8.41 11/03/2024    HGB 11.3 (L) 11/03/2024    HCT 34.8 (L) 11/03/2024    MCV 95 11/03/2024     11/03/2024     11/05/2024    K 3.6 11/05/2024     11/05/2024    CO2 27 11/05/2024    BUN 20 11/05/2024    GLU 89 11/05/2024    CALCIUM 8.3 (L) 11/05/2024    MG 1.8 11/04/2024    PHOS 2.7 02/04/2024    AST 26 11/03/2024    ALT 23 11/03/2024    CHOL 138 05/20/2024    HDL 46 05/20/2024    LDLCALC 73.2 05/20/2024    TRIG 94 05/20/2024       Impression:    ICD-10-CM ICD-9-CM    1. Right leg pain  M79.604 729.5 US Lower Extremity Veins Bilateral      ketorolac injection 30 mg      CK      tiZANidine (ZANAFLEX) 2 MG tablet          Assessment & Plan    PAIN IN RIGHT LOWER LEG:  - Considered DVT as worst-case scenario given patient's right calf pain and Eliquis use.  - Determined ultrasound necessary to rule out clots before proceeding with other treatments.  - Assessed pain as likely muscular in nature if DVT ruled out.  - Decided against X-ray as fracture  deemed unlikely based on patient history and exam.  - Started muscle relaxer for pain relief.  - Ordered ultrasound of right leg to rule out DVT.  - Administered intramuscular injection for pain relief.    LONG TERM USE OF ANTICOAGULANTS:  - Continued Eliquis at current dose.    DIAGNOSTIC TESTING:  - Ordered labs.    FOLLOW-UP:  - Follow up when ultrasound and lab results are available.            1. Right leg pain (Primary)  - US Lower Extremity Veins Bilateral; Future  - ketorolac injection 30 mg  - CK; Future  - tiZANidine (ZANAFLEX) 2 MG tablet; Take 1 tablet (2 mg total) by mouth every 8 (eight) hours. for 10 days  Dispense: 30 tablet; Refill: 0    2. Essential hypertension  Low salt diet.  Enouraged to increase in physical activity at least 30 minutes of brisk walking/day , 5 days a week  Advised weight loss    Advised for DASH diet - The Dietary Approaches to Stop Hypertension (DASH) is high in vegetables, fruits, low-fat dairy products, whole grains, poultry, fish, and nuts and low in sweets, sugar-sweetened beverages, and red meats   Stop smoking.  Limit caffeine intake  Limit alcohol intake <3/day for men and <2/day for women.  Advised to be compliant with medication.  Please monitor your blood pressure regularly at home   Return precaution provided  On Norvasc to be continued  Continue Toprol XL     3. New onset atrial fibrillation  On amiodarone to be continued  On eliquis to be continued  Recently had cardioversion/defibrillation done with dr. Cabezas on 12/13/2024    4. Mixed hyperlipidemia  On statin to be continued.    5. Stage 3a chronic kidney disease  - counseled on increase water intake at least 3 litre of H20 to remain hydrated  - stay away from nephrotoxic drugs like Ibuprofen and NSAID  - Counseled on the need to control HTN and Blood glucose to prevent the disease progression  - low salt diet  - dietary modification: renal diet encouraged      6. Chronic congestive heart failure, unspecified  heart failure type  Last echo on 11/04/2024:    Left Ventricle: The left ventricle is mildly dilated. Normal wall thickness. There is normal systolic function. Ejection fraction is approximately 55%. There is normal diastolic function.    Right Ventricle: Normal right ventricular cavity size. Wall thickness is normal. Systolic function is normal.    Aortic Valve: There is mild aortic valve sclerosis.    Pulmonary Artery: The estimated pulmonary artery systolic pressure is 30 mmHg.    IVC/SVC: Normal venous pressure at 3 mmHg.  Continue toprol XL  Now on lasix every other day.    Following dr. Cabezas    Future Appointments   Date Time Provider Department Center   12/26/2024 10:00 AM IB LABORATORY [facility] [place]   12/26/2024 10:15 AM IB US1 IB ULSOUND [place]   2/13/2025 11:00 AM [facility]    5/26/2025  8:40 AM Jarrett Peck MD IB IM [place]       I spent a total of 32 minutes on the day of the visit.This includes face to face time and non-face to face time preparing to see the patient (eg, review of tests), obtaining and/or reviewing separately obtained history, documenting clinical information in the electronic or other health record, independently interpreting results and communicating results to the patient/family/caregiver, or care coordinator.  Visit today included increased complexity associated with the care of the episodic problem   addressed and managing the longitudinal care of the patient due to the serious and/or complex managed problem(s) .     Jarrett Peck MD    This note was generated with the assistance of ambient listening technology. Verbal consent was obtained by the patient and accompanying visitor(s) for the recording of patient appointment to facilitate this note. I attest to having reviewed and edited the generated note for accuracy, though some syntax or spelling errors may persist. Please contact the author of this note for any  clarification.

## 2024-12-30 ENCOUNTER — HOSPITAL ENCOUNTER (EMERGENCY)
Facility: HOSPITAL | Age: 79
Discharge: HOME | End: 2024-12-30
Attending: EMERGENCY MEDICINE
Payer: MEDICARE

## 2024-12-30 VITALS
TEMPERATURE: 97 F | RESPIRATION RATE: 20 BRPM | DIASTOLIC BLOOD PRESSURE: 59 MMHG | BODY MASS INDEX: 31.68 KG/M2 | OXYGEN SATURATION: 96 % | HEIGHT: 62 IN | WEIGHT: 172.19 LBS | SYSTOLIC BLOOD PRESSURE: 108 MMHG | HEART RATE: 66 BPM

## 2024-12-30 DIAGNOSIS — J10.1 INFLUENZA A: Primary | ICD-10-CM

## 2024-12-30 LAB
CTP QC/QA: YES
CTP QC/QA: YES
POC MOLECULAR INFLUENZA A AGN: POSITIVE
POC MOLECULAR INFLUENZA B AGN: NEGATIVE
SARS-COV-2 RDRP RESP QL NAA+PROBE: NEGATIVE

## 2024-12-30 PROCEDURE — 87635 SARS-COV-2 COVID-19 AMP PRB: CPT | Mod: HCNC,ER | Performed by: EMERGENCY MEDICINE

## 2024-12-30 PROCEDURE — 99283 EMERGENCY DEPT VISIT LOW MDM: CPT | Mod: HCNC,ER

## 2024-12-30 PROCEDURE — 87502 INFLUENZA DNA AMP PROBE: CPT | Mod: HCNC,ER

## 2024-12-30 RX ORDER — OSELTAMIVIR PHOSPHATE 75 MG/1
75 CAPSULE ORAL 2 TIMES DAILY
Qty: 10 CAPSULE | Refills: 0 | Status: SHIPPED | OUTPATIENT
Start: 2024-12-30 | End: 2025-01-04

## 2024-12-30 RX ORDER — GUAIFENESIN AND DEXTROMETHORPHAN HYDROBROMIDE 10; 100 MG/5ML; MG/5ML
10 SYRUP ORAL EVERY 4 HOURS PRN
Qty: 300 ML | Refills: 0 | Status: SHIPPED | OUTPATIENT
Start: 2024-12-30 | End: 2025-01-04

## 2024-12-31 ENCOUNTER — HOSPITAL ENCOUNTER (EMERGENCY)
Facility: HOSPITAL | Age: 79
Discharge: HOME | End: 2025-01-01
Attending: EMERGENCY MEDICINE
Payer: MEDICARE

## 2024-12-31 VITALS
DIASTOLIC BLOOD PRESSURE: 63 MMHG | TEMPERATURE: 99 F | RESPIRATION RATE: 18 BRPM | HEART RATE: 58 BPM | SYSTOLIC BLOOD PRESSURE: 131 MMHG | OXYGEN SATURATION: 96 %

## 2024-12-31 DIAGNOSIS — R31.9 URINARY TRACT INFECTION WITH HEMATURIA, SITE UNSPECIFIED: ICD-10-CM

## 2024-12-31 DIAGNOSIS — R05.9 COUGH: ICD-10-CM

## 2024-12-31 DIAGNOSIS — R40.20 LOSS OF CONSCIOUSNESS: ICD-10-CM

## 2024-12-31 DIAGNOSIS — N39.0 URINARY TRACT INFECTION WITH HEMATURIA, SITE UNSPECIFIED: ICD-10-CM

## 2024-12-31 DIAGNOSIS — E87.6 HYPOKALEMIA: ICD-10-CM

## 2024-12-31 DIAGNOSIS — J11.1 INFLUENZA: Primary | ICD-10-CM

## 2024-12-31 DIAGNOSIS — R55 SYNCOPE, UNSPECIFIED SYNCOPE TYPE: ICD-10-CM

## 2024-12-31 LAB
ALBUMIN SERPL BCP-MCNC: 3.6 G/DL (ref 3.5–5.2)
ALP SERPL-CCNC: 112 U/L (ref 40–150)
ALT SERPL W/O P-5'-P-CCNC: 45 U/L (ref 10–44)
ANION GAP SERPL CALC-SCNC: 14 MMOL/L (ref 8–16)
AST SERPL-CCNC: 52 U/L (ref 10–40)
BACTERIA #/AREA URNS HPF: ABNORMAL /HPF
BASOPHILS # BLD AUTO: 0.03 K/UL (ref 0–0.2)
BASOPHILS NFR BLD: 0.4 % (ref 0–1.9)
BILIRUB SERPL-MCNC: 0.7 MG/DL (ref 0.1–1)
BILIRUB UR QL STRIP: NEGATIVE
BNP SERPL-MCNC: 39 PG/ML (ref 0–99)
BUN SERPL-MCNC: 24 MG/DL (ref 8–23)
CALCIUM SERPL-MCNC: 8.9 MG/DL (ref 8.7–10.5)
CHLORIDE SERPL-SCNC: 95 MMOL/L (ref 95–110)
CLARITY UR: ABNORMAL
CO2 SERPL-SCNC: 26 MMOL/L (ref 23–29)
COLOR UR: YELLOW
CREAT SERPL-MCNC: 1.4 MG/DL (ref 0.5–1.4)
DIFFERENTIAL METHOD BLD: ABNORMAL
EOSINOPHIL # BLD AUTO: 0 K/UL (ref 0–0.5)
EOSINOPHIL NFR BLD: 0.5 % (ref 0–8)
ERYTHROCYTE [DISTWIDTH] IN BLOOD BY AUTOMATED COUNT: 14.4 % (ref 11.5–14.5)
EST. GFR  (NO RACE VARIABLE): 38 ML/MIN/1.73 M^2
GLUCOSE SERPL-MCNC: 117 MG/DL (ref 70–110)
GLUCOSE UR QL STRIP: NEGATIVE
HCT VFR BLD AUTO: 39.2 % (ref 37–48.5)
HGB BLD-MCNC: 12.7 G/DL (ref 12–16)
HGB UR QL STRIP: NEGATIVE
HYALINE CASTS #/AREA URNS LPF: 33 /LPF
IMM GRANULOCYTES # BLD AUTO: 0.04 K/UL (ref 0–0.04)
IMM GRANULOCYTES NFR BLD AUTO: 0.5 % (ref 0–0.5)
KETONES UR QL STRIP: NEGATIVE
LEUKOCYTE ESTERASE UR QL STRIP: ABNORMAL
LYMPHOCYTES # BLD AUTO: 1.3 K/UL (ref 1–4.8)
LYMPHOCYTES NFR BLD: 17.6 % (ref 18–48)
MCH RBC QN AUTO: 30 PG (ref 27–31)
MCHC RBC AUTO-ENTMCNC: 32.4 G/DL (ref 32–36)
MCV RBC AUTO: 93 FL (ref 82–98)
MICROSCOPIC COMMENT: ABNORMAL
MONOCYTES # BLD AUTO: 0.6 K/UL (ref 0.3–1)
MONOCYTES NFR BLD: 7.7 % (ref 4–15)
NEUTROPHILS # BLD AUTO: 5.4 K/UL (ref 1.8–7.7)
NEUTROPHILS NFR BLD: 73.3 % (ref 38–73)
NITRITE UR QL STRIP: NEGATIVE
NRBC BLD-RTO: 0 /100 WBC
PH UR STRIP: 6 [PH] (ref 5–8)
PLATELET # BLD AUTO: 183 K/UL (ref 150–450)
PMV BLD AUTO: 10.9 FL (ref 9.2–12.9)
POTASSIUM SERPL-SCNC: 3.3 MMOL/L (ref 3.5–5.1)
PROT SERPL-MCNC: 7.4 G/DL (ref 6–8.4)
PROT UR QL STRIP: ABNORMAL
RBC # BLD AUTO: 4.24 M/UL (ref 4–5.4)
RBC #/AREA URNS HPF: 5 /HPF (ref 0–4)
SODIUM SERPL-SCNC: 135 MMOL/L (ref 136–145)
SP GR UR STRIP: 1.02 (ref 1–1.03)
SQUAMOUS #/AREA URNS HPF: 13 /HPF
TROPONIN I SERPL DL<=0.01 NG/ML-MCNC: 0.01 NG/ML (ref 0–0.03)
UNIDENT CRYS URNS QL MICRO: ABNORMAL
URN SPEC COLLECT METH UR: ABNORMAL
UROBILINOGEN UR STRIP-ACNC: NEGATIVE EU/DL
WBC # BLD AUTO: 7.43 K/UL (ref 3.9–12.7)
WBC #/AREA URNS HPF: 37 /HPF (ref 0–5)
WBC CLUMPS URNS QL MICRO: ABNORMAL

## 2024-12-31 PROCEDURE — 81000 URINALYSIS NONAUTO W/SCOPE: CPT | Mod: HCNC | Performed by: NURSE PRACTITIONER

## 2024-12-31 PROCEDURE — 25000003 PHARM REV CODE 250: Mod: HCNC | Performed by: EMERGENCY MEDICINE

## 2024-12-31 PROCEDURE — 87086 URINE CULTURE/COLONY COUNT: CPT | Mod: HCNC | Performed by: NURSE PRACTITIONER

## 2024-12-31 PROCEDURE — 93010 ELECTROCARDIOGRAM REPORT: CPT | Mod: HCNC,,, | Performed by: STUDENT IN AN ORGANIZED HEALTH CARE EDUCATION/TRAINING PROGRAM

## 2024-12-31 PROCEDURE — 84484 ASSAY OF TROPONIN QUANT: CPT | Mod: HCNC | Performed by: NURSE PRACTITIONER

## 2024-12-31 PROCEDURE — 63600175 PHARM REV CODE 636 W HCPCS: Mod: HCNC | Performed by: EMERGENCY MEDICINE

## 2024-12-31 PROCEDURE — 96374 THER/PROPH/DIAG INJ IV PUSH: CPT | Mod: HCNC

## 2024-12-31 PROCEDURE — 80053 COMPREHEN METABOLIC PANEL: CPT | Mod: HCNC | Performed by: NURSE PRACTITIONER

## 2024-12-31 PROCEDURE — 85025 COMPLETE CBC W/AUTO DIFF WBC: CPT | Mod: HCNC | Performed by: NURSE PRACTITIONER

## 2024-12-31 PROCEDURE — 99285 EMERGENCY DEPT VISIT HI MDM: CPT | Mod: 25,HCNC

## 2024-12-31 PROCEDURE — 87186 SC STD MICRODIL/AGAR DIL: CPT | Mod: HCNC | Performed by: NURSE PRACTITIONER

## 2024-12-31 PROCEDURE — 93005 ELECTROCARDIOGRAM TRACING: CPT | Mod: HCNC

## 2024-12-31 PROCEDURE — 83880 ASSAY OF NATRIURETIC PEPTIDE: CPT | Mod: HCNC | Performed by: NURSE PRACTITIONER

## 2024-12-31 PROCEDURE — 87088 URINE BACTERIA CULTURE: CPT | Mod: HCNC | Performed by: NURSE PRACTITIONER

## 2024-12-31 RX ORDER — CEFDINIR 300 MG/1
300 CAPSULE ORAL 2 TIMES DAILY
Qty: 14 CAPSULE | Refills: 0 | Status: SHIPPED | OUTPATIENT
Start: 2024-12-31 | End: 2025-01-07

## 2024-12-31 RX ORDER — POTASSIUM CHLORIDE 20 MEQ/1
40 TABLET, EXTENDED RELEASE ORAL
Status: COMPLETED | OUTPATIENT
Start: 2024-12-31 | End: 2024-12-31

## 2024-12-31 RX ORDER — CEFTRIAXONE 1 G/1
1 INJECTION, POWDER, FOR SOLUTION INTRAMUSCULAR; INTRAVENOUS
Status: COMPLETED | OUTPATIENT
Start: 2024-12-31 | End: 2024-12-31

## 2024-12-31 RX ADMIN — SODIUM CHLORIDE 1000 ML: 9 INJECTION, SOLUTION INTRAVENOUS at 09:12

## 2024-12-31 RX ADMIN — CEFTRIAXONE 1 G: 1 INJECTION, POWDER, FOR SOLUTION INTRAMUSCULAR; INTRAVENOUS at 08:12

## 2024-12-31 RX ADMIN — POTASSIUM CHLORIDE 40 MEQ: 1500 TABLET, EXTENDED RELEASE ORAL at 08:12

## 2024-12-31 NOTE — FIRST PROVIDER EVALUATION
Medical screening examination initiated.  I have conducted a focused provider triage encounter, findings are as follows:    Brief history of present illness:  Patient presents to ER accompanied by family member who reports patient with syncopal episode prior to arrival.  States patient was sitting on swing outside, began to shake vigorously and then her head went back and lost consciousness.  Diagnosed with influenza yesterday.    Vitals:    12/31/24 1413   BP: 127/65   BP Location: Right arm   Pulse: 67   Resp: 18   Temp: 98.9 °F (37.2 °C)   TempSrc: Oral   SpO2: 96%       Pertinent physical exam:  Currently awake, alert, oriented to person place time and situation.  Speech.  No acute distress.  Vital signs stable.    Brief workup plan:  Workup.    Preliminary workup initiated; this workup will be continued and followed by the physician or advanced practice provider that is assigned to the patient when roomed.

## 2024-12-31 NOTE — ED PROVIDER NOTES
Encounter Date: 12/30/2024       History     Chief Complaint   Patient presents with    Cough     Pt c/o productive cough, and nausea onset 2-3 days ago.      Patient presents to ER for cough, onset 2-3 days ago.  Associated symptoms include nausea.  Has tried over-the-counter cough medication which has provided some relief.  She denies chest pain, shortness of breath, vomiting, abdominal pain, nausea, vomiting, weakness, fatigue.    The history is provided by the patient.     Review of patient's allergies indicates:  No Known Allergies  Past Medical History:   Diagnosis Date    Allergy     Anxiety 4/25/2022    Arthritis     Back pain     Disorder of kidney and ureter     Diverticulosis of colon     GERD (gastroesophageal reflux disease)     Glaucoma     Hyperlipidemia     Hypertension     Ingrown toenail     Klebsiella cystitis     New onset atrial fibrillation 11/5/2024    Obesity     Pain in joint involving multiple sites     Pneumonia     Tortuous colon     Trouble in sleeping     Unspecified disorder of autonomic nervous system     Uterine fibroid      Past Surgical History:   Procedure Laterality Date    CARDIOVERSION N/A 12/13/2024    Procedure: Cardioversion;  Surgeon: Lopez Cabezas MD;  Location: [facility];  Service: Cardiology;  Laterality: N/A;    COLONOSCOPY      COLONOSCOPY N/A 3/15/2017    Procedure: COLONOSCOPY;  Surgeon: Yogi Carbone MD;  Location: [facility];  Service: Endoscopy;  Laterality: N/A;    TREATMENT OF CARDIAC ARRHYTHMIA N/A 12/13/2024    Procedure: Cardioversion/Defibrillation;  Surgeon: Lopez Cabezas MD;  Location: [facility];  Service: Cardiology;  Laterality: N/A;  1st  0600     Family History   Problem Relation Name Age of Onset    Glaucoma Mother      Hypertension Father      Asthma Daughter      Depression Daughter      Colon cancer Sister      Cancer Sister  70        colon    Diabetes Sister      Hypertension Sister      Hyperlipidemia Sister      Diabetes Brother       Hypertension Brother      Heart disease Brother      Hyperlipidemia Brother      Asthma Paternal Aunt      Diabetes Paternal Uncle      Diabetes Maternal Grandmother      Early death Maternal Grandfather      Early death Paternal Grandfather      Breast cancer Cousin      COPD Neg Hx      Kidney disease Neg Hx      Stroke Neg Hx      Mental illness Neg Hx       Social History     Tobacco Use    Smoking status: Never     Passive exposure: Yes    Smokeless tobacco: Never   Substance Use Topics    Alcohol use: No    Drug use: No     Review of Systems   Constitutional:  Negative for chills, fatigue and fever.   HENT:  Negative for ear pain, sinus pain and sore throat.    Eyes:  Negative for pain.   Respiratory:  Positive for cough. Negative for shortness of breath.    Cardiovascular:  Negative for chest pain.   Gastrointestinal:  Positive for nausea. Negative for abdominal pain, constipation, diarrhea and vomiting.   Genitourinary:  Negative for dysuria.   Musculoskeletal:  Negative for back pain, myalgias and neck pain.   Skin:  Negative for rash.   Neurological:  Negative for weakness and headaches.   All other systems reviewed and are negative.      Physical Exam     Initial Vitals [12/30/24 1702]   BP Pulse Resp Temp SpO2   (!) 103/55 73 16 98.2 °F (36.8 °C) (!) 94 %      MAP       --         Physical Exam    Nursing note and vitals reviewed.  Constitutional: She is not diaphoretic. She is cooperative.  Non-toxic appearance. She does not have a sickly appearance. No distress.   HENT:   Head: Normocephalic and atraumatic.   Right Ear: Tympanic membrane, external ear and ear canal normal.   Left Ear: Tympanic membrane, external ear and ear canal normal.   Nose: Nose normal. Mouth/Throat: Uvula is midline, oropharynx is clear and moist and mucous membranes are normal. No uvula swelling. No oropharyngeal exudate, posterior oropharyngeal edema or posterior oropharyngeal erythema.   Eyes: Conjunctivae are normal.   Neck:  Neck supple.   Normal range of motion.  Cardiovascular:  Normal rate and regular rhythm.           Pulmonary/Chest: Breath sounds normal. No respiratory distress. She has no wheezes. She has no rhonchi. She has no rales.   Abdominal: Abdomen is soft. There is no abdominal tenderness.   Musculoskeletal:         General: Normal range of motion.      Cervical back: Normal range of motion and neck supple.     Neurological: She is alert and oriented to person, place, and time. She has normal strength. GCS score is 15. GCS eye subscore is 4. GCS verbal subscore is 5. GCS motor subscore is 6.   Skin: Skin is warm and dry. Capillary refill takes less than 2 seconds.         ED Course   Procedures  Labs Reviewed   POCT INFLUENZA A/B MOLECULAR - Abnormal       Result Value    POC Molecular Influenza A Ag Positive (*)     POC Molecular Influenza B Ag Negative       Acceptable Yes     SARS-COV-2 RDRP GENE    POC Rapid COVID Negative       Acceptable Yes            Imaging Results    None          Medications - No data to display  Medical Decision Making  Risk  OTC drugs.  Prescription drug management.                Results reviewed and discussed with patient and family member and they verbalized understanding with no further concerns.  Influenza positive, Tamiflu Rx provided.  Guaifenesin DM Rx also provided.  Patient is nontoxic/non ill-appearing.  Vital signs stable.  Patient reports her systolic blood pressure usually runs low 100s.  States her blood pressure is consistent with her usual blood pressure readings.  Breath sounds clear to auscultation bilaterally.  Respirations even and nonlabored.  Discussed symptomatic care home.  Discussed use of over-the-counter Tylenol/ibuprofen for pain/fever.  Discussed increase fluid intake and maintain well-hydrated.  Discussed outpatient follow-up with PCP.  Discussed signs symptoms to return to ER.  Patient and family member agree with plan and voiced  no further concerns.    Regarding INFLUENZA, for prevention, I advised patient to: clean hands with soap and water or an alcohol-based hand rub frequently;  cover mouth and nose with bend of elbow when coughing or sneezing; limit face-to-face contact with others;  maintain good ventilation in the home. For treatment, recommended that patient: get annual vaccination; get plenty of rest; drink clear fluids like water, broth, sports drinks, or electrolyte beverages; place cool, damp washcloth on forehead, arms, and legs to reduce discomfort associated with a fever; use a humidifier; gargle with warm salt water; and take over-the-counter acetaminophen or ibuprofen for pain relief and fever control. I also discussed the viral origin of influenza and treatment limitations.        I discussed with patient and family/caretaker that evaluation in the ED does not suggest any emergent or life threatening medical conditions requiring immediate intervention beyond what was provided in the ED, and I believe patient is safe for discharge. Regardless, an unremarkable evaluation in the ED does not preclude the development or presence of a serious of life threatening condition. As such, patient was instructed to return immediately for any worsening or change in current symptoms.                   Clinical Impression:  Final diagnoses:  [J10.1] Influenza A (Primary)          ED Disposition Condition    Discharge Stable          ED Prescriptions       Medication Sig Dispense Start Date End Date Auth. Provider    oseltamivir (TAMIFLU) 75 MG capsule Take 1 capsule (75 mg total) by mouth 2 (two) times daily. for 5 days 10 capsule 12/30/2024 1/4/2025 Ramon Ackerman, NP    dextromethorphan-guaiFENesin  mg/5 ml (ROBITUSSIN-DM)  mg/5 mL liquid Take 10 mLs by mouth every 4 (four) hours as needed (cough). 300 mL 12/30/2024 1/4/2025 Ramon Ackerman, NP          Follow-up Information       Follow up With Specialties Details Why  Contact Info    Jarrett Peck MD Internal Medicine, Family Medicine In 2 days  [address] [address] [id number] [phone]      [facility] - Emergency Dept Emergency Medicine  As needed, If symptoms worsen [address] [place] [address] 1  Emergency Department  [facility] [id number] [phone]             Ramon Ackerman, NP  12/31/24 1249

## 2025-01-01 NOTE — ED PROVIDER NOTES
SCRIBE #1 NOTE: I, Josse Menezes, am scribing for, and in the presence of, Familia Johnson MD. I have scribed the entire note.       History     Chief Complaint   Patient presents with    Fatigue     Weakness and fatigue, was dx with flu A at [facility] yesterday      Review of patient's allergies indicates:  No Known Allergies      History of Present Illness     HPI    12/31/2024, 8:00 PM  History obtained from the daughter and patient      History of Present Illness: Amber Naranjo is a 79 y.o. female patient with a PMHx of HTN, HLD, GERD, pneumonia, and glaucoma who presents to the Emergency Department for evaluation of syncope which onset today. Per daughter the pt and her were sitting on a swing when patient had loss of consciousness briefly.  Daughter describes the patient as tremoring for few seconds.  No overt seizure-like activity.  No biting of the tongue, urinary incontinence, or postictal confusion.  No history of seizures.  Pt was dx with flu A yesterday. Pt was prescribed Tamiflu but stopped taking it because she felt restless. Pt takes over the counter cough meds for the flu. Pt states that she has not had a-fib since being shocked. Symptoms are constant and moderate in severity. No mitigating or exacerbating factors reported. Associated sxs include diarrhea. Patient denies any dysuria, vomiting, CP, SOB, dizziness, seizure, and all other sxs at this time. No prior Tx. No further complaints or concerns at this time. Pt states that she has no seizure hx.       Arrival mode: Ambulance service    PCP: Jarrett Peck MD        Past Medical History:  Past Medical History:   Diagnosis Date    Allergy     Anxiety 4/25/2022    Arthritis     Back pain     Disorder of kidney and ureter     Diverticulosis of colon     GERD (gastroesophageal reflux disease)     Glaucoma     Hyperlipidemia     Hypertension     Ingrown toenail     Klebsiella cystitis     New onset atrial fibrillation 11/5/2024    Obesity     Pain in  joint involving multiple sites     Pneumonia     Tortuous colon     Trouble in sleeping     Unspecified disorder of autonomic nervous system     Uterine fibroid        Past Surgical History:  Past Surgical History:   Procedure Laterality Date    CARDIOVERSION N/A 12/13/2024    Procedure: Cardioversion;  Surgeon: Lopez Cabezas MD;  [facility];  Service: Cardiology;  Laterality: N/A;    COLONOSCOPY      COLONOSCOPY N/A 3/15/2017    Procedure: COLONOSCOPY;  Surgeon: Yogi Carbone MD;  Location: [facility];  Service: Endoscopy;  Laterality: N/A;    TREATMENT OF CARDIAC ARRHYTHMIA N/A 12/13/2024    Procedure: Cardioversion/Defibrillation;  Surgeon: Lopez Cabezas MD;  Location: [facility] OR;  Service: Cardiology;  Laterality: N/A;  1st  0600         Family History:  Family History   Problem Relation Name Age of Onset    Glaucoma Mother      Hypertension Father      Asthma Daughter      Depression Daughter      Colon cancer Sister      Cancer Sister  70        colon    Diabetes Sister      Hypertension Sister      Hyperlipidemia Sister      Diabetes Brother      Hypertension Brother      Heart disease Brother      Hyperlipidemia Brother      Asthma Paternal Aunt      Diabetes Paternal Uncle      Diabetes Maternal Grandmother      Early death Maternal Grandfather      Early death Paternal Grandfather      Breast cancer Cousin      COPD Neg Hx      Kidney disease Neg Hx      Stroke Neg Hx      Mental illness Neg Hx         Social History:  Social History     Tobacco Use    Smoking status: Never     Passive exposure: Yes    Smokeless tobacco: Never   Substance and Sexual Activity    Alcohol use: No    Drug use: No    Sexual activity: Not Currently     Partners: Male        Review of Systems     Review of Systems   Constitutional:  Negative for fever.   HENT:  Negative for sore throat.    Respiratory:  Negative for shortness of breath.    Cardiovascular:  Negative for chest pain.   Gastrointestinal:  Positive for  diarrhea. Negative for nausea and vomiting.   Genitourinary:  Negative for dysuria.   Musculoskeletal:  Negative for back pain.   Skin:  Negative for rash.   Neurological:  Positive for tremors and syncope. Negative for dizziness, seizures and weakness.   Hematological:  Does not bruise/bleed easily.   All other systems reviewed and are negative.       Physical Exam     Initial Vitals [12/31/24 1413]   BP Pulse Resp Temp SpO2   127/65 67 18 98.9 °F (37.2 °C) 96 %      MAP       --          Physical Exam  Nursing Notes and Vital Signs Reviewed.  Constitutional: Patient is in no acute distress. Well-developed and well-nourished.  Head: Atraumatic. Normocephalic.  Eyes: PERRL. EOM intact. Conjunctivae are not pale. No scleral icterus.  ENT: Mucous membranes are moist.   Neck: Supple. Full ROM.   Cardiovascular: Regular rate. Regular rhythm. No murmurs, rubs, or gallops. Distal pulses are 2+ and symmetric.  Pulmonary/Chest: No respiratory distress. Clear to auscultation bilaterally. No wheezing or rales.  Abdominal: Soft and non-distended.  There is no tenderness.  No rebound, guarding, or rigidity.   Genitourinary: No CVA tenderness  Musculoskeletal: Moves all extremities. No obvious deformities. No edema.   Skin: Warm and dry.  Neurological:  Alert, awake, and appropriate.  Normal speech.  No acute focal neurological deficits are appreciated.  Psychiatric: Normal affect. Good eye contact. Appropriate in content.     ED Course   Procedures  ED Vital Signs:  Vitals:    12/31/24 1413 12/31/24 2221 12/31/24 2223 12/31/24 2224   BP: 127/65 (!) 147/65 (!) 147/67 131/63   Pulse: 67 (!) 57 (!) 58    Resp: 18      Temp: 98.9 °F (37.2 °C)      TempSrc: Oral      SpO2: 96%          Abnormal Lab Results:  Labs Reviewed   CBC W/ AUTO DIFFERENTIAL - Abnormal       Result Value    WBC 7.43      RBC 4.24      Hemoglobin 12.7      Hematocrit 39.2      MCV 93      MCH 30.0      MCHC 32.4      RDW 14.4      Platelets 183      MPV 10.9       Immature Granulocytes 0.5      Gran # (ANC) 5.4      Immature Grans (Abs) 0.04      Lymph # 1.3      Mono # 0.6      Eos # 0.0      Baso # 0.03      nRBC 0      Gran % 73.3 (*)     Lymph % 17.6 (*)     Mono % 7.7      Eosinophil % 0.5      Basophil % 0.4      Differential Method Automated     COMPREHENSIVE METABOLIC PANEL - Abnormal    Sodium 135 (*)     Potassium 3.3 (*)     Chloride 95      CO2 26      Glucose 117 (*)     BUN 24 (*)     Creatinine 1.4      Calcium 8.9      Total Protein 7.4      Albumin 3.6      Total Bilirubin 0.7      Alkaline Phosphatase 112      AST 52 (*)     ALT 45 (*)     eGFR 38 (*)     Anion Gap 14     URINALYSIS, REFLEX TO URINE CULTURE - Abnormal    Specimen UA Urine, Clean Catch      Color, UA Yellow      Appearance, UA Hazy (*)     pH, UA 6.0      Specific Gravity, UA 1.025      Protein, UA 1+ (*)     Glucose, UA Negative      Ketones, UA Negative      Bilirubin (UA) Negative      Occult Blood UA Negative      Nitrite, UA Negative      Urobilinogen, UA Negative      Leukocytes, UA 3+ (*)     Narrative:     Specimen Source->Urine   URINALYSIS MICROSCOPIC - Abnormal    RBC, UA 5 (*)     WBC, UA 37 (*)     WBC Clumps, UA Few (*)     Bacteria Many (*)     Squam Epithel, UA 13      Hyaline Casts, UA 33 (*)     Unclass Miesha UA Occasional      Microscopic Comment SEE COMMENT      Narrative:     Specimen Source->Urine   CULTURE, URINE   TROPONIN I    Troponin I 0.014     B-TYPE NATRIURETIC PEPTIDE    BNP 39          All Lab Results:  Results for orders placed or performed during the hospital encounter of 12/31/24   CBC auto differential    Collection Time: 12/31/24  4:03 PM   Result Value Ref Range    WBC 7.43 3.90 - 12.70 K/uL    RBC 4.24 4.00 - 5.40 M/uL    Hemoglobin 12.7 12.0 - 16.0 g/dL    Hematocrit 39.2 37.0 - 48.5 %    MCV 93 82 - 98 fL    MCH 30.0 27.0 - 31.0 pg    MCHC 32.4 32.0 - 36.0 g/dL    RDW 14.4 11.5 - 14.5 %    Platelets 183 150 - 450 K/uL    MPV 10.9 9.2 - 12.9 fL     Immature Granulocytes 0.5 0.0 - 0.5 %    Gran # (ANC) 5.4 1.8 - 7.7 K/uL    Immature Grans (Abs) 0.04 0.00 - 0.04 K/uL    Lymph # 1.3 1.0 - 4.8 K/uL    Mono # 0.6 0.3 - 1.0 K/uL    Eos # 0.0 0.0 - 0.5 K/uL    Baso # 0.03 0.00 - 0.20 K/uL    nRBC 0 0 /100 WBC    Gran % 73.3 (H) 38.0 - 73.0 %    Lymph % 17.6 (L) 18.0 - 48.0 %    Mono % 7.7 4.0 - 15.0 %    Eosinophil % 0.5 0.0 - 8.0 %    Basophil % 0.4 0.0 - 1.9 %    Differential Method Automated    Comprehensive metabolic panel    Collection Time: 12/31/24  4:03 PM   Result Value Ref Range    Sodium 135 (L) 136 - 145 mmol/L    Potassium 3.3 (L) 3.5 - 5.1 mmol/L    Chloride 95 95 - 110 mmol/L    CO2 26 23 - 29 mmol/L    Glucose 117 (H) 70 - 110 mg/dL    BUN 24 (H) 8 - 23 mg/dL    Creatinine 1.4 0.5 - 1.4 mg/dL    Calcium 8.9 8.7 - 10.5 mg/dL    Total Protein 7.4 6.0 - 8.4 g/dL    Albumin 3.6 3.5 - 5.2 g/dL    Total Bilirubin 0.7 0.1 - 1.0 mg/dL    Alkaline Phosphatase 112 40 - 150 U/L    AST 52 (H) 10 - 40 U/L    ALT 45 (H) 10 - 44 U/L    eGFR 38 (A) >60 mL/min/1.73 m^2    Anion Gap 14 8 - 16 mmol/L   Troponin I    Collection Time: 12/31/24  4:03 PM   Result Value Ref Range    Troponin I 0.014 0.000 - 0.026 ng/mL   Brain natriuretic peptide    Collection Time: 12/31/24  4:03 PM   Result Value Ref Range    BNP 39 0 - 99 pg/mL   Urinalysis, Reflex to Urine Culture Urine, Clean Catch    Collection Time: 12/31/24  6:43 PM    Specimen: Urine   Result Value Ref Range    Specimen UA Urine, Clean Catch     Color, UA Yellow Yellow, Straw, Adelita    Appearance, UA Hazy (A) Clear    pH, UA 6.0 5.0 - 8.0    Specific Gravity, UA 1.025 1.005 - 1.030    Protein, UA 1+ (A) Negative    Glucose, UA Negative Negative    Ketones, UA Negative Negative    Bilirubin (UA) Negative Negative    Occult Blood UA Negative Negative    Nitrite, UA Negative Negative    Urobilinogen, UA Negative <2.0 EU/dL    Leukocytes, UA 3+ (A) Negative   Urinalysis Microscopic    Collection Time: 12/31/24  6:43 PM    Result Value Ref Range    RBC, UA 5 (H) 0 - 4 /hpf    WBC, UA 37 (H) 0 - 5 /hpf    WBC Clumps, UA Few (A) None-Rare    Bacteria Many (A) None-Occ /hpf    Squam Epithel, UA 13 /hpf    Hyaline Casts, UA 33 (A) 0-1/lpf /lpf    Unclass Miesha UA Occasional None-Moderate    Microscopic Comment SEE COMMENT         Imaging Results:  Imaging Results              X-Ray Chest AP Portable (Final result)  Result time 12/31/24 15:39:15      Final result by Allen Al MD (12/31/24 15:39:15)                   Impression:      As above.      Electronically signed by: Allen Al  Date:    12/31/2024  Time:    15:39               Narrative:    EXAMINATION:  XR CHEST AP PORTABLE    CLINICAL HISTORY:  Unspecified coma    TECHNIQUE:  Single frontal view of the chest was performed.    COMPARISON:  Multiple    FINDINGS:  Interstitial opacities possibly related to viral infection.  Correlation is advised.  No pleural fluid or pneumothorax.    The cardiac silhouette is normal in size. The hilar and mediastinal contours are unremarkable.    Bones are intact.                                       CT Head Without Contrast (Final result)  Result time 12/31/24 15:02:38      Final result by Wily AnguianoJens), MD (12/31/24 15:02:38)                   Impression:      No acute intracranial abnormality    All CT scans at this facility use dose modulation, iterative reconstructions, and/or weight base dosing when appropriate to reduce radiation dose to as low as reasonably achievable.      Electronically signed by: Wily Anguiano MD  Date:    12/31/2024  Time:    15:02               Narrative:    EXAMINATION:  CT HEAD WITHOUT CONTRAST    CLINICAL HISTORY:  Syncope, simple, normal neuro exam;    TECHNIQUE:  Noncontrast images were obtained    COMPARISON:  None    FINDINGS:  No intracranial acute hemorrhage or acute focal brain parenchymal abnormality is identified.  Calvarium is intact.                                       The EKG  was ordered, reviewed, and independently interpreted by the ED provider.  Interpretation time: 15:46  Rate: 60 BPM  Rhythm: normal sinus rhythm  Interpretation: Right bundle branch block. No STEMI.      The Emergency Provider reviewed the vital signs and test results, which are outlined above.     ED Discussion       9:46 PM: Reassessed pt at this time. Discussed with pt all pertinent ED information and results. Discussed pt dx and plan of tx. Gave pt all f/u and return to the ED instructions. All questions and concerns were addressed at this time. Pt expresses understanding of information and instructions, and is comfortable with plan to discharge. Pt is stable for discharge.    I discussed with patient and/or family/caretaker that evaluation in the ED does not suggest any emergent or life threatening medical conditions requiring immediate intervention beyond what was provided in the ED, and I believe patient is safe for discharge.  Regardless, an unremarkable evaluation in the ED does not preclude the development or presence of a serious of life threatening condition. As such, patient was instructed to return immediately for any worsening or change in current symptoms.        Medical Decision Making  79-year-old female who has been feeling unwell recently and was diagnosed with influenza yesterday presents to the emergency department today after having what appeared to be a syncopal episode at home.  Patient had reported feeling dizzy and lightheaded.  She does have a history of atrial fibrillation, but was cardioverted and states no episodes of atrial fibrillation since the cardioversion.  She is in normal sinus rhythm here.  She was treated with IV fluids.  Feeling better.  Found to have a urinary tract infection.  Will treat that with antibiotics.  Mild hypokalemia was treated with potassium.  Patient is stable and nontoxic appearing and discharged home with outpatient follow-up instructions and ER return  precautions.  She does have Tamiflu at home, but states she stopped taking it because it made her feel restless    Amount and/or Complexity of Data Reviewed  Independent Historian:      Details: Daughter helps provide majority of the history.  Daughters history is helpful given that patient had experienced a loss of consciousness  External Data Reviewed: notes.     Details: Past medical history, medications, allergies reviewed  Labs: ordered. Decision-making details documented in ED Course.  Radiology: ordered and independent interpretation performed. Decision-making details documented in ED Course.  ECG/medicine tests: ordered and independent interpretation performed. Decision-making details documented in ED Course.    Risk  Prescription drug management.                ED Medication(s):  Medications   cefTRIAXone injection 1 g (1 g Intravenous Given 12/31/24 2052)   potassium chloride SA CR tablet 40 mEq (40 mEq Oral Given 12/31/24 2040)   sodium chloride 0.9% bolus 1,000 mL 1,000 mL (1,000 mLs Intravenous New Bag 12/31/24 2136)       New Prescriptions    CEFDINIR (OMNICEF) 300 MG CAPSULE    Take 1 capsule (300 mg total) by mouth 2 (two) times daily. for 7 days        Follow-up Information       Call  Jarrett Peck MD.    Specialties: Internal Medicine, Family Medicine  Contact information:  [address] [address] [address] [phone]               [facility] - Emergency Dept..    Specialty: Emergency Medicine  Why: As needed, If symptoms worsen  Contact information:  [address] [facility] [address] [phone]                               Scribe Attestation:   Scribe #1: I performed the above scribed service and the documentation accurately describes the services I performed. I attest to the accuracy of the note.     Attending:   Physician Attestation Statement for Scribe #1: I, Familia Johnson MD, personally performed the services described in this documentation, as scribed  by Josse Menezes, in my presence, and it is both accurate and complete.           Clinical Impression       ICD-10-CM ICD-9-CM   1. Influenza  J11.1 487.1   2. Loss of consciousness  R40.20 780.09   3. Cough  R05.9 786.2   4. Urinary tract infection with hematuria, site unspecified  N39.0 599.0    R31.9 599.70   5. Hypokalemia  E87.6 276.8   6. Syncope, unspecified syncope type  R55 780.2       Disposition:   Disposition: Discharged  Condition: Stable               Familia Johnson MD  12/31/24 [id number]

## 2025-01-03 LAB — BACTERIA UR CULT: ABNORMAL

## 2025-01-06 ENCOUNTER — OFFICE VISIT (OUTPATIENT)
Dept: INTERNAL MEDICINE | Facility: CLINIC | Age: 80
End: 2025-01-06
Payer: MEDICARE

## 2025-01-06 ENCOUNTER — HOSPITAL ENCOUNTER (OUTPATIENT)
Dept: RADIOLOGY | Facility: HOSPITAL | Age: 80
Discharge: HOME | End: 2025-01-06
Attending: STUDENT IN AN ORGANIZED HEALTH CARE EDUCATION/TRAINING PROGRAM
Payer: MEDICARE

## 2025-01-06 VITALS
BODY MASS INDEX: 32.46 KG/M2 | DIASTOLIC BLOOD PRESSURE: 74 MMHG | OXYGEN SATURATION: 94 % | HEART RATE: 68 BPM | HEIGHT: 62 IN | SYSTOLIC BLOOD PRESSURE: 136 MMHG | WEIGHT: 176.38 LBS | TEMPERATURE: 98 F

## 2025-01-06 DIAGNOSIS — N18.31 STAGE 3A CHRONIC KIDNEY DISEASE: Primary | ICD-10-CM

## 2025-01-06 DIAGNOSIS — Z12.31 SCREENING MAMMOGRAM FOR BREAST CANCER: ICD-10-CM

## 2025-01-06 DIAGNOSIS — I48.91 NEW ONSET ATRIAL FIBRILLATION: ICD-10-CM

## 2025-01-06 DIAGNOSIS — K21.9 GASTROESOPHAGEAL REFLUX DISEASE WITHOUT ESOPHAGITIS: ICD-10-CM

## 2025-01-06 DIAGNOSIS — E78.2 MIXED HYPERLIPIDEMIA: ICD-10-CM

## 2025-01-06 DIAGNOSIS — I50.9 CHRONIC CONGESTIVE HEART FAILURE, UNSPECIFIED HEART FAILURE TYPE: ICD-10-CM

## 2025-01-06 DIAGNOSIS — I10 ESSENTIAL HYPERTENSION: ICD-10-CM

## 2025-01-06 PROCEDURE — 77063 BREAST TOMOSYNTHESIS BI: CPT | Mod: 26,HCNC,, | Performed by: RADIOLOGY

## 2025-01-06 PROCEDURE — 1101F PT FALLS ASSESS-DOCD LE1/YR: CPT | Mod: HCNC,CPTII,S$GLB, | Performed by: STUDENT IN AN ORGANIZED HEALTH CARE EDUCATION/TRAINING PROGRAM

## 2025-01-06 PROCEDURE — 77067 SCR MAMMO BI INCL CAD: CPT | Mod: TC,HCNC,PO

## 2025-01-06 PROCEDURE — G2211 COMPLEX E/M VISIT ADD ON: HCPCS | Mod: HCNC,S$GLB,, | Performed by: STUDENT IN AN ORGANIZED HEALTH CARE EDUCATION/TRAINING PROGRAM

## 2025-01-06 PROCEDURE — 3288F FALL RISK ASSESSMENT DOCD: CPT | Mod: HCNC,CPTII,S$GLB, | Performed by: STUDENT IN AN ORGANIZED HEALTH CARE EDUCATION/TRAINING PROGRAM

## 2025-01-06 PROCEDURE — 77067 SCR MAMMO BI INCL CAD: CPT | Mod: 26,HCNC,, | Performed by: RADIOLOGY

## 2025-01-06 PROCEDURE — 1126F AMNT PAIN NOTED NONE PRSNT: CPT | Mod: HCNC,CPTII,S$GLB, | Performed by: STUDENT IN AN ORGANIZED HEALTH CARE EDUCATION/TRAINING PROGRAM

## 2025-01-06 PROCEDURE — 99214 OFFICE O/P EST MOD 30 MIN: CPT | Mod: HCNC,S$GLB,, | Performed by: STUDENT IN AN ORGANIZED HEALTH CARE EDUCATION/TRAINING PROGRAM

## 2025-01-06 PROCEDURE — 99999 PR PBB SHADOW E&M-EST. PATIENT-LVL V: CPT | Mod: PBBFAC,HCNC,, | Performed by: STUDENT IN AN ORGANIZED HEALTH CARE EDUCATION/TRAINING PROGRAM

## 2025-01-06 PROCEDURE — 1160F RVW MEDS BY RX/DR IN RCRD: CPT | Mod: HCNC,CPTII,S$GLB, | Performed by: STUDENT IN AN ORGANIZED HEALTH CARE EDUCATION/TRAINING PROGRAM

## 2025-01-06 PROCEDURE — 1159F MED LIST DOCD IN RCRD: CPT | Mod: HCNC,CPTII,S$GLB, | Performed by: STUDENT IN AN ORGANIZED HEALTH CARE EDUCATION/TRAINING PROGRAM

## 2025-01-06 PROCEDURE — 3075F SYST BP GE 130 - 139MM HG: CPT | Mod: HCNC,CPTII,S$GLB, | Performed by: STUDENT IN AN ORGANIZED HEALTH CARE EDUCATION/TRAINING PROGRAM

## 2025-01-06 PROCEDURE — 3078F DIAST BP <80 MM HG: CPT | Mod: HCNC,CPTII,S$GLB, | Performed by: STUDENT IN AN ORGANIZED HEALTH CARE EDUCATION/TRAINING PROGRAM

## 2025-01-06 NOTE — PROGRESS NOTES
Chief Complaint   Patient presents with    Hospital Follow Up     HPI: Amber Naranjo is a 79 y.o. female  with Pmhx listed below who presents to clinic for    History of Present Illness    CHIEF COMPLAINT:  - Ms. Naranjo presents for follow-up after a recent emergency room visit for a syncopal episode with brief loss of consciousness and tremoring.    HPI:  Ms. Naranjo reports an episode of head extension, shaking, altered eye appearance, and loss of consciousness with no recollection of the event. This prompted a bystander to call emergency services. M She denies tongue biting or loss of bladder or bowel control.  She was then evaluated in the ER on 12/31/2024.  During the visit, routine lab works were done which was stable.  CT head was normal.  UA did show  UTI and was treated with Rocephin and was discharged on cefdinir.  She has 3 pills remaining of cefdinir.  Following release from the, she denies subsequent episodes of syncope, tremor, SOB, chest pain, headache, dizziness, blurry vision, loss of control of bowel and bladder, abnormal body movements other complaints at present.  I reviewed labs from her ER visit with did show some hyponatremia and mild elevation of liver enzymes which needs to be followed up.  Repeat this lab work today.  Past medical history is significant for having atrial fibrillation status post cardioversion done by Dr. Cabezas on 12/13/2024.  Now on Pacerone, Eliquis and metoprolol reports to be compliant.  No other issues or concerns today.     Problem List:  Patient Active Problem List   Diagnosis    Essential hypertension    Diverticulosis of colon    Tortuous colon    Uterine fibroid    Hyperlipidemia    Glaucoma    GERD (gastroesophageal reflux disease)    Arthritis    Seasonal allergic rhinitis due to pollen    Obesity (BMI 30.0-34.9)    Osteopenia of multiple sites    Postmenopausal    Primary osteoarthritis of right knee    Spondylolisthesis at L5-S1 level    Abdominal aortic  atherosclerosis    Dyspnea on exertion    Stage 3a chronic kidney disease    Anxiety    Senile purpura    New onset atrial fibrillation       ROS: Negative except as noted above.       Current Meds:  Current Outpatient Medications   Medication Sig Dispense Refill    albuterol (PROVENTIL/VENTOLIN HFA) 90 mcg/actuation inhaler INHALE 2 PUFFS INTO THE LUNGS EVERY 6 (SIX) HOURS AS NEEDED FOR SHORTNESS OF BREATH. 54 g 3    alendronate (FOSAMAX) 70 MG tablet Take 1 tablet (70 mg total) by mouth every 7 days. Take your FOSAMAX tablet with a full glass (6-8 oz) of plain water only. Do not chew or suck on a tablet of FOSAMAX. After swallowing your FOSAMAX tablet, do not lie down - stay fully upright (sitting, standing, or walking) for at least 30 minutes. Do not lie down until after your first food of the day. 4 tablet 11    amiodarone (PACERONE) 200 MG Tab Take 200 mg by mouth.      amLODIPine (NORVASC) 5 MG tablet TAKE 1 TABLET ONE TIME DAILY 90 tablet 3    apixaban (ELIQUIS) 5 mg Tab Take 1 tablet (5 mg total) by mouth 2 (two) times daily. 60 tablet 5    aspirin (ECOTRIN) 81 MG EC tablet Take 81 mg by mouth once daily.      atorvastatin (LIPITOR) 20 MG tablet TAKE 1 TABLET EVERY DAY 90 tablet 3    calcium citrate-vitamin D3 315-200 mg (CITRACAL+D) 315 mg-5 mcg (200 unit) per tablet Take 1 tablet by mouth 2 (two) times daily.      cefdinir (OMNICEF) 300 MG capsule Take 1 capsule (300 mg total) by mouth 2 (two) times daily. for 7 days 14 capsule 0    cetirizine (ZYRTEC) 10 MG tablet TAKE 1 TABLET ONE TIME DAILY 90 tablet 3    furosemide (LASIX) 40 MG tablet Take 1 tablet (40 mg total) by mouth daily as needed. 30 tablet 3    metoprolol succinate (TOPROL-XL) 25 MG 24 hr tablet TAKE 1 TABLET EVERY DAY 90 tablet 3    multivitamin capsule Take 1 capsule by mouth once daily.      omeprazole (PRILOSEC) 40 MG capsule TAKE 1 CAPSULE EVERY MORNING 90 capsule 3    sertraline (ZOLOFT) 25 MG tablet TAKE 1 TABLET EVERY DAY 90 tablet 0     timolol maleate 0.5% (TIMOPTIC) 0.5 % Drop Place into both eyes every evening.       No current facility-administered medications for this visit.      PE:  BP: 136/74  Pulse: 68     Temp: 98.3 °F (36.8 °C)  Weight: 80 kg (176 lb 5.9 oz) Body mass index is 32.26 kg/m².    Wt Readings from Last 5 Encounters:   01/06/25 80 kg (176 lb 5.9 oz)   12/30/24 78.1 kg (172 lb 2.9 oz)   12/26/24 81.6 kg (179 lb 14.3 oz)   12/12/24 79.4 kg (175 lb)   11/26/24 81.6 kg (180 lb)     General appearance: alert and cooperative, not in acute distress  Head: normocephalic, without obvious abnormality, atraumatic  Eyes: conjunctivae/corneas clear. PERRL, EOM's intact.  Ears: clear tympanic membranes   Neck: no adenopathy, supple, symmetrical, trachea midline and thyroid not enlarged, symmetric, no tenderness/mass/nodules, no JVD  Throat: lips, mucosa, and tongue normal; teeth and gums normal; no thrush  Chest: no reproducible chest pain   Heart: regular rate and rhythm, S1, S2 normal, no murmur, click, rub or gallop  Lungs: unlabored respiration, bilateral equal air entry, normal vesicular breath sound heard, no wheezing, rhonchi   Abdomen: soft, non-tender, non-distended; bowel sounds +; no masses,  no organomegaly, no ascites   Extremities: normal, atraumatic, no cyanosis or edema noted B/L upper and lower extremities.  Skin: skin color, texture, turgor normal. No rashes or lesions noted.  Neurologic: grossly intact      Lab:  Lab Results   Component Value Date    WBC 7.43 12/31/2024    HGB 12.7 12/31/2024    HCT 39.2 12/31/2024    MCV 93 12/31/2024     12/31/2024     (L) 12/31/2024    K 3.3 (L) 12/31/2024    CL 95 12/31/2024    CO2 26 12/31/2024    BUN 24 (H) 12/31/2024     (H) 12/31/2024    CALCIUM 8.9 12/31/2024    MG 1.8 11/04/2024    PHOS 2.7 02/04/2024    AST 52 (H) 12/31/2024    ALT 45 (H) 12/31/2024    CHOL 138 05/20/2024    HDL 46 05/20/2024    LDLCALC 73.2 05/20/2024    TRIG 94 05/20/2024        Impression:    ICD-10-CM ICD-9-CM    1. Stage 3a chronic kidney disease  N18.31 585.3 COMPREHENSIVE METABOLIC PANEL      2. Mixed hyperlipidemia  E78.2 272.2       3. Essential hypertension  I10 401.9       4. New onset atrial fibrillation  I48.91 427.31       5. Chronic congestive heart failure, unspecified heart failure type  I50.9 428.0       6. Screening mammogram for breast cancer  Z12.31 V76.12 Mammo Digital Screening Bilat w/ Diego          Assessment & Plan    CONGESTIVE HEART FAILURE, UNSPECIFIED HEART FAILURE TYPE:  - Continue Lasix (furosemide) for fluid management.  - Assessed patient's current condition, noting absence of shortness of breath or chest pain.  - Emphasized importance of maintaining adequate hydration while on Lasix to prevent dehydration and electrolyte imbalances.  - Plan to order labs to monitor patient's condition.    ONSET ATRIAL FIBRILLATION:  - Noted history of recent cardioversion performed by Dr. Cabezas during hospital admission.  - Evaluated patient's heart rhythm post-cardioversion and found it to be stable.    3A CHRONIC KIDNEY DISEASE:  - Reviewed emergency room findings indicating abnormal kidney function, noting decrease in GFR suggesting worsening kidney function.  - Inquired about patient's fluid intake and stressed its importance for kidney function, especially while on Lasix.  - Plan to order labs, including kidney function tests, to monitor condition.    SYNCOPE:  - Evaluated patient following recent emergency room visit for loss of consciousness.  - Ms. Naranjo experienced an episode with shaking and glassy eyes, without recollection of the event, tongue biting, or loss of bladder/bowel control.  - No further episodes of syncope or shaking since hospital discharge.  - Reviewed emergency room visit and hospital records, noting normal CT head, absence of post-ictal confusion, and no history of seizures.    TRACT INFECTION (UTI):  - Continued antibiotic treatment for  UTI, noting 3 pills remaining.    MAMMOGRAM:  - Reminded patient about due mammogram and ordered the procedure.  - Will contact the patient with results.    MANAGEMENT:  - Advised against consuming large quantities of water at once, emphasizing consistent hydration throughout the day.  - Noted patient's reported dislike of water and self-comparison to a camel.  - Ms. Naranjo to increase fluid intake gradually throughout the day.    1. Stage 3a chronic kidney disease (Primary)  - counseled on increase water intake at least 3 litre of H20 to remain hydrated  - stay away from nephrotoxic drugs like Ibuprofen and NSAID  - Counseled on the need to control HTN and Blood glucose to prevent the disease progression  - low salt diet  - dietary modification: renal diet encouraged  - COMPREHENSIVE METABOLIC PANEL; Future    2. Mixed hyperlipidemia  On statin to be continued.     3. Essential hypertension  Low salt diet.  Enouraged to increase in physical activity at least 30 minutes of brisk walking/day , 5 days a week  Advised weight loss    Advised for DASH diet - The Dietary Approaches to Stop Hypertension (DASH) is high in vegetables, fruits, low-fat dairy products, whole grains, poultry, fish, and nuts and low in sweets, sugar-sweetened beverages, and red meats   Stop smoking.  Limit caffeine intake  Limit alcohol intake <3/day for men and <2/day for women.  Advised to be compliant with medication.  Please monitor your blood pressure regularly at home   Return precaution provided  On Norvasc to be continued  Continue Toprol XL     4. New onset atrial fibrillation  On amiodarone to be continued  On eliquis to be continued  Recently had cardioversion/defibrillation done with dr. Cabezas on 12/13/2024    5. Chronic congestive heart failure, unspecified heart failure type  Last echo on 11/04/2024:    Left Ventricle: The left ventricle is mildly dilated. Normal wall thickness. There is normal systolic function. Ejection fraction is  approximately 55%. There is normal diastolic function.    Right Ventricle: Normal right ventricular cavity size. Wall thickness is normal. Systolic function is normal.    Aortic Valve: There is mild aortic valve sclerosis.    Pulmonary Artery: The estimated pulmonary artery systolic pressure is 30 mmHg.    IVC/SVC: Normal venous pressure at 3 mmHg.  Continue toprol XL  Now on lasix every other day  Following dr. Cabezas     6. Screening mammogram for breast cancer  - Mammo Digital Screening Bilat w/ Diego; Future    7. Gastroesophageal reflux disease without esophagitis  On Prilosec to be continued.      Future Appointments   Date Time Provider Department Center   1/6/2025  9:40 AM [facility] M[facility]1 [facility] [place]   1/6/2025 11:00 AM [facility] LABORATORY [facility] [place]   2/13/2025 11:00 AM [facility]    5/26/2025  8:40 AM Jarrett Peck MD [facility] [place]       I spent a total of 32 minutes on the day of the visit.This includes face to face time and non-face to face time preparing to see the patient (eg, review of tests), obtaining and/or reviewing separately obtained history, documenting clinical information in the electronic or other health record, independently interpreting results and communicating results to the patient/family/caregiver, or care coordinator.  Visit today included increased complexity associated with the care of the episodic problem   addressed and managing the longitudinal care of the patient due to the serious and/or complex managed problem(s) .     Jarrett Peck MD    This note was generated with the assistance of ambient listening technology. Verbal consent was obtained by the patient and accompanying visitor(s) for the recording of patient appointment to facilitate this note. I attest to having reviewed and edited the generated note for accuracy, though some syntax or spelling errors may persist. Please contact the author of this note for any  clarification.

## 2025-01-13 DIAGNOSIS — Z00.00 ENCOUNTER FOR MEDICARE ANNUAL WELLNESS EXAM: ICD-10-CM

## 2025-01-29 DIAGNOSIS — M85.89 OSTEOPENIA OF MULTIPLE SITES: ICD-10-CM

## 2025-01-29 RX ORDER — ALENDRONATE SODIUM 70 MG/1
TABLET ORAL
Qty: 12 TABLET | Refills: 3 | Status: SHIPPED | OUTPATIENT
Start: 2025-01-29

## 2025-01-29 NOTE — TELEPHONE ENCOUNTER
Care Due:                  Date            Visit Type   Department     Provider  --------------------------------------------------------------------------------                                HOSPITAL     [facility]  Last Visit: 01-      FOLLOW UP    MEDICINE       Jarrett Peck                              EP -                              PRIMARY      [facility]  Next Visit: 05-      CARE ([facility])   MEDICINE       Jarretttalha Peck                                                            Last  Test          Frequency    Reason                     Performed    Due Date  --------------------------------------------------------------------------------    Phosphate...  12 months..  alendronate..............  02- 01-    Health [facility] Embedded Care Due Messages. Reference number: [id number].   1/29/2025 1:58:58 AM CST

## 2025-02-13 ENCOUNTER — LAB VISIT (OUTPATIENT)
Dept: LAB | Facility: HOSPITAL | Age: 80
End: 2025-02-13
Attending: STUDENT IN AN ORGANIZED HEALTH CARE EDUCATION/TRAINING PROGRAM
Payer: MEDICARE

## 2025-02-13 ENCOUNTER — OFFICE VISIT (OUTPATIENT)
Dept: RHEUMATOLOGY | Facility: CLINIC | Age: 80
End: 2025-02-13
Payer: MEDICARE

## 2025-02-13 ENCOUNTER — PATIENT OUTREACH (OUTPATIENT)
Dept: ADMINISTRATIVE | Facility: HOSPITAL | Age: 80
End: 2025-02-13
Payer: MEDICARE

## 2025-02-13 VITALS
DIASTOLIC BLOOD PRESSURE: 58 MMHG | SYSTOLIC BLOOD PRESSURE: 112 MMHG | HEIGHT: 62 IN | BODY MASS INDEX: 32.26 KG/M2 | HEART RATE: 55 BPM

## 2025-02-13 DIAGNOSIS — M81.0 POSTMENOPAUSAL OSTEOPOROSIS: ICD-10-CM

## 2025-02-13 DIAGNOSIS — M81.0 POSTMENOPAUSAL OSTEOPOROSIS: Primary | ICD-10-CM

## 2025-02-13 LAB — 25(OH)D3+25(OH)D2 SERPL-MCNC: 53 NG/ML (ref 30–96)

## 2025-02-13 PROCEDURE — 3074F SYST BP LT 130 MM HG: CPT | Mod: HCNC,CPTII,S$GLB, | Performed by: STUDENT IN AN ORGANIZED HEALTH CARE EDUCATION/TRAINING PROGRAM

## 2025-02-13 PROCEDURE — 82306 VITAMIN D 25 HYDROXY: CPT | Mod: HCNC | Performed by: STUDENT IN AN ORGANIZED HEALTH CARE EDUCATION/TRAINING PROGRAM

## 2025-02-13 PROCEDURE — 99999 PR PBB SHADOW E&M-EST. PATIENT-LVL III: CPT | Mod: PBBFAC,HCNC,, | Performed by: STUDENT IN AN ORGANIZED HEALTH CARE EDUCATION/TRAINING PROGRAM

## 2025-02-13 PROCEDURE — 1159F MED LIST DOCD IN RCRD: CPT | Mod: HCNC,CPTII,S$GLB, | Performed by: STUDENT IN AN ORGANIZED HEALTH CARE EDUCATION/TRAINING PROGRAM

## 2025-02-13 PROCEDURE — 1101F PT FALLS ASSESS-DOCD LE1/YR: CPT | Mod: HCNC,CPTII,S$GLB, | Performed by: STUDENT IN AN ORGANIZED HEALTH CARE EDUCATION/TRAINING PROGRAM

## 2025-02-13 PROCEDURE — 3078F DIAST BP <80 MM HG: CPT | Mod: HCNC,CPTII,S$GLB, | Performed by: STUDENT IN AN ORGANIZED HEALTH CARE EDUCATION/TRAINING PROGRAM

## 2025-02-13 PROCEDURE — 3288F FALL RISK ASSESSMENT DOCD: CPT | Mod: HCNC,CPTII,S$GLB, | Performed by: STUDENT IN AN ORGANIZED HEALTH CARE EDUCATION/TRAINING PROGRAM

## 2025-02-13 PROCEDURE — 1125F AMNT PAIN NOTED PAIN PRSNT: CPT | Mod: HCNC,CPTII,S$GLB, | Performed by: STUDENT IN AN ORGANIZED HEALTH CARE EDUCATION/TRAINING PROGRAM

## 2025-02-13 PROCEDURE — 1160F RVW MEDS BY RX/DR IN RCRD: CPT | Mod: HCNC,CPTII,S$GLB, | Performed by: STUDENT IN AN ORGANIZED HEALTH CARE EDUCATION/TRAINING PROGRAM

## 2025-02-13 PROCEDURE — 99204 OFFICE O/P NEW MOD 45 MIN: CPT | Mod: HCNC,S$GLB,, | Performed by: STUDENT IN AN ORGANIZED HEALTH CARE EDUCATION/TRAINING PROGRAM

## 2025-02-13 PROCEDURE — 36415 COLL VENOUS BLD VENIPUNCTURE: CPT | Mod: HCNC | Performed by: STUDENT IN AN ORGANIZED HEALTH CARE EDUCATION/TRAINING PROGRAM

## 2025-02-13 NOTE — PROGRESS NOTES
"Subjective:      Patient ID: Amber Naranjo is a 79 y.o. female.    Chief Complaint: osteopenia    HPI:   Patient presents for Rheumatology evaluation for low bone density. Has osteopenia with high FRAX seen in previous DXAs which has been treated with calcium and vitamin D supplementation. Repeat DXA in 3/2024 again showed stable osteopenia with high FRAX in hip. She was started on Fosamax at that time. She takes it correctly. She denies any complaints today other than chronic knee pain for which she sees Orthopedics.     Previous bone strengthening agents: Fosamax since 3/2024  Taking Vitamin D supplements: Citracal BID  Invasive dental work: No natural teeth.  History of falls: No.  Personal hx of fractures: No.  Family hx of fractures: No.  Acid reflux: Yes.  History of skeletal metastases or radiation to the skeleton: No.  History of kidney stones: No.    Social Hx: Never smoker. No alcohol. <1 cup coffee daily.    Rheumatology ROS is otherwise negative.      Objective:   BP (!) 112/58 (BP Location: Right arm, Patient Position: Sitting)   Pulse (!) 55   Ht 5' 2" (1.575 m)   BMI 32.26 kg/m²   Physical Exam  Constitutional:       General: She is not in acute distress.     Appearance: Normal appearance.   HENT:      Head: Normocephalic and atraumatic.      Mouth/Throat:      Mouth: Mucous membranes are moist.      Pharynx: Oropharynx is clear.   Cardiovascular:      Rate and Rhythm: Normal rate and regular rhythm.   Pulmonary:      Effort: Pulmonary effort is normal.      Breath sounds: Normal breath sounds.   Abdominal:      Palpations: Abdomen is soft.      Tenderness: There is no abdominal tenderness.   Musculoskeletal:         General: No swelling or tenderness.      Cervical back: Normal range of motion. No tenderness.   Skin:     General: Skin is warm and dry.   Neurological:      Mental Status: She is alert and oriented to person, place, and time. Mental status is at baseline.             Assessment and " Plan:     Problem List Items Addressed This Visit    None  Visit Diagnoses       Postmenopausal osteoporosis    -  Primary    Relevant Orders    Comprehensive Metabolic Panel    DXA Bone Density Axial Skeleton 1 or more sites    Vitamin D          DXA Bone Density Axial Skeleton 1 or more sites  Order: [id number]  Status: Final result       Visible to patient: Yes (not seen)       Next appt: 02/21/2025 at 01:00 PM in [facility] ([facility]lyn Metcalf, [facility])       Dx: Menopause    2 Result Notes       1 HM Topic  Details    Reading Physician Reading Date Result Priority   Glenroy Vizcaino,   [phone] 3/4/2024 Routine     Narrative & Impression  EXAMINATION:  DXA BONE DENSITY AXIAL SKELETON 1 OR MORE SITES     CLINICAL HISTORY:  Asymptomatic menopausal state     TECHNIQUE:  DXA scanning was performed over the left hip and lumbar spine.  Review of the images confirms satisfactory positioning and technique.     COMPARISON:  Comparison cannot be made to prior study from 06/15/2020 as it was performed on a different machine.     FINDINGS:  The L1 to L4 vertebral bone mineral density is equal to 0.926 g/cm squared with a T score of -1.1.     The left femoral neck bone mineral density is equal to 0.605 g/cm squared with a T score of -2.2.     There is a 15% risk of a major osteoporotic fracture and a 4.3% risk of hip fracture in the next 10 years (FRAX).     Impression:     Osteopenia     Consider FDA approved medical therapies in postmenopausal women and men aged 50 years and older, based on the following:     *A hip or vertebral (clinical or morphometric) fracture  *T score less than or equal to -2.5 at the femoral neck or spine after appropriate evaluation to exclude secondary causes.  *Low bone mass -- also known as osteopenia (T score between -1.0 and -2.5 at the femoral neck or spine) and a 10 year probability of hip fracture greater than or equal to 3% or a 10 year probability of major osteoporosis-related  fracture greater than or equal to 20% based on the [place]-adapted WHO algorithm.  *Clinicians judgment and/or patient preference may indicate treatment for people with 10 year fracture probabilities is above or below these levels.        Electronically signed by:Glenroy Vizcaino DO  Date:                                            03/04/2024  Time:                                           10:30      Latest Reference Range & Units 01/06/25 09:27   Sodium 136 - 145 mmol/L 142   Potassium 3.5 - 5.1 mmol/L 4.0   Chloride 95 - 110 mmol/L 102   CO2 23 - 29 mmol/L 30 (H)   Anion Gap 8 - 16 mmol/L 10   BUN 8 - 23 mg/dL 18   Creatinine 0.5 - 1.4 mg/dL 1.0   eGFR >60 mL/min/1.73 m^2 57.3 !   Glucose 70 - 110 mg/dL 96   Calcium 8.7 - 10.5 mg/dL 9.1   ALP 40 - 150 U/L 87   PROTEIN TOTAL 6.0 - 8.4 g/dL 6.6   Albumin 3.5 - 5.2 g/dL 3.2 (L)   BILIRUBIN TOTAL 0.1 - 1.0 mg/dL 0.5   AST 10 - 40 U/L 34   ALT 10 - 44 U/L 32   (H): Data is abnormally high  !: Data is abnormal  (L): Data is abnormally low      All of the data above and below has been reviewed by myself and any further interpretations will be reflected in the assessment and plan.   The data includes review of external notes, and independent interpretation of lab results, x-rays, and imaging reports.    Patient presents for Rheumatology evaluation for low bone density.     DXA 3/4/2024 shows osteopenia mild in spine and T score -2.2 hip. High FRAX in hip conferred mainly by the low BMD as well as non-modifiable risk factors.      Plan:  Check Vitamin D today.  Continue Fosamax 70 mg weekly (started 3/2024).  Repeat DXA on or after 3/5/2026.  Follow up with labs after DXA.  Take vitamin D 1000 units daily.  Get calcium 1200 mg from your diet daily.  Osteoporosis precautions:  Regular exercise: aerobic, strength, flexibility, and balance.  Limitation of alcohol consumption to </= 2 drinks per day.  Limitation of caffeine consumption to </= 2 servings per day.  Fall prevention,  avoid high impact/twisting motion, do not flop into a chair.      Patient educated that Fosamax has to be taken first thing in the morning on an empty stomach with 6-8 oz of water. Do not take it with other beverages. You must remain upright (sitting or standing--no lying down) for 30 minutes after taking the medication. Do not take any additional medications, beverages, or food for 45-60 minutes after taking the medication.       Medication Monitoring encounter  No current medication related issues, no evidence of toxicity  Patient was counseled on risk of osteonecrosis of the jaw and atypical femoral fracture with bisphosphonates and denosumab.          Follow up in about 13 months (around 3/13/2026).

## 2025-02-21 ENCOUNTER — OFFICE VISIT (OUTPATIENT)
Dept: HOME HEALTH SERVICES | Facility: CLINIC | Age: 80
End: 2025-02-21
Payer: MEDICARE

## 2025-02-21 VITALS
BODY MASS INDEX: 32.57 KG/M2 | WEIGHT: 177 LBS | TEMPERATURE: 98 F | HEART RATE: 42 BPM | DIASTOLIC BLOOD PRESSURE: 58 MMHG | SYSTOLIC BLOOD PRESSURE: 142 MMHG | OXYGEN SATURATION: 96 % | HEIGHT: 62 IN

## 2025-02-21 DIAGNOSIS — I70.0 ABDOMINAL AORTIC ATHEROSCLEROSIS: ICD-10-CM

## 2025-02-21 DIAGNOSIS — H40.9 GLAUCOMA OF BOTH EYES, UNSPECIFIED GLAUCOMA TYPE: ICD-10-CM

## 2025-02-21 DIAGNOSIS — E66.811 OBESITY (BMI 30.0-34.9): Chronic | ICD-10-CM

## 2025-02-21 DIAGNOSIS — K21.9 GASTROESOPHAGEAL REFLUX DISEASE WITHOUT ESOPHAGITIS: Chronic | ICD-10-CM

## 2025-02-21 DIAGNOSIS — Z00.00 ENCOUNTER FOR PREVENTIVE HEALTH EXAMINATION: Primary | ICD-10-CM

## 2025-02-21 DIAGNOSIS — E78.2 MIXED HYPERLIPIDEMIA: Chronic | ICD-10-CM

## 2025-02-21 DIAGNOSIS — M43.17 SPONDYLOLISTHESIS AT L5-S1 LEVEL: ICD-10-CM

## 2025-02-21 DIAGNOSIS — I48.91 NEW ONSET ATRIAL FIBRILLATION: ICD-10-CM

## 2025-02-21 DIAGNOSIS — I10 ESSENTIAL HYPERTENSION: Chronic | ICD-10-CM

## 2025-02-21 DIAGNOSIS — M17.11 PRIMARY OSTEOARTHRITIS OF RIGHT KNEE: ICD-10-CM

## 2025-02-21 DIAGNOSIS — J30.1 SEASONAL ALLERGIC RHINITIS DUE TO POLLEN: ICD-10-CM

## 2025-02-21 DIAGNOSIS — R00.1 BRADYCARDIA: ICD-10-CM

## 2025-02-21 DIAGNOSIS — K57.30 DIVERTICULOSIS OF COLON: ICD-10-CM

## 2025-02-21 DIAGNOSIS — M19.90 ARTHRITIS: ICD-10-CM

## 2025-02-21 DIAGNOSIS — I50.9 CHRONIC CONGESTIVE HEART FAILURE, UNSPECIFIED HEART FAILURE TYPE: ICD-10-CM

## 2025-02-21 DIAGNOSIS — F41.9 ANXIETY: Chronic | ICD-10-CM

## 2025-02-21 DIAGNOSIS — M85.89 OSTEOPENIA OF MULTIPLE SITES: ICD-10-CM

## 2025-02-21 DIAGNOSIS — N18.31 STAGE 3A CHRONIC KIDNEY DISEASE: ICD-10-CM

## 2025-02-21 DIAGNOSIS — R26.9 ABNORMALITY OF GAIT AND MOBILITY: ICD-10-CM

## 2025-02-21 NOTE — PATIENT INSTRUCTIONS
Counseling and Referral of Other Preventative  (Italic type indicates deductible and co-insurance are waived)    Patient Name: Amber Naranjo  Today's Date: 2/21/2025    Health Maintenance       Date Due Completion Date    COVID-19 Vaccine (6 - 2024-25 season) 09/01/2024 8/10/2023    Lipid Panel 05/20/2025 5/20/2024    Mammogram 01/06/2026 1/6/2025    Override on 9/15/2015: Done (PER CARE EVERYWHERE)    DEXA Scan 03/04/2027 3/4/2024    Colonoscopy 03/15/2027 3/15/2017    TETANUS VACCINE 05/23/2027 5/23/2017    Override on 5/23/2017: Done        No orders of the defined types were placed in this encounter.    The following information is provided to all patients.  This information is to help you find resources for any of the problems found today that may be affecting your health:                  Living healthy guide: www.[facility].[place]      Understanding Diabetes: www.diabetes.org      Eating healthy: www.cdc.gov/healthyweight      CDC home safety checklist: www.cdc.gov/steadi/patient.html      Agency on Aging: www.goea.[place].[facility]      Alcoholics anonymous (AA): www.aa.org      Physical Activity: www.[place].nih.gov/ck2nlnr      Tobacco use: www.quitwithusla.org

## 2025-02-21 NOTE — Clinical Note
Medicare awv complete. Health maintenance:  updated covid 19 vaccine due-encouraged pt to obtain at a pharmacy.    Bradycardia pt's heart rate is 42bpm today. bp142/58. I noticed it has not been this low on the vitals signs flowsheet in her chart. She is asymptomatic.she is on metoprolol and amiodarone.  She has been doing home readings and hr ranges 40s-50s.  Above sent in a secure chat during the time of assessment. Since no response, later sent a message to Dr. Cabezas cardiology and cc'd Dr. Metcalf.  She is also using timolol eye drops.

## 2025-02-21 NOTE — PROGRESS NOTES
"  Amber Naranjo presented for a follow-up Medicare AWV today. The following components were reviewed and updated:    Medical history  Family History  Social history  Allergies and Current Medications  Health Risk Assessment  Health Maintenance  Care Team    **See Completed Assessments for Annual Wellness visit with in the encounter summary    The following assessments were completed:  Depression Screening  Cognitive function Screening    Timed Get Up Test  Whisper Test      Opioid documentation:      Patient does not have a current opioid prescription.          Vitals:    02/21/25 1223   BP: (!) 142/58   Pulse: (!) 42   Temp: 97.9 °F (36.6 °C)   TempSrc: Temporal   SpO2: 96%   Weight: 80.3 kg (177 lb)   Height: 5' 2" (1.575 m)     Body mass index is 32.37 kg/m².       Physical Exam  HENT:      Mouth/Throat:      Mouth: Mucous membranes are moist.   Cardiovascular:      Rate and Rhythm: Regular rhythm. Bradycardia present.      Pulses: Normal pulses.      Heart sounds: Normal heart sounds.   Pulmonary:      Effort: Pulmonary effort is normal.      Breath sounds: Normal breath sounds.   Skin:     General: Skin is warm and dry.      Findings: Bruising (arms) present.   Neurological:      General: No focal deficit present.      Mental Status: She is alert and oriented to person, place, and time.   Psychiatric:         Mood and Affect: Mood normal.         Behavior: Behavior normal.                 Diagnoses and health risks identified today and associated recommendations/orders:  1. Encounter for preventive health examination  Medicare awv complete. Health maintenance:  updated covid 19 vaccine due-encouraged pt to obtain at a pharmacy.      2. Chronic congestive heart failure, unspecified heart failure type  Symptoms stable. Continue Norvasc, Lipitor, Lasix, Toprol-XL, aspirin, amiodarone . Follow up with pcp.      3. New onset atrial fibrillation  Symptoms and hr stable. Continue Toprol-XL, Eliquis . Follow up with pcp.  "     4. Stage 3a chronic kidney disease   Latest Reference Range & Units 09/27/22 08:55 03/27/23 09:07 08/02/23 15:14 11/13/23 10:38 02/04/24 09:10 05/20/24 13:00 11/03/24 16:56 11/04/24 05:05 11/05/24 04:35 12/31/24 16:03 01/06/25 09:27   eGFR >60 mL/min/1.73 m^2 58.4 ! 58.0 ! 46.6 ! >60.0 51.4 ! 51.4 ! >60.0 >60 57 ! 38 ! 57.3 !   !: Data is abnormal  Lab stable.  Continue routine monitoring with labs.  Recommend avoiding nsaids and other nephrotoxic medications. Follow up with PCP/nephrologist.       5. Bradycardia  pt's heart rate is 42bpm today. bp142/58. I noticed it has not been this low on the vitals signs flowsheet in her chart. She is asymptomatic.she is on metoprolol and amiodarone.   She has been doing home readings and hr ranges 40s-50s.   Above sent in a secure chat during the time of assessment. Since no response, later sent a message to Dr. Cabezas cardiology and cc'd Dr. Metcalf.   She is also using timolol eye drops.   Recommend pt schedule an appointment with allergist Dr. Cabezas.    6. Essential hypertension  BP stable. Continue  Norvasc, Lasix, Toprol-XL. Managed by PCP.       7. Mixed hyperlipidemia  Lab Results   Component Value Date    CHOL 138 05/20/2024    CHOL 141 08/02/2023    CHOL 132 09/27/2022     Lab Results   Component Value Date    HDL 46 05/20/2024    HDL 42 08/02/2023    HDL 44 09/27/2022     Lab Results   Component Value Date    LDLCALC 73.2 05/20/2024    LDLCALC 72.8 08/02/2023    LDLCALC 72.4 09/27/2022     Lab Results   Component Value Date    TRIG 94 05/20/2024    TRIG 131 08/02/2023    TRIG 78 09/27/2022       Lab Results   Component Value Date    CHOLHDL 33.3 05/20/2024    CHOLHDL 29.8 08/02/2023    CHOLHDL 33.3 09/27/2022    Labs stable. Continue Lipitor . Continue routine monitoring. Managed by PCP.       8. Abdominal aortic atherosclerosis  Bp stable. Continue aspirin and Lipitor . Follow up with pcp.      9. Anxiety  Symptoms stable. Continue Zoloft . Follow up with pcp.       10. Glaucoma of both eyes, unspecified glaucoma type  Symptoms stable. Continue timolol . Follow up with pcp.      11. Seasonal allergic rhinitis due to pollen  Symptoms stable. Continue Zyrtec . Follow up with pcp.      12. Obesity (BMI 30.0-34.9)  Recommend diet and exercise to lose weight. Follow up with your PCP as planned to discuss adjustments to your treatment plan.      13. Gastroesophageal reflux disease without esophagitis  Symptoms stable. Continue  Prilosec. Follow up with pcp.      14. Diverticulosis of colon  Symptoms stable.. Follow up with pcp.      15. Arthritis  Pain controlled. Continue tylenol prn . Follow up with pcp.      16. Primary osteoarthritis of right knee  Pain controlled. Continue tylenol prn . Follow up with pcp.      17. Osteopenia of multiple sites  Chronic and stable on current management. Continue Fosamax, vitamin d, calcium in the diet, and weight bearing exercise.  Follow up with PCP.       18. Spondylolisthesis at L5-S1 level  Pain controlled. Continue tylenol prn . Follow up with pcp.      19. Abnormality of gait and mobility  Chronic. Fall precautions recommended and discussed. Follow up with PCP.        Provided Amber with a 5-10 year written screening schedule and personal prevention plan. Recommendations were developed using the USPSTF age appropriate recommendations. Education, counseling, and referrals were provided as needed.  After Visit Summary printed and given to patient which includes a list of additional screenings\tests needed.    Follow up in about 1 year (around 2/21/2026) for annual wellness visit.      ANAYELI Angel  I offered to discuss advanced care planning, including how to pick a person who would make decisions for you if you were unable to make them for yourself, called a health care power of , and what kind of decisions you might make such as use of life sustaining treatments such as ventilators and tube feeding when faced with a  life limiting illness recorded on a living will that they will need to know. (How you want to be cared for as you near the end of your natural life)     X  Patient has advanced directives written and agrees to provide copies to the institution.

## 2025-02-24 ENCOUNTER — TELEPHONE (OUTPATIENT)
Dept: INTERNAL MEDICINE | Facility: CLINIC | Age: 80
End: 2025-02-24
Payer: MEDICARE

## 2025-02-24 ENCOUNTER — PATIENT MESSAGE (OUTPATIENT)
Dept: INTERNAL MEDICINE | Facility: CLINIC | Age: 80
End: 2025-02-24
Payer: MEDICARE

## 2025-02-24 ENCOUNTER — RESULTS FOLLOW-UP (OUTPATIENT)
Dept: RHEUMATOLOGY | Facility: CLINIC | Age: 80
End: 2025-02-24

## 2025-02-24 NOTE — TELEPHONE ENCOUNTER
----- Message from KIMBERLEE Owens sent at 2/23/2025  8:42 PM CST -----  Can you call patient and see if Dr. Cabezas has adjusted her medication regarding her low heart rate?KIMBERLEE Owens  ----- Message -----  From: Roslyn Metcalf FNP  Sent: 2/21/2025   7:59 PM CST  To: Jarrett Peck MD    Medicare awv complete. Health maintenance:  updated covid 19 vaccine due-encouraged pt to obtain at a pharmacy.      Bradycardia  pt's heart rate is 42bpm today. bp142/58. I noticed it has not been this low on the vitals signs flowsheet in her chart. She is asymptomatic.she is on metoprolol and amiodarone.   She has been doing home readings and hr ranges 40s-50s.   Above sent in a secure chat during the time of assessment. Since no response, later sent a message to Dr. Cabezas cardiology and cc'd Dr. Metcalf.   She is also using timolol eye drops.

## 2025-02-25 ENCOUNTER — OFFICE VISIT (OUTPATIENT)
Dept: INTERNAL MEDICINE | Facility: CLINIC | Age: 80
End: 2025-02-25
Payer: MEDICARE

## 2025-02-25 VITALS
OXYGEN SATURATION: 94 % | SYSTOLIC BLOOD PRESSURE: 126 MMHG | BODY MASS INDEX: 33.02 KG/M2 | HEART RATE: 53 BPM | HEIGHT: 62 IN | DIASTOLIC BLOOD PRESSURE: 68 MMHG | TEMPERATURE: 98 F | RESPIRATION RATE: 16 BRPM | WEIGHT: 179.44 LBS

## 2025-02-25 DIAGNOSIS — I10 ESSENTIAL HYPERTENSION: ICD-10-CM

## 2025-02-25 DIAGNOSIS — I50.9 CHRONIC CONGESTIVE HEART FAILURE, UNSPECIFIED HEART FAILURE TYPE: ICD-10-CM

## 2025-02-25 DIAGNOSIS — R00.1 BRADYCARDIA: Primary | ICD-10-CM

## 2025-02-25 DIAGNOSIS — I48.91 NEW ONSET ATRIAL FIBRILLATION: ICD-10-CM

## 2025-02-25 PROCEDURE — G2211 COMPLEX E/M VISIT ADD ON: HCPCS | Mod: HCNC,S$GLB,, | Performed by: NURSE PRACTITIONER

## 2025-02-25 PROCEDURE — 1126F AMNT PAIN NOTED NONE PRSNT: CPT | Mod: HCNC,CPTII,S$GLB, | Performed by: NURSE PRACTITIONER

## 2025-02-25 PROCEDURE — 3078F DIAST BP <80 MM HG: CPT | Mod: HCNC,CPTII,S$GLB, | Performed by: NURSE PRACTITIONER

## 2025-02-25 PROCEDURE — 99214 OFFICE O/P EST MOD 30 MIN: CPT | Mod: HCNC,S$GLB,, | Performed by: NURSE PRACTITIONER

## 2025-02-25 PROCEDURE — 3288F FALL RISK ASSESSMENT DOCD: CPT | Mod: HCNC,CPTII,S$GLB, | Performed by: NURSE PRACTITIONER

## 2025-02-25 PROCEDURE — 3074F SYST BP LT 130 MM HG: CPT | Mod: HCNC,CPTII,S$GLB, | Performed by: NURSE PRACTITIONER

## 2025-02-25 PROCEDURE — 1101F PT FALLS ASSESS-DOCD LE1/YR: CPT | Mod: HCNC,CPTII,S$GLB, | Performed by: NURSE PRACTITIONER

## 2025-02-25 PROCEDURE — 99999 PR PBB SHADOW E&M-EST. PATIENT-LVL V: CPT | Mod: PBBFAC,HCNC,, | Performed by: NURSE PRACTITIONER

## 2025-02-25 PROCEDURE — 1160F RVW MEDS BY RX/DR IN RCRD: CPT | Mod: HCNC,CPTII,S$GLB, | Performed by: NURSE PRACTITIONER

## 2025-02-25 PROCEDURE — 1159F MED LIST DOCD IN RCRD: CPT | Mod: HCNC,CPTII,S$GLB, | Performed by: NURSE PRACTITIONER

## 2025-02-25 RX ORDER — METOPROLOL SUCCINATE 25 MG/1
12.5 TABLET, EXTENDED RELEASE ORAL DAILY
Start: 2025-02-25

## 2025-02-25 NOTE — PROGRESS NOTES
Subjective:       Patient ID: Amber Naranjo is a 79 y.o. female.    Chief Complaint: Bradycardia    History of Present Illness    HPI:  Ms. Naranjo's heart rate has been running low, with readings as low as 41-42 BPM, mostly in the 40s and occasionally in the 50s. She was unaware of this condition until it was discovered during a recent Medicare wellness visit. The duration is unclear, as she reports not paying attention to it previously.    She reports chronic shortness of breath when walking up 5 steps to get home, lifting objects, and bending down to put socks on her feet.    Her blood pressure readings at home have been on the lower end, with systolic pressures in the 110s to 120s and diastolic pressures mostly in the 40s to 60s, occasionally reaching the 70s.    She has an upcoming appointment with Dr. Cabezas, cardiologist in May.    She is currently taking metoprolol 25 mg and amiodarone 200 mg.    She denies dizziness, lightheadedness, unsteadiness, or feeling like she could pass out.          HPI    Problem List[1]    Family History   Problem Relation Name Age of Onset    Glaucoma Mother      Hypertension Father      Asthma Daughter      Depression Daughter      Colon cancer Sister      Cancer Sister  70        colon    Diabetes Sister      Hypertension Sister      Hyperlipidemia Sister      Diabetes Brother      Hypertension Brother      Heart disease Brother      Hyperlipidemia Brother      Asthma Paternal Aunt      Diabetes Paternal Uncle      Diabetes Maternal Grandmother      Early death Maternal Grandfather      Early death Paternal Grandfather      Breast cancer Cousin      COPD Neg Hx      Kidney disease Neg Hx      Stroke Neg Hx      Mental illness Neg Hx       Past Surgical History:   Procedure Laterality Date    CARDIOVERSION N/A 12/13/2024    Procedure: Cardioversion;  Surgeon: Lopez Cabezas MD;  Location: [facility];  Service: Cardiology;  Laterality: N/A;    COLONOSCOPY      COLONOSCOPY N/A  "3/15/2017    Procedure: COLONOSCOPY;  Surgeon: Yogi Carbone MD;  Location: [facility] ENDO;  Service: Endoscopy;  Laterality: N/A;    TREATMENT OF CARDIAC ARRHYTHMIA N/A 12/13/2024    Procedure: Cardioversion/Defibrillation;  Surgeon: Lopez Cabezas MD;  Location: [facility] OR;  Service: Cardiology;  Laterality: N/A;  1st  0600       Current Medications[2]    Review of Systems   Constitutional:  Negative for chills, fatigue and fever.   Respiratory:  Positive for shortness of breath (chronic, on exertion). Negative for chest tightness.    Cardiovascular:  Negative for chest pain, palpitations and leg swelling.   Musculoskeletal:  Negative for myalgias.   Skin:  Negative for rash.   Neurological:  Negative for dizziness, syncope, weakness and light-headedness.       Objective:   /68 (BP Location: Left arm, Patient Position: Sitting)   Pulse (!) 53   Temp 98 °F (36.7 °C) (Oral)   Resp 16   Ht 5' 2" (1.575 m)   Wt 81.4 kg (179 lb 7.3 oz)   SpO2 (!) 94%   BMI 32.82 kg/m²      Physical Exam  Constitutional:       General: She is not in acute distress.     Appearance: Normal appearance. She is obese. She is not ill-appearing, toxic-appearing or diaphoretic.   Eyes:      Conjunctiva/sclera: Conjunctivae normal.   Cardiovascular:      Rate and Rhythm: Regular rhythm. Bradycardia present.      Heart sounds: Normal heart sounds. No murmur heard.  Pulmonary:      Effort: Pulmonary effort is normal. No respiratory distress.      Breath sounds: Normal breath sounds. No wheezing, rhonchi or rales.   Musculoskeletal:      Right lower leg: No edema.      Left lower leg: No edema.   Neurological:      Mental Status: She is alert and oriented to person, place, and time.   Psychiatric:         Mood and Affect: Mood normal.         Behavior: Behavior normal.         Assessment & Plan     Assessment & Plan    ATRIAL FIBRILLATION:  - Continued amiodarone and Eliquis for atrial fibrillation management.  - Acknowledged the patient's " atrial fibrillation and current medication regimen.  - Followed by cardiologist    CONGESTIVE HEART FAILURE:  - Decreased metoprolol from 25 mg to 12.5 mg daily due to bradycardia  - Continue lasix.    BRADYCARDIA:  - Reviewed patient's low heart rate (41-42 bpm) from recent Medicare wellness visit.  - Noted that the patient's heart rate has been running low, mostly in the 40s, sometimes in the 50s.  - Evaluated the patient's low heart rate and its potential effects.  - Assessed the need to adjust metoprolol dosage.  - Decreased metoprolol from 25 mg to 12.5 mg.  - Scheduled a 2-week follow-up nurse visit to monitor heart rate and blood pressure after medication adjustment.    LOW BLOOD PRESSURE:  - Noted that the patient's home blood pressure readings are on the lower end, with systolic in the 110s-120s and diastolic in the 40s-60s.  - Acknowledged the patient's low blood pressure and considered it when adjusting medication.  - Decreased metoprolol from 25 mg to 12.5 mg daily to address low blood pressure and heart rate.  - Scheduled a 2-week follow-up nurse visit to monitor blood pressure after medication adjustment.      FOLLOW UP:  - Ms. Jose A to continue keeping detailed log of heart rate, blood pressure, and weight measurements.  - Ms. Jose A to inform Dr. Cabezas about the metoprolol dosage change.  - Follow up in 2 weeks for nurse visit to review log and assess response to metoprolol dose reduction.  - Follow up with Dr. Cabezas in May as scheduled, bringing medication log to appointment.  - Contact the office if any issues arise before next appointment.          1. Bradycardia    2. Chronic congestive heart failure, unspecified heart failure type    3. New onset atrial fibrillation    4. Essential hypertension  -     metoprolol succinate (TOPROL-XL) 25 MG 24 hr tablet; Take 0.5 tablets (12.5 mg total) by mouth once daily.        Visit today included increased complexity associated with the care of the episodic  "problem addressed and managing the longitudinal care of the patient due to the serious and/or complex managed problem(s).        KIMBERLEE Owens    This note was generated with the assistance of ambient listening technology. Verbal consent was obtained by the patient and accompanying visitor(s) for the recording of patient appointment to facilitate this note. I attest to having reviewed and edited the generated note for accuracy, though some syntax or spelling errors may persist. Please contact the author of this note for any clarification.       Portions of this note may have been created with voice recognition software. Occasional "wrong-word" or "sound-a-like" substitutions may have occurred due to the inherent limitations of voice recognition software. Please, read the note carefully and recognize, using context, where substitutions have occurred.             [1]   Patient Active Problem List  Diagnosis    Essential hypertension    Diverticulosis of colon    Tortuous colon    Uterine fibroid    Hyperlipidemia    Glaucoma    GERD (gastroesophageal reflux disease)    Arthritis    Seasonal allergic rhinitis due to pollen    Obesity (BMI 30.0-34.9)    Osteopenia of multiple sites    Postmenopausal    Primary osteoarthritis of right knee    Spondylolisthesis at L5-S1 level    Abdominal aortic atherosclerosis    Dyspnea on exertion    Stage 3a chronic kidney disease    Anxiety    Senile purpura    New onset atrial fibrillation    Bradycardia    Chronic congestive heart failure   [2]   Current Outpatient Medications:     albuterol (PROVENTIL/VENTOLIN HFA) 90 mcg/actuation inhaler, INHALE 2 PUFFS INTO THE LUNGS EVERY 6 (SIX) HOURS AS NEEDED FOR SHORTNESS OF BREATH., Disp: 54 g, Rfl: 3    alendronate (FOSAMAX) 70 MG tablet, TAKE 1 TABLET WEEKLY AS DIRECTED. SEE PACKAGE FOR ADDITIONAL INSTRUCTIONS, Disp: 12 tablet, Rfl: 3    amiodarone (PACERONE) 200 MG Tab, Take 200 mg by mouth once daily., Disp: , Rfl:     " amLODIPine (NORVASC) 5 MG tablet, TAKE 1 TABLET ONE TIME DAILY, Disp: 90 tablet, Rfl: 3    apixaban (ELIQUIS) 5 mg Tab, Take 1 tablet (5 mg total) by mouth 2 (two) times daily., Disp: 60 tablet, Rfl: 5    aspirin (ECOTRIN) 81 MG EC tablet, Take 81 mg by mouth once daily., Disp: , Rfl:     atorvastatin (LIPITOR) 20 MG tablet, TAKE 1 TABLET EVERY DAY, Disp: 90 tablet, Rfl: 3    calcium citrate-vitamin D3 315-200 mg (CITRACAL+D) 315 mg-5 mcg (200 unit) per tablet, Take 1 tablet by mouth 2 (two) times daily., Disp: , Rfl:     cetirizine (ZYRTEC) 10 MG tablet, TAKE 1 TABLET ONE TIME DAILY, Disp: 90 tablet, Rfl: 3    furosemide (LASIX) 40 MG tablet, Take 1 tablet (40 mg total) by mouth daily as needed., Disp: 30 tablet, Rfl: 3    multivitamin capsule, Take 1 capsule by mouth once daily., Disp: , Rfl:     omeprazole (PRILOSEC) 40 MG capsule, TAKE 1 CAPSULE EVERY MORNING, Disp: 90 capsule, Rfl: 3    sertraline (ZOLOFT) 25 MG tablet, TAKE 1 TABLET EVERY DAY, Disp: 90 tablet, Rfl: 0    timolol maleate 0.5% (TIMOPTIC) 0.5 % Drop, Place into both eyes every evening., Disp: , Rfl:     metoprolol succinate (TOPROL-XL) 25 MG 24 hr tablet, Take 0.5 tablets (12.5 mg total) by mouth once daily., Disp: , Rfl:

## 2025-03-11 ENCOUNTER — CLINICAL SUPPORT (OUTPATIENT)
Dept: INTERNAL MEDICINE | Facility: CLINIC | Age: 80
End: 2025-03-11
Payer: MEDICARE

## 2025-03-11 VITALS — DIASTOLIC BLOOD PRESSURE: 68 MMHG | SYSTOLIC BLOOD PRESSURE: 130 MMHG

## 2025-03-11 DIAGNOSIS — I10 HYPERTENSION, UNSPECIFIED TYPE: Primary | ICD-10-CM

## 2025-03-11 PROCEDURE — 99999 PR PBB SHADOW E&M-EST. PATIENT-LVL II: CPT | Mod: PBBFAC,HCNC,,

## 2025-03-11 NOTE — PROGRESS NOTES
Pt came in for nurse visit to have B/P check. 1st reading was 150/68.  Pt allowed to sit in room for 10 minutes. 2nd B/P reading was 130/68.  Pt stated that B/P meds were taken today. Pt also brought a list of reading which were given to the Dr. Pt encouraged to continue current BP meds as ordered.

## 2025-03-13 ENCOUNTER — PATIENT MESSAGE (OUTPATIENT)
Dept: RHEUMATOLOGY | Facility: CLINIC | Age: 80
End: 2025-03-13
Payer: MEDICARE

## 2025-04-05 DIAGNOSIS — I10 ESSENTIAL HYPERTENSION: ICD-10-CM

## 2025-04-05 RX ORDER — AMLODIPINE BESYLATE 5 MG/1
5 TABLET ORAL
Qty: 90 TABLET | Refills: 3 | Status: SHIPPED | OUTPATIENT
Start: 2025-04-05

## 2025-04-05 NOTE — TELEPHONE ENCOUNTER
Care Due:                  Date            Visit Type   Department     Provider  --------------------------------------------------------------------------------                                HOSPITAL     IB INTERNAL  Last Visit: 01-      FOLLOW UP    MEDICINE       Jarrett Peck                              EP -                              PRIMARY      IB INTERNAL  Next Visit: 05-      CARE ([facility])   MEDICINE       Jarretttalha Peck                                                            Last  Test          Frequency    Reason                     Performed    Due Date  --------------------------------------------------------------------------------    Phosphate...  12 months..  alendronate..............  02- 01-    [facility] Embedded Care Due Messages. Reference number: [id number].   4/05/2025 2:33:11 AM CDT

## 2025-04-05 NOTE — TELEPHONE ENCOUNTER
Refill Decision Note   Amber Naranjo  is requesting a refill authorization.  Brief Assessment and Rationale for Refill:  Approve     Medication Therapy Plan:        Comments:     Note composed:12:02 PM 04/05/2025

## 2025-04-30 ENCOUNTER — HOSPITAL ENCOUNTER (EMERGENCY)
Facility: HOSPITAL | Age: 80
Discharge: HOME | End: 2025-04-30
Attending: EMERGENCY MEDICINE
Payer: MEDICARE

## 2025-04-30 VITALS
BODY MASS INDEX: 33.31 KG/M2 | RESPIRATION RATE: 20 BRPM | HEART RATE: 48 BPM | HEIGHT: 62 IN | OXYGEN SATURATION: 97 % | TEMPERATURE: 98 F | WEIGHT: 181 LBS | DIASTOLIC BLOOD PRESSURE: 63 MMHG | SYSTOLIC BLOOD PRESSURE: 150 MMHG

## 2025-04-30 DIAGNOSIS — M79.89 LEG SWELLING: Primary | ICD-10-CM

## 2025-04-30 PROCEDURE — 99284 EMERGENCY DEPT VISIT MOD MDM: CPT | Mod: 25,HCNC,ER

## 2025-04-30 NOTE — ED PROVIDER NOTES
Encounter Date: 4/30/2025       History     Chief Complaint   Patient presents with    Leg Pain     Hit leg a few weeks ago and still has pain. -swelling/deformity noted.      The history is provided by the patient.   Leg Pain   The incident occurred at home. The injury mechanism was a direct blow. The incident occurred several days ago. The pain has been Constant since onset. Pertinent negatives include no numbness, no inability to bear weight, no muscle weakness, no loss of sensation and no tingling. The symptoms are aggravated by activity.     Review of patient's allergies indicates:  No Known Allergies  Past Medical History:   Diagnosis Date    Allergy     Anxiety 4/25/2022    Arthritis     Back pain     Chronic congestive heart failure 2/21/2025    Disorder of kidney and ureter     Diverticulosis of colon     GERD (gastroesophageal reflux disease)     Glaucoma     Hyperlipidemia     Hypertension     Ingrown toenail     Klebsiella cystitis     New onset atrial fibrillation 11/5/2024    Obesity     Pain in joint involving multiple sites     Pneumonia     Tortuous colon     Trouble in sleeping     Unspecified disorder of autonomic nervous system     Uterine fibroid      Past Surgical History:   Procedure Laterality Date    CARDIOVERSION N/A 12/13/2024    Procedure: Cardioversion;  Surgeon: Lopez Cabezas MD;  Location: [facility] OR;  Service: Cardiology;  Laterality: N/A;    COLONOSCOPY      COLONOSCOPY N/A 3/15/2017    Procedure: COLONOSCOPY;  Surgeon: Yogi Carbone MD;  Location: [facility];  Service: Endoscopy;  Laterality: N/A;    TREATMENT OF CARDIAC ARRHYTHMIA N/A 12/13/2024    Procedure: Cardioversion/Defibrillation;  Surgeon: Lopez Cabezas MD;  Location: [facility] OR;  Service: Cardiology;  Laterality: N/A;  1st 0600     Family History   Problem Relation Name Age of Onset    Glaucoma Mother      Hypertension Father      Asthma Daughter      Depression Daughter      Colon cancer Sister      Cancer Sister   70        colon    Diabetes Sister      Hypertension Sister      Hyperlipidemia Sister      Diabetes Brother      Hypertension Brother      Heart disease Brother      Hyperlipidemia Brother      Asthma Paternal Aunt      Diabetes Paternal Uncle      Diabetes Maternal Grandmother      Early death Maternal Grandfather      Early death Paternal Grandfather      Breast cancer Cousin      COPD Neg Hx      Kidney disease Neg Hx      Stroke Neg Hx      Mental illness Neg Hx       Social History[1]  Review of Systems   Constitutional:  Negative for fever.   HENT: Negative.  Negative for congestion and sore throat.    Eyes: Negative.    Respiratory: Negative.  Negative for cough and shortness of breath.    Cardiovascular:  Negative for chest pain.   Gastrointestinal:  Negative for abdominal pain, nausea and vomiting.   Genitourinary:  Negative for dysuria.   Neurological:  Negative for tingling, weakness, numbness and headaches.   Psychiatric/Behavioral:  Negative for confusion.        Physical Exam     Initial Vitals [04/30/25 0931]   BP Pulse Resp Temp SpO2   (!) 150/63 (!) 48 20 97.7 °F (36.5 °C) 97 %      MAP       --         Physical Exam    Constitutional: She appears well-developed and well-nourished. No distress.   HENT:   Head: Normocephalic and atraumatic.   Eyes: Conjunctivae are normal. Pupils are equal, round, and reactive to light.   Neck: Neck supple.   Normal range of motion.  Cardiovascular:  Normal rate, regular rhythm and normal heart sounds.           Pulmonary/Chest: Breath sounds normal.   Abdominal: Abdomen is soft. Bowel sounds are normal. She exhibits no distension. There is no abdominal tenderness. There is no rebound.   Musculoskeletal:         General: Normal range of motion.      Cervical back: Normal range of motion and neck supple.      Left lower leg: Swelling present. Edema present.     Neurological: She is alert and oriented to person, place, and time. She has normal strength.   Skin: Skin  is warm and dry.   Psychiatric: She has a normal mood and affect.         ED Course   Procedures  Labs Reviewed   HEPATITIS C ANTIBODY   HEP C VIRUS HOLD SPECIMEN   HIV 1 / 2 ANTIBODY          Imaging Results              US Lower Extremity Veins Left (Final result)  Result time 04/30/25 10:33:39      Final result by Wily Anguiano MD (Timothy) (04/30/25 10:33:39)                   Impression:      The      Electronically signed by: Wily Anguiano MD  Date:    04/30/2025  Time:    10:33               Narrative:    EXAMINATION:  US LOWER EXTREMITY VEINS LEFT    CLINICAL HISTORY:  Other specified soft tissue disorders    FINDINGS:  Color flow and spectral doppler vascular ultrasound was performed. There is documented compressibility and color Doppler flow left lower extremity with normal venous waveforms and good calf augmentation response.                                       Medications - No data to display  Medical Decision Making  DDx: contusion, DVT    Amount and/or Complexity of Data Reviewed  Labs: ordered.  Radiology: ordered.     Details: Negative for DVT                                      Clinical Impression:  Final diagnoses:  [M79.89] Leg swelling (Primary)          ED Disposition Condition    Discharge Stable          ED Prescriptions    None       Follow-up Information       Follow up With Specialties Details Why Contact Info    Jarrett Peck MD Internal Medicine, Family Medicine   [id number] [place] [id number]  [phone]                   [1]   Social History  Tobacco Use    Smoking status: Never     Passive exposure: Yes    Smokeless tobacco: Never   Substance Use Topics    Alcohol use: No    Drug use: No        Jovanni uYng MD  04/30/25 1636

## 2025-05-01 ENCOUNTER — PATIENT OUTREACH (OUTPATIENT)
Facility: OTHER | Age: 80
End: 2025-05-01
Payer: MEDICARE

## 2025-05-01 NOTE — PROGRESS NOTES
Nell Han  ED Navigator  Emergency Department    [place] [facility] [place]  Role: Community Health Worker    Date: 05/01/2025  Patient Name: Amber Naranjo  MRN: [id number]  PCP: Jarrett Peck MD    Assessment:     Amber Naranjo is a 79 y.o. female who has presented to ED for leg swelling. Patient has visited the ED 1 times in the past 3 months. Patient did not contact PCP.     ED Navigator Initial Assessment    ED Navigator Enrollment Documentation  Consent to Services  Does patient consent to completing the assessment?: Yes  Contact  Method of Initial Contact: Phone  Transportation  Insurance Coverage  Do you have coverage/adequate coverage?: Yes  Specialist Appointment  Did the patient come to the ED to see a specialist?: No  Does the patient have a pending specialist referral?: No  Does the patient have a specialist appointment made?: No  PCP Follow Up Appointment  Medications  Is patient able to afford medication?: Yes  Psychological  Food  Communication/Education  Other Financial Concerns  Other Social Barriers/Concerns  Primary Barrier         Social History     Socioeconomic History    Marital status:    Tobacco Use    Smoking status: Never     Passive exposure: Yes    Smokeless tobacco: Never   Substance and Sexual Activity    Alcohol use: No    Drug use: No    Sexual activity: Not Currently     Partners: Male     Social Drivers of Health     Financial Resource Strain: Low Risk  (2/21/2025)    Overall Financial Resource Strain (CARDIA)     Difficulty of Paying Living Expenses: Not hard at all   Food Insecurity: No Food Insecurity (2/21/2025)    Hunger Vital Sign     Worried About Running Out of Food in the Last Year: Never true     Ran Out of Food in the Last Year: Never true   Transportation Needs: No Transportation Needs (2/21/2025)    PRAPARE - Transportation     Lack of Transportation (Medical): No     Lack of Transportation (Non-Medical): No   Physical Activity: Sufficiently Active (2/21/2025)     Exercise Vital Sign     Days of Exercise per Week: 7 days     Minutes of Exercise per Session: 30 min   Stress: No Stress Concern Present (2/21/2025)    [employer] [place] [employer] - Occupational Stress Questionnaire     Feeling of Stress : Not at all   Housing Stability: Low Risk  (2/21/2025)    Housing Stability Vital Sign     Unable to Pay for Housing in the Last Year: No     Number of Times Moved in the Last Year: 0     Homeless in the Last Year: No       Plan:   Patient was contacted for post ED discharge navigation. They have an appointment scheduled with Dr. Jarrett Peck on 5/26/25 at 8:40. ED navigator will remind patient of appointment.

## 2025-05-07 DIAGNOSIS — R06.09 DYSPNEA ON EXERTION: ICD-10-CM

## 2025-05-07 RX ORDER — ALBUTEROL SULFATE 90 UG/1
2 INHALANT RESPIRATORY (INHALATION) EVERY 6 HOURS PRN
Qty: 8 G | Refills: 3 | Status: SHIPPED | OUTPATIENT
Start: 2025-05-07

## 2025-05-14 DIAGNOSIS — I48.91 NEW ONSET ATRIAL FIBRILLATION: ICD-10-CM

## 2025-05-14 NOTE — TELEPHONE ENCOUNTER
Care Due:                  Date            Visit Type   Department     Provider  --------------------------------------------------------------------------------                                HOSPITAL     [facility]  Last Visit: 01-      FOLLOW UP    MEDICINE       Jarrett Peck                              EP -                              PRIMARY      [facility]  Next Visit: 05-      CARE ([facility])   MEDICINE       Jarrett Peck                                                            Last  Test          Frequency    Reason                     Performed    Due Date  --------------------------------------------------------------------------------    Lipid Panel.  12 months..  atorvastatin.............  05- 05-    [facility] Embedded Care Due Messages. Reference number: [id number].   5/14/2025 12:30:28 PM CDT

## 2025-05-15 DIAGNOSIS — J30.1 SEASONAL ALLERGIC RHINITIS DUE TO POLLEN: ICD-10-CM

## 2025-05-15 RX ORDER — APIXABAN 5 MG/1
5 TABLET, FILM COATED ORAL 2 TIMES DAILY
Qty: 180 TABLET | Refills: 3 | Status: SHIPPED | OUTPATIENT
Start: 2025-05-15

## 2025-05-15 RX ORDER — ATORVASTATIN CALCIUM 20 MG/1
20 TABLET, FILM COATED ORAL
Qty: 90 TABLET | Refills: 0 | Status: SHIPPED | OUTPATIENT
Start: 2025-05-15

## 2025-05-15 NOTE — TELEPHONE ENCOUNTER
Refill Routing Note   Medication(s) are not appropriate for processing by [facility] for the following reason(s):        Outside of protocol    [facility] action(s):  Approve  Route     Requires labs : Yes      Medication Therapy Plan: FOVS; Reviewed acute care/admission visit notes; No follow up with PCP recommended by acute care provider; Approved per protocol    Extended chart review required: Yes     Appointments  past 12m or future 3m with PCP    Date Provider   Last Visit   1/6/2025 Jarrett Peck MD   Next Visit   5/26/2025 Jarrett Peck MD   ED visits in past 90 days: 1        Note composed:8:44 AM 05/15/2025

## 2025-05-15 NOTE — TELEPHONE ENCOUNTER
No care due was identified.  [facility] Embedded Care Due Messages. Reference number: [id number].   5/15/2025 8:52:52 AM CDT

## 2025-05-16 RX ORDER — CETIRIZINE HYDROCHLORIDE 10 MG/1
10 TABLET ORAL
Qty: 90 TABLET | Refills: 3 | Status: SHIPPED | OUTPATIENT
Start: 2025-05-16

## 2025-05-26 ENCOUNTER — OFFICE VISIT (OUTPATIENT)
Dept: INTERNAL MEDICINE | Facility: CLINIC | Age: 80
End: 2025-05-26
Payer: MEDICARE

## 2025-05-26 VITALS
OXYGEN SATURATION: 96 % | HEIGHT: 62 IN | BODY MASS INDEX: 33.79 KG/M2 | TEMPERATURE: 97 F | HEART RATE: 50 BPM | SYSTOLIC BLOOD PRESSURE: 124 MMHG | WEIGHT: 183.63 LBS | DIASTOLIC BLOOD PRESSURE: 64 MMHG

## 2025-05-26 DIAGNOSIS — I48.91 NEW ONSET ATRIAL FIBRILLATION: ICD-10-CM

## 2025-05-26 DIAGNOSIS — R79.9 ABNORMAL FINDING OF BLOOD CHEMISTRY, UNSPECIFIED: ICD-10-CM

## 2025-05-26 DIAGNOSIS — N18.31 STAGE 3A CHRONIC KIDNEY DISEASE: ICD-10-CM

## 2025-05-26 DIAGNOSIS — R51.9 FRONTAL HEADACHE: ICD-10-CM

## 2025-05-26 DIAGNOSIS — I10 ESSENTIAL HYPERTENSION: Primary | ICD-10-CM

## 2025-05-26 DIAGNOSIS — E78.2 MIXED HYPERLIPIDEMIA: Chronic | ICD-10-CM

## 2025-05-26 DIAGNOSIS — I50.9 CHRONIC CONGESTIVE HEART FAILURE, UNSPECIFIED HEART FAILURE TYPE: ICD-10-CM

## 2025-05-26 DIAGNOSIS — R00.1 BRADYCARDIA: ICD-10-CM

## 2025-05-26 PROCEDURE — 99214 OFFICE O/P EST MOD 30 MIN: CPT | Mod: HCNC,S$GLB,, | Performed by: STUDENT IN AN ORGANIZED HEALTH CARE EDUCATION/TRAINING PROGRAM

## 2025-05-26 PROCEDURE — 3074F SYST BP LT 130 MM HG: CPT | Mod: CPTII,HCNC,S$GLB, | Performed by: STUDENT IN AN ORGANIZED HEALTH CARE EDUCATION/TRAINING PROGRAM

## 2025-05-26 PROCEDURE — 1101F PT FALLS ASSESS-DOCD LE1/YR: CPT | Mod: CPTII,HCNC,S$GLB, | Performed by: STUDENT IN AN ORGANIZED HEALTH CARE EDUCATION/TRAINING PROGRAM

## 2025-05-26 PROCEDURE — G2211 COMPLEX E/M VISIT ADD ON: HCPCS | Mod: HCNC,S$GLB,, | Performed by: STUDENT IN AN ORGANIZED HEALTH CARE EDUCATION/TRAINING PROGRAM

## 2025-05-26 PROCEDURE — 3078F DIAST BP <80 MM HG: CPT | Mod: CPTII,HCNC,S$GLB, | Performed by: STUDENT IN AN ORGANIZED HEALTH CARE EDUCATION/TRAINING PROGRAM

## 2025-05-26 PROCEDURE — 1159F MED LIST DOCD IN RCRD: CPT | Mod: CPTII,HCNC,S$GLB, | Performed by: STUDENT IN AN ORGANIZED HEALTH CARE EDUCATION/TRAINING PROGRAM

## 2025-05-26 PROCEDURE — 1125F AMNT PAIN NOTED PAIN PRSNT: CPT | Mod: CPTII,HCNC,S$GLB, | Performed by: STUDENT IN AN ORGANIZED HEALTH CARE EDUCATION/TRAINING PROGRAM

## 2025-05-26 PROCEDURE — 3288F FALL RISK ASSESSMENT DOCD: CPT | Mod: CPTII,HCNC,S$GLB, | Performed by: STUDENT IN AN ORGANIZED HEALTH CARE EDUCATION/TRAINING PROGRAM

## 2025-05-26 PROCEDURE — 99999 PR PBB SHADOW E&M-EST. PATIENT-LVL IV: CPT | Mod: PBBFAC,HCNC,, | Performed by: STUDENT IN AN ORGANIZED HEALTH CARE EDUCATION/TRAINING PROGRAM

## 2025-05-26 PROCEDURE — 1160F RVW MEDS BY RX/DR IN RCRD: CPT | Mod: CPTII,HCNC,S$GLB, | Performed by: STUDENT IN AN ORGANIZED HEALTH CARE EDUCATION/TRAINING PROGRAM

## 2025-05-26 NOTE — PROGRESS NOTES
Chief Complaint   Patient presents with    Headache     HPI: Amber Naranjo is a 79 y.o. female  with Pmhx listed below who presents to clinic for    History of Present Illness    CHIEF COMPLAINT:  - Ms. Naranjo presents with complaints of acid reflux, headaches, and lightheadedness.    HPI:  Ms. Naranjo reports acid reflux, described as gastric acid regurgitation, occurring predominantly at night with a burning sensation in her chest. She denies nausea or vomiting. Ms. Naranjo takes Fosamax once a week on Wednesdays, with her last dose taken 5 days ago. Dr. Wilson had previously mentioned that this medication could cause acid reflux.    She has been having daily headaches for about a week, described as dull and localized to the frontal region of her head, with a pain severity of 4 out of 10. They occur especially in the mornings. She also reports feeling heaviness in the occipital region.    Lightheadedness has been present for about a week, occurring especially in the mornings after rising. She feels weak and occasionally feels near-syncopal, although she has not had syncope. The lightheadedness tends to improve throughout the day.    She takes omeprazole (Prilosec) daily for her acid reflux. She has tried taking Tylenol for her headaches but prefers to limit its use. She is evaluated by Dr. Cabezas every 3 to 6 months and had an appointment since December when she underwent a procedure. Dr. Cabezas performs carotid ultrasounds at every visit, with the last one showing normal results.    She denies blurry vision, dizziness, sneezing, watery eyes, itchy eyes, sore throat, shortness of breath, chest pain, wheezing, loose stools, diarrhea, blood in stool, blood in urine, or darker than normal stools. She also denies any recent falls.           Problem List:  Problem List[1]    ROS: Negative except as noted above.       Current Meds:  Current Medications[2]   PE:  BP: 124/64  Pulse: (!) 50 Resp: (!) 0   Temp: 97 °F (36.1 °C)   Weight: 83.3 kg (183 lb 10.3 oz) Body mass index is 33.59 kg/m².    Wt Readings from Last 5 Encounters:   05/26/25 83.3 kg (183 lb 10.3 oz)   04/30/25 82.1 kg (181 lb)   02/25/25 81.4 kg (179 lb 7.3 oz)   02/21/25 80.3 kg (177 lb)   01/06/25 80 kg (176 lb 5.9 oz)     General appearance: alert and cooperative, not in acute distress  Head: normocephalic, without obvious abnormality, atraumatic  Eyes: conjunctivae/corneas clear. PERRL, EOM's intact.  Ears: clear tympanic membranes   Neck: no adenopathy, supple, symmetrical, trachea midline and thyroid not enlarged, symmetric, no tenderness/mass/nodules, no JVD  Throat: lips, mucosa, and tongue normal; teeth and gums normal; no thrush  Chest: no reproducible chest pain   Heart: regular rate and rhythm, S1, S2 normal, no murmur, click, rub or gallop  Lungs: unlabored respiration, bilateral equal air entry, normal vesicular breath sound heard, no wheezing, rhonchi   Abdomen: soft, non-tender, non-distended; bowel sounds +; no masses,  no organomegaly, no ascites   Extremities: normal, atraumatic, no cyanosis or edema noted B/L upper and lower extremities.  Skin: skin color, texture, turgor normal. No rashes or lesions noted.  Neurologic: grossly intact      Lab:  Lab Results   Component Value Date    WBC 7.43 12/31/2024    HGB 12.7 12/31/2024    HCT 39.2 12/31/2024    MCV 93 12/31/2024     12/31/2024     01/06/2025    K 4.0 01/06/2025     01/06/2025    CO2 30 (H) 01/06/2025    BUN 18 01/06/2025    GLU 96 01/06/2025    CALCIUM 9.1 01/06/2025    MG 1.8 11/04/2024    PHOS 2.7 02/04/2024    AST 34 01/06/2025    ALT 32 01/06/2025    CHOL 138 05/20/2024    HDL 46 05/20/2024    LDLCALC 73.2 05/20/2024    TRIG 94 05/20/2024       Impression:    ICD-10-CM ICD-9-CM    1. Essential hypertension  I10 401.9 Hypertension Digital Medicine (HDMP) Enrollment Order      2. New onset atrial fibrillation  I48.91 427.31 TSH      3. Chronic congestive heart failure,  unspecified heart failure type  I50.9 428.0 Comprehensive Metabolic Panel      HEMOGLOBIN A1C      4. Hypertension, unspecified type  I10 401.9       5. Stage 3a chronic kidney disease  N18.31 585.3       6. Bradycardia  R00.1 427.89       7. Mixed hyperlipidemia  E78.2 272.2 Lipid Panel      8. Abnormal finding of blood chemistry, unspecified  R79.9 790.6 HEMOGLOBIN A1C          Assessment & Plan    ## GASTROESOPHAGEAL REFLUX DISEASE (GERD):  - Evaluated patient's acid reflux symptoms, particularly nighttime burning in the chest.  - Assessed potential connection between Fosamax and acid reflux symptoms.  - Increased omeprazole (Prilosec) from daily to twice daily, to be taken half an hour before meals for the next 2 weeks.  - Discontinued Fosamax for 1 week, to be restarted after acid reflux improves.    ## HYPERTENSION:  - Evaluated patient's blood pressure readings, noting consistently low diastolic pressure (40s-50s).  - Discontinued metoprolol due to low diastolic readings.  - Instructed patient to monitor BP daily and record readings.  - Advised to restart amlodipine at half dose if systolic BP exceeds 130.  - Ordered digital BP monitoring device covered by insurance.    ## HEADACHE:  - Ms. Naranjo reports daily frontal region headaches, dull in nature, rated 4/10, occurring especially in the morning.  - Potential triggers include stress.  - Advised patient to keep a log of headaches including frequency, duration, severity, and potential triggers.  - Recommend Tylenol for headache management and to monitor for improvement.    ## DIZZINESS AND LIGHTHEADEDNESS:  - Ms. Naranjo experiencing daily lightheadedness for the past 2 weeks, especially in the morning after getting up.  - Assessed potential causes including low blood pressure and medication effects.  - Discontinued metoprolol which may be contributing to these symptoms.  - Discussed importance of BP monitoring in relation to lightheadedness symptoms.    ##  ALLERGIC RHINITIS:  - Ms. Naranjo denies rhinorrhea, sneezing, watery/itchy eyes, sore throat, dyspnea, chest pain, and wheezing.  - Current allergy management with Zyrtec appears effective and will be continued.    ## HISTORY OF TRAUMATIC BRAIN INJURY:  - Documented patient's recall of head injury from 1997 or 1998 that involved bleeding from the head.  - Noted in medical history for future reference.    ## FOLLOW-UP AND GENERAL RECOMMENDATIONS:  - Ms. Naranjo to continue morning exercise routine (riding exercise bike and playing [employer]icksjong).  - Ordered fasting labs for the following morning at 8:00 AM.          1. Essential hypertension (Primary)  Low salt diet.  Enouraged to increase in physical activity at least 30 minutes of brisk walking/day , 5 days a week  Advised weight loss    Advised for DASH diet - The Dietary Approaches to Stop Hypertension (DASH) is high in vegetables, fruits, low-fat dairy products, whole grains, poultry, fish, and nuts and low in sweets, sugar-sweetened beverages, and red meats   Stop smoking.  Limit caffeine intake  Limit alcohol intake <3/day for men and <2/day for women.  Advised to be compliant with medication.  Please monitor your blood pressure regularly at home   Return precaution provided  Toprol reduced to 12.5 mg on her last visti  Stop Norvasc due to hypotensive episode especially low dystollic bp as recorded above  - Hypertension Digital Medicine (HDMP) Enrollment Order    2. New onset atrial fibrillation  On amiodarone to be continued  On eliquis to be continued  Recently had cardioversion/defibrillation done with dr. Cabezas on 12/13/2024  - TSH; Future    3. Chronic congestive heart failure, unspecified heart failure type  Last echo on 11/04/2024:    Left Ventricle: The left ventricle is mildly dilated. Normal wall thickness. There is normal systolic function. Ejection fraction is approximately 55%. There is normal diastolic function.    Right Ventricle: Normal right ventricular  cavity size. Wall thickness is normal. Systolic function is normal.    Aortic Valve: There is mild aortic valve sclerosis.    Pulmonary Artery: The estimated pulmonary artery systolic pressure is 30 mmHg.    IVC/SVC: Normal venous pressure at 3 mmHg.  Continue toprol XL  Now on lasix every other day  - Comprehensive Metabolic Panel; Future  - HEMOGLOBIN A1C; Future    5. Stage 3a chronic kidney disease  - counseled on increase water intake at least 3 litre of H20 to remain hydrated  - stay away from nephrotoxic drugs like Ibuprofen and NSAID  - Counseled on the need to control HTN and Blood glucose to prevent the disease progression  - low salt diet  - dietary modification: renal diet encouraged      6. Bradycardia  Toprol reduced to half on her last visit, due to bradycardia  Continue with bp log log and HR log    7. Mixed hyperlipidemia  On statin to be continued.   - Lipid Panel; Future    8. Abnormal finding of blood chemistry, unspecified  - HEMOGLOBIN A1C; Future    9. Frontal headache  Headache log  Tylenol prn      Future Appointments   Date Time Provider Department Center   5/27/2025  8:20 AM [facility] [place]   6/16/2025  8:00 AM Jarrett Peck MD [facility] [place]   2/12/2026 10:00 AM [facility]  [facility]   Rtc in 3 weeks with bp log      I spent a total of 35 minutes on the day of the visit.This includes face to face time and non-face to face time preparing to see the patient (eg, review of tests), obtaining and/or reviewing separately obtained history, documenting clinical information in the electronic or other health record, independently interpreting results and communicating results to the patient/family/caregiver, or care coordinator.  Visit today included increased complexity associated with the care of the episodic problem   addressed and managing the longitudinal care of the patient due to the serious and/or complex managed problem(s) .     Jarrett Peck  MD    This note was generated with the assistance of ambient listening technology. Verbal consent was obtained by the patient and accompanying visitor(s) for the recording of patient appointment to facilitate this note. I attest to having reviewed and edited the generated note for accuracy, though some syntax or spelling errors may persist. Please contact the author of this note for any clarification.          [1]   Patient Active Problem List  Diagnosis    Essential hypertension    Diverticulosis of colon    Tortuous colon    Uterine fibroid    Hyperlipidemia    Glaucoma    GERD (gastroesophageal reflux disease)    Arthritis    Seasonal allergic rhinitis due to pollen    Obesity (BMI 30.0-34.9)    Osteopenia of multiple sites    Postmenopausal    Primary osteoarthritis of right knee    Spondylolisthesis at L5-S1 level    Abdominal aortic atherosclerosis    Dyspnea on exertion    Stage 3a chronic kidney disease    Anxiety    Senile purpura    New onset atrial fibrillation    Bradycardia    Chronic congestive heart failure   [2]   Current Outpatient Medications   Medication Sig Dispense Refill    albuterol (PROVENTIL/VENTOLIN HFA) 90 mcg/actuation inhaler INHALE 2 PUFFS INTO THE LUNGS EVERY 6 (SIX) HOURS AS NEEDED FOR SHORTNESS OF BREATH. 8 g 3    alendronate (FOSAMAX) 70 MG tablet TAKE 1 TABLET WEEKLY AS DIRECTED. SEE PACKAGE FOR ADDITIONAL INSTRUCTIONS 12 tablet 3    amiodarone (PACERONE) 200 MG Tab Take 200 mg by mouth once daily.      aspirin (ECOTRIN) 81 MG EC tablet Take 81 mg by mouth once daily.      atorvastatin (LIPITOR) 20 MG tablet TAKE 1 TABLET EVERY DAY 90 tablet 0    calcium citrate-vitamin D3 315-200 mg (CITRACAL+D) 315 mg-5 mcg (200 unit) per tablet Take 1 tablet by mouth 2 (two) times daily.      cetirizine (ZYRTEC) 10 MG tablet TAKE 1 TABLET ONE TIME DAILY 90 tablet 3    ELIQUIS 5 mg Tab TAKE 1 TABLET TWICE DAILY 180 tablet 3    furosemide (LASIX) 40 MG tablet TAKE 1 TABLET EVERY DAY AS NEEDED 30  tablet 3    metoprolol succinate (TOPROL-XL) 25 MG 24 hr tablet Take 0.5 tablets (12.5 mg total) by mouth once daily.      multivitamin capsule Take 1 capsule by mouth once daily.      omeprazole (PRILOSEC) 40 MG capsule TAKE 1 CAPSULE EVERY MORNING 90 capsule 3    sertraline (ZOLOFT) 25 MG tablet TAKE 1 TABLET EVERY DAY 90 tablet 3    timolol maleate 0.5% (TIMOPTIC) 0.5 % Drop Place into both eyes every evening.       No current facility-administered medications for this visit.

## 2025-05-27 ENCOUNTER — LAB VISIT (OUTPATIENT)
Dept: LAB | Facility: HOSPITAL | Age: 80
End: 2025-05-27
Attending: STUDENT IN AN ORGANIZED HEALTH CARE EDUCATION/TRAINING PROGRAM
Payer: MEDICARE

## 2025-05-27 ENCOUNTER — RESULTS FOLLOW-UP (OUTPATIENT)
Dept: INTERNAL MEDICINE | Facility: CLINIC | Age: 80
End: 2025-05-27

## 2025-05-27 DIAGNOSIS — E03.8 OTHER SPECIFIED HYPOTHYROIDISM: Primary | ICD-10-CM

## 2025-05-27 DIAGNOSIS — R79.9 ABNORMAL FINDING OF BLOOD CHEMISTRY, UNSPECIFIED: ICD-10-CM

## 2025-05-27 DIAGNOSIS — I48.91 NEW ONSET ATRIAL FIBRILLATION: ICD-10-CM

## 2025-05-27 DIAGNOSIS — I50.9 CHRONIC CONGESTIVE HEART FAILURE, UNSPECIFIED HEART FAILURE TYPE: ICD-10-CM

## 2025-05-27 DIAGNOSIS — E78.2 MIXED HYPERLIPIDEMIA: Chronic | ICD-10-CM

## 2025-05-27 LAB
ALBUMIN SERPL BCP-MCNC: 3.7 G/DL (ref 3.5–5.2)
ALP SERPL-CCNC: 86 UNIT/L (ref 40–150)
ALT SERPL W/O P-5'-P-CCNC: 34 UNIT/L (ref 10–44)
ANION GAP (OHS): 11 MMOL/L (ref 8–16)
AST SERPL-CCNC: 32 UNIT/L (ref 11–45)
BILIRUB SERPL-MCNC: 0.6 MG/DL (ref 0.1–1)
BUN SERPL-MCNC: 22 MG/DL (ref 8–23)
CALCIUM SERPL-MCNC: 8.9 MG/DL (ref 8.7–10.5)
CHLORIDE SERPL-SCNC: 102 MMOL/L (ref 95–110)
CHOLEST SERPL-MCNC: 154 MG/DL (ref 120–199)
CHOLEST/HDLC SERPL: 2.7 {RATIO} (ref 2–5)
CO2 SERPL-SCNC: 28 MMOL/L (ref 23–29)
CREAT SERPL-MCNC: 1.2 MG/DL (ref 0.5–1.4)
EAG (OHS): 114 MG/DL (ref 68–131)
GFR SERPLBLD CREATININE-BSD FMLA CKD-EPI: 46 ML/MIN/1.73/M2
GLUCOSE SERPL-MCNC: 97 MG/DL (ref 70–110)
HBA1C MFR BLD: 5.6 % (ref 4–5.6)
HDLC SERPL-MCNC: 58 MG/DL (ref 40–75)
HDLC SERPL: 37.7 % (ref 20–50)
LDLC SERPL CALC-MCNC: 76 MG/DL (ref 63–159)
NONHDLC SERPL-MCNC: 96 MG/DL
POTASSIUM SERPL-SCNC: 4.1 MMOL/L (ref 3.5–5.1)
PROT SERPL-MCNC: 7.1 GM/DL (ref 6–8.4)
SODIUM SERPL-SCNC: 141 MMOL/L (ref 136–145)
TRIGL SERPL-MCNC: 100 MG/DL (ref 30–150)
TSH SERPL-ACNC: 94.84 UIU/ML (ref 0.4–4)

## 2025-05-27 PROCEDURE — 80053 COMPREHEN METABOLIC PANEL: CPT | Mod: HCNC,PO

## 2025-05-27 PROCEDURE — 84443 ASSAY THYROID STIM HORMONE: CPT | Mod: HCNC,PO

## 2025-05-27 PROCEDURE — 36415 COLL VENOUS BLD VENIPUNCTURE: CPT | Mod: HCNC,PO

## 2025-05-27 PROCEDURE — 80061 LIPID PANEL: CPT | Mod: HCNC

## 2025-05-27 PROCEDURE — 83036 HEMOGLOBIN GLYCOSYLATED A1C: CPT | Mod: HCNC

## 2025-06-15 DIAGNOSIS — K21.9 GASTROESOPHAGEAL REFLUX DISEASE WITHOUT ESOPHAGITIS: ICD-10-CM

## 2025-06-15 NOTE — TELEPHONE ENCOUNTER
Care Due:                  Date            Visit Type   Department     Provider  --------------------------------------------------------------------------------                                EP -                              PRIMARY      [facility]  Last Visit: 05-      CARE ([facility])   MEDICINE       Jarrett  Cisco                              EP -                              PRIMARY      [facility]  Next Visit: 06-      CARE ([facility])   MEDIC[facility]                                                            Last  Test          Frequency    Reason                     Performed    Due Date  --------------------------------------------------------------------------------    Phosphate...  12 months..  alendronate..............  02- 01-    [facility] Embedded Care Due Messages. Reference number: [id number].   6/15/2025 12:54:10 PM CDT

## 2025-06-16 ENCOUNTER — LAB VISIT (OUTPATIENT)
Dept: LAB | Facility: HOSPITAL | Age: 80
End: 2025-06-16
Attending: STUDENT IN AN ORGANIZED HEALTH CARE EDUCATION/TRAINING PROGRAM
Payer: MEDICARE

## 2025-06-16 ENCOUNTER — OFFICE VISIT (OUTPATIENT)
Dept: INTERNAL MEDICINE | Facility: CLINIC | Age: 80
End: 2025-06-16
Payer: MEDICARE

## 2025-06-16 ENCOUNTER — PATIENT OUTREACH (OUTPATIENT)
Dept: ADMINISTRATIVE | Facility: HOSPITAL | Age: 80
End: 2025-06-16
Payer: MEDICARE

## 2025-06-16 VITALS
WEIGHT: 184.94 LBS | DIASTOLIC BLOOD PRESSURE: 64 MMHG | OXYGEN SATURATION: 95 % | HEIGHT: 62 IN | BODY MASS INDEX: 34.03 KG/M2 | HEART RATE: 44 BPM | SYSTOLIC BLOOD PRESSURE: 142 MMHG | TEMPERATURE: 97 F

## 2025-06-16 VITALS — DIASTOLIC BLOOD PRESSURE: 72 MMHG | SYSTOLIC BLOOD PRESSURE: 139 MMHG

## 2025-06-16 DIAGNOSIS — I10 ESSENTIAL HYPERTENSION: ICD-10-CM

## 2025-06-16 DIAGNOSIS — E66.01 MORBID (SEVERE) OBESITY DUE TO EXCESS CALORIES: ICD-10-CM

## 2025-06-16 DIAGNOSIS — E78.2 MIXED HYPERLIPIDEMIA: ICD-10-CM

## 2025-06-16 DIAGNOSIS — I10 ESSENTIAL HYPERTENSION: Primary | ICD-10-CM

## 2025-06-16 DIAGNOSIS — R00.1 BRADYCARDIA: ICD-10-CM

## 2025-06-16 DIAGNOSIS — I48.91 NEW ONSET ATRIAL FIBRILLATION: ICD-10-CM

## 2025-06-16 DIAGNOSIS — I50.9 CHRONIC CONGESTIVE HEART FAILURE, UNSPECIFIED HEART FAILURE TYPE: ICD-10-CM

## 2025-06-16 LAB — TSH SERPL-ACNC: 111.06 UIU/ML (ref 0.4–4)

## 2025-06-16 PROCEDURE — 1159F MED LIST DOCD IN RCRD: CPT | Mod: CPTII,HCNC,S$GLB, | Performed by: STUDENT IN AN ORGANIZED HEALTH CARE EDUCATION/TRAINING PROGRAM

## 2025-06-16 PROCEDURE — 1160F RVW MEDS BY RX/DR IN RCRD: CPT | Mod: CPTII,HCNC,S$GLB, | Performed by: STUDENT IN AN ORGANIZED HEALTH CARE EDUCATION/TRAINING PROGRAM

## 2025-06-16 PROCEDURE — G2211 COMPLEX E/M VISIT ADD ON: HCPCS | Mod: HCNC,S$GLB,, | Performed by: STUDENT IN AN ORGANIZED HEALTH CARE EDUCATION/TRAINING PROGRAM

## 2025-06-16 PROCEDURE — 84443 ASSAY THYROID STIM HORMONE: CPT | Mod: HCNC,PO

## 2025-06-16 PROCEDURE — 99214 OFFICE O/P EST MOD 30 MIN: CPT | Mod: HCNC,S$GLB,, | Performed by: STUDENT IN AN ORGANIZED HEALTH CARE EDUCATION/TRAINING PROGRAM

## 2025-06-16 PROCEDURE — 3288F FALL RISK ASSESSMENT DOCD: CPT | Mod: CPTII,HCNC,S$GLB, | Performed by: STUDENT IN AN ORGANIZED HEALTH CARE EDUCATION/TRAINING PROGRAM

## 2025-06-16 PROCEDURE — 99999 PR PBB SHADOW E&M-EST. PATIENT-LVL IV: CPT | Mod: PBBFAC,HCNC,, | Performed by: STUDENT IN AN ORGANIZED HEALTH CARE EDUCATION/TRAINING PROGRAM

## 2025-06-16 PROCEDURE — 3077F SYST BP >= 140 MM HG: CPT | Mod: CPTII,HCNC,S$GLB, | Performed by: STUDENT IN AN ORGANIZED HEALTH CARE EDUCATION/TRAINING PROGRAM

## 2025-06-16 PROCEDURE — 36415 COLL VENOUS BLD VENIPUNCTURE: CPT | Mod: HCNC,PO

## 2025-06-16 PROCEDURE — 3078F DIAST BP <80 MM HG: CPT | Mod: CPTII,HCNC,S$GLB, | Performed by: STUDENT IN AN ORGANIZED HEALTH CARE EDUCATION/TRAINING PROGRAM

## 2025-06-16 PROCEDURE — 1101F PT FALLS ASSESS-DOCD LE1/YR: CPT | Mod: CPTII,HCNC,S$GLB, | Performed by: STUDENT IN AN ORGANIZED HEALTH CARE EDUCATION/TRAINING PROGRAM

## 2025-06-16 PROCEDURE — 1125F AMNT PAIN NOTED PAIN PRSNT: CPT | Mod: CPTII,HCNC,S$GLB, | Performed by: STUDENT IN AN ORGANIZED HEALTH CARE EDUCATION/TRAINING PROGRAM

## 2025-06-16 RX ORDER — OMEPRAZOLE 40 MG/1
40 CAPSULE, DELAYED RELEASE ORAL EVERY MORNING
Qty: 90 CAPSULE | Refills: 3 | Status: SHIPPED | OUTPATIENT
Start: 2025-06-16

## 2025-06-16 NOTE — TELEPHONE ENCOUNTER
Refill Decision Note   Amber Naranjo  is requesting a refill authorization.  Brief Assessment and Rationale for Refill:  Approve     Medication Therapy Plan:         Comments:     Note composed:12:26 AM 06/16/2025

## 2025-06-16 NOTE — PROGRESS NOTES
[facility] Score: 1     Uncontrolled BP                 Patient had PCP visit today. She is monitoring b/p at home closely. She brought in list from at home.

## 2025-06-16 NOTE — PROGRESS NOTES
Chief Complaint   Patient presents with    Hypertension     HPI: Amber Naranjo is a 79 y.o. female  with Pmhx listed below who presents to clinic for    History of Present Illness    CHIEF COMPLAINT:  - Ms. Naranjo presents with fatigue and allergy-like symptoms.    HPI:  Ms. Naranjo reports fatigue for approximately 1 week, along with allergy-like symptoms including rhinorrhea, sneezing, lacrimation, and ocular pruritus. She has occasional dyspnea, particularly when initiating household cleaning activities, necessitating rest. She reports mild cephalgia and vertigo. She describes dry skin, memory issues, and constipation.     I saw her on 05/26/2025 during which she complained of headache, fatigue and light headedness. Labs were repeated as a part of evaluation . Her tsh was found to be elevated to 94.8 .  She is not on any thyroid supplements at the moment. I will repeat the TSH today.  With regards to symptoms, continues to have fatigue, weakness, constipation. Denies hair loss, dry skin however. She does complain of having memory issues but denies having depression.    She has brought BP log with her . It was reviewed along with her today.            She continues to have low HR in 40's to 50's . On her last visit with me.  Problem List:  Problem List[1]    ROS: Negative except as noted above.       Current Meds:  Current Medications[2]   PE:  BP: (!) 142/64  Pulse: (!) 44     Temp: 97 °F (36.1 °C)  Weight: 83.9 kg (184 lb 15.5 oz) Body mass index is 33.83 kg/m².    Wt Readings from Last 5 Encounters:   06/16/25 83.9 kg (184 lb 15.5 oz)   05/26/25 83.3 kg (183 lb 10.3 oz)   04/30/25 82.1 kg (181 lb)   02/25/25 81.4 kg (179 lb 7.3 oz)   02/21/25 80.3 kg (177 lb)     General appearance: alert and cooperative, not in acute distress  Head: normocephalic, without obvious abnormality, atraumatic  Eyes: conjunctivae/corneas clear. PERRL, EOM's intact.  Ears: clear tympanic membranes   Neck: no adenopathy, supple,  symmetrical, trachea midline and thyroid not enlarged, symmetric, no tenderness/mass/nodules, no JVD  Throat: lips, mucosa, and tongue normal; teeth and gums normal; no thrush  Chest: no reproducible chest pain   Heart: regular rate and rhythm, S1, S2 normal, no murmur, click, rub or gallop  Lungs: unlabored respiration, bilateral equal air entry, normal vesicular breath sound heard, no wheezing, rhonchi   Abdomen: soft, non-tender, non-distended; bowel sounds +; no masses,  no organomegaly, no ascites   Extremities: normal, atraumatic, no cyanosis or edema noted B/L upper and lower extremities.  Skin: skin color, texture, turgor normal. No rashes or lesions noted.  Neurologic: grossly intact      Lab:  Lab Results   Component Value Date    WBC 7.43 12/31/2024    HGB 12.7 12/31/2024    HCT 39.2 12/31/2024    MCV 93 12/31/2024     12/31/2024     05/27/2025    K 4.1 05/27/2025     05/27/2025    CO2 28 05/27/2025    BUN 22 05/27/2025    GLU 97 05/27/2025    CALCIUM 8.9 05/27/2025    MG 1.8 11/04/2024    PHOS 2.7 02/04/2024    AST 32 05/27/2025    ALT 34 05/27/2025    CHOL 154 05/27/2025    HDL 58 05/27/2025    LDLCALC 76.0 05/27/2025    TRIG 100 05/27/2025    TSH 94.843 (H) 05/27/2025       Impression:    ICD-10-CM ICD-9-CM    1. Essential hypertension  I10 401.9 TSH      2. Chronic congestive heart failure, unspecified heart failure type  I50.9 428.0       3. Mixed hyperlipidemia  E78.2 272.2           Assessment & Plan    ESSENTIAL HYPERTENSION:  - Blood pressure is well controlled at 142/64 at home without amlodipine, with stable readings throughout home monitoring.  - Instructed the patient to continue monitoring and recording BP readings at home.    BRADYCARDIA:  - Assessed heart rate, noting bradycardia with heart rate in the 40s.  - Discontinued Metoprolol 12.5 mg due to low heart rate, while maintaining Pacerone 200 mg for heart rhythm control.    THYROID FUNCTION ABNORMALITY:  - Evaluated  thyroid function due to elevated TSH levels in recent labs, considering hypothyroidism.  - Ordered repeat thyroid function tests to confirm results.  - Will initiate thyroid medication if thyroid labs confirm hypothyroidism.  - Educated the patient about the importance of taking thyroid medication on an empty stomach with a full glass of water first thing in the morning, separate from other medications to ensure proper absorption.    CHRONIC FATIGUE:  - Ms. Naranjo reports fatigue lasting all week.  - Considering hypothyroidism as potential cause of fatigue.    ALLERGIC RHINITIS:  - Ms. Naranjo reports rhinorrhea, sneezing, watery and itchy eyes.    DYSPNEA:  - Ms. Naranjo experiences dyspnea sometimes, especially when cleaning.    XEROSIS CUTIS:  - Ms. Naranjo reports having dry skin cutis (dry skin).  - Considering hypothyroidism as potential cause.    CONSTIPATION:  - Ms. Naranjo reports constipation.  - Considering hypothyroidism as potential cause.    DIZZINESS AND HEADACHE:  - Ms. Naranjo reports experiencing light headaches and dizziness.        1. Essential hypertension (Primary)  Low salt diet.  Enouraged to increase in physical activity at least 30 minutes of brisk walking/day , 5 days a week  Advised weight loss    Advised for DASH diet - The Dietary Approaches to Stop Hypertension (DASH) is high in vegetables, fruits, low-fat dairy products, whole grains, poultry, fish, and nuts and low in sweets, sugar-sweetened beverages, and red meats   Stop smoking.  Limit caffeine intake  Limit alcohol intake <3/day for men and <2/day for women.  Advised to be compliant with medication.  Please monitor your blood pressure regularly at home   Return precaution provided  Stop Toprol XL due to bradycardia  Stop Norvasc due to hypotensive episode especially low dystollic bp as recorded above  - TSH; Future    2. Chronic congestive heart failure, unspecified heart failure type  Last echo on 11/04/2024:    Left Ventricle: The left  ventricle is mildly dilated. Normal wall thickness. There is normal systolic function. Ejection fraction is approximately 55%. There is normal diastolic function.    Right Ventricle: Normal right ventricular cavity size. Wall thickness is normal. Systolic function is normal.    Aortic Valve: There is mild aortic valve sclerosis.    Pulmonary Artery: The estimated pulmonary artery systolic pressure is 30 mmHg.    IVC/SVC: Normal venous pressure at 3 mmHg.  Now on lasix every other day  Hold Toprol XL due to bradycardia  Was on 12.5 mg of Toprol  3. Mixed hyperlipidemia  On statin to be continued.     4. Morbid (severe) obesity due to excess calories  Encourage exercise and dietary modification to loose weight.    5. Bradycardia  HR stable  On pacerone to be continued  Hold Toprol XL due to bradycardia    6. New onset atrial fibrillation  On amiodarone to be continued  On eliquis to be continued  Recently had cardioversion/defibrillation done with dr. Cabezas on 12/13/2024    Future Appointments   Date Time Provider Department Center   6/16/2025 10:20 AM [facility] [place]   2/12/2026 10:00 AM [facility]       I spent a total of 32 minutes on the day of the visit.This includes face to face time and non-face to face time preparing to see the patient (eg, review of tests), obtaining and/or reviewing separately obtained history, documenting clinical information in the electronic or other health record, independently interpreting results and communicating results to the patient/family/caregiver, or care coordinator.  Visit today included increased complexity associated with the care of the episodic problem   addressed and managing the longitudinal care of the patient due to the serious and/or complex managed problem(s) .     Jarrett Peck MD    This note was generated with the assistance of ambient listening technology. Verbal consent was obtained by the patient and accompanying  visitor(s) for the recording of patient appointment to facilitate this note. I attest to having reviewed and edited the generated note for accuracy, though some syntax or spelling errors may persist. Please contact the author of this note for any clarification.            [1]   Patient Active Problem List  Diagnosis    Essential hypertension    Diverticulosis of colon    Tortuous colon    Uterine fibroid    Hyperlipidemia    Glaucoma    GERD (gastroesophageal reflux disease)    Arthritis    Seasonal allergic rhinitis due to pollen    Obesity (BMI 30.0-34.9)    Osteopenia of multiple sites    Postmenopausal    Primary osteoarthritis of right knee    Spondylolisthesis at L5-S1 level    Abdominal aortic atherosclerosis    Dyspnea on exertion    Stage 3a chronic kidney disease    Anxiety    Senile purpura    New onset atrial fibrillation    Bradycardia    Chronic congestive heart failure   [2]   Current Outpatient Medications   Medication Sig Dispense Refill    albuterol (PROVENTIL/VENTOLIN HFA) 90 mcg/actuation inhaler INHALE 2 PUFFS INTO THE LUNGS EVERY 6 (SIX) HOURS AS NEEDED FOR SHORTNESS OF BREATH. 8 g 3    alendronate (FOSAMAX) 70 MG tablet TAKE 1 TABLET WEEKLY AS DIRECTED. SEE PACKAGE FOR ADDITIONAL INSTRUCTIONS 12 tablet 3    amiodarone (PACERONE) 200 MG Tab Take 200 mg by mouth once daily.      aspirin (ECOTRIN) 81 MG EC tablet Take 81 mg by mouth once daily.      atorvastatin (LIPITOR) 20 MG tablet TAKE 1 TABLET EVERY DAY 90 tablet 0    calcium citrate-vitamin D3 315-200 mg (CITRACAL+D) 315 mg-5 mcg (200 unit) per tablet Take 1 tablet by mouth 2 (two) times daily.      cetirizine (ZYRTEC) 10 MG tablet TAKE 1 TABLET ONE TIME DAILY 90 tablet 3    ELIQUIS 5 mg Tab TAKE 1 TABLET TWICE DAILY 180 tablet 3    furosemide (LASIX) 40 MG tablet TAKE 1 TABLET EVERY DAY AS NEEDED 30 tablet 3    metoprolol succinate (TOPROL-XL) 25 MG 24 hr tablet Take 0.5 tablets (12.5 mg total) by mouth once daily.      multivitamin  capsule Take 1 capsule by mouth once daily.      omeprazole (PRILOSEC) 40 MG capsule TAKE 1 CAPSULE EVERY MORNING 90 capsule 3    sertraline (ZOLOFT) 25 MG tablet TAKE 1 TABLET EVERY DAY 90 tablet 3    timolol maleate 0.5% (TIMOPTIC) 0.5 % Drop Place into both eyes every evening.       No current facility-administered medications for this visit.

## 2025-06-17 ENCOUNTER — TELEPHONE (OUTPATIENT)
Dept: INTERNAL MEDICINE | Facility: CLINIC | Age: 80
End: 2025-06-17
Payer: MEDICARE

## 2025-06-17 ENCOUNTER — RESULTS FOLLOW-UP (OUTPATIENT)
Dept: INTERNAL MEDICINE | Facility: CLINIC | Age: 80
End: 2025-06-17

## 2025-06-17 DIAGNOSIS — E03.9 HYPOTHYROIDISM, UNSPECIFIED TYPE: Primary | ICD-10-CM

## 2025-06-17 RX ORDER — LEVOTHYROXINE SODIUM 100 UG/1
100 TABLET ORAL
Qty: 30 TABLET | Refills: 2 | Status: SHIPPED | OUTPATIENT
Start: 2025-06-17 | End: 2025-07-14

## 2025-06-17 NOTE — TELEPHONE ENCOUNTER
Copied from CRM #[id number]. Topic: General Inquiry - Return Call  >> Jun 17, 2025  4:01 PM Rebeca wrote:  .Type:  Patient Returning Call    Who Called: aleyda  Who Left Message for Patient: Jarrett Peck MD  Does the patient know what this is regarding?  Would the patient rather a call back or a response via [facility]?  Call back  Best Call Back Number:[phone]  Additional Information:

## 2025-06-17 NOTE — TELEPHONE ENCOUNTER
Copied from CRM #[id number]. Topic: General Inquiry - Patient Advice  >> Jun 17, 2025 11:09 AM Derek wrote:  Type:  Needs Medical Advice    Who Called: aleyda  Symptoms (please be specific): regarding thyroid   How long has patient had these symptoms:    Pharmacy name and phone #:    Would the patient rather a call back or a response via [facility]? Call back   Best Call Back Number: [phone]  Additional Information:

## 2025-06-17 NOTE — TELEPHONE ENCOUNTER
Called back and spoke to pt who wanted to know if she could come to appt earlier for labs because she had another appt somewhere else that day. I told her that would be fine and she would be able to have labs done at that time. Voiced understanding.

## 2025-07-10 ENCOUNTER — HOSPITAL ENCOUNTER (OUTPATIENT)
Facility: HOSPITAL | Age: 80
Discharge: HOME | End: 2025-07-12
Attending: EMERGENCY MEDICINE | Admitting: FAMILY MEDICINE
Payer: MEDICARE

## 2025-07-10 DIAGNOSIS — R42 DIZZINESS: ICD-10-CM

## 2025-07-10 DIAGNOSIS — R00.1 BRADYCARDIA: ICD-10-CM

## 2025-07-10 DIAGNOSIS — R00.1 SYMPTOMATIC BRADYCARDIA: Primary | ICD-10-CM

## 2025-07-10 DIAGNOSIS — R07.9 CHEST PAIN: ICD-10-CM

## 2025-07-10 PROBLEM — E03.9 HYPOTHYROIDISM: Status: ACTIVE | Noted: 2025-07-10

## 2025-07-10 PROBLEM — I50.30 (HFPEF) HEART FAILURE WITH PRESERVED EJECTION FRACTION: Chronic | Status: ACTIVE | Noted: 2025-07-10

## 2025-07-10 PROBLEM — N18.31 STAGE 3A CHRONIC KIDNEY DISEASE: Chronic | Status: ACTIVE | Noted: 2021-11-22

## 2025-07-10 PROBLEM — I50.30 (HFPEF) HEART FAILURE WITH PRESERVED EJECTION FRACTION: Status: ACTIVE | Noted: 2025-07-10

## 2025-07-10 LAB
ABSOLUTE EOSINOPHIL (OHS): 0.21 K/UL
ABSOLUTE MONOCYTE (OHS): 0.85 K/UL (ref 0.3–1)
ABSOLUTE NEUTROPHIL COUNT (OHS): 6.26 K/UL (ref 1.8–7.7)
ALBUMIN SERPL BCP-MCNC: 3.7 G/DL (ref 3.5–5.2)
ALP SERPL-CCNC: 76 UNIT/L (ref 40–150)
ALT SERPL W/O P-5'-P-CCNC: 40 UNIT/L (ref 10–44)
ANION GAP (OHS): 9 MMOL/L (ref 8–16)
AST SERPL-CCNC: 37 UNIT/L (ref 11–45)
BASOPHILS # BLD AUTO: 0.06 K/UL
BASOPHILS NFR BLD AUTO: 0.7 %
BILIRUB SERPL-MCNC: 0.5 MG/DL (ref 0.1–1)
BNP SERPL-MCNC: 404 PG/ML (ref 0–99)
BUN SERPL-MCNC: 21 MG/DL (ref 8–23)
CALCIUM SERPL-MCNC: 8.5 MG/DL (ref 8.7–10.5)
CHLORIDE SERPL-SCNC: 102 MMOL/L (ref 95–110)
CO2 SERPL-SCNC: 26 MMOL/L (ref 23–29)
CREAT SERPL-MCNC: 1.2 MG/DL (ref 0.5–1.4)
ERYTHROCYTE [DISTWIDTH] IN BLOOD BY AUTOMATED COUNT: 15.3 % (ref 11.5–14.5)
GFR SERPLBLD CREATININE-BSD FMLA CKD-EPI: 46 ML/MIN/1.73/M2
GLUCOSE SERPL-MCNC: 89 MG/DL (ref 70–110)
HCT VFR BLD AUTO: 34.9 % (ref 37–48.5)
HCV AB SERPL QL IA: NEGATIVE
HGB BLD-MCNC: 11.5 GM/DL (ref 12–16)
HIV 1+2 AB+HIV1 P24 AG SERPL QL IA: NEGATIVE
IMM GRANULOCYTES # BLD AUTO: 0.06 K/UL (ref 0–0.04)
IMM GRANULOCYTES NFR BLD AUTO: 0.7 % (ref 0–0.5)
LYMPHOCYTES # BLD AUTO: 1.64 K/UL (ref 1–4.8)
MCH RBC QN AUTO: 32.2 PG (ref 27–31)
MCHC RBC AUTO-ENTMCNC: 33 G/DL (ref 32–36)
MCV RBC AUTO: 98 FL (ref 82–98)
NUCLEATED RBC (/100WBC) (OHS): 0 /100 WBC
PLATELET # BLD AUTO: 180 K/UL (ref 150–450)
PMV BLD AUTO: 11.2 FL (ref 9.2–12.9)
POTASSIUM SERPL-SCNC: 4.5 MMOL/L (ref 3.5–5.1)
PROT SERPL-MCNC: 7.3 GM/DL (ref 6–8.4)
RBC # BLD AUTO: 3.57 M/UL (ref 4–5.4)
RELATIVE EOSINOPHIL (OHS): 2.3 %
RELATIVE LYMPHOCYTE (OHS): 18.1 % (ref 18–48)
RELATIVE MONOCYTE (OHS): 9.4 % (ref 4–15)
RELATIVE NEUTROPHIL (OHS): 68.8 % (ref 38–73)
SODIUM SERPL-SCNC: 137 MMOL/L (ref 136–145)
T4 FREE SERPL-MCNC: 0.8 NG/DL (ref 0.71–1.51)
TROPONIN I SERPL DL<=0.01 NG/ML-MCNC: 0.01 NG/ML
TSH SERPL-ACNC: 83.96 UIU/ML (ref 0.4–4)
WBC # BLD AUTO: 9.08 K/UL (ref 3.9–12.7)

## 2025-07-10 PROCEDURE — 96374 THER/PROPH/DIAG INJ IV PUSH: CPT | Mod: ER

## 2025-07-10 PROCEDURE — 84484 ASSAY OF TROPONIN QUANT: CPT | Mod: ER | Performed by: EMERGENCY MEDICINE

## 2025-07-10 PROCEDURE — 63600175 PHARM REV CODE 636 W HCPCS: Mod: ER | Performed by: EMERGENCY MEDICINE

## 2025-07-10 PROCEDURE — 86803 HEPATITIS C AB TEST: CPT | Performed by: EMERGENCY MEDICINE

## 2025-07-10 PROCEDURE — 85025 COMPLETE CBC W/AUTO DIFF WBC: CPT | Mod: ER | Performed by: EMERGENCY MEDICINE

## 2025-07-10 PROCEDURE — G0378 HOSPITAL OBSERVATION PER HR: HCPCS | Mod: ER

## 2025-07-10 PROCEDURE — 99291 CRITICAL CARE FIRST HOUR: CPT | Mod: ER

## 2025-07-10 PROCEDURE — 80053 COMPREHEN METABOLIC PANEL: CPT | Mod: ER | Performed by: EMERGENCY MEDICINE

## 2025-07-10 PROCEDURE — 84439 ASSAY OF FREE THYROXINE: CPT | Performed by: HOSPITALIST

## 2025-07-10 PROCEDURE — 84443 ASSAY THYROID STIM HORMONE: CPT | Mod: ER | Performed by: EMERGENCY MEDICINE

## 2025-07-10 PROCEDURE — G0378 HOSPITAL OBSERVATION PER HR: HCPCS

## 2025-07-10 PROCEDURE — 83880 ASSAY OF NATRIURETIC PEPTIDE: CPT | Mod: ER | Performed by: EMERGENCY MEDICINE

## 2025-07-10 PROCEDURE — 93005 ELECTROCARDIOGRAM TRACING: CPT | Mod: ER

## 2025-07-10 PROCEDURE — 87389 HIV-1 AG W/HIV-1&-2 AB AG IA: CPT | Performed by: EMERGENCY MEDICINE

## 2025-07-10 RX ORDER — ALUMINUM HYDROXIDE, MAGNESIUM HYDROXIDE, AND SIMETHICONE 1200; 120; 1200 MG/30ML; MG/30ML; MG/30ML
30 SUSPENSION ORAL 4 TIMES DAILY PRN
Status: DISCONTINUED | OUTPATIENT
Start: 2025-07-10 | End: 2025-07-12 | Stop reason: HOSPADM

## 2025-07-10 RX ORDER — ACETAMINOPHEN 325 MG/1
650 TABLET ORAL EVERY 8 HOURS PRN
Status: DISCONTINUED | OUTPATIENT
Start: 2025-07-10 | End: 2025-07-12 | Stop reason: HOSPADM

## 2025-07-10 RX ORDER — PROMETHAZINE HYDROCHLORIDE 25 MG/1
25 TABLET ORAL EVERY 6 HOURS PRN
Status: DISCONTINUED | OUTPATIENT
Start: 2025-07-10 | End: 2025-07-12 | Stop reason: HOSPADM

## 2025-07-10 RX ORDER — AMOXICILLIN 250 MG
1 CAPSULE ORAL 2 TIMES DAILY PRN
Status: DISCONTINUED | OUTPATIENT
Start: 2025-07-10 | End: 2025-07-12 | Stop reason: HOSPADM

## 2025-07-10 RX ORDER — IPRATROPIUM BROMIDE AND ALBUTEROL SULFATE 2.5; .5 MG/3ML; MG/3ML
3 SOLUTION RESPIRATORY (INHALATION) EVERY 6 HOURS PRN
Status: DISCONTINUED | OUTPATIENT
Start: 2025-07-10 | End: 2025-07-12 | Stop reason: HOSPADM

## 2025-07-10 RX ORDER — GLUCAGON 1 MG
1 KIT INJECTION
Status: DISCONTINUED | OUTPATIENT
Start: 2025-07-10 | End: 2025-07-12 | Stop reason: HOSPADM

## 2025-07-10 RX ORDER — SODIUM CHLORIDE 0.9 % (FLUSH) 0.9 %
10 SYRINGE (ML) INJECTION EVERY 12 HOURS PRN
Status: DISCONTINUED | OUTPATIENT
Start: 2025-07-10 | End: 2025-07-12 | Stop reason: HOSPADM

## 2025-07-10 RX ORDER — ATROPINE SULFATE 0.4 MG/ML
1 INJECTION, SOLUTION ENDOTRACHEAL; INTRAMEDULLARY; INTRAMUSCULAR; INTRAVENOUS; SUBCUTANEOUS
Status: COMPLETED | OUTPATIENT
Start: 2025-07-10 | End: 2025-07-10

## 2025-07-10 RX ORDER — ACETAMINOPHEN 650 MG/1
650 SUPPOSITORY RECTAL EVERY 6 HOURS PRN
Status: DISCONTINUED | OUTPATIENT
Start: 2025-07-10 | End: 2025-07-12 | Stop reason: HOSPADM

## 2025-07-10 RX ORDER — NALOXONE HCL 0.4 MG/ML
0.02 VIAL (ML) INJECTION
Status: DISCONTINUED | OUTPATIENT
Start: 2025-07-10 | End: 2025-07-12 | Stop reason: HOSPADM

## 2025-07-10 RX ORDER — IBUPROFEN 200 MG
16 TABLET ORAL
Status: DISCONTINUED | OUTPATIENT
Start: 2025-07-10 | End: 2025-07-12 | Stop reason: HOSPADM

## 2025-07-10 RX ORDER — IBUPROFEN 200 MG
24 TABLET ORAL
Status: DISCONTINUED | OUTPATIENT
Start: 2025-07-10 | End: 2025-07-12 | Stop reason: HOSPADM

## 2025-07-10 RX ORDER — ONDANSETRON HYDROCHLORIDE 2 MG/ML
4 INJECTION, SOLUTION INTRAVENOUS EVERY 8 HOURS PRN
Status: DISCONTINUED | OUTPATIENT
Start: 2025-07-10 | End: 2025-07-12 | Stop reason: HOSPADM

## 2025-07-10 RX ADMIN — ATROPINE SULFATE 1 MG: 0.4 INJECTION, SOLUTION INTRAVENOUS at 12:07

## 2025-07-10 NOTE — ED PROVIDER NOTES
Encounter Date: 7/10/2025       History     Chief Complaint   Patient presents with    Dizziness     Dizzy onset yesterday      The history is provided by the patient.   Dizziness  This is a new problem. The current episode started yesterday. The problem occurs constantly. The problem has not changed since onset.Pertinent negatives include no chest pain, no abdominal pain, no headaches and no shortness of breath. The symptoms are aggravated by walking and standing. Nothing relieves the symptoms. She has tried nothing for the symptoms.     Review of patient's allergies indicates:  No Known Allergies  Past Medical History:   Diagnosis Date    Allergy     Anxiety 4/25/2022    Arthritis     Back pain     Chronic congestive heart failure 2/21/2025    Disorder of kidney and ureter     Diverticulosis of colon     GERD (gastroesophageal reflux disease)     Glaucoma     Hyperlipidemia     Hypertension     Ingrown toenail     Klebsiella cystitis     New onset atrial fibrillation 11/5/2024    Obesity     Pain in joint involving multiple sites     Pneumonia     Tortuous colon     Trouble in sleeping     Unspecified disorder of autonomic nervous system     Uterine fibroid      Past Surgical History:   Procedure Laterality Date    CARDIOVERSION N/A 12/13/2024    Procedure: Cardioversion;  Surgeon: Lopez Cabezas MD;  Location: [facility] OR;  Service: Cardiology;  Laterality: N/A;    COLONOSCOPY      COLONOSCOPY N/A 3/15/2017    Procedure: COLONOSCOPY;  Surgeon: Yogi Carbone MD;  Location: [facility];  Service: Endoscopy;  Laterality: N/A;    TREATMENT OF CARDIAC ARRHYTHMIA N/A 12/13/2024    Procedure: Cardioversion/Defibrillation;  Surgeon: Lopez Cabezas MD;  Location: [facility] OR;  Service: Cardiology;  Laterality: N/A;  1st  0600     Family History   Problem Relation Name Age of Onset    Glaucoma Mother      Hypertension Father      Asthma Daughter      Depression Daughter      Colon cancer Sister      Cancer Sister  70         colon    Diabetes Sister      Hypertension Sister      Hyperlipidemia Sister      Diabetes Brother      Hypertension Brother      Heart disease Brother      Hyperlipidemia Brother      Asthma Paternal Aunt      Diabetes Paternal Uncle      Diabetes Maternal Grandmother      Early death Maternal Grandfather      Early death Paternal Grandfather      Breast cancer Cousin      COPD Neg Hx      Kidney disease Neg Hx      Stroke Neg Hx      Mental illness Neg Hx       Social History[1]  Review of Systems   Constitutional:  Negative for fever.   HENT:  Negative for congestion.    Respiratory:  Negative for shortness of breath.    Cardiovascular:  Negative for chest pain.   Gastrointestinal:  Negative for abdominal pain.   Genitourinary:  Negative for dysuria.   Neurological:  Positive for dizziness. Negative for headaches.       Physical Exam     Initial Vitals [07/10/25 1031]   BP Pulse Resp Temp SpO2   (!) 120/57 (!) 38 20 98 °F (36.7 °C) 98 %      MAP       --         Physical Exam    Nursing note and vitals reviewed.  Constitutional: She appears well-developed and well-nourished. No distress.   HENT:   Head: Normocephalic and atraumatic. Mouth/Throat: Oropharynx is clear and moist.   Eyes: Conjunctivae and EOM are normal. Pupils are equal, round, and reactive to light.   Neck: Neck supple.   Normal range of motion.  Cardiovascular:  Regular rhythm and normal heart sounds.   Bradycardia present.         Pulmonary/Chest: Breath sounds normal. No respiratory distress.   Abdominal: Abdomen is soft. Bowel sounds are normal. She exhibits no distension. There is no abdominal tenderness.   Musculoskeletal:         General: Normal range of motion.      Cervical back: Normal range of motion and neck supple.     Neurological: She is alert and oriented to person, place, and time. She has normal strength.   Skin: Skin is warm and dry.   Psychiatric: She has a normal mood and affect. Thought content normal.         ED Course    Critical Care    Date/Time: 7/10/2025 12:19 PM    Performed by: All Lopez MD  Authorized by: Honey Gilbert MD  Direct patient critical care time: 10 minutes  Additional history critical care time: 6 minutes  Ordering / reviewing critical care time: 10 minutes  Documentation critical care time: 10 minutes  Consulting other physicians critical care time: 4 minutes  Total critical care time (exclusive of procedural time) : 40 minutes  Critical care time was exclusive of separately billable procedures and treating other patients.  Critical care was necessary to treat or prevent imminent or life-threatening deterioration of the following conditions: cardiac failure.  Critical care was time spent personally by me on the following activities: blood draw for specimens, development of treatment plan with patient or surrogate, discussions with consultants, examination of patient, evaluation of patient's response to treatment, obtaining history from patient or surrogate, ordering and performing treatments and interventions, ordering and review of laboratory studies, ordering and review of radiographic studies, pulse oximetry, re-evaluation of patient's condition and review of old charts.        Labs Reviewed   COMPREHENSIVE METABOLIC PANEL - Abnormal       Result Value    Sodium 137      Potassium 4.5      Chloride 102      CO2 26      Glucose 89      BUN 21      Creatinine 1.2      Calcium 8.5 (*)     Protein Total 7.3      Albumin 3.7      Bilirubin Total 0.5      ALP 76      AST 37      ALT 40      Anion Gap 9      eGFR 46 (*)    B-TYPE NATRIURETIC PEPTIDE - Abnormal     (*)    CBC WITH DIFFERENTIAL - Abnormal    WBC 9.08      RBC 3.57 (*)     HGB 11.5 (*)     HCT 34.9 (*)     MCV 98      MCH 32.2 (*)     MCHC 33.0      RDW 15.3 (*)     Platelet Count 180      MPV 11.2      Nucleated RBC 0      Neut % 68.8      Lymph % 18.1      Mono % 9.4      Eos % 2.3      Basophil % 0.7      Imm Grans % 0.7  (*)     Neut # 6.26      Lymph # 1.64      Mono # 0.85      Eos # 0.21      Baso # 0.06      Imm Grans # 0.06 (*)    TROPONIN I - Normal    Troponin-I 0.013     CBC W/ AUTO DIFFERENTIAL    Narrative:     The following orders were created for panel order CBC auto differential.  Procedure                               Abnormality         Status                     ---------                               -----------         ------                     CBC with Differential[[id number]]       Abnormal            Final result                 Please view results for these tests on the individual orders.   HEPATITIS C ANTIBODY   HEP C VIRUS HOLD SPECIMEN   HIV 1 / 2 ANTIBODY   TSH        ECG Results              EKG 12-lead (In process)        Collection Time Result Time QRS Duration [facility] QTC Calculation    07/10/25 10:36:41 07/10/25 10:59:55 128 484                     In process by Interface, Lab In [facility] (07/10/25 11:00:00)                   Narrative:    Test Reason : R07.9,    Vent. Rate :  45 BPM     Atrial Rate :  45 BPM     P-R Int : 208 ms          QRS Dur : 128 ms      QT Int : 560 ms       P-R-T Axes :  47  27  52 degrees    QTcB Int : 484 ms    Sinus bradycardia  Right bundle branch block  Abnormal ECG  When compared with ECG of 31-Dec-2024 15:46,  T wave inversion no longer evident in Anterior leads    Referred By: AAAREFERRAL SELF           Confirmed By:                                   Imaging Results              X-Ray Chest AP Portable (Final result)  Result time 07/10/25 11:03:35      Final result by Madi Ortiz MD (07/10/25 11:03:35)                   Impression:      No acute findings.      Electronically signed by: Madi Ortiz MD  Date:    07/10/2025  Time:    11:03               Narrative:    EXAMINATION:  XR CHEST AP PORTABLE    CLINICAL HISTORY:  Acute chest pain, Chest Pain;    COMPARISON:  December 31, 2024    FINDINGS:  Mild cardiomegaly with atherosclerosis of the aortic  arch.    Chronic left suprahilar fibrotic type scarring.    The right lung remains clear.                                  12:19 PM Discussed lab/imaging studies with patient and the need for further evaluation/admission for Bradycardia. Pt verbalized understanding that this is a stand alone ER and we are unable to admit at this facility. Pt will be transferred to [facility] via [facility] with care en route to include cm. I discussed this case with hs and care was accepted by Dr Gilbert.       Medications   atropine injection 1 mg (has no administration in time range)     Medical Decision Making  DDx Afib slow VR, Heart block, ACS, Medication adverse effect    Problems Addressed:  Dizziness: acute illness or injury  Symptomatic bradycardia: undiagnosed new problem with uncertain prognosis    Amount and/or Complexity of Data Reviewed  Labs: ordered.  Radiology: ordered.  ECG/medicine tests: ordered.    Risk  Prescription drug management.                                      Clinical Impression:  Final diagnoses:  [R42] Dizziness  [R00.1] Symptomatic bradycardia (Primary)          ED Disposition Condition    Observation                       [1]   Social History  Tobacco Use    Smoking status: Never     Passive exposure: Yes    Smokeless tobacco: Never   Substance Use Topics    Alcohol use: No    Drug use: No        All Lopez MD  07/10/25 3152

## 2025-07-10 NOTE — ED NOTES
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  Patient remained neurologically stable no seizures reported since I started following up this patient currently on Dilantin, currently on heparin Coumadin last PTT is 51.     Health Status   Allergies:    Allergies (1) Active Reaction  NKA None Documented    Current medications:    Medications (35) Active  Scheduled: (20)  AMIODArone 200 mg tab  200 mg 1 tab, Oral, Daily  Aspirin 81 mg DR tab  81 mg 1 tab, Oral, Daily  Atorvastatin 40 mg tab  40 mg 1 tab, Oral, Daily  ceFAZolin  2,000 mg 15 mL, Slow IV Push, Pre Op (One Time)  Chlorhexidine gluconate 2% topical CLOTH 2s  6 pad, Topical, Daily  coagulation factor VIIa recomb  2 mg, Slow IV Push, Once (scheduled)  Docusate sodium 100 mg/10 mL oral liquid repack  100 mg 10 mL, Oral, BID  Famotidine 20 mg tab  20 mg 1 tab, Oral, Q Bedtime  free water flush  250 mL, NG-tube, Q12H (variable)  HydrALAZINE 25 mg tab  25 mg 1 tab, Oral, Q8H (variable)  insulin glargine  5 unit 0.05 mL, Subcutaneous, Q Evening  Insulin human lispro 1 unit/0.01 mL inj  4 unit 0.04 mL, Subcutaneous, TID [with meals]  Insulin human lispro 1 unit/0.01 mL inj  1-6 units, Subcutaneous, QID [with meals & HS]  Isosorbide dinitrate 10 mg tab  10 mg 1 tab, Oral, Q8H  Megestrol 40 mg/mL oral susp syringe  400 mg 10 mL, Oral, BID  Multivitamin-minerals therapeutic tab  1 tab, Oral, Daily  Phenytoin 25 mg/mL oral susp syringe  100 mg 4 mL, Oral, Q8H  Pneumococcal adult vaccine 23-polyvalent 0.5 mL IM inj SDV  0.5 mL, IM, On Call  Polyvinyl-povidone 1.4%-0.6% tears PF ophth soln 0.4 mL -   1 drop, Each Eye, BID  Warfarin 5 mg tab  5 mg 1 tab, Oral, Tonight (Once)  Continuous:  Report given to EDVIN Matos    (1)  heparin-NaCl 0.45% 25,000 unit  25,000 unit 250 mL, IV  PRN: (14)  Acetaminophen 325 mg tab  650 mg 2 tab, Oral, Q6H  Acetaminophen 650 mg suppos  650 mg 1 suppository, Rectal, Q6H  ALPRAZolam 0.25 mg tab  0.25 mg 1 tab, Oral, Q12H  Bisacodyl 10 mg suppos  10 mg 1 suppository, Rectal, Daily  Dextrose (glucose) 40% 15 gm/37.5 gm oral gel UD  15 gm, Oral, As Directed PRN  Dextrose (glucose) 50% 25 gm/50 mL syringe  12.5 gm 25 mL, IV Push, As Directed PRN  Glucagon 1 mg/1 mL emergency kit SDV  1 mg 1 mL, IM, As Directed PRN  HydrALAZINE 20 mg/1 mL inj SDV  10 mg 0.5 mL, Slow IV Push, Q6H  Hydrocodone-acetaminophen  mg tab  1 tab, Oral, Q4H  Hydrocodone-acetaminophen 5-325 mg tab  1 tab, Oral, Q4H  Milk of magnesia 8% 30 mL oral susp UD  30 mL, Oral, BID  Ondansetron 4 mg/2 mL inj SDV  4 mg 2 mL, Slow IV Push, Q6H  Sodium chloride PF 0.9% flush inj 10 mL  20 mL, Flush, As Directed PRN  Sodium chloride PF 0.9% flush inj 10 mL  2 mL, Flush, As Directed PRN      Review of Systems   Constitutional:  Weakness.    Eye:  Negative.    Cardiovascular:  Negative.    Respiratory:  Negative.    Gastrointestinal:  Negative.    Musculoskeletal:  Negative except as documented in history of present illness.   Neurologic:  Negative except as documented in history of present illness.   Endocrine:  Negative.      Objective   VS/Measurements     Vitals between:   05-SEP-2019 17:34:05   TO   06-SEP-2019 17:34:05                   LAST RESULT MINIMUM MAXIMUM  Temperature 36.6 36.4 36.9  Heart Rate 86 72 86  Respiratory Rate 16 16 16  NISBP           110 110 154  NIDBP           62 62 81  NIMBP           78 78 103  SpO2                    100 98 100    General:  No acute distress, Not alert and oriented.    Neck:  Supple, Non-tender, No carotid bruit.    Cardiovascular:  Normal rate, Regular rhythm, No murmur.    Neurologic:  Alert, Patient appears to be awake and alert able to follow-up simple commands, open eyes spontaneously  pupil approximately 2 mm equal reactive to light face is symmetric, able to count fingers in front of his eyes, motor function no movement were noticed in the right arm patient able to move the left arm against a gravity also has spontaneous movement both legs more on the left side..      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  Patient was able to be extubated and neurologically remained stable.  Recommendation  Patient remained neurologically stable we will continue current management we will continue Dilantin patient benefit from physical therapy patient to be followed up on clinical basis.

## 2025-07-11 PROBLEM — I48.91 ATRIAL FIBRILLATION: Chronic | Status: ACTIVE | Noted: 2025-07-11

## 2025-07-11 PROBLEM — I50.33 ACUTE ON CHRONIC DIASTOLIC CONGESTIVE HEART FAILURE: Status: ACTIVE | Noted: 2025-07-11

## 2025-07-11 PROBLEM — I48.91 ATRIAL FIBRILLATION: Status: ACTIVE | Noted: 2025-07-11

## 2025-07-11 LAB
ABSOLUTE EOSINOPHIL (OHS): 0.18 K/UL
ABSOLUTE MONOCYTE (OHS): 0.6 K/UL (ref 0.3–1)
ABSOLUTE NEUTROPHIL COUNT (OHS): 6.04 K/UL (ref 1.8–7.7)
ALBUMIN SERPL BCP-MCNC: 3.5 G/DL (ref 3.5–5.2)
ALP SERPL-CCNC: 85 UNIT/L (ref 40–150)
ALT SERPL W/O P-5'-P-CCNC: 37 UNIT/L (ref 10–44)
ANION GAP (OHS): 5 MMOL/L (ref 8–16)
AST SERPL-CCNC: 35 UNIT/L (ref 11–45)
BASOPHILS # BLD AUTO: 0.06 K/UL
BASOPHILS NFR BLD AUTO: 0.7 %
BILIRUB SERPL-MCNC: 0.6 MG/DL (ref 0.1–1)
BUN SERPL-MCNC: 19 MG/DL (ref 8–23)
CALCIUM SERPL-MCNC: 8.4 MG/DL (ref 8.7–10.5)
CHLORIDE SERPL-SCNC: 104 MMOL/L (ref 95–110)
CO2 SERPL-SCNC: 28 MMOL/L (ref 23–29)
CREAT SERPL-MCNC: 1 MG/DL (ref 0.5–1.4)
ERYTHROCYTE [DISTWIDTH] IN BLOOD BY AUTOMATED COUNT: 14.8 % (ref 11.5–14.5)
GFR SERPLBLD CREATININE-BSD FMLA CKD-EPI: 57 ML/MIN/1.73/M2
GLUCOSE SERPL-MCNC: 83 MG/DL (ref 70–110)
HCT VFR BLD AUTO: 34.2 % (ref 37–48.5)
HGB BLD-MCNC: 11.3 GM/DL (ref 12–16)
IMM GRANULOCYTES # BLD AUTO: 0.06 K/UL (ref 0–0.04)
IMM GRANULOCYTES NFR BLD AUTO: 0.7 % (ref 0–0.5)
LYMPHOCYTES # BLD AUTO: 1.35 K/UL (ref 1–4.8)
MCH RBC QN AUTO: 32.2 PG (ref 27–31)
MCHC RBC AUTO-ENTMCNC: 33 G/DL (ref 32–36)
MCV RBC AUTO: 97 FL (ref 82–98)
NUCLEATED RBC (/100WBC) (OHS): 0 /100 WBC
PLATELET # BLD AUTO: 159 K/UL (ref 150–450)
PMV BLD AUTO: 11.2 FL (ref 9.2–12.9)
POTASSIUM SERPL-SCNC: 4.2 MMOL/L (ref 3.5–5.1)
PROT SERPL-MCNC: 6.7 GM/DL (ref 6–8.4)
RBC # BLD AUTO: 3.51 M/UL (ref 4–5.4)
RELATIVE EOSINOPHIL (OHS): 2.2 %
RELATIVE LYMPHOCYTE (OHS): 16.3 % (ref 18–48)
RELATIVE MONOCYTE (OHS): 7.2 % (ref 4–15)
RELATIVE NEUTROPHIL (OHS): 72.9 % (ref 38–73)
SODIUM SERPL-SCNC: 137 MMOL/L (ref 136–145)
WBC # BLD AUTO: 8.29 K/UL (ref 3.9–12.7)

## 2025-07-11 PROCEDURE — 97162 PT EVAL MOD COMPLEX 30 MIN: CPT

## 2025-07-11 PROCEDURE — 82040 ASSAY OF SERUM ALBUMIN: CPT | Performed by: HOSPITALIST

## 2025-07-11 PROCEDURE — 25000003 PHARM REV CODE 250: Performed by: HOSPITALIST

## 2025-07-11 PROCEDURE — 36415 COLL VENOUS BLD VENIPUNCTURE: CPT | Performed by: HOSPITALIST

## 2025-07-11 PROCEDURE — G0378 HOSPITAL OBSERVATION PER HR: HCPCS

## 2025-07-11 PROCEDURE — 85025 COMPLETE CBC W/AUTO DIFF WBC: CPT | Performed by: HOSPITALIST

## 2025-07-11 RX ORDER — FUROSEMIDE 40 MG/1
40 TABLET ORAL DAILY
Status: DISCONTINUED | OUTPATIENT
Start: 2025-07-11 | End: 2025-07-12 | Stop reason: HOSPADM

## 2025-07-11 RX ORDER — LEVOTHYROXINE SODIUM 100 UG/1
100 TABLET ORAL
Status: DISCONTINUED | OUTPATIENT
Start: 2025-07-11 | End: 2025-07-12 | Stop reason: HOSPADM

## 2025-07-11 RX ORDER — PANTOPRAZOLE SODIUM 40 MG/1
40 TABLET, DELAYED RELEASE ORAL DAILY
Status: DISCONTINUED | OUTPATIENT
Start: 2025-07-11 | End: 2025-07-12 | Stop reason: HOSPADM

## 2025-07-11 RX ORDER — ASPIRIN 81 MG/1
81 TABLET ORAL DAILY
Status: DISCONTINUED | OUTPATIENT
Start: 2025-07-11 | End: 2025-07-11

## 2025-07-11 RX ORDER — ATORVASTATIN CALCIUM 10 MG/1
20 TABLET, FILM COATED ORAL DAILY
Status: DISCONTINUED | OUTPATIENT
Start: 2025-07-11 | End: 2025-07-12 | Stop reason: HOSPADM

## 2025-07-11 RX ADMIN — ATORVASTATIN CALCIUM 20 MG: 10 TABLET, FILM COATED ORAL at 08:07

## 2025-07-11 RX ADMIN — PANTOPRAZOLE SODIUM 40 MG: 40 TABLET, DELAYED RELEASE ORAL at 08:07

## 2025-07-11 RX ADMIN — APIXABAN 5 MG: 2.5 TABLET, FILM COATED ORAL at 10:07

## 2025-07-11 RX ADMIN — APIXABAN 5 MG: 2.5 TABLET, FILM COATED ORAL at 08:07

## 2025-07-11 RX ADMIN — LEVOTHYROXINE SODIUM 100 MCG: 100 TABLET ORAL at 06:07

## 2025-07-11 RX ADMIN — ASPIRIN 81 MG: 81 TABLET, COATED ORAL at 08:07

## 2025-07-11 RX ADMIN — FUROSEMIDE 40 MG: 40 TABLET ORAL at 08:07

## 2025-07-11 NOTE — CONSULTS
[facility] Surg  Cardiology  Consult Note    Patient Name: Amber Naranjo  MRN: [id number]  Admission Date: 7/10/2025  Hospital Length of Stay: 0 days  Code Status: Full Code   Attending Provider: Denise Urban MD   Consulting Provider: Sim Montez MD  Primary Care Physician: Jarrett Peck MD  Principal Problem:Dizziness    Patient information was obtained from patient and ER records.     Inpatient consult to Cardiology  Consult performed by: Sim Montez MD  Consult ordered by: Denise Urban MD        Subjective:       HPI:   Amber Naranjo is a 79 y.o. female with a PMH has a past medical history of Allergy, Anxiety (4/25/2022), Arthritis, Back pain, Chronic congestive heart failure (2/21/2025), Disorder of kidney and ureter, Diverticulosis of colon, GERD (gastroesophageal reflux disease), Glaucoma, Hyperlipidemia, Hypertension, Ingrown toenail, Klebsiella cystitis, New onset atrial fibrillation (11/5/2024), Obesity, Pain in joint involving multiple sites, Pneumonia, Tortuous colon, Trouble in sleeping, Unspecified disorder of autonomic nervous system, and Uterine fibroid. who presented as a transfer from [facility] for higher level of care after patient presented with complaints of acute onset lightheadedness/dizziness worse with positional changes and on exertion since yesterday. Per chart review, patient has been seen by her PCP with similar complaints since May and was found to have evidence of hypothyroidism and was recently started on Synthroid with no improvement in symptoms. Associated symptoms also included generalized weakness/fatigue and decreased appetite in addition to those complaints noted above however, denied endorsing any headaches, visual changes, fever, chills, sweats, heat/cold intolerance, nausea, vomiting, chest pain, abdominal pain, dysuria, hematuria, melena, hematochezia, diarrhea, or onset neurological deficits. Prior to onset of symptoms, patient reported being in her usual state  of [facility] with no other concerns or complaints. All other review of systems negative except as noted above. Initial workup at outside facility revealed patient to be afebrile without leukocytosis, bradycardic with heart rate 38-58 beats per minute, EKG positive for sinus bradycardia, H/H 11.5/34.9, creatinine/GFR 1.2/46, , troponin negative, TSH 83.459, and T4 0.80. Patient admitted to Hospital Medicine under observation for continued medical management and treatment of symptomatic bradycardia and hypothyroidism     CARDIOLOGY consult for bradycardia  PMH AFIB hypothyroidism [id number]  Recently started synthroid rx  Admitted for dizziness and faint. The pulse was at 40's  EKG showed sinus liya. CXR no acute change  Cr 1.0  HGB 11.3   and troponin 0.013  T4 0.8  Now pulse at 55 and the symptoms resolves.          Past Medical History:   Diagnosis Date    Allergy     Anxiety 4/25/2022    Arthritis     Back pain     Chronic congestive heart failure 2/21/2025    Disorder of kidney and ureter     Diverticulosis of colon     GERD (gastroesophageal reflux disease)     Glaucoma     Hyperlipidemia     Hypertension     Hypothyroidism 7/10/2025    Ingrown toenail     Klebsiella cystitis     New onset atrial fibrillation 11/5/2024    Obesity     Pain in joint involving multiple sites     Pneumonia     Tortuous colon     Trouble in sleeping     Unspecified disorder of autonomic nervous system     Uterine fibroid        Past Surgical History:   Procedure Laterality Date    CARDIOVERSION N/A 12/13/2024    Procedure: Cardioversion;  Surgeon: Lopez Cabezas MD;  Location: [facility];  Service: Cardiology;  Laterality: N/A;    COLONOSCOPY      COLONOSCOPY N/A 3/15/2017    Procedure: COLONOSCOPY;  Surgeon: Yogi Carbone MD;  Location: [facility];  Service: Endoscopy;  Laterality: N/A;    TREATMENT OF CARDIAC ARRHYTHMIA N/A 12/13/2024    Procedure: Cardioversion/Defibrillation;  Surgeon: Lopez Cabezas MD;  Location:  [facility] OR;  Service: Cardiology;  Laterality: N/A;  1st 0600       Review of patient's allergies indicates:  No Known Allergies    No current facility-administered medications on file prior to encounter.     Current Outpatient Medications on File Prior to Encounter   Medication Sig    albuterol (PROVENTIL/VENTOLIN HFA) 90 mcg/actuation inhaler INHALE 2 PUFFS INTO THE LUNGS EVERY 6 (SIX) HOURS AS NEEDED FOR SHORTNESS OF BREATH.    alendronate (FOSAMAX) 70 MG tablet TAKE 1 TABLET WEEKLY AS DIRECTED. SEE PACKAGE FOR ADDITIONAL INSTRUCTIONS    amiodarone (PACERONE) 200 MG Tab Take 200 mg by mouth once daily.    aspirin (ECOTRIN) 81 MG EC tablet Take 81 mg by mouth once daily.    atorvastatin (LIPITOR) 20 MG tablet TAKE 1 TABLET EVERY DAY    calcium citrate-vitamin D3 315-200 mg (CITRACAL+D) 315 mg-5 mcg (200 unit) per tablet Take 1 tablet by mouth 2 (two) times daily.    cetirizine (ZYRTEC) 10 MG tablet TAKE 1 TABLET ONE TIME DAILY    ELIQUIS 5 mg Tab TAKE 1 TABLET TWICE DAILY    furosemide (LASIX) 40 MG tablet TAKE 1 TABLET EVERY DAY AS NEEDED    levothyroxine (SYNTHROID) 100 MCG tablet Take 1 tablet (100 mcg total) by mouth before breakfast.    metoprolol succinate (TOPROL-XL) 25 MG 24 hr tablet Take 0.5 tablets (12.5 mg total) by mouth once daily.    multivitamin capsule Take 1 capsule by mouth once daily.    omeprazole (PRILOSEC) 40 MG capsule TAKE 1 CAPSULE EVERY MORNING    sertraline (ZOLOFT) 25 MG tablet TAKE 1 TABLET EVERY DAY    timolol maleate 0.5% (TIMOPTIC) 0.5 % Drop Place into both eyes every evening.     Family History       Problem Relation (Age of Onset)    Asthma Daughter, Paternal Aunt    Breast cancer Cousin    Cancer Sister (70)    Colon cancer Sister    Depression Daughter    Diabetes Sister, Brother, Paternal Uncle, Maternal Grandmother    Early death Maternal Grandfather, Paternal Grandfather    Glaucoma Mother    Heart disease Brother    Hyperlipidemia Sister, Brother    Hypertension Father,  "Sister, Brother          Tobacco Use    Smoking status: Never     Passive exposure: Yes    Smokeless tobacco: Never   Substance and Sexual Activity    Alcohol use: No    Drug use: No    Sexual activity: Not Currently     Partners: Male     ROS  Objective:     Vital Signs (Most Recent):  Temp: 97.6 °F (36.4 °C) (07/11/25 1645)  Pulse: (!) 55 (07/11/25 1645)  Resp: 16 (07/11/25 1645)  BP: (!) 162/70 (07/11/25 1645)  SpO2: 98 % (07/11/25 1645) Vital Signs (24h Range):  Temp:  [97.6 °F (36.4 °C)-98.3 °F (36.8 °C)] 97.6 °F (36.4 °C)  Pulse:  [46-66] 55  Resp:  [16-18] 16  SpO2:  [93 %-98 %] 98 %  BP: (127-169)/(59-72) 162/70     Weight: 62 kg (136 lb 11 oz)  Body mass index is 29.58 kg/m².    SpO2: 98 %       No intake or output data in the 24 hours ending 07/11/25 1749    Lines/Drains/Airways       Peripheral Intravenous Line  Duration             Peripheral IV Single Lumen 07/10/25 1102 20 G Right Antecubital 1 day                     Physical Exam  HENT:      Head: Normocephalic.   Eyes:      Pupils: Pupils are equal, round, and reactive to light.   Neck:      Thyroid: No thyromegaly.      Vascular: Normal carotid pulses. No carotid bruit or JVD.   Cardiovascular:      Rate and Rhythm: Regular rhythm. Bradycardia present. No extrasystoles are present.     Chest Wall: PMI is not displaced.      Pulses: Normal pulses.      Heart sounds: Normal heart sounds. No murmur heard.     No gallop. No S3 sounds.   Pulmonary:      Effort: No respiratory distress.      Breath sounds: Normal breath sounds. No stridor.   Abdominal:      General: Bowel sounds are normal.      Palpations: Abdomen is soft.      Tenderness: There is no abdominal tenderness. There is no rebound.   Skin:     Findings: No rash.   Neurological:      Mental Status: She is alert and oriented to person, place, and time.   Psychiatric:         Behavior: Behavior normal.          Significant Labs: ABG: No results for input(s): "PH", "PCO2", "HCO3", " ""POCSATURATED", "BE" in the last 48 hours., Blood Culture: No results for input(s): "LABBLOO" in the last 48 hours., BMP:   Recent Labs   Lab 07/10/25  1107 07/11/25  0622   GLU 89 83    137   K 4.5 4.2    104   CO2 26 28   BUN 21 19   CREATININE 1.2 1.0   CALCIUM 8.5* 8.4*   , CMP   Recent Labs   Lab 07/10/25  1107 07/11/25  0622    137   K 4.5 4.2    104   CO2 26 28   GLU 89 83   BUN 21 19   CREATININE 1.2 1.0   CALCIUM 8.5* 8.4*   PROT 7.3 6.7   ALBUMIN 3.7 3.5   BILITOT 0.5 0.6   ALKPHOS 76 85   AST 37 35   ALT 40 37   ANIONGAP 9 5*   , CBC   Recent Labs   Lab 07/10/25  1107 07/11/25  0622   WBC 9.08 8.29   HGB 11.5* 11.3*   HCT 34.9* 34.2*    159   , INR No results for input(s): "INR", "PROTIME" in the last 48 hours., Lipid Panel No results for input(s): "CHOL", "HDL", "LDLCALC", "TRIG", "CHOLHDL" in the last 48 hours., and Troponin No results for input(s): "TROPONINIHS" in the last 48 hours.    Significant Imaging: EKG reviewed  Assessment and Plan:     Acute on chronic diastolic congestive heart failure  Patient has Diastolic (HFpEF) heart failure that is Chronic. On presentation their CHF was decompensated. Evidence of decompensated CHF on presentation includes: shortness of breath. The etiology of their decompensation is likely dietary indiscretion. Most recent BNP and echo results are listed below.  Recent Labs     07/10/25  1107   *     Latest ECHO  Results for orders placed during the hospital encounter of 11/03/24    Echo    Interpretation Summary    Left Ventricle: The left ventricle is mildly dilated. Normal wall thickness. There is normal systolic function. Ejection fraction is approximately 55%. There is normal diastolic function.    Right Ventricle: Normal right ventricular cavity size. Wall thickness is normal. Systolic function is normal.    Aortic Valve: There is mild aortic valve sclerosis.    Pulmonary Artery: The estimated pulmonary artery systolic " pressure is 30 mmHg.    IVC/SVC: Normal venous pressure at 3 mmHg.    Current Heart Failure Medications  furosemide tablet 40 mg, Daily, Oral    Plan  - Monitor strict I&Os and daily weights.    - Place on telemetry  - Low sodium diet  - Place on fluid restriction of 1.5 L.   - Cardiology has been consulted  - The patient's volume status is improving but not at their baseline as indicated by shortness of breath  - CONTINUE LASIX 40 MG DAILY            Atrial fibrillation  On SR     Continue Eliquis   D/C ASA    Symptomatic bradycardia  Sinus liya and RBBB    Repeat echo   Continue synthroid Rx  Avoid BB and CCB  If no recurrent severe liya and symptoms, will do the 2 weeks as op     Hyperlipidemia  CONTINUE STATIN        VTE Risk Mitigation (From admission, onward)           Ordered     apixaban tablet 5 mg  2 times daily         07/11/25 0310     Reason for No Pharmacological VTE Prophylaxis  Once        Question:  Reasons:  Answer:  Already adequately anticoagulated on oral Anticoagulants    07/10/25 2207     IP VTE HIGH RISK PATIENT  Once         07/10/25 2207     Place sequential compression device  Until discontinued         07/10/25 2207                    Thank you for your consult. I will follow-up with patient. Please contact us if you have any additional questions.    Sim Montez MD  Cardiology   [facility] - Med Surg

## 2025-07-11 NOTE — ASSESSMENT & PLAN NOTE
Patient presented with complaints of acute onset lightheadedness/dizziness worse with positional changes and on exertion since yesterday.  Per chart review, patient evaluated by her PCP with similar complaints since May and was found to have evidence of hypothyroidism and bradycardia. Home metoprolol held and patient is started on Synthroid.  Initial workup at outside facility revealed patient to be afebrile without leukocytosis, bradycardic with heart rate ranging between 38-58 bpm, EKG positive for sinus bradycardia, H/H 11.5/34.9, creatinine/GFR 1.2/46, , troponin negative, TSH 83.459, and T4 0.80.  Dizziness likely multifactorial in nature given hypothyroidism and bradycardia.  Plan:  -Telemetry  -Hold amiodarone and metoprolol in setting of bradycardia  -Continued treatment of hypothyroidism as noted below  -Consider CT head vs MRI for further evaluation  -f/u echo and carotid doppler

## 2025-07-11 NOTE — PROGRESS NOTES
[facility] Medicine  Progress Note    Patient Name: Amber Naranjo  MRN: [id number]  Patient Class: OP- Observation   Admission Date: 7/10/2025  Length of Stay: 0 days  Attending Physician: Denise Urban MD  Primary Care Provider: Jarrett Peck MD        Subjective     Principal Problem:Dizziness        HPI:  Amber Naranjo is a 79 y.o. female with a PMH  has a past medical history of Allergy, Anxiety (4/25/2022), Arthritis, Back pain, Chronic congestive heart failure (2/21/2025), Disorder of kidney and ureter, Diverticulosis of colon, GERD (gastroesophageal reflux disease), Glaucoma, Hyperlipidemia, Hypertension, Ingrown toenail, Klebsiella cystitis, New onset atrial fibrillation (11/5/2024), Obesity, Pain in joint involving multiple sites, Pneumonia, Tortuous colon, Trouble in sleeping, Unspecified disorder of autonomic nervous system, and Uterine fibroid. who presented as a transfer from [facility] for higher level of care after patient presented with complaints of acute onset lightheadedness/dizziness worse with positional changes and on exertion since yesterday.  Per chart review, patient has been seen by her PCP with similar complaints since May and was found to have evidence of hypothyroidism and was recently started on Synthroid with no improvement in symptoms.  Associated symptoms also included generalized weakness/fatigue and decreased appetite in addition to those complaints noted above however, denied endorsing any headaches, visual changes, fever, chills, sweats, heat/cold intolerance, nausea, vomiting, chest pain, abdominal pain, dysuria, hematuria, melena, hematochezia, diarrhea, or onset neurological deficits.  Prior to onset of symptoms, patient reported being in her usual state of health with no other concerns or complaints.  All other review of systems negative except as noted above.  Initial workup at outside facility revealed patient to be afebrile without leukocytosis,  bradycardic with heart rate 38-58 beats per minute, EKG positive for sinus bradycardia, H/H 11.5/34.9, creatinine/GFR 1.2/46, , troponin negative, TSH 83.459, and T4 0.80.  Patient admitted to Hospital Medicine under observation for continued medical management and treatment of symptomatic bradycardia and hypothyroidism.    PCP: Jarrett Peck      Overview/Hospital Course:  No notes on file    Interval History: f/u dizziness  resolved likely related to bradycardia. Cardiology consulted due to being on amiodarone and metoprolol at home    Review of Systems  Objective:     Vital Signs (Most Recent):  Temp: 97.6 °F (36.4 °C) (07/11/25 1645)  Pulse: (!) 55 (07/11/25 1645)  Resp: 16 (07/11/25 1645)  BP: (!) 162/70 (07/11/25 1645)  SpO2: 98 % (07/11/25 1645) Vital Signs (24h Range):  Temp:  [97.6 °F (36.4 °C)-98.3 °F (36.8 °C)] 97.6 °F (36.4 °C)  Pulse:  [46-66] 55  Resp:  [16-18] 16  SpO2:  [93 %-98 %] 98 %  BP: (127-169)/(59-72) 162/70     Weight: 62 kg (136 lb 11 oz)  Body mass index is 29.58 kg/m².  No intake or output data in the 24 hours ending 07/11/25 1653      Physical Exam  HENT:      Head: Normocephalic and atraumatic.   Cardiovascular:      Rate and Rhythm: Regular rhythm. Bradycardia present.      Heart sounds: No murmur heard.  Pulmonary:      Effort: Pulmonary effort is normal. No respiratory distress.      Breath sounds: Normal breath sounds. No wheezing.   Abdominal:      General: Bowel sounds are normal. There is no distension.      Palpations: Abdomen is soft.      Tenderness: There is no abdominal tenderness.   Musculoskeletal:         General: No swelling.   Skin:     General: Skin is warm and dry.   Neurological:      Mental Status: She is alert and oriented to person, place, and time. Mental status is at baseline.               Significant Labs: All pertinent labs within the past 24 hours have been reviewed.  Recent Lab Results         07/11/25  0622        Albumin 3.5       ALP 85        ALT 37       Anion Gap 5       AST 35       Baso # 0.06       Basophil % 0.7       BILIRUBIN TOTAL 0.6  Comment: For infants and newborns, interpretation of results should be based   on gestational age, weight and in agreement with clinical   observations.    Premature Infant recommended reference ranges:   0-24 hours:  <8.0 mg/dL   24-48 hours: <12.0 mg/dL   3-5 days:    <15.0 mg/dL   6-29 days:   <15.0 mg/dL       BUN 19       Calcium 8.4       Chloride 104       CO2 28       Creatinine 1.0       eGFR 57  Comment: Estimated GFR calculated using the CKD-EPI creatinine (2021) equation.       Eos # 0.18       Eos % 2.2       Glucose 83       Gran # (ANC) 6.04       Hematocrit 34.2       Hemoglobin 11.3       Immature Grans (Abs) 0.06  Comment: Mild elevation in immature granulocytes is non specific and can be seen in a variety of conditions including stress response, acute inflammation, trauma and pregnancy. Correlation with other laboratory and clinical findings is essential.       Immature Granulocytes 0.7       Lymph # 1.35       Lymph % 16.3       MCH 32.2       MCHC 33.0       MCV 97       Mono # 0.60       Mono % 7.2       MPV 11.2       Neut % 72.9       nRBC 0       Platelet Count 159       Potassium 4.2       PROTEIN TOTAL 6.7       RBC 3.51       RDW 14.8       Sodium 137       WBC 8.29               Significant Imaging: I have reviewed all pertinent imaging results/findings within the past 24 hours.      Assessment & Plan  Dizziness  Likely related to significant bradycardia  Holding amiodarone and metoprolol  Symptomatic bradycardia  -Continue to hold amiodarone and metoprolol  -Continued treatment of hypothyroidism as noted below  -Cardiology consult to weigh in on bradycardia and medication recommendations for ongoing treatment of atrial fibrillation    Hypothyroidism  Patient has chronic hypothyroidism. TSH 83.959 and T4 0.80. TFTs reviewed-   Lab Results   Component Value Date    TSH 83.959 (H)  07/10/2025   Plan:  -Will continue chronic levothyroxine and adjust for and clinical changes  -Consider endocrine consult and/or IV replacement therapy     Dyspnea on exertion  resolved  (HFpEF) heart failure with preserved ejection fraction  Patient is identified as having preserved heart failure that is Chronic. CHF is currently controlled. Latest ECHO performed and demonstrates- Results for orders placed during the hospital encounter of 11/03/24    Echo    Interpretation Summary    Left Ventricle: The left ventricle is mildly dilated. Normal wall thickness. There is normal systolic function. Ejection fraction is approximately 55%. There is normal diastolic function.    Right Ventricle: Normal right ventricular cavity size. Wall thickness is normal. Systolic function is normal.    Aortic Valve: There is mild aortic valve sclerosis.    Pulmonary Artery: The estimated pulmonary artery systolic pressure is 30 mmHg.    IVC/SVC: Normal venous pressure at 3 mmHg.  Continue home lasix, hold metoprolol in setting of bradycardia and monitor clinical status closely. Monitor on telemetry. Patient is off CHF pathway.  Monitor strict Is&Os and daily weights.  Place on fluid restriction of 2 L. Continue to stress to patient importance of self efficacy and  on diet for CHF. Last BNP reviewed- and noted below   Recent Labs   Lab 07/10/25  1107   *     Atrial fibrillation  Patient has paroxysmal (<7 days) atrial fibrillation. Patient is currently in sinus rhythm. Patient is status post cardioversion/defibrillation with Dr. Cabezas back on 12/13/2024. [id number] Score: 3. The patients heart rate in the last 24 hours is as follows:  Pulse  Min: 46  Max: 66     Antiarrhythmics  Amiodarone 200 mg daily  Metoprolol Succinate 12.5 mg daily    Anticoagulants  apixaban tablet 5 mg, 2 times daily, Oral    Plan  -Replete lytes with a goal of K>4, Mg >2  -Patient is anticoagulated, see medications listed above.  -Patient's afib  is currently controlled  -Hold amiodarone and metoprolol in setting of bradycardia  -f/u cardiology  regarding medication recommendations for ongoing treatment of atrial fibrillation in setting of bradycardia    Essential hypertension  Chronic, uncontrolled.  Latest blood pressure and vitals reviewed-   Temp:  [97.6 °F (36.4 °C)-98.3 °F (36.8 °C)]   Pulse:  [46-66]   Resp:  [16-18]   BP: (127-169)/(59-72)   SpO2:  [93 %-98 %] .   Home meds for hypertension were reviewed and noted below.   Hypertension Medications              furosemide (LASIX) 40 MG tablet TAKE 1 TABLET EVERY DAY AS NEEDED    metoprolol succinate (TOPROL-XL) 25 MG 24 hr tablet Take 0.5 tablets (12.5 mg total) by mouth once daily.     While in the hospital, will manage blood pressure as follows; Adjust home antihypertensive regimen as follows- hold metoprolol in setting of bradycardia but continue furosemide    Will utilize p.r.n. blood pressure medication only if patient's blood pressure greater than  180/110 and she develops symptoms such as worsening chest pain or shortness of breath.    Stage 3a chronic kidney disease  Creatine stable for now. BMP reviewed- noted Estimated Creatinine Clearance: 37.5 mL/min (based on SCr of 1 mg/dL). according to latest data. Based on current GFR, CKD stage is stage 3 - GFR 30-59.  Monitor UOP and serial BMP and adjust therapy as needed. Renally dose meds. Avoid nephrotoxic medications and procedures.    Anxiety  Chronic. Stable. Not in acute exacerbation and currently denies endorsing any suicidal/homicidal ideations.   Plan:  -Continue home medications      Hyperlipidemia  Patient is chronically on statin.will continue for now. Last Lipid Panel:   Lab Results   Component Value Date    CHOL 154 05/27/2025    HDL 58 05/27/2025    LDLCALC 76.0 05/27/2025    TRIG 100 05/27/2025    CHOLHDL 37.7 05/27/2025     Plan:  -Continue home medication  -low fat/low calorie diet    GERD (gastroesophageal reflux  disease)  Chronic. Stable. Currently asymptomatic. Home medications include PPI/Antacids as needed.  Plan:  -Continue PPI/Antacids as needed     Obesity (BMI 30.0-34.9)  Body mass index is 29.58 kg/m². Elevation likely secondary to increased calorie intake and sedentary lifestyle. Patient educated on morbidity and mortality in regards to elevated BMI and stressed importance of diet and exercise.   Plan:  -low fat/low calorie diet     VTE Risk Mitigation (From admission, onward)           Ordered     apixaban tablet 5 mg  2 times daily         07/11/25 0310     Reason for No Pharmacological VTE Prophylaxis  Once        Question:  Reasons:  Answer:  Already adequately anticoagulated on oral Anticoagulants    07/10/25 2207     IP VTE HIGH RISK PATIENT  Once         07/10/25 2207     Place sequential compression device  Until discontinued         07/10/25 2207                    Discharge Planning   MALIK: 7/12/2025     Code Status: Full Code   Medical Readiness for Discharge Date:   Discharge Plan A: Home                        Denise Urban MD  Department of Hospital Medicine   [facility] - Med Surg

## 2025-07-11 NOTE — ASSESSMENT & PLAN NOTE
Patient has chronic hypothyroidism. TSH 83.959 and T4 0.80. TFTs reviewed-   Lab Results   Component Value Date    TSH 83.959 (H) 07/10/2025   Plan:  -Will continue chronic levothyroxine and adjust for and clinical changes  -Consider endocrine consult and/or IV replacement therapy

## 2025-07-11 NOTE — ASSESSMENT & PLAN NOTE
Patient is identified as having preserved heart failure that is Chronic. CHF is currently controlled. Latest ECHO performed and demonstrates- Results for orders placed during the hospital encounter of 11/03/24    Echo    Interpretation Summary    Left Ventricle: The left ventricle is mildly dilated. Normal wall thickness. There is normal systolic function. Ejection fraction is approximately 55%. There is normal diastolic function.    Right Ventricle: Normal right ventricular cavity size. Wall thickness is normal. Systolic function is normal.    Aortic Valve: There is mild aortic valve sclerosis.    Pulmonary Artery: The estimated pulmonary artery systolic pressure is 30 mmHg.    IVC/SVC: Normal venous pressure at 3 mmHg.  Continue home lasix, hold metoprolol in setting of bradycardia and monitor clinical status closely. Monitor on telemetry. Patient is off CHF pathway.  Monitor strict Is&Os and daily weights.  Place on fluid restriction of 2 L. Continue to stress to patient importance of self efficacy and  on diet for CHF. Last BNP reviewed- and noted below   Recent Labs   Lab 07/10/25  1107   *

## 2025-07-11 NOTE — ASSESSMENT & PLAN NOTE
Patient has paroxysmal (<7 days) atrial fibrillation. Patient is currently in sinus rhythm. Patient is status post cardioversion/defibrillation with Dr. Cabezas back on 12/13/2024. [id number] Score: 3. The patients heart rate in the last 24 hours is as follows:  Pulse  Min: 38  Max: 61     Antiarrhythmics  Amiodarone 200 mg daily  Metoprolol Succinate 12.5 mg daily    Anticoagulants  apixaban tablet 5 mg, 2 times daily, Oral    Plan  -Replete lytes with a goal of K>4, Mg >2  -Patient is anticoagulated, see medications listed above.  -Patient's afib is currently controlled  -Hold amiodarone and metoprolol in setting of bradycardia  -f/u cardiology in a.m. regarding medication recommendations for ongoing treatment of atrial fibrillation in setting of bradycardia

## 2025-07-11 NOTE — ASSESSMENT & PLAN NOTE
Sinus liya and RBBB    Repeat echo   Continue synthroid Rx  Avoid BB and CCB  If no recurrent severe liya and symptoms, will do the 2 weeks as op

## 2025-07-11 NOTE — ASSESSMENT & PLAN NOTE
Patient is chronically on statin.will continue for now. Last Lipid Panel:   Lab Results   Component Value Date    CHOL 154 05/27/2025    HDL 58 05/27/2025    LDLCALC 76.0 05/27/2025    TRIG 100 05/27/2025    CHOLHDL 37.7 05/27/2025     Plan:  -Continue home medication  -low fat/low calorie diet

## 2025-07-11 NOTE — SUBJECTIVE & OBJECTIVE
Interval History: f/u dizziness  resolved likely related to bradycardia. Cardiology consulted due to being on amiodarone and metoprolol at home    Review of Systems  Objective:     Vital Signs (Most Recent):  Temp: 97.6 °F (36.4 °C) (07/11/25 1645)  Pulse: (!) 55 (07/11/25 1645)  Resp: 16 (07/11/25 1645)  BP: (!) 162/70 (07/11/25 1645)  SpO2: 98 % (07/11/25 1645) Vital Signs (24h Range):  Temp:  [97.6 °F (36.4 °C)-98.3 °F (36.8 °C)] 97.6 °F (36.4 °C)  Pulse:  [46-66] 55  Resp:  [16-18] 16  SpO2:  [93 %-98 %] 98 %  BP: (127-169)/(59-72) 162/70     Weight: 62 kg (136 lb 11 oz)  Body mass index is 29.58 kg/m².  No intake or output data in the 24 hours ending 07/11/25 1653      Physical Exam  HENT:      Head: Normocephalic and atraumatic.   Cardiovascular:      Rate and Rhythm: Regular rhythm. Bradycardia present.      Heart sounds: No murmur heard.  Pulmonary:      Effort: Pulmonary effort is normal. No respiratory distress.      Breath sounds: Normal breath sounds. No wheezing.   Abdominal:      General: Bowel sounds are normal. There is no distension.      Palpations: Abdomen is soft.      Tenderness: There is no abdominal tenderness.   Musculoskeletal:         General: No swelling.   Skin:     General: Skin is warm and dry.   Neurological:      Mental Status: She is alert and oriented to person, place, and time. Mental status is at baseline.               Significant Labs: All pertinent labs within the past 24 hours have been reviewed.  Recent Lab Results         07/11/25  0622        Albumin 3.5       ALP 85       ALT 37       Anion Gap 5       AST 35       Baso # 0.06       Basophil % 0.7       BILIRUBIN TOTAL 0.6  Comment: For infants and newborns, interpretation of results should be based   on gestational age, weight and in agreement with clinical   observations.    Premature Infant recommended reference ranges:   0-24 hours:  <8.0 mg/dL   24-48 hours: <12.0 mg/dL   3-5 days:    <15.0 mg/dL   6-29 days:   <15.0  mg/dL       BUN 19       Calcium 8.4       Chloride 104       CO2 28       Creatinine 1.0       eGFR 57  Comment: Estimated GFR calculated using the CKD-EPI creatinine (2021) equation.       Eos # 0.18       Eos % 2.2       Glucose 83       Gran # (ANC) 6.04       Hematocrit 34.2       Hemoglobin 11.3       Immature Grans (Abs) 0.06  Comment: Mild elevation in immature granulocytes is non specific and can be seen in a variety of conditions including stress response, acute inflammation, trauma and pregnancy. Correlation with other laboratory and clinical findings is essential.       Immature Granulocytes 0.7       Lymph # 1.35       Lymph % 16.3       MCH 32.2       MCHC 33.0       MCV 97       Mono # 0.60       Mono % 7.2       MPV 11.2       Neut % 72.9       nRBC 0       Platelet Count 159       Potassium 4.2       PROTEIN TOTAL 6.7       RBC 3.51       RDW 14.8       Sodium 137       WBC 8.29               Significant Imaging: I have reviewed all pertinent imaging results/findings within the past 24 hours.

## 2025-07-11 NOTE — SUBJECTIVE & OBJECTIVE
Past Medical History:   Diagnosis Date    Allergy     Anxiety 4/25/2022    Arthritis     Back pain     Chronic congestive heart failure 2/21/2025    Disorder of kidney and ureter     Diverticulosis of colon     GERD (gastroesophageal reflux disease)     Glaucoma     Hyperlipidemia     Hypertension     Ingrown toenail     Klebsiella cystitis     New onset atrial fibrillation 11/5/2024    Obesity     Pain in joint involving multiple sites     Pneumonia     Tortuous colon     Trouble in sleeping     Unspecified disorder of autonomic nervous system     Uterine fibroid        Past Surgical History:   Procedure Laterality Date    CARDIOVERSION N/A 12/13/2024    Procedure: Cardioversion;  Surgeon: Lopez Cabezas MD;  Location: [facility] OR;  Service: Cardiology;  Laterality: N/A;    COLONOSCOPY      COLONOSCOPY N/A 3/15/2017    Procedure: COLONOSCOPY;  Surgeon: Yogi Carbone MD;  Location: [facility];  Service: Endoscopy;  Laterality: N/A;    TREATMENT OF CARDIAC ARRHYTHMIA N/A 12/13/2024    Procedure: Cardioversion/Defibrillation;  Surgeon: Lopez Cabezas MD;  Location: [facility] OR;  Service: Cardiology;  Laterality: N/A;  1st 0600       Review of patient's allergies indicates:  No Known Allergies    No current facility-administered medications on file prior to encounter.     Current Outpatient Medications on File Prior to Encounter   Medication Sig    albuterol (PROVENTIL/VENTOLIN HFA) 90 mcg/actuation inhaler INHALE 2 PUFFS INTO THE LUNGS EVERY 6 (SIX) HOURS AS NEEDED FOR SHORTNESS OF BREATH.    alendronate (FOSAMAX) 70 MG tablet TAKE 1 TABLET WEEKLY AS DIRECTED. SEE PACKAGE FOR ADDITIONAL INSTRUCTIONS    amiodarone (PACERONE) 200 MG Tab Take 200 mg by mouth once daily.    aspirin (ECOTRIN) 81 MG EC tablet Take 81 mg by mouth once daily.    atorvastatin (LIPITOR) 20 MG tablet TAKE 1 TABLET EVERY DAY    calcium citrate-vitamin D3 315-200 mg (CITRACAL+D) 315 mg-5 mcg (200 unit) per tablet Take 1 tablet by mouth 2 (two)  times daily.    cetirizine (ZYRTEC) 10 MG tablet TAKE 1 TABLET ONE TIME DAILY    ELIQUIS 5 mg Tab TAKE 1 TABLET TWICE DAILY    furosemide (LASIX) 40 MG tablet TAKE 1 TABLET EVERY DAY AS NEEDED    levothyroxine (SYNTHROID) 100 MCG tablet Take 1 tablet (100 mcg total) by mouth before breakfast.    metoprolol succinate (TOPROL-XL) 25 MG 24 hr tablet Take 0.5 tablets (12.5 mg total) by mouth once daily.    multivitamin capsule Take 1 capsule by mouth once daily.    omeprazole (PRILOSEC) 40 MG capsule TAKE 1 CAPSULE EVERY MORNING    sertraline (ZOLOFT) 25 MG tablet TAKE 1 TABLET EVERY DAY    timolol maleate 0.5% (TIMOPTIC) 0.5 % Drop Place into both eyes every evening.     Family History       Problem Relation (Age of Onset)    Asthma Daughter, Paternal Aunt    Breast cancer Cousin    Cancer Sister (70)    Colon cancer Sister    Depression Daughter    Diabetes Sister, Brother, Paternal Uncle, Maternal Grandmother    Early death Maternal Grandfather, Paternal Grandfather    Glaucoma Mother    Heart disease Brother    Hyperlipidemia Sister, Brother    Hypertension Father, Sister, Brother          Tobacco Use    Smoking status: Never     Passive exposure: Yes    Smokeless tobacco: Never   Substance and Sexual Activity    Alcohol use: No    Drug use: No    Sexual activity: Not Currently     Partners: Male     Review of Systems   All other systems reviewed and are negative.    Objective:     Vital Signs (Most Recent):  Temp: 97.9 °F (36.6 °C) ([birth year]/1945)  Pulse: (!) 54 (07/10/25 2000)  Resp: 18 (07/1945)  BP: (!) 169/72 (07/1945)  SpO2: (!) 93 % (07/1945) Vital Signs (24h Range):  Temp:  [97.9 °F (36.6 °C)-98 °F (36.7 °C)] 97.9 °F (36.6 °C)  Pulse:  [38-58] 54  Resp:  [14-20] 18  SpO2:  [93 %-98 %] 93 %  BP: (120-195)/() 169/72     Weight: 82.6 kg (182 lb)  Body mass index is 33.29 kg/m².     Physical Exam  Vitals reviewed.   Constitutional:       General: She is not in acute distress.      Appearance: Normal appearance. She is obese. She is not ill-appearing, toxic-appearing or diaphoretic.   HENT:      Head: Normocephalic and atraumatic.      Right Ear: External ear normal.      Left Ear: External ear normal.      Nose: Nose normal. No congestion or rhinorrhea.      Mouth/Throat:      Mouth: Mucous membranes are moist.      Pharynx: Oropharynx is clear. No oropharyngeal exudate or posterior oropharyngeal erythema.   Eyes:      General: No scleral icterus.     Extraocular Movements: Extraocular movements intact.      Conjunctiva/sclera: Conjunctivae normal.      Pupils: Pupils are equal, round, and reactive to light.   Neck:      Vascular: No carotid bruit.   Cardiovascular:      Rate and Rhythm: Regular rhythm. Bradycardia present.      Pulses: Normal pulses.      Heart sounds: Normal heart sounds. No murmur heard.     No friction rub. No gallop.   Pulmonary:      Effort: Pulmonary effort is normal. No respiratory distress.      Breath sounds: Normal breath sounds. No stridor. No wheezing, rhonchi or rales.   Chest:      Chest wall: No tenderness.   Abdominal:      General: Abdomen is flat. Bowel sounds are normal. There is no distension.      Palpations: Abdomen is soft. There is no mass.      Tenderness: There is no abdominal tenderness. There is no right CVA tenderness, left CVA tenderness, guarding or rebound.      Hernia: No hernia is present.   Musculoskeletal:         General: No swelling, tenderness, deformity or signs of injury. Normal range of motion.      Cervical back: Normal range of motion and neck supple. No rigidity or tenderness.      Right lower leg: No edema.      Left lower leg: No edema.   Lymphadenopathy:      Cervical: No cervical adenopathy.   Skin:     General: Skin is warm and dry.      Capillary Refill: Capillary refill takes less than 2 seconds.      Coloration: Skin is not jaundiced or pale.      Findings: No bruising, erythema, lesion or rash.   Neurological:       General: No focal deficit present.      Mental Status: She is alert and oriented to person, place, and time. Mental status is at baseline.      Cranial Nerves: No cranial nerve deficit.      Sensory: No sensory deficit.      Motor: No weakness.      Coordination: Coordination normal.   Psychiatric:         Mood and Affect: Mood normal.         Behavior: Behavior normal.         Thought Content: Thought content normal.         Judgment: Judgment normal.              CRANIAL NERVES     CN III, IV, VI   Pupils are equal, round, and reactive to light.       Significant Labs: All pertinent labs within the past 24 hours have been reviewed.    Significant Imaging: I have reviewed all pertinent imaging results/findings within the past 24 hours.    LABS:  Recent Results (from the past 24 hours)   EKG 12-lead    Collection Time: 07/10/25 10:36 AM   Result Value Ref Range    QRS Duration 128 ms    [facility] QTC Calculation 484 ms   Hepatitis C Antibody    Collection Time: 07/10/25 11:07 AM   Result Value Ref Range    Hep C Ab Interp Negative Negative   HIV 1/2 Ag/Ab (4th Gen)    Collection Time: 07/10/25 11:07 AM   Result Value Ref Range    HIV 1/2 Ag/Ab Negative Negative   Comprehensive metabolic panel    Collection Time: 07/10/25 11:07 AM   Result Value Ref Range    Sodium 137 136 - 145 mmol/L    Potassium 4.5 3.5 - 5.1 mmol/L    Chloride 102 95 - 110 mmol/L    CO2 26 23 - 29 mmol/L    Glucose 89 70 - 110 mg/dL    BUN 21 8 - 23 mg/dL    Creatinine 1.2 0.5 - 1.4 mg/dL    Calcium 8.5 (L) 8.7 - 10.5 mg/dL    Protein Total 7.3 6.0 - 8.4 gm/dL    Albumin 3.7 3.5 - 5.2 g/dL    Bilirubin Total 0.5 0.1 - 1.0 mg/dL    ALP 76 40 - 150 unit/L    AST 37 11 - 45 unit/L    ALT 40 10 - 44 unit/L    Anion Gap 9 8 - 16 mmol/L    eGFR 46 (L) >60 mL/min/1.73/m2   Troponin I #1    Collection Time: 07/10/25 11:07 AM   Result Value Ref Range    Troponin-I 0.013 <=0.026 ng/mL   BNP    Collection Time: 07/10/25 11:07 AM   Result Value Ref Range    BNP  404 (H) 0 - 99 pg/mL   CBC with Differential    Collection Time: 07/10/25 11:07 AM   Result Value Ref Range    WBC 9.08 3.90 - 12.70 K/uL    RBC 3.57 (L) 4.00 - 5.40 M/uL    HGB 11.5 (L) 12.0 - 16.0 gm/dL    HCT 34.9 (L) 37.0 - 48.5 %    MCV 98 82 - 98 fL    MCH 32.2 (H) 27.0 - 31.0 pg    MCHC 33.0 32.0 - 36.0 g/dL    RDW 15.3 (H) 11.5 - 14.5 %    Platelet Count 180 150 - 450 K/uL    MPV 11.2 9.2 - 12.9 fL    Nucleated RBC 0 <=0 /100 WBC    Neut % 68.8 38 - 73 %    Lymph % 18.1 18 - 48 %    Mono % 9.4 4 - 15 %    Eos % 2.3 <=8 %    Basophil % 0.7 <=1.9 %    Imm Grans % 0.7 (H) 0.0 - 0.5 %    Neut # 6.26 1.8 - 7.7 K/uL    Lymph # 1.64 1 - 4.8 K/uL    Mono # 0.85 0.3 - 1 K/uL    Eos # 0.21 <=0.5 K/uL    Baso # 0.06 <=0.2 K/uL    Imm Grans # 0.06 (H) 0.00 - 0.04 K/uL   TSH    Collection Time: 07/10/25 11:07 AM   Result Value Ref Range    TSH 83.959 (H) 0.400 - 4.000 uIU/mL   T4, Free    Collection Time: 07/10/25 11:07 AM   Result Value Ref Range    Free T4 0.80 0.71 - 1.51 ng/dL       RADIOLOGY  X-Ray Chest AP Portable  Result Date: 7/10/2025  EXAMINATION: XR CHEST AP PORTABLE CLINICAL HISTORY: Acute chest pain, Chest Pain; COMPARISON: December 31, 2024 FINDINGS: Mild cardiomegaly with atherosclerosis of the aortic arch. Chronic left suprahilar fibrotic type scarring. The right lung remains clear.     No acute findings. Electronically signed by: Madi Ortiz MD Date:    07/10/2025 Time:    11:03      EKG    MICROBIOLOGY    MDM

## 2025-07-11 NOTE — ASSESSMENT & PLAN NOTE
Chronic. Stable. Not in acute exacerbation and currently denies endorsing any suicidal/homicidal ideations.   Plan:  -Continue home medications

## 2025-07-11 NOTE — PT/OT/SLP EVAL
Physical Therapy Evaluation    Patient Name:  Amber Naranjo   MRN:  [id number]    Recommendations:     Discharge Recommendations: Low Intensity Therapy   Discharge Equipment Recommendations: none   Barriers to discharge: None    Assessment:     Amber Naranjo is a 79 y.o. female admitted with a medical diagnosis of Dizziness.  She presents with the following impairments/functional limitations: weakness, impaired endurance, impaired functional mobility, gait instability, decreased coordination, impaired cognition, impaired balance, decreased safety awareness, decreased lower extremity function which limits mobility and increases risk of falls. Pt was able to tolerate gait activity in hallway with no s/s of distress, slight unsteadiness on feet but no LOB. Mobilized 40ft x 2 with no AD. Pt will benefit from continued PT services in order to progress toward baseline.    Rehab Prognosis: Good; patient would benefit from acute skilled PT services to address these deficits and reach maximum level of function.    Recent Surgery: * No surgery found *      Plan:     During this hospitalization, patient to be seen 3 x/week to address the identified rehab impairments via gait training, therapeutic activities, therapeutic exercises, neuromuscular re-education and progress toward the following goals:    Plan of Care Expires:  07/25/25    Subjective     Chief Complaint: no major complaints  Patient/Family Comments/goals: to get better/go home  Pain/Comfort:  Pain Rating 1: 0/10  Pain Rating Post-Intervention 1: 0/10    Patients cultural, spiritual, Holiness conflicts given the current situation: no    Living Environment:  Pt lives alone in mobile home with 5 steps to entry with B rails  Prior to admission, patients level of function was independent with ADLs, ambulatory in home and community. Pt is retired and does not drive  Equipment used at home: grab bar.  DME owned (not currently used): single point cane and rollator.  Upon  discharge, patient will have assistance from family.    Objective:     Communicated with nursing (Nay) and performed chart review via epic system prior to session.  Patient found supine with peripheral IV, telemetry  upon PT entry to room. Daughters at bedside    General Precautions: Standard, fall  Orthopedic Precautions:N/A   Braces: N/A  Respiratory Status: Room air    Exams:  Cognitive Exam:  Patient is oriented to Person, Place, Time, and Situation  Gross Motor Coordination:  WFL  Postural Exam:  Patient presented with the following abnormalities:    -       Rounded shoulders  -       Forward head  RLE ROM: WFL  RLE Strength: WFL  LLE ROM: WFL  LLE Strength: WFL    Functional Mobility:  Bed Mobility:     Scooting: supervision  Supine to Sit: supervision  Transfers:     Sit to Stand:  supervision with no AD  Bed to Chair: supervision with  no AD  using  Stand Pivot  Gait: demonstrated slow pace, flexed posture, wide KOSTAS, 40 ft x 2 SBA/CGA with no AD  Balance: fair to fair plus dynamic standing balance      AM-PAC 6 CLICK MOBILITY  Total Score:16       Treatment & Education:  Educated pt on benefits of consistent participation in PT services to meet functional goals. Educated pt on seated therex to promote strength, circulation and joint mobility. Exercises included AP, LAQ, marching. Educated to perform exercises intermittently throughout day to tolerance. Educated pt on importance of sitting OOB to promote endurance and overall activity tolerance. Educated pt on call don't fall policy and use of call button to alert nursing staff of needs (including to assist in/out of bed). Pt expressed understanding.     Patient left supine with all lines intact, call button in reach, chair alarm on, nursing notified, and daughters present.    GOALS:   Multidisciplinary Problems       Physical Therapy Goals          Problem: Physical Therapy    Goal Priority Disciplines Outcome Interventions   Physical Therapy Goal     PT,  PT/OT     Description: Pt will perform bed mobility independently in order to participate in EOB activity.  Pt will perform transfers independently in order to participate in OOB activity.  Pt will ambulate 350 ft I with LRAD in order to participate in daily tasks.   Pt will improve safe functional mobility as evidenced by 2 pt increase in AMPAC score.                       DME Justifications:  No DME recommended requiring DME justifications    History:     Past Medical History:   Diagnosis Date    Allergy     Anxiety 4/25/2022    Arthritis     Back pain     Chronic congestive heart failure 2/21/2025    Disorder of kidney and ureter     Diverticulosis of colon     GERD (gastroesophageal reflux disease)     Glaucoma     Hyperlipidemia     Hypertension     Hypothyroidism 7/10/2025    Ingrown toenail     Klebsiella cystitis     New onset atrial fibrillation 11/5/2024    Obesity     Pain in joint involving multiple sites     Pneumonia     Tortuous colon     Trouble in sleeping     Unspecified disorder of autonomic nervous system     Uterine fibroid        Past Surgical History:   Procedure Laterality Date    CARDIOVERSION N/A 12/13/2024    Procedure: Cardioversion;  Surgeon: Lopez Cabezas MD;  Location: [facility];  Service: Cardiology;  Laterality: N/A;    COLONOSCOPY      COLONOSCOPY N/A 3/15/2017    Procedure: COLONOSCOPY;  Surgeon: Yogi Carbone MD;  Location: [facility];  Service: Endoscopy;  Laterality: N/A;    TREATMENT OF CARDIAC ARRHYTHMIA N/A 12/13/2024    Procedure: Cardioversion/Defibrillation;  Surgeon: Lopez Cabezas MD;  Location: [facility];  Service: Cardiology;  Laterality: N/A;  1st 0600       Time Tracking:     PT Received On: 07/11/25  PT Start Time: 1040     PT Stop Time: 1050  PT Total Time (min): 10 min     Billable Minutes: Evaluation 10      07/11/2025

## 2025-07-11 NOTE — H&P
[facility] Medicine  History & Physical    Patient Name: Amber Naranjo  MRN: [id number]  Patient Class: OP- Observation  Admission Date: 7/10/2025  Attending Physician: Honey Gilbert MD   Primary Care Provider: Jarrett Peck MD         Patient information was obtained from patient, past medical records, and ER records.     Subjective:     Principal Problem:Dizziness    Chief Complaint:   Chief Complaint   Patient presents with    Dizziness     Dizzy onset yesterday         HPI: Amber Naranjo is a 79 y.o. female with a PMH  has a past medical history of Allergy, Anxiety (4/25/2022), Arthritis, Back pain, Chronic congestive heart failure (2/21/2025), Disorder of kidney and ureter, Diverticulosis of colon, GERD (gastroesophageal reflux disease), Glaucoma, Hyperlipidemia, Hypertension, Ingrown toenail, Klebsiella cystitis, New onset atrial fibrillation (11/5/2024), Obesity, Pain in joint involving multiple sites, Pneumonia, Tortuous colon, Trouble in sleeping, Unspecified disorder of autonomic nervous system, and Uterine fibroid. who presented as a transfer from [facility] for higher level of care after patient presented with complaints of acute onset lightheadedness/dizziness worse with positional changes and on exertion since yesterday.  Per chart review, patient has been seen by her PCP with similar complaints since May and was found to have evidence of hypothyroidism and was recently started on Synthroid with no improvement in symptoms.  Associated symptoms also included generalized weakness/fatigue and decreased appetite in addition to those complaints noted above however, denied endorsing any headaches, visual changes, fever, chills, sweats, heat/cold intolerance, nausea, vomiting, chest pain, abdominal pain, dysuria, hematuria, melena, hematochezia, diarrhea, or onset neurological deficits.  Prior to onset of symptoms, patient reported being in her usual state of health with no other concerns or  complaints.  All other review of systems negative except as noted above.  Initial workup at outside facility revealed patient to be afebrile without leukocytosis, bradycardic with heart rate 38-58 beats per minute, EKG positive for sinus bradycardia, H/H 11.5/34.9, creatinine/GFR 1.2/46, , troponin negative, TSH 83.459, and T4 0.80.  Patient admitted to Hospital Medicine under observation for continued medical management and treatment of symptomatic bradycardia and hypothyroidism.    PCP: Jarrett Peck      Past Medical History:   Diagnosis Date    Allergy     Anxiety 4/25/2022    Arthritis     Back pain     Chronic congestive heart failure 2/21/2025    Disorder of kidney and ureter     Diverticulosis of colon     GERD (gastroesophageal reflux disease)     Glaucoma     Hyperlipidemia     Hypertension     Ingrown toenail     Klebsiella cystitis     New onset atrial fibrillation 11/5/2024    Obesity     Pain in joint involving multiple sites     Pneumonia     Tortuous colon     Trouble in sleeping     Unspecified disorder of autonomic nervous system     Uterine fibroid        Past Surgical History:   Procedure Laterality Date    CARDIOVERSION N/A 12/13/2024    Procedure: Cardioversion;  Surgeon: Lopez Cabezas MD;  Location: [facility];  Service: Cardiology;  Laterality: N/A;    COLONOSCOPY      COLONOSCOPY N/A 3/15/2017    Procedure: COLONOSCOPY;  Surgeon: Yogi Carbone MD;  Location: [facility];  Service: Endoscopy;  Laterality: N/A;    TREATMENT OF CARDIAC ARRHYTHMIA N/A 12/13/2024    Procedure: Cardioversion/Defibrillation;  Surgeon: Lopez Cabezas MD;  Location: [facility];  Service: Cardiology;  Laterality: N/A;  1st 0600       Review of patient's allergies indicates:  No Known Allergies    No current facility-administered medications on file prior to encounter.     Current Outpatient Medications on File Prior to Encounter   Medication Sig    albuterol (PROVENTIL/VENTOLIN HFA) 90 mcg/actuation  inhaler INHALE 2 PUFFS INTO THE LUNGS EVERY 6 (SIX) HOURS AS NEEDED FOR SHORTNESS OF BREATH.    alendronate (FOSAMAX) 70 MG tablet TAKE 1 TABLET WEEKLY AS DIRECTED. SEE PACKAGE FOR ADDITIONAL INSTRUCTIONS    amiodarone (PACERONE) 200 MG Tab Take 200 mg by mouth once daily.    aspirin (ECOTRIN) 81 MG EC tablet Take 81 mg by mouth once daily.    atorvastatin (LIPITOR) 20 MG tablet TAKE 1 TABLET EVERY DAY    calcium citrate-vitamin D3 315-200 mg (CITRACAL+D) 315 mg-5 mcg (200 unit) per tablet Take 1 tablet by mouth 2 (two) times daily.    cetirizine (ZYRTEC) 10 MG tablet TAKE 1 TABLET ONE TIME DAILY    ELIQUIS 5 mg Tab TAKE 1 TABLET TWICE DAILY    furosemide (LASIX) 40 MG tablet TAKE 1 TABLET EVERY DAY AS NEEDED    levothyroxine (SYNTHROID) 100 MCG tablet Take 1 tablet (100 mcg total) by mouth before breakfast.    metoprolol succinate (TOPROL-XL) 25 MG 24 hr tablet Take 0.5 tablets (12.5 mg total) by mouth once daily.    multivitamin capsule Take 1 capsule by mouth once daily.    omeprazole (PRILOSEC) 40 MG capsule TAKE 1 CAPSULE EVERY MORNING    sertraline (ZOLOFT) 25 MG tablet TAKE 1 TABLET EVERY DAY    timolol maleate 0.5% (TIMOPTIC) 0.5 % Drop Place into both eyes every evening.     Family History       Problem Relation (Age of Onset)    Asthma Daughter, Paternal Aunt    Breast cancer Cousin    Cancer Sister (70)    Colon cancer Sister    Depression Daughter    Diabetes Sister, Brother, Paternal Uncle, Maternal Grandmother    Early death Maternal Grandfather, Paternal Grandfather    Glaucoma Mother    Heart disease Brother    Hyperlipidemia Sister, Brother    Hypertension Father, Sister, Brother          Tobacco Use    Smoking status: Never     Passive exposure: Yes    Smokeless tobacco: Never   Substance and Sexual Activity    Alcohol use: No    Drug use: No    Sexual activity: Not Currently     Partners: Male     Review of Systems   All other systems reviewed and are negative.    Objective:     Vital Signs (Most  Recent):  Temp: 97.9 °F (36.6 °C) (07/1945)  Pulse: (!) 54 (07/10/25 2000)  Resp: 18 (07/1945)  BP: (!) 169/72 (07/1945)  SpO2: (!) 93 % (07/1945) Vital Signs (24h Range):  Temp:  [97.9 °F (36.6 °C)-98 °F (36.7 °C)] 97.9 °F (36.6 °C)  Pulse:  [38-58] 54  Resp:  [14-20] 18  SpO2:  [93 %-98 %] 93 %  BP: (120-195)/() 169/72     Weight: 82.6 kg (182 lb)  Body mass index is 33.29 kg/m².     Physical Exam  Vitals reviewed.   Constitutional:       General: She is not in acute distress.     Appearance: Normal appearance. She is obese. She is not ill-appearing, toxic-appearing or diaphoretic.   HENT:      Head: Normocephalic and atraumatic.      Right Ear: External ear normal.      Left Ear: External ear normal.      Nose: Nose normal. No congestion or rhinorrhea.      Mouth/Throat:      Mouth: Mucous membranes are moist.      Pharynx: Oropharynx is clear. No oropharyngeal exudate or posterior oropharyngeal erythema.   Eyes:      General: No scleral icterus.     Extraocular Movements: Extraocular movements intact.      Conjunctiva/sclera: Conjunctivae normal.      Pupils: Pupils are equal, round, and reactive to light.   Neck:      Vascular: No carotid bruit.   Cardiovascular:      Rate and Rhythm: Regular rhythm. Bradycardia present.      Pulses: Normal pulses.      Heart sounds: Normal heart sounds. No murmur heard.     No friction rub. No gallop.   Pulmonary:      Effort: Pulmonary effort is normal. No respiratory distress.      Breath sounds: Normal breath sounds. No stridor. No wheezing, rhonchi or rales.   Chest:      Chest wall: No tenderness.   Abdominal:      General: Abdomen is flat. Bowel sounds are normal. There is no distension.      Palpations: Abdomen is soft. There is no mass.      Tenderness: There is no abdominal tenderness. There is no right CVA tenderness, left CVA tenderness, guarding or rebound.      Hernia: No hernia is present.   Musculoskeletal:         General: No  swelling, tenderness, deformity or signs of injury. Normal range of motion.      Cervical back: Normal range of motion and neck supple. No rigidity or tenderness.      Right lower leg: No edema.      Left lower leg: No edema.   Lymphadenopathy:      Cervical: No cervical adenopathy.   Skin:     General: Skin is warm and dry.      Capillary Refill: Capillary refill takes less than 2 seconds.      Coloration: Skin is not jaundiced or pale.      Findings: No bruising, erythema, lesion or rash.   Neurological:      General: No focal deficit present.      Mental Status: She is alert and oriented to person, place, and time. Mental status is at baseline.      Cranial Nerves: No cranial nerve deficit.      Sensory: No sensory deficit.      Motor: No weakness.      Coordination: Coordination normal.   Psychiatric:         Mood and Affect: Mood normal.         Behavior: Behavior normal.         Thought Content: Thought content normal.         Judgment: Judgment normal.              CRANIAL NERVES     CN III, IV, VI   Pupils are equal, round, and reactive to light.       Significant Labs: All pertinent labs within the past 24 hours have been reviewed.    Significant Imaging: I have reviewed all pertinent imaging results/findings within the past 24 hours.    LABS:  Recent Results (from the past 24 hours)   EKG 12-lead    Collection Time: 07/10/25 10:36 AM   Result Value Ref Range    QRS Duration 128 ms    [facility] QTC Calculation 484 ms   Hepatitis C Antibody    Collection Time: 07/10/25 11:07 AM   Result Value Ref Range    Hep C Ab Interp Negative Negative   HIV 1/2 Ag/Ab (4th Gen)    Collection Time: 07/10/25 11:07 AM   Result Value Ref Range    HIV 1/2 Ag/Ab Negative Negative   Comprehensive metabolic panel    Collection Time: 07/10/25 11:07 AM   Result Value Ref Range    Sodium 137 136 - 145 mmol/L    Potassium 4.5 3.5 - 5.1 mmol/L    Chloride 102 95 - 110 mmol/L    CO2 26 23 - 29 mmol/L    Glucose 89 70 - 110 mg/dL    BUN 21 8  - 23 mg/dL    Creatinine 1.2 0.5 - 1.4 mg/dL    Calcium 8.5 (L) 8.7 - 10.5 mg/dL    Protein Total 7.3 6.0 - 8.4 gm/dL    Albumin 3.7 3.5 - 5.2 g/dL    Bilirubin Total 0.5 0.1 - 1.0 mg/dL    ALP 76 40 - 150 unit/L    AST 37 11 - 45 unit/L    ALT 40 10 - 44 unit/L    Anion Gap 9 8 - 16 mmol/L    eGFR 46 (L) >60 mL/min/1.73/m2   Troponin I #1    Collection Time: 07/10/25 11:07 AM   Result Value Ref Range    Troponin-I 0.013 <=0.026 ng/mL   BNP    Collection Time: 07/10/25 11:07 AM   Result Value Ref Range     (H) 0 - 99 pg/mL   CBC with Differential    Collection Time: 07/10/25 11:07 AM   Result Value Ref Range    WBC 9.08 3.90 - 12.70 K/uL    RBC 3.57 (L) 4.00 - 5.40 M/uL    HGB 11.5 (L) 12.0 - 16.0 gm/dL    HCT 34.9 (L) 37.0 - 48.5 %    MCV 98 82 - 98 fL    MCH 32.2 (H) 27.0 - 31.0 pg    MCHC 33.0 32.0 - 36.0 g/dL    RDW 15.3 (H) 11.5 - 14.5 %    Platelet Count 180 150 - 450 K/uL    MPV 11.2 9.2 - 12.9 fL    Nucleated RBC 0 <=0 /100 WBC    Neut % 68.8 38 - 73 %    Lymph % 18.1 18 - 48 %    Mono % 9.4 4 - 15 %    Eos % 2.3 <=8 %    Basophil % 0.7 <=1.9 %    Imm Grans % 0.7 (H) 0.0 - 0.5 %    Neut # 6.26 1.8 - 7.7 K/uL    Lymph # 1.64 1 - 4.8 K/uL    Mono # 0.85 0.3 - 1 K/uL    Eos # 0.21 <=0.5 K/uL    Baso # 0.06 <=0.2 K/uL    Imm Grans # 0.06 (H) 0.00 - 0.04 K/uL   TSH    Collection Time: 07/10/25 11:07 AM   Result Value Ref Range    TSH 83.959 (H) 0.400 - 4.000 uIU/mL   T4, Free    Collection Time: 07/10/25 11:07 AM   Result Value Ref Range    Free T4 0.80 0.71 - 1.51 ng/dL       RADIOLOGY  X-Ray Chest AP Portable  Result Date: 7/10/2025  EXAMINATION: XR CHEST AP PORTABLE CLINICAL HISTORY: Acute chest pain, Chest Pain; COMPARISON: December 31, 2024 FINDINGS: Mild cardiomegaly with atherosclerosis of the aortic arch. Chronic left suprahilar fibrotic type scarring. The right lung remains clear.     No acute findings. Electronically signed by: Madi Ortiz MD Date:    07/10/2025  Time:    11:03      EKG    MICROBIOLOGY    [facility]    Assessment/Plan:     Assessment & Plan  Dizziness  Patient presented with complaints of acute onset lightheadedness/dizziness worse with positional changes and on exertion since yesterday.  Per chart review, patient evaluated by her PCP with similar complaints since May and was found to have evidence of hypothyroidism and bradycardia. Home metoprolol held and patient is started on Synthroid.  Initial workup at outside facility revealed patient to be afebrile without leukocytosis, bradycardic with heart rate ranging between 38-58 bpm, EKG positive for sinus bradycardia, H/H 11.5/34.9, creatinine/GFR 1.2/46, , troponin negative, TSH 83.459, and T4 0.80.  Dizziness likely multifactorial in nature given hypothyroidism and bradycardia.  Plan:  -Telemetry  -Hold amiodarone and metoprolol in setting of bradycardia  -Continued treatment of hypothyroidism as noted below  -Consider CT head vs MRI for further evaluation  -f/u echo and carotid doppler     Symptomatic bradycardia  Patient presented with complaints of lightheadedness/dizziness, persistent generalized weakness/fatigue, and dyspnea/shortness of breath in setting of bradycardia with HR ranging from 38-58 bpm.  EKG revealed bradycardia in his currently on amiodarone for atrial fibrillation.  Metoprolol was discontinued by PCP secondary to bradycardia however, continues to run low.  Patient recently diagnosed with hypothyroidism as well and was started on Synthroid which may also be contributing to her bradycardia.  Plan:  -Telemetry  -Continue to hold amiodarone and metoprolol  -Continued treatment of hypothyroidism as noted below  -Consider cardiology consult to weigh in on bradycardia and medication recommendations for ongoing treatment of atrial fibrillation    Hypothyroidism  Patient has chronic hypothyroidism. TSH 83.959 and T4 0.80. TFTs reviewed-   Lab Results   Component Value Date    TSH 83.959 (H)  07/10/2025   Plan:  -Will continue chronic levothyroxine and adjust for and clinical changes  -Consider endocrine consult and/or IV replacement therapy     Dyspnea on exertion  Patient presented with intermittent dyspnea/shortness a breath in setting of known heart failure with preserved EF with BNP currently measuring 404.  Chest x-ray negative for acute findings as well as negative findings concerning for volume overload.  Dyspnea likely secondary to symptomatic bradycardia and hypothyroidism.  Plan:  -telemetry  -continue home medications for CHF  -continued treatment of symptomatic bradycardia and hypothyroidism as noted above    (HFpEF) heart failure with preserved ejection fraction  Patient is identified as having preserved heart failure that is Chronic. CHF is currently controlled. Latest ECHO performed and demonstrates- Results for orders placed during the hospital encounter of 11/03/24    Echo    Interpretation Summary    Left Ventricle: The left ventricle is mildly dilated. Normal wall thickness. There is normal systolic function. Ejection fraction is approximately 55%. There is normal diastolic function.    Right Ventricle: Normal right ventricular cavity size. Wall thickness is normal. Systolic function is normal.    Aortic Valve: There is mild aortic valve sclerosis.    Pulmonary Artery: The estimated pulmonary artery systolic pressure is 30 mmHg.    IVC/SVC: Normal venous pressure at 3 mmHg.  Continue home lasix, hold metoprolol in setting of bradycardia and monitor clinical status closely. Monitor on telemetry. Patient is off CHF pathway.  Monitor strict Is&Os and daily weights.  Place on fluid restriction of 2 L. Continue to stress to patient importance of self efficacy and  on diet for CHF. Last BNP reviewed- and noted below   Recent Labs   Lab 07/10/25  1107   *     Atrial fibrillation  Patient has paroxysmal (<7 days) atrial fibrillation. Patient is currently in sinus rhythm. Patient  is status post cardioversion/defibrillation with Dr. Cabezas back on 12/13/2024. [id number] Score: 3. The patients heart rate in the last 24 hours is as follows:  Pulse  Min: 38  Max: 61     Antiarrhythmics  Amiodarone 200 mg daily  Metoprolol Succinate 12.5 mg daily    Anticoagulants  apixaban tablet 5 mg, 2 times daily, Oral    Plan  -Replete lytes with a goal of K>4, Mg >2  -Patient is anticoagulated, see medications listed above.  -Patient's afib is currently controlled  -Hold amiodarone and metoprolol in setting of bradycardia  -f/u cardiology in a.m. regarding medication recommendations for ongoing treatment of atrial fibrillation in setting of bradycardia    Essential hypertension  Chronic, uncontrolled.  Latest blood pressure and vitals reviewed-   Temp:  [97.9 °F (36.6 °C)-98.3 °F (36.8 °C)]   Pulse:  [38-61]   Resp:  [14-20]   BP: (120-195)/()   SpO2:  [93 %-98 %] .   Home meds for hypertension were reviewed and noted below.   Hypertension Medications              furosemide (LASIX) 40 MG tablet TAKE 1 TABLET EVERY DAY AS NEEDED    metoprolol succinate (TOPROL-XL) 25 MG 24 hr tablet Take 0.5 tablets (12.5 mg total) by mouth once daily.     While in the hospital, will manage blood pressure as follows; Adjust home antihypertensive regimen as follows- hold metoprolol in setting of bradycardia but continue furosemide    Will utilize p.r.n. blood pressure medication only if patient's blood pressure greater than  180/110 and she develops symptoms such as worsening chest pain or shortness of breath.    Stage 3a chronic kidney disease  Creatine stable for now. BMP reviewed- noted Estimated Creatinine Clearance: 37.9 mL/min (based on SCr of 1.2 mg/dL). according to latest data. Based on current GFR, CKD stage is stage 3 - GFR 30-59.  Monitor UOP and serial BMP and adjust therapy as needed. Renally dose meds. Avoid nephrotoxic medications and procedures.    Anxiety  Chronic. Stable. Not in acute exacerbation  and currently denies endorsing any suicidal/homicidal ideations.   Plan:  -Continue home medications      Hyperlipidemia  Patient is chronically on statin.will continue for now. Last Lipid Panel:   Lab Results   Component Value Date    CHOL 154 05/27/2025    HDL 58 05/27/2025    LDLCALC 76.0 05/27/2025    TRIG 100 05/27/2025    CHOLHDL 37.7 05/27/2025     Plan:  -Continue home medication  -low fat/low calorie diet    GERD (gastroesophageal reflux disease)  Chronic. Stable. Currently asymptomatic. Home medications include PPI/Antacids as needed.  Plan:  -Continue PPI/Antacids as needed     Obesity (BMI 30.0-34.9)  Body mass index is 33.29 kg/m². Elevation likely secondary to increased calorie intake and sedentary lifestyle. Patient educated on morbidity and mortality in regards to elevated BMI and stressed importance of diet and exercise.   Plan:  -low fat/low calorie diet       VTE Risk Mitigation (From admission, onward)           Ordered     apixaban tablet 5 mg  2 times daily         07/11/25 0310     Reason for No Pharmacological VTE Prophylaxis  Once        Question:  Reasons:  Answer:  Already adequately anticoagulated on oral Anticoagulants    07/10/25 2207     IP VTE HIGH RISK PATIENT  Once         07/10/25 2207     Place sequential compression device  Until discontinued         07/10/25 2207                  //Core Measures   -DVT proph: SCDs, Eliquis  -Code status: Full    -Surrogate: none provided       Components of this note were documented using a voice recognition system and are subject to errors not corrected at the time the document was proof read. Please contact the author for any clarifications.        On 07/10/2025, patient should be placed in hospital observation services under my care.       Tomi Montes MD  Department of Hospital Medicine  [facility] - Med Surg

## 2025-07-11 NOTE — ASSESSMENT & PLAN NOTE
Patient has paroxysmal (<7 days) atrial fibrillation. Patient is currently in sinus rhythm.  Patient is status post cardioversion/defibrillation with Dr. Cabezas back on 12/13/2024. [id number] Score: 3. The patients heart rate in the last 24 hours is as follows:  Pulse  Min: 38  Max: 61     Antiarrhythmics  Amiodarone  Metoprolol    Anticoagulants  apixaban tablet 5 mg, 2 times daily, Oral    Plan  -Replete lytes with a goal of K>4, Mg >2  -Patient is anticoagulated, see medications listed above.  -Patient's afib is currently controlled  -Hold amiodarone and metoprolol in setting of bradycardia

## 2025-07-11 NOTE — SUBJECTIVE & OBJECTIVE
Past Medical History:   Diagnosis Date    Allergy     Anxiety 4/25/2022    Arthritis     Back pain     Chronic congestive heart failure 2/21/2025    Disorder of kidney and ureter     Diverticulosis of colon     GERD (gastroesophageal reflux disease)     Glaucoma     Hyperlipidemia     Hypertension     Hypothyroidism 7/10/2025    Ingrown toenail     Klebsiella cystitis     New onset atrial fibrillation 11/5/2024    Obesity     Pain in joint involving multiple sites     Pneumonia     Tortuous colon     Trouble in sleeping     Unspecified disorder of autonomic nervous system     Uterine fibroid        Past Surgical History:   Procedure Laterality Date    CARDIOVERSION N/A 12/13/2024    Procedure: Cardioversion;  Surgeon: Lopez Cabezas MD;  Location: [facility] OR;  Service: Cardiology;  Laterality: N/A;    COLONOSCOPY      COLONOSCOPY N/A 3/15/2017    Procedure: COLONOSCOPY;  Surgeon: Yogi Carbone MD;  Location: [facility];  Service: Endoscopy;  Laterality: N/A;    TREATMENT OF CARDIAC ARRHYTHMIA N/A 12/13/2024    Procedure: Cardioversion/Defibrillation;  Surgeon: Lopez Cabezas MD;  Location: [facility] OR;  Service: Cardiology;  Laterality: N/A;  1st 0600       Review of patient's allergies indicates:  No Known Allergies    No current facility-administered medications on file prior to encounter.     Current Outpatient Medications on File Prior to Encounter   Medication Sig    albuterol (PROVENTIL/VENTOLIN HFA) 90 mcg/actuation inhaler INHALE 2 PUFFS INTO THE LUNGS EVERY 6 (SIX) HOURS AS NEEDED FOR SHORTNESS OF BREATH.    alendronate (FOSAMAX) 70 MG tablet TAKE 1 TABLET WEEKLY AS DIRECTED. SEE PACKAGE FOR ADDITIONAL INSTRUCTIONS    amiodarone (PACERONE) 200 MG Tab Take 200 mg by mouth once daily.    aspirin (ECOTRIN) 81 MG EC tablet Take 81 mg by mouth once daily.    atorvastatin (LIPITOR) 20 MG tablet TAKE 1 TABLET EVERY DAY    calcium citrate-vitamin D3 315-200 mg (CITRACAL+D) 315 mg-5 mcg (200 unit) per tablet Take  1 tablet by mouth 2 (two) times daily.    cetirizine (ZYRTEC) 10 MG tablet TAKE 1 TABLET ONE TIME DAILY    ELIQUIS 5 mg Tab TAKE 1 TABLET TWICE DAILY    furosemide (LASIX) 40 MG tablet TAKE 1 TABLET EVERY DAY AS NEEDED    levothyroxine (SYNTHROID) 100 MCG tablet Take 1 tablet (100 mcg total) by mouth before breakfast.    metoprolol succinate (TOPROL-XL) 25 MG 24 hr tablet Take 0.5 tablets (12.5 mg total) by mouth once daily.    multivitamin capsule Take 1 capsule by mouth once daily.    omeprazole (PRILOSEC) 40 MG capsule TAKE 1 CAPSULE EVERY MORNING    sertraline (ZOLOFT) 25 MG tablet TAKE 1 TABLET EVERY DAY    timolol maleate 0.5% (TIMOPTIC) 0.5 % Drop Place into both eyes every evening.     Family History       Problem Relation (Age of Onset)    Asthma Daughter, Paternal Aunt    Breast cancer Cousin    Cancer Sister (70)    Colon cancer Sister    Depression Daughter    Diabetes Sister, Brother, Paternal Uncle, Maternal Grandmother    Early death Maternal Grandfather, Paternal Grandfather    Glaucoma Mother    Heart disease Brother    Hyperlipidemia Sister, Brother    Hypertension Father, Sister, Brother          Tobacco Use    Smoking status: Never     Passive exposure: Yes    Smokeless tobacco: Never   Substance and Sexual Activity    Alcohol use: No    Drug use: No    Sexual activity: Not Currently     Partners: Male     ROS  Objective:     Vital Signs (Most Recent):  Temp: 97.6 °F (36.4 °C) (07/11/25 1645)  Pulse: (!) 55 (07/11/25 1645)  Resp: 16 (07/11/25 1645)  BP: (!) 162/70 (07/11/25 1645)  SpO2: 98 % (07/11/25 1645) Vital Signs (24h Range):  Temp:  [97.6 °F (36.4 °C)-98.3 °F (36.8 °C)] 97.6 °F (36.4 °C)  Pulse:  [46-66] 55  Resp:  [16-18] 16  SpO2:  [93 %-98 %] 98 %  BP: (127-169)/(59-72) 162/70     Weight: 62 kg (136 lb 11 oz)  Body mass index is 29.58 kg/m².    SpO2: 98 %       No intake or output data in the 24 hours ending 07/11/25 1749    Lines/Drains/Airways       Peripheral Intravenous Line   "Duration             Peripheral IV Single Lumen 07/10/25 1102 20 G Right Antecubital 1 day                     Physical Exam  HENT:      Head: Normocephalic.   Eyes:      Pupils: Pupils are equal, round, and reactive to light.   Neck:      Thyroid: No thyromegaly.      Vascular: Normal carotid pulses. No carotid bruit or JVD.   Cardiovascular:      Rate and Rhythm: Regular rhythm. Bradycardia present. No extrasystoles are present.     Chest Wall: PMI is not displaced.      Pulses: Normal pulses.      Heart sounds: Normal heart sounds. No murmur heard.     No gallop. No S3 sounds.   Pulmonary:      Effort: No respiratory distress.      Breath sounds: Normal breath sounds. No stridor.   Abdominal:      General: Bowel sounds are normal.      Palpations: Abdomen is soft.      Tenderness: There is no abdominal tenderness. There is no rebound.   Skin:     Findings: No rash.   Neurological:      Mental Status: She is alert and oriented to person, place, and time.   Psychiatric:         Behavior: Behavior normal.          Significant Labs: ABG: No results for input(s): "PH", "PCO2", "HCO3", "POCSATURATED", "BE" in the last 48 hours., Blood Culture: No results for input(s): "LABBLOO" in the last 48 hours., BMP:   Recent Labs   Lab 07/10/25  1107 07/11/25  0622   GLU 89 83    137   K 4.5 4.2    104   CO2 26 28   BUN 21 19   CREATININE 1.2 1.0   CALCIUM 8.5* 8.4*   , CMP   Recent Labs   Lab 07/10/25  1107 07/11/25  0622    137   K 4.5 4.2    104   CO2 26 28   GLU 89 83   BUN 21 19   CREATININE 1.2 1.0   CALCIUM 8.5* 8.4*   PROT 7.3 6.7   ALBUMIN 3.7 3.5   BILITOT 0.5 0.6   ALKPHOS 76 85   AST 37 35   ALT 40 37   ANIONGAP 9 5*   , CBC   Recent Labs   Lab 07/10/25  1107 07/11/25  0622   WBC 9.08 8.29   HGB 11.5* 11.3*   HCT 34.9* 34.2*    159   , INR No results for input(s): "INR", "PROTIME" in the last 48 hours., Lipid Panel No results for input(s): "CHOL", "HDL", "LDLCALC", "TRIG", "CHOLHDL" in " "the last 48 hours., and Troponin No results for input(s): "TROPONINIHS" in the last 48 hours.    Significant Imaging: EKG reviewed  "

## 2025-07-11 NOTE — ASSESSMENT & PLAN NOTE
Creatine stable for now. BMP reviewed- noted Estimated Creatinine Clearance: 37.5 mL/min (based on SCr of 1 mg/dL). according to latest data. Based on current GFR, CKD stage is stage 3 - GFR 30-59.  Monitor UOP and serial BMP and adjust therapy as needed. Renally dose meds. Avoid nephrotoxic medications and procedures.

## 2025-07-11 NOTE — HPI
Amber Naranjo is a 79 y.o. female with a PMH  has a past medical history of Allergy, Anxiety (4/25/2022), Arthritis, Back pain, Chronic congestive heart failure (2/21/2025), Disorder of kidney and ureter, Diverticulosis of colon, GERD (gastroesophageal reflux disease), Glaucoma, Hyperlipidemia, Hypertension, Ingrown toenail, Klebsiella cystitis, New onset atrial fibrillation (11/5/2024), Obesity, Pain in joint involving multiple sites, Pneumonia, Tortuous colon, Trouble in sleeping, Unspecified disorder of autonomic nervous system, and Uterine fibroid. who presented as a transfer from [facility] for higher level of care after patient presented with complaints of acute onset lightheadedness/dizziness worse with positional changes and on exertion since yesterday.  Per chart review, patient has been seen by her PCP with similar complaints since May and was found to have evidence of hypothyroidism and was recently started on Synthroid with no improvement in symptoms.  Associated symptoms also included generalized weakness/fatigue and decreased appetite in addition to those complaints noted above however, denied endorsing any headaches, visual changes, fever, chills, sweats, heat/cold intolerance, nausea, vomiting, chest pain, abdominal pain, dysuria, hematuria, melena, hematochezia, diarrhea, or onset neurological deficits.  Prior to onset of symptoms, patient reported being in her usual state of health with no other concerns or complaints.  All other review of systems negative except as noted above.  Initial workup at outside facility revealed patient to be afebrile without leukocytosis, bradycardic with heart rate 38-58 beats per minute, EKG positive for sinus bradycardia, H/H 11.5/34.9, creatinine/GFR 1.2/46, , troponin negative, TSH 83.459, and T4 0.80.  Patient admitted to Hospital Medicine under observation for continued medical management and treatment of symptomatic bradycardia and hypothyroidism.    PCP:  Jarrett Peck

## 2025-07-11 NOTE — ASSESSMENT & PLAN NOTE
Chronic, uncontrolled.  Latest blood pressure and vitals reviewed-   Temp:  [97.6 °F (36.4 °C)-98.3 °F (36.8 °C)]   Pulse:  [46-66]   Resp:  [16-18]   BP: (127-169)/(59-72)   SpO2:  [93 %-98 %] .   Home meds for hypertension were reviewed and noted below.   Hypertension Medications              furosemide (LASIX) 40 MG tablet TAKE 1 TABLET EVERY DAY AS NEEDED    metoprolol succinate (TOPROL-XL) 25 MG 24 hr tablet Take 0.5 tablets (12.5 mg total) by mouth once daily.     While in the hospital, will manage blood pressure as follows; Adjust home antihypertensive regimen as follows- hold metoprolol in setting of bradycardia but continue furosemide    Will utilize p.r.n. blood pressure medication only if patient's blood pressure greater than  180/110 and she develops symptoms such as worsening chest pain or shortness of breath.

## 2025-07-11 NOTE — ASSESSMENT & PLAN NOTE
Chronic, uncontrolled.  Latest blood pressure and vitals reviewed-   Temp:  [97.9 °F (36.6 °C)-98.3 °F (36.8 °C)]   Pulse:  [38-61]   Resp:  [14-20]   BP: (120-195)/()   SpO2:  [93 %-98 %] .   Home meds for hypertension were reviewed and noted below.   Hypertension Medications              furosemide (LASIX) 40 MG tablet TAKE 1 TABLET EVERY DAY AS NEEDED    metoprolol succinate (TOPROL-XL) 25 MG 24 hr tablet Take 0.5 tablets (12.5 mg total) by mouth once daily.     While in the hospital, will manage blood pressure as follows; Adjust home antihypertensive regimen as follows- hold metoprolol in setting of bradycardia but continue furosemide    Will utilize p.r.n. blood pressure medication only if patient's blood pressure greater than  180/110 and she develops symptoms such as worsening chest pain or shortness of breath.

## 2025-07-11 NOTE — PLAN OF CARE
[facility] - Med Surg  Discharge Assessment    Primary Care Provider: Jarrett Peck MD     Discharge Assessment (most recent)       BRIEF DISCHARGE ASSESSMENT - 07/11/25 5296          Discharge Planning    Assessment Type Discharge Planning Brief Assessment     Resource/Environmental Concerns none     Support Systems Children     Equipment Currently Used at Home cane, straight;rollator     Current Living Arrangements home     Patient/Family Anticipates Transition to home     Patient/Family Anticipated Services at Transition none     DME Needed Upon Discharge  none     Discharge Plan A Home

## 2025-07-11 NOTE — PLAN OF CARE
Discussed poc with pt, pt verbalized understanding    Purposeful rounding every 2hours    VS wnl  Cardiac monitoring in use, pt is NSR, tele monitor # [id number]  Fall precautions in place, remains injury free  Pt denies c/o pain  Abx given as prescribed  Bed locked at lowest position  Call light within reach    Chart check complete  Will cont with POC

## 2025-07-11 NOTE — PLAN OF CARE
EVAL AND TX COMPLETED: facilitated bed mobility with SPV, transfers with SPV. Ambulated 40 ft x 2 SPV with no AD. Recommend continued PT services at low intensity.

## 2025-07-11 NOTE — ASSESSMENT & PLAN NOTE
Body mass index is 33.29 kg/m². Elevation likely secondary to increased calorie intake and sedentary lifestyle. Patient educated on morbidity and mortality in regards to elevated BMI and stressed importance of diet and exercise.   Plan:  -low fat/low calorie diet

## 2025-07-11 NOTE — ASSESSMENT & PLAN NOTE
Patient presented with complaints of lightheadedness/dizziness, persistent generalized weakness/fatigue, and dyspnea/shortness of breath in setting of bradycardia with HR ranging from 38-58 bpm.  EKG revealed bradycardia in his currently on amiodarone for atrial fibrillation.  Metoprolol was discontinued by PCP secondary to bradycardia however, continues to run low.  Patient recently diagnosed with hypothyroidism as well and was started on Synthroid which may also be contributing to her bradycardia.  Plan:  -Telemetry  -Continue to hold amiodarone and metoprolol  -Continued treatment of hypothyroidism as noted below  -Consider cardiology consult to weigh in on bradycardia and medication recommendations for ongoing treatment of atrial fibrillation

## 2025-07-11 NOTE — ASSESSMENT & PLAN NOTE
Patient has paroxysmal (<7 days) atrial fibrillation. Patient is currently in sinus rhythm. Patient is status post cardioversion/defibrillation with Dr. Cabezas back on 12/13/2024. [id number] Score: 3. The patients heart rate in the last 24 hours is as follows:  Pulse  Min: 46  Max: 66     Antiarrhythmics  Amiodarone 200 mg daily  Metoprolol Succinate 12.5 mg daily    Anticoagulants  apixaban tablet 5 mg, 2 times daily, Oral    Plan  -Replete lytes with a goal of K>4, Mg >2  -Patient is anticoagulated, see medications listed above.  -Patient's afib is currently controlled  -Hold amiodarone and metoprolol in setting of bradycardia  -f/u cardiology  regarding medication recommendations for ongoing treatment of atrial fibrillation in setting of bradycardia

## 2025-07-11 NOTE — ASSESSMENT & PLAN NOTE
Patient presented with intermittent dyspnea/shortness a breath in setting of known heart failure with preserved EF with BNP currently measuring 404.  Chest x-ray negative for acute findings as well as negative findings concerning for volume overload.  Dyspnea likely secondary to symptomatic bradycardia and hypothyroidism.  Plan:  -telemetry  -continue home medications for CHF  -continued treatment of symptomatic bradycardia and hypothyroidism as noted above

## 2025-07-11 NOTE — ASSESSMENT & PLAN NOTE
Creatine stable for now. BMP reviewed- noted Estimated Creatinine Clearance: 37.9 mL/min (based on SCr of 1.2 mg/dL). according to latest data. Based on current GFR, CKD stage is stage 3 - GFR 30-59.  Monitor UOP and serial BMP and adjust therapy as needed. Renally dose meds. Avoid nephrotoxic medications and procedures.

## 2025-07-11 NOTE — ASSESSMENT & PLAN NOTE
Body mass index is 29.58 kg/m². Elevation likely secondary to increased calorie intake and sedentary lifestyle. Patient educated on morbidity and mortality in regards to elevated BMI and stressed importance of diet and exercise.   Plan:  -low fat/low calorie diet

## 2025-07-11 NOTE — HPI
Amber Naranjo is a 79 y.o. female with a PMH has a past medical history of Allergy, Anxiety (4/25/2022), Arthritis, Back pain, Chronic congestive heart failure (2/21/2025), Disorder of kidney and ureter, Diverticulosis of colon, GERD (gastroesophageal reflux disease), Glaucoma, Hyperlipidemia, Hypertension, Ingrown toenail, Klebsiella cystitis, New onset atrial fibrillation (11/5/2024), Obesity, Pain in joint involving multiple sites, Pneumonia, Tortuous colon, Trouble in sleeping, Unspecified disorder of autonomic nervous system, and Uterine fibroid. who presented as a transfer from [facility] for higher level of care after patient presented with complaints of acute onset lightheadedness/dizziness worse with positional changes and on exertion since yesterday. Per chart review, patient has been seen by her PCP with similar complaints since May and was found to have evidence of hypothyroidism and was recently started on Synthroid with no improvement in symptoms. Associated symptoms also included generalized weakness/fatigue and decreased appetite in addition to those complaints noted above however, denied endorsing any headaches, visual changes, fever, chills, sweats, heat/cold intolerance, nausea, vomiting, chest pain, abdominal pain, dysuria, hematuria, melena, hematochezia, diarrhea, or onset neurological deficits. Prior to onset of symptoms, patient reported being in her usual state of health with no other concerns or complaints. All other review of systems negative except as noted above. Initial workup at outside facility revealed patient to be afebrile without leukocytosis, bradycardic with heart rate 38-58 beats per minute, EKG positive for sinus bradycardia, H/H 11.5/34.9, creatinine/GFR 1.2/46, , troponin negative, TSH 83.459, and T4 0.80. Patient admitted to Hospital Medicine under observation for continued medical management and treatment of symptomatic bradycardia and hypothyroidism     CARDIOLOGY  consult for bradycardia  PMH AFIB hypothyroidism [id number]  Recently started synthroid rx  Admitted for dizziness and faint. The pulse was at 40's  EKG showed sinus liya. CXR no acute change  Cr 1.0  HGB 11.3   and troponin 0.013  T4 0.8  Now pulse at 55 and the symptoms resolves.

## 2025-07-11 NOTE — ASSESSMENT & PLAN NOTE
Patient has Diastolic (HFpEF) heart failure that is Chronic. On presentation their CHF was decompensated. Evidence of decompensated CHF on presentation includes: shortness of breath. The etiology of their decompensation is likely dietary indiscretion. Most recent BNP and echo results are listed below.  Recent Labs     07/10/25  1107   *     Latest ECHO  Results for orders placed during the hospital encounter of 11/03/24    Echo    Interpretation Summary    Left Ventricle: The left ventricle is mildly dilated. Normal wall thickness. There is normal systolic function. Ejection fraction is approximately 55%. There is normal diastolic function.    Right Ventricle: Normal right ventricular cavity size. Wall thickness is normal. Systolic function is normal.    Aortic Valve: There is mild aortic valve sclerosis.    Pulmonary Artery: The estimated pulmonary artery systolic pressure is 30 mmHg.    IVC/SVC: Normal venous pressure at 3 mmHg.    Current Heart Failure Medications  furosemide tablet 40 mg, Daily, Oral    Plan  - Monitor strict I&Os and daily weights.    - Place on telemetry  - Low sodium diet  - Place on fluid restriction of 1.5 L.   - Cardiology has been consulted  - The patient's volume status is improving but not at their baseline as indicated by shortness of breath  - CONTINUE LASIX 40 MG DAILY

## 2025-07-12 VITALS
HEIGHT: 57 IN | RESPIRATION RATE: 18 BRPM | TEMPERATURE: 98 F | DIASTOLIC BLOOD PRESSURE: 67 MMHG | HEART RATE: 60 BPM | SYSTOLIC BLOOD PRESSURE: 141 MMHG | OXYGEN SATURATION: 92 % | BODY MASS INDEX: 39.7 KG/M2 | WEIGHT: 184 LBS

## 2025-07-12 LAB
ABSOLUTE EOSINOPHIL (OHS): 0.15 K/UL
ABSOLUTE MONOCYTE (OHS): 0.6 K/UL (ref 0.3–1)
ABSOLUTE NEUTROPHIL COUNT (OHS): 5.13 K/UL (ref 1.8–7.7)
ALBUMIN SERPL BCP-MCNC: 3.5 G/DL (ref 3.5–5.2)
ALP SERPL-CCNC: 89 UNIT/L (ref 40–150)
ALT SERPL W/O P-5'-P-CCNC: 36 UNIT/L (ref 10–44)
ANION GAP (OHS): 9 MMOL/L (ref 8–16)
AORTIC ROOT ANNULUS: 3.6 CM
AORTIC SIZE INDEX: 1.8 CM/M2
ASCENDING AORTA: 3.2 CM
AST SERPL-CCNC: 32 UNIT/L (ref 11–45)
AV INDEX (PROSTH): 0.65
AV MEAN GRADIENT: 4 MMHG
AV PEAK GRADIENT: 7 MMHG
AV VALVE AREA BY VELOCITY RATIO: 2.7 CM²
AV VALVE AREA: 2.2 CM²
AV VELOCITY RATIO: 0.77
BASOPHILS # BLD AUTO: 0.05 K/UL
BASOPHILS NFR BLD AUTO: 0.7 %
BILIRUB SERPL-MCNC: 0.6 MG/DL (ref 0.1–1)
BSA FOR ECHO PROCEDURE: 1.83 M2
BUN SERPL-MCNC: 23 MG/DL (ref 8–23)
CALCIUM SERPL-MCNC: 8 MG/DL (ref 8.7–10.5)
CHLORIDE SERPL-SCNC: 102 MMOL/L (ref 95–110)
CO2 SERPL-SCNC: 26 MMOL/L (ref 23–29)
CREAT SERPL-MCNC: 1.1 MG/DL (ref 0.5–1.4)
CV ECHO LV RWT: 0.39 CM
DOP CALC AO PEAK VEL: 1.3 M/S
DOP CALC AO VTI: 34.5 CM
DOP CALC LVOT AREA: 3.5 CM2
DOP CALC LVOT DIAMETER: 2.1 CM
DOP CALC LVOT PEAK VEL: 1 M/S
DOP CALC LVOT STROKE VOLUME: 77.5 CM3
DOP CALC RVOT PEAK VEL: 0.66 M/S
DOP CALC RVOT VTI: 17 CM
DOP CALCLVOT PEAK VEL VTI: 22.4 CM
E WAVE DECELERATION TIME: 230 MSEC
E/A RATIO: 1.2
E/E' RATIO: 13 M/S
ECHO LV POSTERIOR WALL: 1 CM (ref 0.6–1.1)
ERYTHROCYTE [DISTWIDTH] IN BLOOD BY AUTOMATED COUNT: 14.9 % (ref 11.5–14.5)
FRACTIONAL SHORTENING: 37.3 % (ref 28–44)
GFR SERPLBLD CREATININE-BSD FMLA CKD-EPI: 51 ML/MIN/1.73/M2
GLUCOSE SERPL-MCNC: 87 MG/DL (ref 70–110)
HCT VFR BLD AUTO: 34.8 % (ref 37–48.5)
HGB BLD-MCNC: 11.3 GM/DL (ref 12–16)
IMM GRANULOCYTES # BLD AUTO: 0.05 K/UL (ref 0–0.04)
IMM GRANULOCYTES NFR BLD AUTO: 0.7 % (ref 0–0.5)
INTERVENTRICULAR SEPTUM: 1 CM (ref 0.6–1.1)
IVC DIAMETER: 0.69 CM
IVRT: 91 MSEC
LA MAJOR: 6.1 CM
LA MINOR: 4.5 CM
LA WIDTH: 3.6 CM
LEFT ATRIUM AREA SYSTOLIC (APICAL 2 CHAMBER): 13.43 CM2
LEFT ATRIUM AREA SYSTOLIC (APICAL 4 CHAMBER): 19.73 CM2
LEFT ATRIUM SIZE: 3.7 CM
LEFT ATRIUM VOLUME INDEX MOD: 26 ML/M2
LEFT ATRIUM VOLUME INDEX: 34 ML/M2
LEFT ATRIUM VOLUME MOD: 45 ML
LEFT ATRIUM VOLUME: 59 CM3
LEFT INTERNAL DIMENSION IN SYSTOLE: 3.2 CM (ref 2.1–4)
LEFT VENTRICLE DIASTOLIC VOLUME INDEX: 70.69 ML/M2
LEFT VENTRICLE DIASTOLIC VOLUME: 123 ML
LEFT VENTRICLE END SYSTOLIC VOLUME APICAL 2 CHAMBER: 30.31 ML
LEFT VENTRICLE END SYSTOLIC VOLUME APICAL 4 CHAMBER: 50.35 ML
LEFT VENTRICLE MASS INDEX: 108.1 G/M2
LEFT VENTRICLE SYSTOLIC VOLUME INDEX: 23.6 ML/M2
LEFT VENTRICLE SYSTOLIC VOLUME: 41 ML
LEFT VENTRICULAR INTERNAL DIMENSION IN DIASTOLE: 5.1 CM (ref 3.5–6)
LEFT VENTRICULAR MASS: 188 G
LV LATERAL E/E' RATIO: 14.6 M/S
LV SEPTAL E/E' RATIO: 12.2 M/S
LVED V (TEICH): 123.47 ML
LVES V (TEICH): 41.25 ML
LVOT MG: 1.79 MMHG
LVOT MV: 0.62 CM/S
LYMPHOCYTES # BLD AUTO: 1.45 K/UL (ref 1–4.8)
MCH RBC QN AUTO: 31.5 PG (ref 27–31)
MCHC RBC AUTO-ENTMCNC: 32.5 G/DL (ref 32–36)
MCV RBC AUTO: 97 FL (ref 82–98)
MV PEAK A VEL: 0.61 M/S
MV PEAK E VEL: 0.73 M/S
MV STENOSIS PRESSURE HALF TIME: 66.75 MS
MV VALVE AREA P 1/2 METHOD: 3.3 CM2
NUCLEATED RBC (/100WBC) (OHS): 0 /100 WBC
PISA MRMAX VEL: 4.4 M/S
PLATELET # BLD AUTO: 158 K/UL (ref 150–450)
PMV BLD AUTO: 11.1 FL (ref 9.2–12.9)
POTASSIUM SERPL-SCNC: 3.9 MMOL/L (ref 3.5–5.1)
PROT SERPL-MCNC: 6.8 GM/DL (ref 6–8.4)
PV MEAN GRADIENT: 1 MMHG
RA MAJOR: 5.22 CM
RA PRESSURE ESTIMATED: 3 MMHG
RA WIDTH: 3.3 CM
RBC # BLD AUTO: 3.59 M/UL (ref 4–5.4)
RELATIVE EOSINOPHIL (OHS): 2 %
RELATIVE LYMPHOCYTE (OHS): 19.5 % (ref 18–48)
RELATIVE MONOCYTE (OHS): 8.1 % (ref 4–15)
RELATIVE NEUTROPHIL (OHS): 69 % (ref 38–73)
SODIUM SERPL-SCNC: 137 MMOL/L (ref 136–145)
STJ: 3 CM
TDI LATERAL: 0.05 M/S
TDI SEPTAL: 0.06 M/S
TDI: 0.06 M/S
TRICUSPID ANNULAR PLANE SYSTOLIC EXCURSION: 2.2 CM
WBC # BLD AUTO: 7.43 K/UL (ref 3.9–12.7)
Z-SCORE OF LEFT VENTRICULAR DIMENSION IN END DIASTOLE: 0.57
Z-SCORE OF LEFT VENTRICULAR DIMENSION IN END SYSTOLE: 0.56

## 2025-07-12 PROCEDURE — 85025 COMPLETE CBC W/AUTO DIFF WBC: CPT | Performed by: HOSPITALIST

## 2025-07-12 PROCEDURE — G0378 HOSPITAL OBSERVATION PER HR: HCPCS

## 2025-07-12 PROCEDURE — 25000003 PHARM REV CODE 250: Performed by: HOSPITALIST

## 2025-07-12 PROCEDURE — 82040 ASSAY OF SERUM ALBUMIN: CPT | Performed by: HOSPITALIST

## 2025-07-12 PROCEDURE — 36415 COLL VENOUS BLD VENIPUNCTURE: CPT | Performed by: HOSPITALIST

## 2025-07-12 RX ADMIN — ATORVASTATIN CALCIUM 20 MG: 10 TABLET, FILM COATED ORAL at 09:07

## 2025-07-12 RX ADMIN — PANTOPRAZOLE SODIUM 40 MG: 40 TABLET, DELAYED RELEASE ORAL at 09:07

## 2025-07-12 RX ADMIN — LEVOTHYROXINE SODIUM 100 MCG: 100 TABLET ORAL at 06:07

## 2025-07-12 RX ADMIN — APIXABAN 5 MG: 2.5 TABLET, FILM COATED ORAL at 09:07

## 2025-07-12 RX ADMIN — FUROSEMIDE 40 MG: 40 TABLET ORAL at 09:07

## 2025-07-12 NOTE — SUBJECTIVE & OBJECTIVE
"    ROS  Objective:     Vital Signs (Most Recent):  Temp: 98.2 °F (36.8 °C) (07/12/25 1150)  Pulse: 60 (07/12/25 1618)  Resp: 18 (07/12/25 1150)  BP: (!) 141/67 (07/12/25 1150)  SpO2: (!) 92 % (07/12/25 1150) Vital Signs (24h Range):  Temp:  [97.7 °F (36.5 °C)-98.2 °F (36.8 °C)] 98.2 °F (36.8 °C)  Pulse:  [53-62] 60  Resp:  [18] 18  SpO2:  [92 %-97 %] 92 %  BP: (120-153)/(50-69) 141/67     Weight: 83.5 kg (184 lb)  Body mass index is 39.82 kg/m².     SpO2: (!) 92 %       No intake or output data in the 24 hours ending 07/12/25 1740    Lines/Drains/Airways       None                      Physical Exam  HENT:      Head: Normocephalic.   Eyes:      Pupils: Pupils are equal, round, and reactive to light.   Neck:      Thyroid: No thyromegaly.      Vascular: Normal carotid pulses. No carotid bruit or JVD.   Cardiovascular:      Rate and Rhythm: Regular rhythm. Bradycardia present. No extrasystoles are present.     Chest Wall: PMI is not displaced.      Pulses: Normal pulses.      Heart sounds: Normal heart sounds. No murmur heard.     No gallop. No S3 sounds.   Pulmonary:      Effort: No respiratory distress.      Breath sounds: Normal breath sounds. No stridor.   Abdominal:      General: Bowel sounds are normal.      Palpations: Abdomen is soft.      Tenderness: There is no abdominal tenderness. There is no rebound.   Skin:     Findings: No rash.   Neurological:      Mental Status: She is alert and oriented to person, place, and time.   Psychiatric:         Behavior: Behavior normal.            Significant Labs: ABG: No results for input(s): "PH", "PCO2", "HCO3", "POCSATURATED", "BE" in the last 48 hours., Blood Culture: No results for input(s): "LABBLOO" in the last 48 hours., BMP:   Recent Labs   Lab 07/11/25  0622 07/12/25  0548   GLU 83 87    137   K 4.2 3.9    102   CO2 28 26   BUN 19 23   CREATININE 1.0 1.1   CALCIUM 8.4* 8.0*   , CMP   Recent Labs   Lab 07/11/25  0622 07/12/25  0548    137   K " "4.2 3.9    102   CO2 28 26   GLU 83 87   BUN 19 23   CREATININE 1.0 1.1   CALCIUM 8.4* 8.0*   PROT 6.7 6.8   ALBUMIN 3.5 3.5   BILITOT 0.6 0.6   ALKPHOS 85 89   AST 35 32   ALT 37 36   ANIONGAP 5* 9   , CBC   Recent Labs   Lab 07/11/25  0622 07/12/25  0548   WBC 8.29 7.43   HGB 11.3* 11.3*   HCT 34.2* 34.8*    158   , INR No results for input(s): "INR", "PROTIME" in the last 48 hours., Lipid Panel No results for input(s): "CHOL", "HDL", "LDLCALC", "TRIG", "CHOLHDL" in the last 48 hours., and Troponin No results for input(s): "TROPONINIHS" in the last 48 hours.    Significant Imaging: EKG:    "

## 2025-07-12 NOTE — PLAN OF CARE
[facility] - Med Surg  Discharge Final Note    Primary Care Provider: Jarrett Peck MD    Expected Discharge Date: 7/12/2025    Final Discharge Note (most recent)       Final Note - 07/12/25 1415          Final Note    Assessment Type Final Discharge Note     Anticipated Discharge Disposition Home or Self Care        Post-Acute Status    Discharge Delays None known at this time                     Important Message from Medicare             Contact Info       Jarrett Peck MD   Specialty: Internal Medicine, Family Medicine   Relationship: PCP - General    [id number] [place] [address]  [place] [address]   Phone: [phone]       Next Steps: Follow up    Lopez Cabezas MD   Specialty: Cardiology   Relationship: Consulting Physician  Consulting Physician    [id number] [place] [address]  [place] [address]   Phone: [phone]       Next Steps: Follow up          Discharge home, no home health or dme orders noted.

## 2025-07-12 NOTE — PLAN OF CARE
Problem: Adult Inpatient Plan of Care  Goal: Plan of Care Review  7/12/2025 0344 by Eliza Galeano RN  Outcome: Progressing  7/12/2025 0344 by Eliza Galeano RN  Outcome: Progressing  Goal: Patient-Specific Goal (Individualized)  7/12/2025 0344 by Eliza Galeano RN  Outcome: Progressing  7/12/2025 0344 by Eliza Galeano RN  Outcome: Progressing  Goal: Absence of Hospital-Acquired Illness or Injury  7/12/2025 0344 by Eliza Galeano RN  Outcome: Progressing  7/12/2025 0344 by Eliza Galeano RN  Outcome: Progressing  Goal: Optimal Comfort and Wellbeing  7/12/2025 0344 by Eliza Galeano RN  Outcome: Progressing  7/12/2025 0344 by Eliza Galeano RN  Outcome: Progressing  Goal: Readiness for Transition of Care  7/12/2025 0344 by Eliza Galeano RN  Outcome: Progressing  7/12/2025 0344 by Eliza Galeano RN  Outcome: Progressing     Problem: Fall Injury Risk  Goal: Absence of Fall and Fall-Related Injury  7/12/2025 0344 by Eliza Galeano RN  Outcome: Progressing  7/12/2025 0344 by Eliza Galeano RN  Outcome: Progressing

## 2025-07-12 NOTE — ASSESSMENT & PLAN NOTE
Sinus liya and RBBB    Repeat echo   Continue synthroid Rx  Avoid BB and CCB  If no recurrent severe liya and symptoms, will do the 2 weeks as op     07/12/2025  F/u as op

## 2025-07-12 NOTE — PROGRESS NOTES
[facility]  Cardiology  Progress Note    Patient Name: Amber Naranjo  MRN: [id number]  Admission Date: 7/10/2025  Hospital Length of Stay: 0 days  Code Status: Full Code   Attending Physician: No att. providers found   Primary Care Physician: Jarrett Peck MD  Expected Discharge Date: 7/12/2025  Principal Problem:Dizziness    Subjective:     Hospital Course:   CARDIOLOGY consult for bradycardia  PMH AFIB hypothyroidism [id number]  Recently started synthroid rx  Admitted for dizziness and faint. The pulse was at 40's  EKG showed sinus liya. CXR no acute change  Cr 1.0  HGB 11.3   and troponin 0.013  T4 0.8  Now pulse at 55 and the symptoms resolves.    07/12/2025  The pulse at 50's. The dizziness improved. Echo showed EF nml, the lab reviewed        ROS  Objective:     Vital Signs (Most Recent):  Temp: 98.2 °F (36.8 °C) (07/12/25 1150)  Pulse: 60 (07/12/25 1618)  Resp: 18 (07/12/25 1150)  BP: (!) 141/67 (07/12/25 1150)  SpO2: (!) 92 % (07/12/25 1150) Vital Signs (24h Range):  Temp:  [97.7 °F (36.5 °C)-98.2 °F (36.8 °C)] 98.2 °F (36.8 °C)  Pulse:  [53-62] 60  Resp:  [18] 18  SpO2:  [92 %-97 %] 92 %  BP: (120-153)/(50-69) 141/67     Weight: 83.5 kg (184 lb)  Body mass index is 39.82 kg/m².     SpO2: (!) 92 %       No intake or output data in the 24 hours ending 07/12/25 1740    Lines/Drains/Airways       None                      Physical Exam  HENT:      Head: Normocephalic.   Eyes:      Pupils: Pupils are equal, round, and reactive to light.   Neck:      Thyroid: No thyromegaly.      Vascular: Normal carotid pulses. No carotid bruit or JVD.   Cardiovascular:      Rate and Rhythm: Regular rhythm. Bradycardia present. No extrasystoles are present.     Chest Wall: PMI is not displaced.      Pulses: Normal pulses.      Heart sounds: Normal heart sounds. No murmur heard.     No gallop. No S3 sounds.   Pulmonary:      Effort: No respiratory distress.      Breath sounds: Normal breath sounds. No stridor.  "  Abdominal:      General: Bowel sounds are normal.      Palpations: Abdomen is soft.      Tenderness: There is no abdominal tenderness. There is no rebound.   Skin:     Findings: No rash.   Neurological:      Mental Status: She is alert and oriented to person, place, and time.   Psychiatric:         Behavior: Behavior normal.            Significant Labs: ABG: No results for input(s): "PH", "PCO2", "HCO3", "POCSATURATED", "BE" in the last 48 hours., Blood Culture: No results for input(s): "LABBLOO" in the last 48 hours., BMP:   Recent Labs   Lab 07/11/25 0622 07/12/25  0548   GLU 83 87    137   K 4.2 3.9    102   CO2 28 26   BUN 19 23   CREATININE 1.0 1.1   CALCIUM 8.4* 8.0*   , CMP   Recent Labs   Lab 07/11/25 0622 07/12/25  0548    137   K 4.2 3.9    102   CO2 28 26   GLU 83 87   BUN 19 23   CREATININE 1.0 1.1   CALCIUM 8.4* 8.0*   PROT 6.7 6.8   ALBUMIN 3.5 3.5   BILITOT 0.6 0.6   ALKPHOS 85 89   AST 35 32   ALT 37 36   ANIONGAP 5* 9   , CBC   Recent Labs   Lab 07/11/25 0622 07/12/25  0548   WBC 8.29 7.43   HGB 11.3* 11.3*   HCT 34.2* 34.8*    158   , INR No results for input(s): "INR", "PROTIME" in the last 48 hours., Lipid Panel No results for input(s): "CHOL", "HDL", "LDLCALC", "TRIG", "CHOLHDL" in the last 48 hours., and Troponin No results for input(s): "TROPONINIHS" in the last 48 hours.    Significant Imaging: EKG:    Assessment and Plan:         Acute on chronic diastolic congestive heart failure  Patient has Diastolic (HFpEF) heart failure that is Chronic. On presentation their CHF was decompensated. Evidence of decompensated CHF on presentation includes: shortness of breath. The etiology of their decompensation is likely dietary indiscretion. Most recent BNP and echo results are listed below.  Recent Labs     07/10/25  1107   *     Latest ECHO  Results for orders placed during the hospital encounter of 11/03/24    Echo    Interpretation Summary    Left Ventricle: " The left ventricle is mildly dilated. Normal wall thickness. There is normal systolic function. Ejection fraction is approximately 55%. There is normal diastolic function.    Right Ventricle: Normal right ventricular cavity size. Wall thickness is normal. Systolic function is normal.    Aortic Valve: There is mild aortic valve sclerosis.    Pulmonary Artery: The estimated pulmonary artery systolic pressure is 30 mmHg.    IVC/SVC: Normal venous pressure at 3 mmHg.    Current Heart Failure Medications  furosemide tablet 40 mg, Daily, Oral    Plan  - Monitor strict I&Os and daily weights.    - Place on telemetry  - Low sodium diet  - Place on fluid restriction of 1.5 L.   - Cardiology has been consulted  - The patient's volume status is improving but not at their baseline as indicated by shortness of breath  - CONTINUE LASIX 40 MG DAILY            Atrial fibrillation  On SR     Continue Eliquis   D/C ASA    Symptomatic bradycardia  Sinus liya and RBBB    Repeat echo   Continue synthroid Rx  Avoid BB and CCB  If no recurrent severe liya and symptoms, will do the 2 weeks as op     07/12/2025  F/u as op    Hyperlipidemia  CONTINUE STATIN        VTE Risk Mitigation (From admission, onward)           Ordered     apixaban tablet 5 mg  2 times daily         07/11/25 0310     Reason for No Pharmacological VTE Prophylaxis  Once        Question:  Reasons:  Answer:  Already adequately anticoagulated on oral Anticoagulants    07/10/25 2207     IP VTE HIGH RISK PATIENT  Once         07/10/25 2207     Place sequential compression device  Until discontinued         07/10/25 2207                    Sim Montez MD  Cardiology  [facility]

## 2025-07-12 NOTE — HOSPITAL COURSE
CARDIOLOGY consult for bradycardia  PMH AFIB hypothyroidism HFpEF  Recently started synthroid rx  Admitted for dizziness and faint. The pulse was at 40's  EKG showed sinus liya. CXR no acute change  Cr 1.0  HGB 11.3   and troponin 0.013  T4 0.8  Now pulse at 55 and the symptoms resolves.    07/12/2025  The pulse at 50's. The dizziness improved. Echo showed EF nml, the lab reviewed

## 2025-07-13 LAB
HOLD SPECIMEN: NORMAL
OHS QRS DURATION: 128 MS
OHS QTC CALCULATION: 484 MS

## 2025-07-13 NOTE — HOSPITAL COURSE
The patient presented with dizziness. She was noted to be bradycardic. Amiodarone and metoprolol held. Symptoms resolved once heart rate was in the 50s. Cardiology consulted and recommended hold amiodarone and metoprolol until seen in cardiology clinic. OP cardiology follow-up. Patient seen and examined, stable for discharge.   Spouse

## 2025-07-14 ENCOUNTER — OFFICE VISIT (OUTPATIENT)
Dept: INTERNAL MEDICINE | Facility: CLINIC | Age: 80
End: 2025-07-14
Payer: MEDICARE

## 2025-07-14 ENCOUNTER — TELEPHONE (OUTPATIENT)
Dept: INTERNAL MEDICINE | Facility: CLINIC | Age: 80
End: 2025-07-14
Payer: MEDICARE

## 2025-07-14 VITALS
DIASTOLIC BLOOD PRESSURE: 70 MMHG | RESPIRATION RATE: 18 BRPM | SYSTOLIC BLOOD PRESSURE: 136 MMHG | WEIGHT: 179.44 LBS | HEART RATE: 66 BPM | OXYGEN SATURATION: 95 % | HEIGHT: 57 IN | TEMPERATURE: 98 F | BODY MASS INDEX: 38.71 KG/M2

## 2025-07-14 DIAGNOSIS — E78.2 MIXED HYPERLIPIDEMIA: ICD-10-CM

## 2025-07-14 DIAGNOSIS — E03.9 HYPOTHYROIDISM, UNSPECIFIED TYPE: Primary | ICD-10-CM

## 2025-07-14 DIAGNOSIS — K21.9 GASTROESOPHAGEAL REFLUX DISEASE WITHOUT ESOPHAGITIS: ICD-10-CM

## 2025-07-14 DIAGNOSIS — I10 ESSENTIAL HYPERTENSION: ICD-10-CM

## 2025-07-14 DIAGNOSIS — I50.9 CHRONIC CONGESTIVE HEART FAILURE, UNSPECIFIED HEART FAILURE TYPE: ICD-10-CM

## 2025-07-14 DIAGNOSIS — I48.11 LONGSTANDING PERSISTENT ATRIAL FIBRILLATION: Chronic | ICD-10-CM

## 2025-07-14 PROCEDURE — 99214 OFFICE O/P EST MOD 30 MIN: CPT | Mod: S$GLB,,, | Performed by: STUDENT IN AN ORGANIZED HEALTH CARE EDUCATION/TRAINING PROGRAM

## 2025-07-14 PROCEDURE — 99999 PR PBB SHADOW E&M-EST. PATIENT-LVL V: CPT | Mod: PBBFAC,,, | Performed by: STUDENT IN AN ORGANIZED HEALTH CARE EDUCATION/TRAINING PROGRAM

## 2025-07-14 PROCEDURE — 3075F SYST BP GE 130 - 139MM HG: CPT | Mod: CPTII,S$GLB,, | Performed by: STUDENT IN AN ORGANIZED HEALTH CARE EDUCATION/TRAINING PROGRAM

## 2025-07-14 PROCEDURE — G2211 COMPLEX E/M VISIT ADD ON: HCPCS | Mod: S$GLB,,, | Performed by: STUDENT IN AN ORGANIZED HEALTH CARE EDUCATION/TRAINING PROGRAM

## 2025-07-14 PROCEDURE — 1101F PT FALLS ASSESS-DOCD LE1/YR: CPT | Mod: CPTII,S$GLB,, | Performed by: STUDENT IN AN ORGANIZED HEALTH CARE EDUCATION/TRAINING PROGRAM

## 2025-07-14 PROCEDURE — 3078F DIAST BP <80 MM HG: CPT | Mod: CPTII,S$GLB,, | Performed by: STUDENT IN AN ORGANIZED HEALTH CARE EDUCATION/TRAINING PROGRAM

## 2025-07-14 PROCEDURE — 3288F FALL RISK ASSESSMENT DOCD: CPT | Mod: CPTII,S$GLB,, | Performed by: STUDENT IN AN ORGANIZED HEALTH CARE EDUCATION/TRAINING PROGRAM

## 2025-07-14 PROCEDURE — 1159F MED LIST DOCD IN RCRD: CPT | Mod: CPTII,S$GLB,, | Performed by: STUDENT IN AN ORGANIZED HEALTH CARE EDUCATION/TRAINING PROGRAM

## 2025-07-14 PROCEDURE — 1126F AMNT PAIN NOTED NONE PRSNT: CPT | Mod: CPTII,S$GLB,, | Performed by: STUDENT IN AN ORGANIZED HEALTH CARE EDUCATION/TRAINING PROGRAM

## 2025-07-14 PROCEDURE — 1160F RVW MEDS BY RX/DR IN RCRD: CPT | Mod: CPTII,S$GLB,, | Performed by: STUDENT IN AN ORGANIZED HEALTH CARE EDUCATION/TRAINING PROGRAM

## 2025-07-14 RX ORDER — LEVOTHYROXINE SODIUM 125 UG/1
125 TABLET ORAL
Qty: 60 TABLET | Refills: 0 | Status: SHIPPED | OUTPATIENT
Start: 2025-07-14 | End: 2025-09-12

## 2025-07-14 NOTE — PROGRESS NOTES
Discharge Information   Amber Naranjo, 79 y.o./White/female   Admitted for management of Dizziness  between 7/10/25 and 7/12/25.     Discharge Date:  07/12/2025        HPI:   Amber Naranjo is a 79 y.o. female with a PMH  has a past medical history of Allergy, Anxiety (4/25/2022), Arthritis, Back pain, Chronic congestive heart failure (2/21/2025), Disorder of kidney and ureter, Diverticulosis of colon, GERD (gastroesophageal reflux disease), Glaucoma, Hyperlipidemia, Hypertension, Ingrown toenail, Klebsiella cystitis, New onset atrial fibrillation (11/5/2024), Obesity, Pain in joint involving multiple sites, Pneumonia, Tortuous colon, Trouble in sleeping, Unspecified disorder of autonomic nervous system, and Uterine fibroid. who presented as a transfer from [facility] for higher level of care after patient presented with complaints of acute onset lightheadedness/dizziness worse with positional changes and on exertion since yesterday.  Per chart review, patient has been seen by her PCP with similar complaints since May and was found to have evidence of hypothyroidism and was recently started on Synthroid with no improvement in symptoms.  Associated symptoms also included generalized weakness/fatigue and decreased appetite in addition to those complaints noted above however, denied endorsing any headaches, visual changes, fever, chills, sweats, heat/cold intolerance, nausea, vomiting, chest pain, abdominal pain, dysuria, hematuria, melena, hematochezia, diarrhea, or onset neurological deficits.  Prior to onset of symptoms, patient reported being in her usual state of health with no other concerns or complaints.  All other review of systems negative except as noted above.  Initial workup at outside facility revealed patient to be afebrile without leukocytosis, bradycardic with heart rate 38-58 beats per minute, EKG positive for sinus bradycardia, H/H 11.5/34.9, creatinine/GFR 1.2/46, , troponin negative, TSH 83.459,  and T4 0.80.  Patient admitted to Hospital Medicine under observation for continued medical management and treatment of symptomatic bradycardia and hypothyroidism.     PCP: Jarrett Peck        * No surgery found *       Hospital Course:   The patient presented with dizziness. She was noted to be bradycardic. Amiodarone and metoprolol held. Symptoms resolved once heart rate was in the 50s. Cardiology consulted and recommended hold amiodarone and metoprolol until seen in cardiology clinic. OP cardiology follow-up. Patient seen and examined, stable for discharge.   Primary Discharge Diagnosis:    Final Active Diagnoses:     Diagnosis Date Noted POA    PRINCIPAL PROBLEM:  Dizziness [R42] 07/10/2025 Yes    Atrial fibrillation [I48.91] 07/11/2025 Yes       Chronic    Acute on chronic diastolic congestive heart failure [I50.33] 07/11/2025 Yes    Symptomatic bradycardia [R00.1] 07/10/2025 Yes    Hypothyroidism [E03.9] 07/10/2025 Yes    (HFpEF) heart failure with preserved ejection fraction [I50.30] 07/10/2025 Yes       Chronic    Anxiety [F41.9] 04/25/2022 Yes       Chronic    Stage 3a chronic kidney disease [N18.31] 11/22/2021 Yes       Chronic    Dyspnea on exertion [R06.09] 09/20/2021 Yes    Hyperlipidemia [E78.5]   Yes       Chronic    GERD (gastroesophageal reflux disease) [K21.9]   Yes       Chronic    Obesity (BMI 30.0-34.9) [E66.811]   Yes       Chronic    Essential hypertension [I10] 05/23/2017 Yes       Chronic             How patient is feeling since discharge from the hospital?  Ms. Naranjo was recently hospitalized for a full day due to dizziness. The incident occurred on a Thursday, and she was transferred to [facility]. She had a sudden feeling of near-syncope and discovered a contusion on her ear. She reports getting up and being confused, suggesting a possible loss of consciousness. She is uncertain if she sustained a head injury or had complete syncope, as she does not recall the exact details of  the event.    Since discharge, her condition is stable with no significant changes. She has been monitoring her blood pressure at home, with recent readings of 135/60, 119/56, and 134/64. Today, her blood pressure was measured at 158/62, higher than her recent home readings. Her heart rate, previously low at 40 bpm in the hospital, has increased to 66 bpm today.    She is scheduled for evaluation by her cardiologist, Dr. Cabezas, tomorrow. During her hospital stay, her blood pressure medications and amiodarone were held. She is currently taking Xarelto as an anticoagulant.    She denies constipation, diarrhea, loose tooth, sweating, palpitations, and hair loss.           Review of Systems -   Negative except mentioned above    PCP: Jarrett Peck MD  Prior to Admission medications    Medication Sig Start Date End Date Taking? Authorizing Provider   albuterol (PROVENTIL/VENTOLIN HFA) 90 mcg/actuation inhaler INHALE 2 PUFFS INTO THE LUNGS EVERY 6 (SIX) HOURS AS NEEDED FOR SHORTNESS OF BREATH. 5/7/25   Mikayla Olivares FNP-VIVIENNE   alendronate (FOSAMAX) 70 MG tablet TAKE 1 TABLET WEEKLY AS DIRECTED. SEE PACKAGE FOR ADDITIONAL INSTRUCTIONS 1/29/25   Jarrett Peck MD   amiodarone (PACERONE) 200 MG Tab Take 200 mg by mouth once daily. 11/19/24   Provider, Historical   aspirin (ECOTRIN) 81 MG EC tablet Take 81 mg by mouth once daily.    Provider, Historical   atorvastatin (LIPITOR) 20 MG tablet TAKE 1 TABLET EVERY DAY 5/15/25   Jarrett Peck MD   calcium citrate-vitamin D3 315-200 mg (CITRACAL+D) 315 mg-5 mcg (200 unit) per tablet Take 1 tablet by mouth 2 (two) times daily.    Provider, Historical   cetirizine (ZYRTEC) 10 MG tablet TAKE 1 TABLET ONE TIME DAILY 5/16/25   Mikayla Olivares FNP-C   ELIQUIS 5 mg Tab TAKE 1 TABLET TWICE DAILY 5/15/25   Jarrett Peck MD   furosemide (LASIX) 40 MG tablet TAKE 1 TABLET EVERY DAY AS NEEDED 3/16/25   Jarrett Peck MD   levothyroxine (SYNTHROID) 100 MCG tablet  Take 1 tablet (100 mcg total) by mouth before breakfast. 6/17/25 9/15/25  Jarrett Peck MD   metoprolol succinate (TOPROL-XL) 25 MG 24 hr tablet Take 0.5 tablets (12.5 mg total) by mouth once daily. 2/25/25   Mikayla Olivares FNP-VIVIENNE   multivitamin capsule Take 1 capsule by mouth once daily.    Provider, Historical   omeprazole (PRILOSEC) 40 MG capsule TAKE 1 CAPSULE EVERY MORNING 6/16/25   Jarrett Peck MD   sertraline (ZOLOFT) 25 MG tablet TAKE 1 TABLET EVERY DAY 3/16/25   Jarrett Peck MD   timolol maleate 0.5% (TIMOPTIC) 0.5 % Drop Place into both eyes every evening. 9/4/20   Provider, Historical       There were no vitals filed for this visit.   There is no height or weight on file to calculate BMI.     PE:  General: Not in acute distress  HEENT : Atraumatic, EOM normal, no eye or nasal congestion, no ear discharge, no lymphadenopathy  Heart:  RRR. Normal S1 and S2. No S3/S4, no murmur / gallop / rub.   Respiratory: Effort normal. Clear to auscultation bilaterally. No rales/ rhonchi/ wheezing, no use of accessory muscles for breathing.  Abdomen: Positive bowel sounds. Abdomen is soft, nondistended, nontender, no guarding, no rebound, no masses / organomegaly.  Extremities: No edema or erythema noted,  2+ pulse in all extremities.  Neurologic: Oriented to time, place and person, Grossly intact.      Plan:  There are no diagnoses linked to this encounter.    DIZZINESS AND BLACKOUT EPISODES:  - Assessed recent hospital admission for dizziness and subsequent medication changes.  - Ms. Naranjo was hospitalized for a full day and reports the dizziness is now improving.  - Ms. Naranjo experienced a sudden blackout episode with dizziness.  - Discussed these episodes with the nurse and advised patient to maintain adequate hydration.      HYPOTHYROIDISM:  - Reviewed thyroid function test results, noting TSH level was 0.283 on 7/10, indicating improvement.  - Increased Synthroid dosage from 100 mcg to 125 mcg  daily.  - Explained importance of taking Synthroid on an empty stomach, early in the morning, separate from other medications.  - Discussed timing of thyroid function tests, noting that 6-8 weeks is ideal for reassessment after medication changes.    BRADYCARDIA AND BLOOD PRESSURE MANAGEMENT:  - Evaluated current BP and heart rate, noting improvement since hospital discharge.  - Heart rate has improved from 40 in the hospital to 66 currently.  - Considering potential rebound effect on BP due to discontinuation of Metoprolol and Amiodarone.  - Will monitor BP for 2 weeks before making further medication changes.  - Instructed patient to continue monitoring BP at home and to contact the office if BP remains elevated.    ANTICOAGULATION THERAPY:  - Continued Xarelto and aspirin therapy.    FOLLOW-UP AND HOME HEALTH SERVICES:  - Evaluated need for and referred patient for home health services.  - Follow up in 2 weeks.        1. Hypothyroidism, unspecified type (Primary)   Latest Reference Range & Units 05/27/25 08:22 06/16/25 08:21 07/10/25 11:07   TSH 0.400 - 4.000 uIU/mL 94.843 (H) 111.058 (H) 83.959 (H)   Free T4 0.71 - 1.51 ng/dL   0.80   (H): Data is abnormally high  Was on synthroid 100 mcg will increase to 125 mcg  TSH on improving side  - levothyroxine (SYNTHROID) 125 MCG tablet; Take 1 tablet (125 mcg total) by mouth before breakfast.  Dispense: 60 tablet; Refill: 0    2. Essential hypertension  Low salt diet.  Enouraged to increase in physical activity at least 30 minutes of brisk walking/day , 5 days a week  Advised weight loss    Advised for DASH diet - The Dietary Approaches to Stop Hypertension (DASH) is high in vegetables, fruits, low-fat dairy products, whole grains, poultry, fish, and nuts and low in sweets, sugar-sweetened beverages, and red meats   Stop smoking.  Limit caffeine intake  Limit alcohol intake <3/day for men and <2/day for women.  Advised to be compliant with medication.  Please monitor  your blood pressure regularly at home   Return precaution provided  Not on any medication at this time    3. Chronic congestive heart failure, unspecified heart failure type  Last echo on 11/04/2024:    Left Ventricle: The left ventricle is mildly dilated. Normal wall thickness. There is normal systolic function. Ejection fraction is approximately 55%. There is normal diastolic function.    Right Ventricle: Normal right ventricular cavity size. Wall thickness is normal. Systolic function is normal.    Aortic Valve: There is mild aortic valve sclerosis.    Pulmonary Artery: The estimated pulmonary artery systolic pressure is 30 mmHg.    IVC/SVC: Normal venous pressure at 3 mmHg.  Now on lasix every other day    Following cardiology, next visit tomorrow with dr. Cabezas    4. Mixed hyperlipidemia  On statin to be continued.     5. Gastroesophageal reflux disease without esophagitis  On prilosec to be continued.    6. Longstanding persistent atrial fibrillation  Was on amiodarone and metoprolol   Hold since discharge due to symptomatic bradycardia with dizziness  Not taking since she got discharge from hospital  Doing better  Has appointment with dr. Cabezas to discuss this further  - Ambulatory referral/consult to Home Health; Future    Medication & Order Review     Discharge Medication Review:    Medication reconciliation performed Yes   If no, state reason why not performed: N/A       Did patient have any difficulty/problems filling prescriptions?  No           If yes, state reason and steps taken to assist in resolving issue: N/A     Does patient have any questions regarding medications?  No           If yes, state question and steps taken to answer question:  N/A      Was Home Health and/or any equipment ordered for patient upon discharge?  No          Transitional Care Note    Family and/or Caretaker present at visit?  No.  Diagnostic tests reviewed/disposition: I have reviewed all completed as well as pending  "diagnostic tests at the time of discharge.  Disease/illness education: yes  Home health/community services discussion/referrals: Patient does not have home health established from hospital visit.  They do need home health.  If needed, we will set up home health for the patient.   Establishment or re-establishment of referral orders for community resources: No other necessary community resources.   Discussion with other health care providers: No discussion with other health care providers necessary.          Red Flag Review     Was patient educated on "red flags" or things to watch for?  Yes       If yes:  "Red flags" patient was told to watch for:     Severe headache/ lightheadedness               SOB               Chest pain                Is patient experiencing any red flags today (or any of these symptoms) (List examples of red flags specifically)?  No        Jarrett Peck MD               This note was generated with the assistance of ambient listening technology. Verbal consent was obtained by the patient and accompanying visitor(s) for the recording of patient appointment to facilitate this note. I attest to having reviewed and edited the generated note for accuracy, though some syntax or spelling errors may persist. Please contact the author of this note for any clarification.     "

## 2025-07-14 NOTE — TELEPHONE ENCOUNTER
----- Message from Theresa sent at 7/14/2025  9:11 AM CDT -----  Regarding: Recent Hosp Stay  Please give this pt a call at [phone] regarding her recent hosp stay, and future appts that need to be made. Thank You.

## 2025-07-15 ENCOUNTER — PATIENT OUTREACH (OUTPATIENT)
Dept: ADMINISTRATIVE | Facility: CLINIC | Age: 80
End: 2025-07-15
Payer: MEDICARE

## 2025-07-15 NOTE — PROGRESS NOTES
C3 nurse attempted to contact Amber SMITH Jose A for a TCC post hospital discharge follow up call. LVM . The patient has a scheduled hospitals appointment with Jarrett Peck MD on 07/14/25 @ 2429, completed.

## 2025-07-16 NOTE — PROGRESS NOTES
C3 nurse spoke with Amber Naranjo  for a TCC post hospital discharge follow up call. The patient has a scheduled HOSFU appointment, completed

## 2025-07-26 DIAGNOSIS — I10 ESSENTIAL HYPERTENSION: ICD-10-CM

## 2025-07-26 NOTE — TELEPHONE ENCOUNTER
No care due was identified.  [facility] Embedded Care Due Messages. Reference number: [id number].   7/26/2025 2:08:25 AM CDT

## 2025-07-27 RX ORDER — ATORVASTATIN CALCIUM 20 MG/1
20 TABLET, FILM COATED ORAL
Qty: 90 TABLET | Refills: 3 | Status: SHIPPED | OUTPATIENT
Start: 2025-07-27

## 2025-07-27 NOTE — TELEPHONE ENCOUNTER
Refill Routing Note   Medication(s) are not appropriate for processing by [facility] for the following reason(s):        Clarification of medication (Rx) details-Medication is paused, and instructions have changed to 0.5 daily by another provider.  No active prescription written by provider    [facility] action(s):  Approve  Defer               Appointments  past 12m or future 3m with PCP    Date Provider   Last Visit   7/14/2025 Jarrett Peck MD   Next Visit   Visit date not found Jarrett Peck MD   ED visits in past 90 days: 1        Note composed:8:41 AM 07/27/2025

## 2025-07-28 RX ORDER — METOPROLOL SUCCINATE 25 MG/1
12.5 TABLET, EXTENDED RELEASE ORAL DAILY
Qty: 90 TABLET | Refills: 3 | Status: SHIPPED | OUTPATIENT
Start: 2025-07-28

## 2025-08-08 ENCOUNTER — HOSPITAL ENCOUNTER (EMERGENCY)
Facility: HOSPITAL | Age: 80
Discharge: HOME | End: 2025-08-08
Attending: EMERGENCY MEDICINE
Payer: MEDICARE

## 2025-08-08 VITALS
OXYGEN SATURATION: 97 % | HEIGHT: 63 IN | WEIGHT: 180.88 LBS | BODY MASS INDEX: 32.05 KG/M2 | RESPIRATION RATE: 20 BRPM | DIASTOLIC BLOOD PRESSURE: 78 MMHG | SYSTOLIC BLOOD PRESSURE: 174 MMHG | HEART RATE: 56 BPM | TEMPERATURE: 98 F

## 2025-08-08 DIAGNOSIS — I10 HYPERTENSION, UNSPECIFIED TYPE: Primary | ICD-10-CM

## 2025-08-08 PROCEDURE — 25000003 PHARM REV CODE 250: Mod: HCNC,ER | Performed by: EMERGENCY MEDICINE

## 2025-08-08 PROCEDURE — 99283 EMERGENCY DEPT VISIT LOW MDM: CPT | Mod: HCNC,ER

## 2025-08-08 RX ORDER — LISINOPRIL 10 MG/1
10 TABLET ORAL DAILY
Qty: 14 TABLET | Refills: 1 | Status: SHIPPED | OUTPATIENT
Start: 2025-08-08 | End: 2025-08-11 | Stop reason: SDUPTHER

## 2025-08-08 RX ORDER — AMLODIPINE BESYLATE 5 MG/1
5 TABLET ORAL
COMMUNITY
Start: 2025-06-27 | End: 2025-08-11

## 2025-08-08 RX ORDER — LISINOPRIL 10 MG/1
10 TABLET ORAL
Status: COMPLETED | OUTPATIENT
Start: 2025-08-08 | End: 2025-08-08

## 2025-08-08 RX ORDER — ACETAMINOPHEN 500 MG
1000 TABLET ORAL
Status: COMPLETED | OUTPATIENT
Start: 2025-08-08 | End: 2025-08-08

## 2025-08-08 RX ADMIN — ACETAMINOPHEN 1000 MG: 500 TABLET ORAL at 10:08

## 2025-08-08 RX ADMIN — LISINOPRIL 10 MG: 10 TABLET ORAL at 11:08

## 2025-08-11 ENCOUNTER — OFFICE VISIT (OUTPATIENT)
Dept: INTERNAL MEDICINE | Facility: CLINIC | Age: 80
End: 2025-08-11
Payer: MEDICARE

## 2025-08-11 ENCOUNTER — LAB VISIT (OUTPATIENT)
Dept: LAB | Facility: HOSPITAL | Age: 80
End: 2025-08-11
Attending: STUDENT IN AN ORGANIZED HEALTH CARE EDUCATION/TRAINING PROGRAM
Payer: MEDICARE

## 2025-08-11 VITALS
BODY MASS INDEX: 31.95 KG/M2 | OXYGEN SATURATION: 96 % | SYSTOLIC BLOOD PRESSURE: 130 MMHG | RESPIRATION RATE: 16 BRPM | DIASTOLIC BLOOD PRESSURE: 62 MMHG | WEIGHT: 180.31 LBS | HEART RATE: 70 BPM | HEIGHT: 63 IN | TEMPERATURE: 98 F

## 2025-08-11 DIAGNOSIS — E03.9 HYPOTHYROIDISM, UNSPECIFIED TYPE: Primary | ICD-10-CM

## 2025-08-11 DIAGNOSIS — I48.11 LONGSTANDING PERSISTENT ATRIAL FIBRILLATION: ICD-10-CM

## 2025-08-11 DIAGNOSIS — E78.2 MIXED HYPERLIPIDEMIA: ICD-10-CM

## 2025-08-11 DIAGNOSIS — K21.9 GASTROESOPHAGEAL REFLUX DISEASE WITHOUT ESOPHAGITIS: ICD-10-CM

## 2025-08-11 DIAGNOSIS — I10 ESSENTIAL HYPERTENSION: ICD-10-CM

## 2025-08-11 DIAGNOSIS — E03.9 HYPOTHYROIDISM, UNSPECIFIED TYPE: ICD-10-CM

## 2025-08-11 LAB
T4 FREE SERPL-MCNC: 1.38 NG/DL (ref 0.71–1.51)
T4 FREE SERPL-MCNC: 1.38 NG/DL (ref 0.71–1.51)
TSH SERPL-ACNC: 3.85 UIU/ML (ref 0.4–4)

## 2025-08-11 PROCEDURE — G2211 COMPLEX E/M VISIT ADD ON: HCPCS | Mod: HCNC,S$GLB,, | Performed by: STUDENT IN AN ORGANIZED HEALTH CARE EDUCATION/TRAINING PROGRAM

## 2025-08-11 PROCEDURE — 3075F SYST BP GE 130 - 139MM HG: CPT | Mod: CPTII,HCNC,S$GLB, | Performed by: STUDENT IN AN ORGANIZED HEALTH CARE EDUCATION/TRAINING PROGRAM

## 2025-08-11 PROCEDURE — 99999 PR PBB SHADOW E&M-EST. PATIENT-LVL IV: CPT | Mod: PBBFAC,HCNC,, | Performed by: STUDENT IN AN ORGANIZED HEALTH CARE EDUCATION/TRAINING PROGRAM

## 2025-08-11 PROCEDURE — 36415 COLL VENOUS BLD VENIPUNCTURE: CPT | Mod: HCNC,PO

## 2025-08-11 PROCEDURE — 1126F AMNT PAIN NOTED NONE PRSNT: CPT | Mod: CPTII,HCNC,S$GLB, | Performed by: STUDENT IN AN ORGANIZED HEALTH CARE EDUCATION/TRAINING PROGRAM

## 2025-08-11 PROCEDURE — 99214 OFFICE O/P EST MOD 30 MIN: CPT | Mod: HCNC,S$GLB,, | Performed by: STUDENT IN AN ORGANIZED HEALTH CARE EDUCATION/TRAINING PROGRAM

## 2025-08-11 PROCEDURE — 1159F MED LIST DOCD IN RCRD: CPT | Mod: CPTII,HCNC,S$GLB, | Performed by: STUDENT IN AN ORGANIZED HEALTH CARE EDUCATION/TRAINING PROGRAM

## 2025-08-11 PROCEDURE — 3078F DIAST BP <80 MM HG: CPT | Mod: CPTII,HCNC,S$GLB, | Performed by: STUDENT IN AN ORGANIZED HEALTH CARE EDUCATION/TRAINING PROGRAM

## 2025-08-11 PROCEDURE — 3288F FALL RISK ASSESSMENT DOCD: CPT | Mod: CPTII,HCNC,S$GLB, | Performed by: STUDENT IN AN ORGANIZED HEALTH CARE EDUCATION/TRAINING PROGRAM

## 2025-08-11 PROCEDURE — 1101F PT FALLS ASSESS-DOCD LE1/YR: CPT | Mod: CPTII,HCNC,S$GLB, | Performed by: STUDENT IN AN ORGANIZED HEALTH CARE EDUCATION/TRAINING PROGRAM

## 2025-08-11 PROCEDURE — 84439 ASSAY OF FREE THYROXINE: CPT | Mod: HCNC,PO

## 2025-08-11 RX ORDER — LISINOPRIL 10 MG/1
10 TABLET ORAL DAILY
Qty: 90 TABLET | Refills: 1 | Status: SHIPPED | OUTPATIENT
Start: 2025-08-11 | End: 2025-11-09

## 2025-09-02 DIAGNOSIS — R06.09 DYSPNEA ON EXERTION: ICD-10-CM

## 2025-09-03 RX ORDER — ALBUTEROL SULFATE 90 UG/1
2 INHALANT RESPIRATORY (INHALATION) EVERY 6 HOURS PRN
Qty: 8 G | Refills: 0 | Status: SHIPPED | OUTPATIENT
Start: 2025-09-03